# Patient Record
Sex: MALE | Race: OTHER
[De-identification: names, ages, dates, MRNs, and addresses within clinical notes are randomized per-mention and may not be internally consistent; named-entity substitution may affect disease eponyms.]

---

## 2018-12-29 ENCOUNTER — HOSPITAL ENCOUNTER (INPATIENT)
Dept: HOSPITAL 74 - JER | Age: 33
LOS: 6 days | Discharge: HOME | DRG: 720 | End: 2019-01-04
Attending: INTERNAL MEDICINE | Admitting: INTERNAL MEDICINE
Payer: COMMERCIAL

## 2018-12-29 VITALS — BODY MASS INDEX: 23.4 KG/M2

## 2018-12-29 DIAGNOSIS — Q02: ICD-10-CM

## 2018-12-29 DIAGNOSIS — Z93.1: ICD-10-CM

## 2018-12-29 DIAGNOSIS — G40.909: ICD-10-CM

## 2018-12-29 DIAGNOSIS — R00.0: ICD-10-CM

## 2018-12-29 DIAGNOSIS — N39.0: ICD-10-CM

## 2018-12-29 DIAGNOSIS — J96.00: ICD-10-CM

## 2018-12-29 DIAGNOSIS — R53.83: ICD-10-CM

## 2018-12-29 DIAGNOSIS — Z96.0: ICD-10-CM

## 2018-12-29 DIAGNOSIS — F79: ICD-10-CM

## 2018-12-29 DIAGNOSIS — A41.9: Primary | ICD-10-CM

## 2018-12-29 DIAGNOSIS — J69.0: ICD-10-CM

## 2018-12-29 DIAGNOSIS — D72.829: ICD-10-CM

## 2018-12-29 LAB
ALBUMIN SERPL-MCNC: 3.4 G/DL (ref 3.4–5)
ALP SERPL-CCNC: 125 U/L (ref 45–117)
ALT SERPL-CCNC: 52 U/L (ref 13–61)
ANION GAP SERPL CALC-SCNC: 10 MMOL/L (ref 8–16)
APPEARANCE UR: (no result)
AST SERPL-CCNC: 26 U/L (ref 15–37)
BACTERIA #/AREA URNS HPF: (no result) /HPF
BASOPHILS # BLD: 0.1 % (ref 0–2)
BILIRUB SERPL-MCNC: 0.4 MG/DL (ref 0.2–1)
BILIRUB UR STRIP.AUTO-MCNC: NEGATIVE MG/DL
BUN SERPL-MCNC: 19 MG/DL (ref 7–18)
CALCIUM SERPL-MCNC: 9.2 MG/DL (ref 8.5–10.1)
CHLORIDE SERPL-SCNC: 102 MMOL/L (ref 98–107)
CO2 SERPL-SCNC: 26 MMOL/L (ref 21–32)
COLOR UR: YELLOW
CREAT SERPL-MCNC: 0.7 MG/DL (ref 0.55–1.3)
DEPRECATED RDW RBC AUTO: 13.3 % (ref 11.9–15.9)
EOSINOPHIL # BLD: 0.1 % (ref 0–4.5)
GLUCOSE SERPL-MCNC: 95 MG/DL (ref 74–106)
HCT VFR BLD CALC: 39.3 % (ref 35.4–49)
HGB BLD-MCNC: 13 GM/DL (ref 11.7–16.9)
INR BLD: 1.16 (ref 0.83–1.09)
KETONES UR QL STRIP: NEGATIVE
LEUKOCYTE ESTERASE UR QL STRIP.AUTO: (no result)
LYMPHOCYTES # BLD: 11.4 % (ref 8–40)
MAGNESIUM SERPL-MCNC: 2.2 MG/DL (ref 1.8–2.4)
MCH RBC QN AUTO: 30.7 PG (ref 25.7–33.7)
MCHC RBC AUTO-ENTMCNC: 33 G/DL (ref 32–35.9)
MCV RBC: 93.2 FL (ref 80–96)
MONOCYTES # BLD AUTO: 2.3 % (ref 3.8–10.2)
MUCOUS THREADS URNS QL MICRO: (no result)
NEUTROPHILS # BLD: 86.1 % (ref 42.8–82.8)
NITRITE UR QL STRIP: POSITIVE
PH UR: 7 [PH] (ref 5–8)
PLATELET # BLD AUTO: 245 K/MM3 (ref 134–434)
PLATELET BLD QL SMEAR: ADEQUATE
PMV BLD: 7.4 FL (ref 7.5–11.1)
POTASSIUM SERPLBLD-SCNC: 4 MMOL/L (ref 3.5–5.1)
PROT SERPL-MCNC: 8.2 G/DL (ref 6.4–8.2)
PROT UR QL STRIP: NEGATIVE
PROT UR QL STRIP: NEGATIVE
PT PNL PPP: 13.7 SEC (ref 9.7–13)
RBC # BLD AUTO: 4.22 M/MM3 (ref 4–5.6)
SODIUM SERPL-SCNC: 138 MMOL/L (ref 136–145)
SP GR UR: 1.01 (ref 1.01–1.03)
UROBILINOGEN UR STRIP-MCNC: NEGATIVE MG/DL (ref 0.2–1)
WBC # BLD AUTO: 20.7 K/MM3 (ref 4–10)

## 2018-12-29 PROCEDURE — 3E0G76Z INTRODUCTION OF NUTRITIONAL SUBSTANCE INTO UPPER GI, VIA NATURAL OR ARTIFICIAL OPENING: ICD-10-PCS | Performed by: INTERNAL MEDICINE

## 2018-12-29 NOTE — PDOC
History of Present Illness





- General


Chief Complaint: Seizure


Stated Complaint: SEIZURE


Time Seen by Provider: 12/29/18 16:34


History Source: Care Provider, Nursing Home Records


Exam Limitations: Clinical Condition, Physical Impairment





- History of Present Illness


Initial Comments: 





12/29/18 17:27


Pt. is a 33 y.o. M from [place] w/ PMHx. 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 

synostosis, MR, G-tube, and permanent suprapubic catheter 2/2 urethral 

strictures presents to the ED for desaturations to 78%, upper airway congestion

, scattered ronchi, and seizures x2 this morning each lasting about 30 seconds. 

Pt. sezied enroute to the hospital however was not actively seizing on arrival. 

Pt. seized again for ~20seconds during physical exam. Pt. did not receive any 

of his evening medications. Pt. unable to give ROS and caretaker present now 

was not present in the AM when the events transpired.   





Valproic Acid level, Keppra level, Head CT, CBC, CMP, Mag, UA, EKG and CXR were 

ordered. 





12/29/18 21:01


CXR, UA and Valproic acid level noted. Pt. give 1L NS bolus, Keppra and 

Valproic Acid. BCx. obtained. 





Timing/Duration: 4-6 hours


Severity: mild


Aspirin Received prior to arrival: Yes: no aspirin today


Beta Blocker Contraindications(Core Measure): Yes: Not Prescribed





Past History





- Travel


Traveled outside of the country in the last 30 days: No


Close contact w/someone who was outside of country & ill: No





- Past Medical History


Allergies/Adverse Reactions: 


 Allergies











Allergy/AdvReac Type Severity Reaction Status Date / Time


 


No Known Allergies Allergy   Verified 02/09/15 20:22











Home Medications: 


Ambulatory Orders





Calcium Carbonate/Vitamin D3 [Oyster Shell 500 mg + Vit D Tb] 1 each GT BID 02/

09/15 


Ciprofloxacin [Cipro -] 500 mg PO BID #14 tablet 02/09/15 


Lacosamide Liquid [Vimpat] 150 mg GT BID 02/09/15 


Lactulose [Enulose] 20 gm GT BID 02/09/15 


Levetiracetam in NaCl (Iso-Os) [Levetiraceta-NaCl 1,000 mg/100] 2 gm GT BID 02/

09/15 


Levocarnitine 660 mg GT TID 02/09/15 


Multivit Infusion,Pedi 2,Vit K [M.v.i. Pediatric] 1 each GT DAILY 02/09/15 


Psyllium Husk [Fiber] 11 gm GT DAILY 02/09/15 


Pyridoxine HCl [Vitamin B-6] 100 mg GT DAILY 02/09/15 


Sodium Chloride 3.5 gm GT BID 02/09/15 


Valproic Acid [Depakene] 200 mg GT BID 02/09/15 


Zonisamide [Zonegran] 300 mg GT BID 02/09/15 








Asthma: No


CVA: No


COPD: No


CHF: No


DVT: No


Diabetes: No


GI Disorders: Yes (gerd)


HTN: No


Hypercholesterolemia: No


Seizures: Yes (microcephaly)





- Surgical History


Abdominal Surgery: Yes (G-tube)


GI Surgery: Yes (suprapubic tube)


Orthopedic Surgery: Yes (Spinal fusion)





- Immunization History


Immunization Up to Date: Yes





- Suicide/Smoking/Psychosocial Hx


Smoking History: Never smoked


Have you smoked in the past 12 months: No


Information on smoking cessation initiated: No


Hx Alcohol Use: No


Drug/Substance Use Hx: No


Substance Use Type: None





**Review of Systems





- Review of Systems


Able to Perform ROS?: No (Pt. is non-verbal )


HEENTM: Yes: Symptoms Reported


Respiratory: Yes: Symptoms reported


Cardiac (ROS): Yes: Symptoms Reported





*Physical Exam





- Vital Signs


 Last Vital Signs











Temp Pulse Resp BP Pulse Ox


 


    107 H  16   139/92   100 


 


    12/29/18 16:20  12/29/18 16:20  12/29/18 16:20  12/29/18 16:20














- Physical Exam


General Appearance: Yes: Nourished, Appropriately Dressed, Apparent Distress


HEENT: positive: CARLO, Symmetrical, Hearing Grossly Normal, Excessive drooling.

  negative: Photophobia, Sinus Tenderness


Neck: positive: Supple.  negative: Tender


Respiratory/Chest: positive: Respiratory Distress, Decreased Breath Sounds, 

Crackles, Rhonchi, Wheezing.  negative: Chest Tender, Lungs Clear, Normal 

Breath Sounds, Accessory Muscle Use


Cardiovascular: positive: Other (could not appreciate heart sounds over 

treatment and coarse BS)


Vascular Pulses: Dorsalis-Pedis (R): 1+, Doralis-Pedis (L): 1+


Gastrointestinal/Abdominal: positive: Normal Bowel Sounds, Soft.  negative: 

Protuberent, Distended, Guarding, Rebound


Musculoskeletal: positive: Normal Inspection.  negative: CVA Tenderness


Extremity: positive: Normal Inspection.  negative: Pedal Edema, Swelling, Calf 

Tenderness, Erythema, Inflammation


Neurologic: positive: Alert, Respond to painful stimul, Responsive





Moderate Sedation





- Procedure Monitoring


Vital Signs: 


Procedure Monitoring Vital Signs











Temperature      


 


Pulse Rate  107 H  12/29/18 16:20


 


Respiratory Rate  16   12/29/18 16:20


 


Blood Pressure  139/92   12/29/18 16:20


 


O2 Sat by Pulse Oximetry (%)  100   12/29/18 16:20











ED Treatment Course





- LABORATORY


CBC & Chemistry Diagram: 


 12/29/18 19:32





 12/29/18 19:32





*DC/Admit/Observation/Transfer


Diagnosis at time of Disposition: 


 UTI (lower urinary tract infection)








- Discharge Dispostion


Decision to Admit order: Yes





- Referrals





- Patient Instructions





- Post Discharge Activity

## 2018-12-29 NOTE — HP
CHIEF COMPLAINT: seizures and respiratory distress





PCP: Joe





HISTORY OF PRESENT ILLNESS:


pt is nonverbal and unable to provide history. History obtained from health 

aide at bedside and ED documentation. 


33 y.o. man from [place] with Microencephaly due to cranial synostosis, MR, G-

tube, and permanent suprapubic catheter 2/2 urethral strictures BIBEMS to the 

ED for desaturations to 78%, upper airway congestion, scattered ronchi, and 

seizures x2 this morning each lasting about 30 seconds. Pt had witnessed 

seizures enroute and in the ED. 


As per aide, multiple residents of [place] have similar respiratory 

desaturations and respiratory congestion during this last week.


No reports of fevers, vomiting, diarrhea or changes in behavior as noted by the 

aide at bedside.





ER course was notable for:


(1) multiple seizures


(2)


(3)





Recent Travel: none





PAST MEDICAL HISTORY:


microencephaly, MR





PAST SURGICAL HISTORY:


G-tube, and permanent suprapubic catheter 2/2 urethral strictures





Social History:


Smoking: never


Alcohol:never


Drugs: never





Family History: NC


Allergies





No Known Allergies Allergy (Verified 02/09/15 20:22)


 








HOME MEDICATIONS:


 Home Medications











 Medication  Instructions  Recorded


 


Calcium Carbonate/Vitamin D3 1 each GT BID 02/09/15





[Oyster Shell 500 mg + Vit D Tb]  


 


Ciprofloxacin [Cipro -] 500 mg PO BID #14 tablet 02/09/15


 


Lacosamide Liquid [Vimpat] 150 mg GT BID 02/09/15


 


Lactulose [Enulose] 20 gm GT BID 02/09/15


 


Levetiracetam in NaCl (Iso-Os) 2 gm GT BID 02/09/15





[Levetiraceta-NaCl 1,000 mg/100]  


 


Levocarnitine 660 mg GT TID 02/09/15


 


Multivit Infusion,Pedi 2,Vit K 1 each GT DAILY 02/09/15





[M.v.i. Pediatric]  


 


Psyllium Husk [Fiber] 11 gm GT DAILY 02/09/15


 


Pyridoxine HCl [Vitamin B-6] 100 mg GT DAILY 02/09/15


 


Sodium Chloride 3.5 gm GT BID 02/09/15


 


Valproic Acid [Depakene] 200 mg GT BID 02/09/15


 


Zonisamide [Zonegran] 300 mg GT BID 02/09/15








REVIEW OF SYSTEMS


pt unable to participate in ROS.








PHYSICAL EXAMINATION


 Vital Signs - 24 hr











  12/29/18 12/29/18





  16:20 19:09


 


Temperature 100.1 F H 


 


Pulse Rate 107 H 


 


Respiratory 16 





Rate  


 


Blood Pressure 139/92 


 


O2 Sat by Pulse 100 99





Oximetry (%)  











GENERAL:  in no acute distress, contracted to his right side in fetal position 

in bed(is preferred position)


HEAD: microcephalic, atraumatic


EYES: Pupils equal, round and reactive to light, sclera anicteric, conjunctiva 

clear. No lid lag.


EARS, NOSE, THROAT: nares patent, pt does not follow commands for full 

assessment: visable portions of oropharynx clear without exudates. Moist mucous 

membranes.


NECK: pt contracted looking down. without lymphadenopathy, JVD, or masses.


LUNGS: coarse breath sounds throughout


HEART: Regular rate and rhythm, normal S1 and S2 without murmur, rub or gallop.


ABDOMEN: Soft, nontender, not distended, normoactive bowel sounds, no guarding, 

no rebound, Gtube in place, clamped. suprapubic fraser in place with yellow urine


MUSCULOSKELETAL: contracted upper extremities especially at wrists. no 

tenderness. 


UPPER EXTREMITIES: 2+ radial pulses, warm, well-perfused. No cyanosis. No 

clubbing. No peripheral edema.


LOWER EXTREMITIES: warm, well-perfused. No peripheral edema. 


NEUROLOGICAL: nonverbal, MR


SKIN: Warm, dry, normal turgor, no rashes or lesions noted, normal capillary 

refill. 


 Laboratory Results - last 24 hr











  12/29/18 12/29/18 12/29/18





  19:32 19:32 19:32


 


WBC  20.7 H  


 


RBC  4.22  


 


Hgb  13.0  


 


Hct  39.3  


 


MCV  93.2  


 


MCH  30.7  


 


MCHC  33.0  


 


RDW  13.3  


 


Plt Count  245  


 


MPV  7.4 L D  


 


Absolute Neuts (auto)  17.8 H  


 


Neutrophils %  86.1 H  


 


Neutrophils % (Manual)  47.0  


 


Band Neutrophils %  34.0  


 


Lymphocytes %  11.4  


 


Lymphocytes % (Manual)  17.0  


 


Monocytes %  2.3 L  


 


Monocytes % (Manual)  2 L  


 


Eosinophils %  0.1  


 


Eosinophils % (Manual)  0.0  


 


Basophils %  0.1  


 


Basophils % (Manual)  0.0  


 


Nucleated RBC %  0  


 


Platelet Estimate  Adequate  


 


PT with INR   13.70 H 


 


INR   1.16 H 


 


Sodium   


 


Potassium   


 


Chloride   


 


Carbon Dioxide   


 


Anion Gap   


 


BUN   


 


Creatinine   


 


Creat Clearance w eGFR   


 


Random Glucose   


 


Lactic Acid   


 


Calcium   


 


Magnesium   


 


Total Bilirubin   


 


AST   


 


ALT   


 


Alkaline Phosphatase   


 


Total Protein   


 


Albumin   


 


Urine Color    Yellow


 


Urine Appearance    Cloudy


 


Urine pH    7.0


 


Ur Specific Gravity    1.015


 


Urine Protein    Negative


 


Urine Glucose (UA)    Negative


 


Urine Ketones    Negative


 


Urine Blood    Negative


 


Urine Nitrite    Positive


 


Urine Bilirubin    Negative


 


Urine Urobilinogen    Negative


 


Ur Leukocyte Esterase    2+ H


 


Urine WBC (Auto)    27


 


Urine RBC (Auto)    1


 


Urine Bacteria    Few


 


Urine Mucus    Rare


 


Valproic Acid   














  12/29/18 12/29/18 12/29/18





  19:32 19:32 20:57


 


WBC   


 


RBC   


 


Hgb   


 


Hct   


 


MCV   


 


MCH   


 


MCHC   


 


RDW   


 


Plt Count   


 


MPV   


 


Absolute Neuts (auto)   


 


Neutrophils %   


 


Neutrophils % (Manual)   


 


Band Neutrophils %   


 


Lymphocytes %   


 


Lymphocytes % (Manual)   


 


Monocytes %   


 


Monocytes % (Manual)   


 


Eosinophils %   


 


Eosinophils % (Manual)   


 


Basophils %   


 


Basophils % (Manual)   


 


Nucleated RBC %   


 


Platelet Estimate   


 


PT with INR   


 


INR   


 


Sodium  138  


 


Potassium  4.0  


 


Chloride  102  


 


Carbon Dioxide  26  


 


Anion Gap  10  


 


BUN  19 H  


 


Creatinine  0.7  


 


Creat Clearance w eGFR  > 60  


 


Random Glucose  95  


 


Lactic Acid    0.8


 


Calcium  9.2  


 


Magnesium  2.2  


 


Total Bilirubin  0.4  


 


AST  26  


 


ALT  52  


 


Alkaline Phosphatase  125 H  


 


Total Protein  8.2  


 


Albumin  3.4  


 


Urine Color   


 


Urine Appearance   


 


Urine pH   


 


Ur Specific Gravity   


 


Urine Protein   


 


Urine Glucose (UA)   


 


Urine Ketones   


 


Urine Blood   


 


Urine Nitrite   


 


Urine Bilirubin   


 


Urine Urobilinogen   


 


Ur Leukocyte Esterase   


 


Urine WBC (Auto)   


 


Urine RBC (Auto)   


 


Urine Bacteria   


 


Urine Mucus   


 


Valproic Acid   6.2 L 











ASSESSMENT/PLAN:


33 yr old man with microcephy from [place] presents with seizures and reported 

desaturations this evening, found to have leucocytosis, low grade fever and 

tachycardia. 








#Sepsis due to pneumonia(Left upper lobe infiltrate on cxy) and possibly UTI (

pos nit, leuk and wbc in urine)


- no historical cx at our facility


 - broad spec abx: zosyn and azithromycin to cover asp pna and for atypical 

organisms and for UTI organisms


- ed has given dose of rocephin and azithro


- Dr. bateman consulted for ID input on abx use


- pending urine strep ag, respiratory viral panel, bld cx 


- influenza negative


- abg ordered to monitor oxygenation and need for intubation





#seizure - likely induced by sepsis


- keppra level low, ed loaded with 1g ivpb


- continue home medications; valproate, keppra 2gm BID, vimpat





#Diet: Gtube - will need to obtain feed schedule from [place]


#DVT: lovenox


#Activity: bedrest continuous


# continue home supplements: vit B6 and ca+


Full code














Visit type





- Emergency Visit


Emergency Visit: Yes


ED Registration Date: 12/30/18


Care time: The patient presented to the Emergency Department on the above date 

and was hospitalized for further evaluation of their emergent condition.





- New Patient


This patient is new to me today: Yes


Date on this admission: 12/30/18





- Critical Care


Critical Care patient: No

## 2018-12-29 NOTE — PDOC
Attending Attestation





- HPI


HPI: 





12/29/18 21:51


The patient is a 33 year old male with a significant PMH of microcephaly, 

SEIZURES, mr, gastrostomy tube, craniosynostosis, and suprapubic catheter  who 

presents to the emergency department via EMS from [place] with seizures since 

earlier today. As per EMS, the patient had been noted to be desaturating 

between 72- 80 %. On arrival to ED, patient has experienced about 6 seizures 

lasting about 20 sec each. The patient reports that he has not taken his 

medications today. He denies any other symptoms or complaints. 











- Physicial Exam


PE: 





12/29/18 21:51


GENERAL: Awake, alert, and fully oriented, in no acute distress. Good pulses 

and color. 


HEAD: (+)large cranial surgical scar. No signs of trauma


EYES: (+)right eye injection. PERRLA, EOMI, sclera anicteric, conjunctiva clear


ENT: Auricles normal inspection, hearing grossly normal, nares patent, 

oropharynx clear without exudates. Moist mucosa


NECK: Normal ROM, supple, no lymphadenopathy, JVD, or masses


LUNGS:(+)coarse breath sounds bilaterally, crackles and wheezing.  


HEART: Regular rate and rhythm, normal S1 and S2, no murmurs, rubs or gallops


ABDOMEN: (+)g tube in place, suprapubic cath clean and in place. Soft, nontender

, normoactive bowel sounds.  No guarding, no rebound.  No masses


EXTREMITIES: Normal range of motion, no edema.  No clubbing or cyanosis. No 

cords, erythema, or tenderness


NEUROLOGICAL: Cranial nerves II through XII grossly intact.  Normal speech, 

normal gait


SKIN: Warm, Dry, normal turgor, no rashes or lesions noted.








Documentation prepared by Cathy Macias, acting as medical scribe for Jelly Evans MD.





<Cathy Macias - Last Filed: 12/29/18 21:51>





- Resident


Resident Name: Juan Puente





- ED Attending Attestation


I have performed the following: I have examined & evaluated the patient, The 

case was reviewed & discussed with the resident, I agree w/resident's findings 

& plan





- Medical Decision Making





12/29/18 21:20


Pt comes with temp and focal seizures/shaking of his bilateral arms.  He has 

pneumonia seen on CXR left side haziness; pt also has some leukocytes in his 

urine. 


Tegretol level is low/subtherapeutic.


Pt will be loaded with keppra and tegretol.  He will be given a L of NSS.


He will be admitted for pneumonia/seizures/uti.





12/29/18 21:26


WBC 20; lactic acid sent


12/29/18 23:18


Patient Name: SAMANTHA CAM


THIS IS A PRELIMINARY REPORT FROM IMAGING ON CALL


DATE OF SERVICE: 2018-12-29 22:17:17


IMAGES: 134


Exam: CT head without IV contrast.


Clinical indication:Rule out mass. No further information is provided.


Comparison:None available.


Technique: Axial unenhanced CT images from the skull base through the brain 

were obtained


followed by coronal and sagital reformats.


Findings:


The visualized bony structures are unremarkable.


The visualized paranasal sinuses and mastoid air cells are clear.


There is no evidence of intra-or extra-axial hemorrhage.


The ventricles and basilar cisterns are unremarkable.


There is some asymmetric atrophic changes involving the right parietal lobe 

which could be


posttraumatic or postischemic. There is no evidence of intracranial mass, acute 

infarct, or midline


shift.


Impression:


1. No definite evidence of intracranial mass on this unenhanced CT. If there is 

high clinical


concern for intracranial mass then an MRI with IV contrast could be performed.


2. Asymmetric atrophic changes involving the right parietal lobe which could be 

posttraumatic or


postischemic.


12/29/18 23:18


Lactic acid is normal





<Jelly Evans - Last Filed: 12/29/18 23:19>

## 2018-12-30 LAB
ANISOCYTOSIS BLD QL: 0
ARTERIAL BLOOD GAS PCO2: 34.4 MMHG (ref 35–45)
ARTERIAL PATENCY WRIST A: POSITIVE
BASE EXCESS BLDA CALC-SCNC: -2.2 MEQ/L (ref -2–2)
BASOPHILS # BLD: 0.4 % (ref 0–2)
DEPRECATED RDW RBC AUTO: 13.7 % (ref 11.9–15.9)
EOSINOPHIL # BLD: 0.1 % (ref 0–4.5)
HCT VFR BLD CALC: 42.8 % (ref 35.4–49)
HGB BLD-MCNC: 13.9 GM/DL (ref 11.7–16.9)
LYMPHOCYTES # BLD: 14.3 % (ref 8–40)
MACROCYTES BLD QL: 0
MCH RBC QN AUTO: 30.5 PG (ref 25.7–33.7)
MCHC RBC AUTO-ENTMCNC: 32.5 G/DL (ref 32–35.9)
MCV RBC: 94 FL (ref 80–96)
MONOCYTES # BLD AUTO: 4 % (ref 3.8–10.2)
NEUTROPHILS # BLD: 81.2 % (ref 42.8–82.8)
PLATELET # BLD AUTO: 192 K/MM3 (ref 134–434)
PLATELET BLD QL SMEAR: NORMAL
PMV BLD: 6.9 FL (ref 7.5–11.1)
PO2 BLDA: 219 MMHG (ref 80–100)
RBC # BLD AUTO: 4.55 M/MM3 (ref 4–5.6)
SAO2 % BLDA: 99.1 % (ref 90–98.9)
WBC # BLD AUTO: 22.9 K/MM3 (ref 4–10)

## 2018-12-30 RX ADMIN — Medication SCH TAB: at 22:02

## 2018-12-30 RX ADMIN — SODIUM CHLORIDE SCH MLS/HR: 9 INJECTION, SOLUTION INTRAVENOUS at 15:17

## 2018-12-30 RX ADMIN — LEVETIRACETAM SCH MG: 100 SOLUTION ORAL at 15:16

## 2018-12-30 RX ADMIN — ZONISAMIDE SCH MG: 100 CAPSULE ORAL at 22:03

## 2018-12-30 RX ADMIN — LEVETIRACETAM SCH MG: 100 SOLUTION ORAL at 22:54

## 2018-12-30 RX ADMIN — Medication SCH MG: at 15:16

## 2018-12-30 RX ADMIN — ENOXAPARIN SODIUM SCH MG: 40 INJECTION SUBCUTANEOUS at 15:16

## 2018-12-30 RX ADMIN — LACOSAMIDE SCH: 10 SOLUTION ORAL at 18:25

## 2018-12-30 RX ADMIN — LACOSAMIDE SCH MG: 10 SOLUTION ORAL at 22:53

## 2018-12-30 RX ADMIN — LACOSAMIDE SCH MG: 10 SOLUTION ORAL at 22:04

## 2018-12-30 RX ADMIN — PIPERACILLIN SODIUM,TAZOBACTAM SODIUM SCH MLS/HR: 3; .375 INJECTION, POWDER, FOR SOLUTION INTRAVENOUS at 15:17

## 2018-12-30 RX ADMIN — PIPERACILLIN SODIUM,TAZOBACTAM SODIUM SCH: 3; .375 INJECTION, POWDER, FOR SOLUTION INTRAVENOUS at 17:44

## 2018-12-30 RX ADMIN — ZONISAMIDE SCH: 100 CAPSULE ORAL at 17:44

## 2018-12-30 RX ADMIN — Medication SCH: at 17:44

## 2018-12-30 RX ADMIN — PIPERACILLIN SODIUM,TAZOBACTAM SODIUM SCH MLS/HR: 3; .375 INJECTION, POWDER, FOR SOLUTION INTRAVENOUS at 06:15

## 2018-12-30 NOTE — PN
Progress Note (short form)





- Note


Progress Note: 





Subjective:


unable to obtain hx. per aid he usually gets break through seizures at  

[facility] 








Objective:








Vital Signs:


 Last Vital Signs











Temp Pulse Resp BP Pulse Ox


 


 100.1 F H  107 H  16   115/50 L  99 


 


 12/29/18 16:20  12/30/18 07:06  12/29/18 16:20  12/30/18 07:06  12/30/18 07:06








 Laboratory Results - last 24 hr











  12/29/18 12/29/18 12/29/18





  19:32 19:32 19:32


 


WBC  20.7 H  


 


RBC  4.22  


 


Hgb  13.0  


 


Hct  39.3  


 


MCV  93.2  


 


MCH  30.7  


 


MCHC  33.0  


 


RDW  13.3  


 


Plt Count  245  


 


MPV  7.4 L D  


 


Absolute Neuts (auto)  17.8 H  


 


Neutrophils %  86.1 H  


 


Neutrophils % (Manual)  47.0  


 


Band Neutrophils %  34.0  


 


Lymphocytes %  11.4  


 


Lymphocytes % (Manual)  17.0  


 


Monocytes %  2.3 L  


 


Monocytes % (Manual)  2 L  


 


Eosinophils %  0.1  


 


Eosinophils % (Manual)  0.0  


 


Basophils %  0.1  


 


Basophils % (Manual)  0.0  


 


Nucleated RBC %  0  


 


Platelet Estimate  Adequate  


 


PT with INR   13.70 H 


 


INR   1.16 H 


 


Anticoagulation Therapy   


 


Puncture Site   


 


ABG pH   


 


ABG pCO2 at Pt Temp   


 


ABG pO2 at Pt Temp   


 


ABG HCO3   


 


ABG O2 Sat (Measured)   


 


ABG O2 Content   


 


ABG Base Excess   


 


Nabeel Test   


 


O2 Delivery Device   


 


Oxygen Flow Rate   


 


Vent Mode   


 


Vent Rate   


 


Mechanical Rate   


 


Pressure Support Vent   


 


Sodium   


 


Potassium   


 


Chloride   


 


Carbon Dioxide   


 


Anion Gap   


 


BUN   


 


Creatinine   


 


Creat Clearance w eGFR   


 


Random Glucose   


 


Lactic Acid   


 


Calcium   


 


Magnesium   


 


Total Bilirubin   


 


AST   


 


ALT   


 


Alkaline Phosphatase   


 


Total Protein   


 


Albumin   


 


Urine Color    Yellow


 


Urine Appearance    Cloudy


 


Urine pH    7.0


 


Ur Specific Gravity    1.015


 


Urine Protein    Negative


 


Urine Glucose (UA)    Negative


 


Urine Ketones    Negative


 


Urine Blood    Negative


 


Urine Nitrite    Positive


 


Urine Bilirubin    Negative


 


Urine Urobilinogen    Negative


 


Ur Leukocyte Esterase    2+ H


 


Urine WBC (Auto)    27


 


Urine RBC (Auto)    1


 


Urine Bacteria    Few


 


Urine Mucus    Rare


 


Valproic Acid   


 


Influenza A (Rapid)   


 


Influenza B (Rapid)   














  12/29/18 12/29/18 12/29/18





  19:32 19:32 20:57


 


WBC   


 


RBC   


 


Hgb   


 


Hct   


 


MCV   


 


MCH   


 


MCHC   


 


RDW   


 


Plt Count   


 


MPV   


 


Absolute Neuts (auto)   


 


Neutrophils %   


 


Neutrophils % (Manual)   


 


Band Neutrophils %   


 


Lymphocytes %   


 


Lymphocytes % (Manual)   


 


Monocytes %   


 


Monocytes % (Manual)   


 


Eosinophils %   


 


Eosinophils % (Manual)   


 


Basophils %   


 


Basophils % (Manual)   


 


Nucleated RBC %   


 


Platelet Estimate   


 


PT with INR   


 


INR   


 


Anticoagulation Therapy   


 


Puncture Site   


 


ABG pH   


 


ABG pCO2 at Pt Temp   


 


ABG pO2 at Pt Temp   


 


ABG HCO3   


 


ABG O2 Sat (Measured)   


 


ABG O2 Content   


 


ABG Base Excess   


 


Nabeel Test   


 


O2 Delivery Device   


 


Oxygen Flow Rate   


 


Vent Mode   


 


Vent Rate   


 


Mechanical Rate   


 


Pressure Support Vent   


 


Sodium  138  


 


Potassium  4.0  


 


Chloride  102  


 


Carbon Dioxide  26  


 


Anion Gap  10  


 


BUN  19 H  


 


Creatinine  0.7  


 


Creat Clearance w eGFR  > 60  


 


Random Glucose  95  


 


Lactic Acid    0.8


 


Calcium  9.2  


 


Magnesium  2.2  


 


Total Bilirubin  0.4  


 


AST  26  


 


ALT  52  


 


Alkaline Phosphatase  125 H  


 


Total Protein  8.2  


 


Albumin  3.4  


 


Urine Color   


 


Urine Appearance   


 


Urine pH   


 


Ur Specific Gravity   


 


Urine Protein   


 


Urine Glucose (UA)   


 


Urine Ketones   


 


Urine Blood   


 


Urine Nitrite   


 


Urine Bilirubin   


 


Urine Urobilinogen   


 


Ur Leukocyte Esterase   


 


Urine WBC (Auto)   


 


Urine RBC (Auto)   


 


Urine Bacteria   


 


Urine Mucus   


 


Valproic Acid   6.2 L 


 


Influenza A (Rapid)   


 


Influenza B (Rapid)   














  12/29/18 12/30/18





  23:40 00:30


 


WBC  


 


RBC  


 


Hgb  


 


Hct  


 


MCV  


 


MCH  


 


MCHC  


 


RDW  


 


Plt Count  


 


MPV  


 


Absolute Neuts (auto)  


 


Neutrophils %  


 


Neutrophils % (Manual)  


 


Band Neutrophils %  


 


Lymphocytes %  


 


Lymphocytes % (Manual)  


 


Monocytes %  


 


Monocytes % (Manual)  


 


Eosinophils %  


 


Eosinophils % (Manual)  


 


Basophils %  


 


Basophils % (Manual)  


 


Nucleated RBC %  


 


Platelet Estimate  


 


PT with INR  


 


INR  


 


Anticoagulation Therapy   No Result Required.


 


Puncture Site   Right radial


 


ABG pH   7.41


 


ABG pCO2 at Pt Temp   34.4 L


 


ABG pO2 at Pt Temp   219.0 H*


 


ABG HCO3   21.4 L


 


ABG O2 Sat (Measured)   99.1 H


 


ABG O2 Content   16.4


 


ABG Base Excess   -2.2 L


 


Nabeel Test   Positive


 


O2 Delivery Device   Non rebreather


 


Oxygen Flow Rate   100


 


Vent Mode   No Result Required.


 


Vent Rate   No Result Required.


 


Mechanical Rate   No Result Required.


 


Pressure Support Vent   No Result Required.


 


Sodium  


 


Potassium  


 


Chloride  


 


Carbon Dioxide  


 


Anion Gap  


 


BUN  


 


Creatinine  


 


Creat Clearance w eGFR  


 


Random Glucose  


 


Lactic Acid  


 


Calcium  


 


Magnesium  


 


Total Bilirubin  


 


AST  


 


ALT  


 


Alkaline Phosphatase  


 


Total Protein  


 


Albumin  


 


Urine Color  


 


Urine Appearance  


 


Urine pH  


 


Ur Specific Gravity  


 


Urine Protein  


 


Urine Glucose (UA)  


 


Urine Ketones  


 


Urine Blood  


 


Urine Nitrite  


 


Urine Bilirubin  


 


Urine Urobilinogen  


 


Ur Leukocyte Esterase  


 


Urine WBC (Auto)  


 


Urine RBC (Auto)  


 


Urine Bacteria  


 


Urine Mucus  


 


Valproic Acid  


 


Influenza A (Rapid)  Negative 


 


Influenza B (Rapid)  Negative 











Physical Exam:


sleeping,  mask on .  


CV: RRR


Lungs: course breath sounds b/l 


Abd: sfot, NT, ND , N LB S. PEg in . supeapubic cath in 


ext : no edema or erythema 











Imaging:


cxray reviewed 








Assessment/Plan:





32 y/o man with h/o seizures , microcephaly, urethral stricture s/p suprapubic 

cath placement, MR and other medical problems who presented with   tachycardia, 

and was found to have sepsis 





1- Sepsis L due to L sided PNA. ? aspiration . UA shows pyuira but has cath. 


- cont zosyn 


- follow blood cx 


- order urine cx 


- check  urine legionella 


- sputum cx if possible 


- ID consult 





2- Seizure disorder with 3 seizures yesterday.  likely due to sepsis. has break 

through seizure at [facility] normally


- cont  his home seizure meds 


- meds were ordered incorrectly. he is not on valproic , but he is on Onfi in 

addition to other meds. meds were reconciled and update din EMR . correct 

dosing ordered 





3- Nutrition : tube feeds order Promote 9.5 hours  with water flushes 





HLOC 





Visit type





- Emergency Visit


Emergency Visit: Yes


ED Registration Date: 12/30/18


Care time: The patient presented to the Emergency Department on the above date 

and was hospitalized for further evaluation of their emergent condition.





- New Patient


This patient is new to me today: Yes


Date on this admission: 12/30/18





- Critical Care


Critical Care patient: No

## 2018-12-30 NOTE — CON.ID
Consult


Consult Specialty:: infectious diseases


Reason for Consultation:: pneumonia





- History of Present Illness


Chief Complaint: lethargy


History of Present Illness: 





33 y.o. 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

tube, and permanent suprapubic catheter 2/2 urethral strictures BIBEMS to the 

ED for desaturations to 78%, upper airway congestion, scattered ronchi, and 

seizures x2 this morning each lasting about 30 seconds. Pt had witnessed 

seizures enroute and in the ED. 


As per aide, multiple residents of [place] have similar respiratory 

desaturations and respiratory congestion during this last week.


No reports of fevers, vomiting, diarrhea or changes in behavior as noted by the 

aide at bedside.


patient because of his condition unable to give any history








- History Source


History Provided By: Medical Record


Limitations to Obtaining History: Clinical Condition





- Alcohol/Substance Use


Hx Alcohol Use: No





- Smoking History


Smoking history: Never smoked


Have you smoked in the past 12 months: No





Home Medications





- Allergies


Allergies/Adverse Reactions: 


 Allergies











Allergy/AdvReac Type Severity Reaction Status Date / Time


 


No Known Allergies Allergy   Verified 02/09/15 20:22














- Home Medications


Home Medications: 


Ambulatory Orders





Calcium Carbonate/Vitamin D3 [Oyster Shell 500 mg + Vit D Tb] 1 each GT BID 02/

09/15 


Lacosamide Liquid [Vimpat] 200 mg GT BID 02/09/15 


Levetiracetam in NaCl (Iso-Os) [Levetiraceta-NaCl 1,000 mg/100] 2 gm GT BID 02/

09/15 


Multivit Infusion,Pedi 2,Vit K [M.v.i. Pediatric] 1 each GT DAILY 02/09/15 


Pyridoxine HCl [Vitamin B-6] 100 mg GT DAILY 02/09/15 


RX: Levocarnitine 660 mg GT TID 02/09/15 


Zonisamide [Zonegran] 300 mg GT BID 02/09/15 


Albuterol Sulfate 0.5% [Ventolin 0.5% -] 1 amp NEB TID 12/30/18 


Clobazam [Onfi -] 10 mg GT HS 12/30/18 


Diazepam [Diastat Acudial] 5 mg RC PRN 12/31/18 


Fructooligosaccharides/Polydex [Fiber-Stat 15 gm/30 ml Liquid] 30 ml GT DAILY 12 /31/18 


RX: Sodium Chloride Tablet - 3.5 tab GT BID 12/31/18 


Sennosides [Senna] 2 tab GT HS 12/31/18 











Review of Systems


Unable to obtain ROS, reason: unable to obtain





Physical Exam


Vital Signs: 


 Vital Signs











Temperature  98.3 F   12/30/18 15:54


 


Pulse Rate  101 H  12/30/18 15:54


 


Respiratory Rate  18   12/30/18 15:54


 


Blood Pressure  112/70   12/30/18 15:54


 


O2 Sat by Pulse Oximetry (%)  99   12/30/18 15:54











Constitutional: Yes: No Distress, Calm


Eyes: Yes: Conjunctiva Clear


Neck: Yes: Supple, Trachea Midline


Cardiovascular: Yes: Regular Rate and Rhythm


Respiratory: Yes: Poor Air Entry (bases), Rhonchi, Other (on face mask)


Gastrointestinal: Yes: Normal Bowel Sounds, Soft, Other (feeding tube in place)


Musculoskeletal: Yes: WNL


Extremities: Yes: Other


Neurological: Yes: Lethargy, Other


Psychiatric: Yes: Other


Labs: 


 CBC, BMP





 12/30/18 13:17 





 12/29/18 19:32 











Imaging





- Results


Chest X-ray: Report Reviewed, Image Reviewed





Assessment/Plan





sepsis


pneumonia


lethargy


leukocytosis





patients wbc trending down








plan


ct scan


will start patient on abx


asp precautions


monitor wbc


rest as per the team


resp support

## 2018-12-30 NOTE — PN
Teaching Attending Note


Name of Resident: Vincenzo Baltazar





ATTENDING PHYSICIAN STATEMENT





I saw and evaluated the patient.


I reviewed the resident's note and discussed the case with the resident.


I agree with the resident's findings and plan as documented.








SUBJECTIVE:


Seen and examined; please refer to resident note for additional historical 

details.  Briefly, this is a 32 y/o male resident of [facility] who 

presents with sepsis; he is found to be tachycardic with a positive UA and 

findings suggestive of a pneumonia.  He also had a witnessed seizure x2 for ~30 

seconds at the NH and was found in our ER to have a low valproate level; he is 

documented as seizing a third time in the ER during exam.  He was hypoxic per 

ER records which did improve with O2.  He has no prior records here in this 

chart in terms of abx sensitivity.  Unfortunately, due to his clinical status, 

this patient cannot provide any history.  He will be admitted to the medicine 

service with ID consult.





10 sys ROS couldn't be obtained due to underlying clinical condition


PMH and PSH reviewed; as per chart


FH couldnt obtain


Socially he is a nondrinker/nonsmoker; dependent on [facility] care for all ADLs.


Medication list reviewed with resident; reconciliation pending








OBJECTIVE:


VS, labs, imaging reviewed





Labs reviewed; leukocytosis to 20 with neutrophilia observed; ABG with normal 

pH and adequate O2.  BMP unremarkable.  Alk Phos 125 which is only 8 over the 

upper limit of normal.  Flu negative.  Valproic Acid level low at 6.2.


CXR shows L-sided infiltrates; CT chest pending


Pancultures pending


EKG reviewed








ASSESSMENT AND PLAN:


Pt presents from [facility] with sepsis from ?UTI and Pneumonia, 

breakthrough seizure





1) Sepsis 2/2 ?UTI/PNA


2) Breakthrough Seizure


3) H/O MR/CP





In summation he will be treated with broad spectrum abx.  Further studies will 

be done to ascertain the etiology of his pulmonary findings; chronic fraser with 

pyuria but it should be noted he would be covered for both respiratory and 

urinary source by current abx.  Loaded with seizure meds in ER; continue and 

consider neuro consult.  Seizure precautions and neuro checks.

## 2018-12-31 LAB
ANION GAP SERPL CALC-SCNC: 6 MMOL/L (ref 8–16)
BASOPHILS # BLD: 0.4 % (ref 0–2)
BUN SERPL-MCNC: 12 MG/DL (ref 7–18)
CALCIUM SERPL-MCNC: 8.6 MG/DL (ref 8.5–10.1)
CHLORIDE SERPL-SCNC: 106 MMOL/L (ref 98–107)
CO2 SERPL-SCNC: 25 MMOL/L (ref 21–32)
CREAT SERPL-MCNC: 0.5 MG/DL (ref 0.55–1.3)
DEPRECATED RDW RBC AUTO: 13.5 % (ref 11.9–15.9)
EOSINOPHIL # BLD: 0.5 % (ref 0–4.5)
GLUCOSE SERPL-MCNC: 141 MG/DL (ref 74–106)
HCT VFR BLD CALC: 34.8 % (ref 35.4–49)
HGB BLD-MCNC: 11.4 GM/DL (ref 11.7–16.9)
LYMPHOCYTES # BLD: 13.7 % (ref 8–40)
MCH RBC QN AUTO: 30.6 PG (ref 25.7–33.7)
MCHC RBC AUTO-ENTMCNC: 32.8 G/DL (ref 32–35.9)
MCV RBC: 93.2 FL (ref 80–96)
MONOCYTES # BLD AUTO: 3.6 % (ref 3.8–10.2)
NEUTROPHILS # BLD: 81.8 % (ref 42.8–82.8)
PLATELET # BLD AUTO: 216 K/MM3 (ref 134–434)
PMV BLD: 7.2 FL (ref 7.5–11.1)
POTASSIUM SERPLBLD-SCNC: 3.8 MMOL/L (ref 3.5–5.1)
RBC # BLD AUTO: 3.74 M/MM3 (ref 4–5.6)
SODIUM SERPL-SCNC: 137 MMOL/L (ref 136–145)
WBC # BLD AUTO: 15.8 K/MM3 (ref 4–10)

## 2018-12-31 RX ADMIN — PIPERACILLIN SODIUM,TAZOBACTAM SODIUM SCH MLS/HR: 3; .375 INJECTION, POWDER, FOR SOLUTION INTRAVENOUS at 01:52

## 2018-12-31 RX ADMIN — LEVETIRACETAM SCH MG: 100 SOLUTION ORAL at 10:33

## 2018-12-31 RX ADMIN — Medication SCH TAB: at 10:32

## 2018-12-31 RX ADMIN — LEVETIRACETAM SCH MG: 100 SOLUTION ORAL at 21:56

## 2018-12-31 RX ADMIN — ENOXAPARIN SODIUM SCH MG: 40 INJECTION SUBCUTANEOUS at 10:31

## 2018-12-31 RX ADMIN — Medication SCH MG: at 10:33

## 2018-12-31 RX ADMIN — ZONISAMIDE SCH MG: 100 CAPSULE ORAL at 21:57

## 2018-12-31 RX ADMIN — SODIUM CHLORIDE SCH MLS/HR: 9 INJECTION, SOLUTION INTRAVENOUS at 05:48

## 2018-12-31 RX ADMIN — PIPERACILLIN SODIUM,TAZOBACTAM SODIUM SCH MLS/HR: 3; .375 INJECTION, POWDER, FOR SOLUTION INTRAVENOUS at 10:40

## 2018-12-31 RX ADMIN — PIPERACILLIN SODIUM,TAZOBACTAM SODIUM SCH MLS/HR: 3; .375 INJECTION, POWDER, FOR SOLUTION INTRAVENOUS at 17:38

## 2018-12-31 RX ADMIN — ZONISAMIDE SCH MG: 100 CAPSULE ORAL at 10:31

## 2018-12-31 RX ADMIN — LACOSAMIDE SCH MG: 10 SOLUTION ORAL at 10:30

## 2018-12-31 RX ADMIN — Medication SCH TAB: at 21:55

## 2018-12-31 RX ADMIN — LACOSAMIDE SCH MG: 10 SOLUTION ORAL at 22:19

## 2018-12-31 NOTE — PN
Physical Exam: 


SUBJECTIVE: Patient seen and examined at bedside. Per nurse, no acute events 

overnight. Unable to obtain ROS from patient.








OBJECTIVE:





 Vital Signs











Temperature  97.9 F   12/31/18 06:03


 


Pulse Rate  90   12/31/18 06:03


 


Respiratory Rate  18   12/31/18 06:03


 


Blood Pressure  123/66   12/31/18 06:03


 


O2 Sat by Pulse Oximetry (%)  97   12/30/18 21:00

















GENERAL: Nonverbal. Comfortable in bed. NAD. Moaning.


HEENT: Microcephaly. Opens eyes spontaneously. Moist mucus membranes.


NECK: Trachea midline, full range of motion, supple. 


LUNGS: CTA B/L. Symmetric chest rise. 


HEART: RRR. Normal S1, S2. No murmurs noted.


ABDOMEN: Soft, NT/ND. +Bs in all 4Qs. 


EXTREMITIES: 2+ pulses, warm, well-perfused, no edema. 


NEUROLOGICAL: Unable to assess.


PSYCH: Normal mood, normal affect.











 CBCD











WBC  22.9 K/mm3 (4.0-10.0)  H  12/30/18  13:17    


 


RBC  4.55 M/mm3 (4.00-5.60)   12/30/18  13:17    


 


Hgb  13.9 GM/dL (11.7-16.9)   12/30/18  13:17    


 


Hct  42.8 % (35.4-49)   12/30/18  13:17    


 


MCV  94.0 fl (80-96)   12/30/18  13:17    


 


MCHC  32.5 g/dl (32.0-35.9)   12/30/18  13:17    


 


RDW  13.7 % (11.9-15.9)   12/30/18  13:17    


 


Plt Count  192 K/MM3 (134-434)  D 12/30/18  13:17    


 


MPV  6.9 fl (7.5-11.1)  L  12/30/18  13:17    








 CMP











Sodium  138 mmol/L (136-145)   12/29/18  19:32    


 


Potassium  4.0 mmol/L (3.5-5.1)   12/29/18  19:32    


 


Chloride  102 mmol/L ()   12/29/18  19:32    


 


Carbon Dioxide  26 mmol/L (21-32)   12/29/18  19:32    


 


Anion Gap  10 MMOL/L (8-16)   12/29/18  19:32    


 


BUN  19 mg/dL (7-18)  H  12/29/18  19:32    


 


Creatinine  0.7 mg/dL (0.55-1.3)   12/29/18  19:32    


 


Creat Clearance w eGFR  > 60  (>60)   12/29/18  19:32    


 


Calcium  9.2 mg/dL (8.5-10.1)   12/29/18  19:32    


 


Total Bilirubin  0.4 mg/dL (0.2-1)   12/29/18  19:32    


 


AST  26 U/L (15-37)   12/29/18  19:32    


 


ALT  52 U/L (13-61)   12/29/18  19:32    


 


Alkaline Phosphatase  125 U/L ()  H  12/29/18  19:32    


 


Total Protein  8.2 g/dl (6.4-8.2)   12/29/18  19:32    


 


Albumin  3.4 g/dl (3.4-5.0)   12/29/18  19:32    























Active Medications





Calcium Carbonate/Cholecalciferol (Os-Dale 500+D -)  1 tab GT BID [facility]


   Last Admin: 12/30/18 22:02 Dose:  1 tab


Clobazam (Onfi -)  10 mg GT HS [facility]


   Last Admin: 12/30/18 22:02 Dose:  10 mg


Enoxaparin Sodium (Lovenox -)  40 mg SQ DAILY [facility]


   Last Admin: 12/30/18 15:16 Dose:  40 mg


Piperacillin Sod/Tazobactam (Sod 3.375 gm/ Dextrose)  50 mls @ 100 mls/hr IVPB 

Q8H-IV [facility]; Protocol


   Last Admin: 12/31/18 01:52 Dose:  100 mls/hr


Sodium Chloride (Normal Saline -)  1,000 mls @ 75 mls/hr IV [facility]


   Last Admin: 12/31/18 05:48 Dose:  75 mls/hr


Lacosamide (Vimpat Liquid -)  200 mg GT BID [facility]


   Last Admin: 12/30/18 22:53 Dose:  200 mg


Levetiracetam (Keppra Oral Solution -)  2,000 mg GT BID [facility]


   Last Admin: 12/30/18 22:54 Dose:  2,000 mg


Pyridoxine HCl (Vitamin B6 -)  100 mg GT DAILY [facility]


   Last Admin: 12/30/18 15:16 Dose:  100 mg


Zonisamide (Zonegran -)  300 mg GT BID [facility]


   Last Admin: 12/30/18 22:03 Dose:  300 mg








CONSULT:


ID- Dr. Garcia





IMAGING:


* Chest CT: Patchy consolidation within both upper and lower lobes. Most 

extensive infiltrates are noted within the L upper and R lower lobes. Diffuse 

pna involving both lungs. 


* Head CT: No acute IC pathology. Asymmetric atrophic changes are seen. 

Mucoperiosteal thickening L maxillary sinus.


* CXR (12/31/18): There are now b/l patchy infiltrates.








ASSESSMENT/PLAN:


34 y/o man with h/o seizures , microcephaly, urethral stricture s/p suprapubic 

cath placement, MR and other medical problems who presented with   tachycardia, 

and was found to have sepsis.





#Sepsis L due to L sided PNA; ? aspiration. UA shows pyuria but has cath. 


-Cont Zosyn 3.375 gm (started 12/30)


-BCx neg x24h, UCx/Sputum Cx ordered


-Legionella/S pneumo urine Ag neg


-ID consult 





#Seizure disorder; 3 seizures yesterday. likely due to sepsis. has break 

through seizure at [place] normally


Cont home meds:


-Clozabam 1 mg GT HS, Keppra 2000 GT BID, Vit B6 100 GT QD, Zonaside 300 mg GT 

BID, Vimpat 200 mg GT BID





#FEN


-IVf d/c'd


-recheck lytes in AM


-Promote 9.5 hours with water flushes 





dispo


-cont to monitor in med-surg





Visit type





- Emergency Visit


Emergency Visit: Yes


ED Registration Date: 12/30/18


Care time: The patient presented to the Emergency Department on the above date 

and was hospitalized for further evaluation of their emergent condition.





- New Patient


This patient is new to me today: Yes


Date on this admission: 12/31/18





- Critical Care


Critical Care patient: No

## 2018-12-31 NOTE — PN
Teaching Attending Note


Name of Resident: Maria C Robin





ATTENDING PHYSICIAN STATEMENT





I saw and evaluated the patient.


I reviewed the resident's note and discussed the case with the resident.


I agree with the resident's findings and plan as documented.








SUBJECTIVE:


no events overnight 





OBJECTIVE:


NAD


CV: RRR


Lungs: R sided crackles 


Abd: soft, NT, ND , NL BS . PEG in . supeapubic cath in 


ext : no edema or erythema 








Assessment/Plan:





34 y/o man with h/o seizures , microcephaly, urethral stricture s/p suprapubic 

cath placement, MR and other medical problems who presented with   tachycardia, 

and was found to have sepsis 





1- Sepsis L due to L sided PNA. ? aspiration . 


- cont zosyn 


- follow blood cx 


- urine cx  pending 


- urine legionella neg 





2- Seizure disorder : no recurrence . cont home meds 





3- Nutrition : tube feeds  Promote 9.5 hours  with water flushes 


dc IVF 





HLOC

## 2018-12-31 NOTE — PN
Progress Note, Physician


History of Present Illness: 





patient looks better than yesterday


salivation noted


afebrile


mental retardation





- Current Medication List


Current Medications: 


Active Medications





Calcium Carbonate/Cholecalciferol (Os-Dale 500+D -)  1 tab GT BID [facility]


   Last Admin: 12/31/18 10:32 Dose:  1 tab


Clobazam (Onfi -)  10 mg GT HS [facility]


   Last Admin: 12/30/18 22:02 Dose:  10 mg


Enoxaparin Sodium (Lovenox -)  40 mg SQ DAILY [facility]


   Last Admin: 12/31/18 10:31 Dose:  40 mg


Piperacillin Sod/Tazobactam (Sod 3.375 gm/ Dextrose)  50 mls @ 100 mls/hr IVPB 

Q8H-IV [facility]; Protocol


   Last Admin: 12/31/18 17:38 Dose:  100 mls/hr


Lacosamide (Vimpat Liquid -)  200 mg GT BID [facility]


   Last Admin: 12/31/18 10:30 Dose:  200 mg


Levetiracetam (Keppra Oral Solution -)  2,000 mg GT BID [facility]


   Last Admin: 12/31/18 10:33 Dose:  2,000 mg


Pyridoxine HCl (Vitamin B6 -)  100 mg GT DAILY [facility]


   Last Admin: 12/31/18 10:33 Dose:  100 mg


Zonisamide (Zonegran -)  300 mg GT BID [facility]


   Last Admin: 12/31/18 10:31 Dose:  300 mg











- Objective


Vital Signs: 


 Vital Signs











Temperature  99.0 F   12/31/18 14:59


 


Pulse Rate  90   12/31/18 14:59


 


Respiratory Rate  22 H  12/31/18 09:00


 


Blood Pressure  115/77   12/31/18 09:00


 


O2 Sat by Pulse Oximetry (%)  95   12/31/18 10:00











Constitutional: Yes: No Distress, Calm


Cardiovascular: Yes: S1, S2


Respiratory: Yes: Poor Air Entry, Other


Gastrointestinal: Yes: Normal Bowel Sounds, Soft, Other (peg in place)


Musculoskeletal: Yes: WNL


Extremities: Yes: WNL


Neurological: Yes: Other


Labs: 


 CBC, BMP





 12/31/18 06:45 





 12/31/18 06:45 





 INR, PTT











INR  1.16  (0.83-1.09)  H  12/29/18  19:32    














Assessment/Plan





sepsis


pneumonia


lethargy


leukocytosis





patients wbc trending down








plan


1 asp precautions


montor wbc


if wbc increases keep him npo


continue abx


resp support


monitor for fevers


rest as per the team

## 2019-01-01 LAB
ANION GAP SERPL CALC-SCNC: 9 MMOL/L (ref 8–16)
BASOPHILS # BLD: 0.3 % (ref 0–2)
BUN SERPL-MCNC: 12 MG/DL (ref 7–18)
CALCIUM SERPL-MCNC: 8.3 MG/DL (ref 8.5–10.1)
CHLORIDE SERPL-SCNC: 103 MMOL/L (ref 98–107)
CO2 SERPL-SCNC: 23 MMOL/L (ref 21–32)
CREAT SERPL-MCNC: 0.5 MG/DL (ref 0.55–1.3)
DEPRECATED RDW RBC AUTO: 13.5 % (ref 11.9–15.9)
EOSINOPHIL # BLD: 0.9 % (ref 0–4.5)
GLUCOSE SERPL-MCNC: 219 MG/DL (ref 74–106)
HCT VFR BLD CALC: 33.9 % (ref 35.4–49)
HGB BLD-MCNC: 11.1 GM/DL (ref 11.7–16.9)
LYMPHOCYTES # BLD: 17.3 % (ref 8–40)
MCH RBC QN AUTO: 30.4 PG (ref 25.7–33.7)
MCHC RBC AUTO-ENTMCNC: 32.8 G/DL (ref 32–35.9)
MCV RBC: 92.6 FL (ref 80–96)
MONOCYTES # BLD AUTO: 5.2 % (ref 3.8–10.2)
NEUTROPHILS # BLD: 76.3 % (ref 42.8–82.8)
PLATELET # BLD AUTO: 207 K/MM3 (ref 134–434)
PMV BLD: 7.4 FL (ref 7.5–11.1)
POTASSIUM SERPLBLD-SCNC: 4 MMOL/L (ref 3.5–5.1)
RBC # BLD AUTO: 3.66 M/MM3 (ref 4–5.6)
SODIUM SERPL-SCNC: 135 MMOL/L (ref 136–145)
WBC # BLD AUTO: 12.1 K/MM3 (ref 4–10)

## 2019-01-01 RX ADMIN — LACOSAMIDE SCH MG: 10 SOLUTION ORAL at 09:54

## 2019-01-01 RX ADMIN — LEVETIRACETAM SCH MG: 100 SOLUTION ORAL at 22:34

## 2019-01-01 RX ADMIN — PIPERACILLIN SODIUM,TAZOBACTAM SODIUM SCH MLS/HR: 3; .375 INJECTION, POWDER, FOR SOLUTION INTRAVENOUS at 17:13

## 2019-01-01 RX ADMIN — ZONISAMIDE SCH MG: 100 CAPSULE ORAL at 09:55

## 2019-01-01 RX ADMIN — Medication SCH TAB: at 09:53

## 2019-01-01 RX ADMIN — LACOSAMIDE SCH MG: 10 SOLUTION ORAL at 22:33

## 2019-01-01 RX ADMIN — PIPERACILLIN SODIUM,TAZOBACTAM SODIUM SCH MLS/HR: 3; .375 INJECTION, POWDER, FOR SOLUTION INTRAVENOUS at 09:54

## 2019-01-01 RX ADMIN — Medication SCH MG: at 09:56

## 2019-01-01 RX ADMIN — ZONISAMIDE SCH MG: 100 CAPSULE ORAL at 22:34

## 2019-01-01 RX ADMIN — PIPERACILLIN SODIUM,TAZOBACTAM SODIUM SCH MLS/HR: 3; .375 INJECTION, POWDER, FOR SOLUTION INTRAVENOUS at 03:01

## 2019-01-01 RX ADMIN — Medication SCH TAB: at 22:34

## 2019-01-01 RX ADMIN — ENOXAPARIN SODIUM SCH MG: 40 INJECTION SUBCUTANEOUS at 09:54

## 2019-01-01 RX ADMIN — LEVETIRACETAM SCH MG: 100 SOLUTION ORAL at 09:55

## 2019-01-01 NOTE — PN
Progress Note (short form)





- Note


Progress Note: 





Subjective:





No events over night . 


Objective:








Vital Signs:


NAD


CV: RRR


Lungs: R sided crackles . 


Abd: soft, NT, ND , NL BS . PEG in . supeapubic cath in 


ext : no edema or erythema 








Assessment/Plan:





32 y/o man with h/o seizures , microcephaly, urethral stricture s/p suprapubic 

cath placement, MR and other medical problems who presented with   tachycardia, 

and was found to have sepsis 





1- Sepsis L due to Aspiration PNA 


- cont zosyn 


-  blood cx neg to date 


- urine cx  pending 


- urine legionella neg , and RVP pending 





2- Seizure disorder : no recurrence . cont home meds 





3- Nutrition: tube feeds  Promote 9.5 hours  over night with water flushes . 

feed were being given continuously due to error in order . stop TF and will fix 

order. 








HLOC 























Visit type





- Emergency Visit


Emergency Visit: Yes


ED Registration Date: 12/30/18


Care time: The patient presented to the Emergency Department on the above date 

and was hospitalized for further evaluation of their emergent condition.





- New Patient


This patient is new to me today: No





- Critical Care


Critical Care patient: No

## 2019-01-02 LAB
ANION GAP SERPL CALC-SCNC: 7 MMOL/L (ref 8–16)
BASOPHILS # BLD: 0.3 % (ref 0–2)
BUN SERPL-MCNC: 15 MG/DL (ref 7–18)
CALCIUM SERPL-MCNC: 8.5 MG/DL (ref 8.5–10.1)
CHLORIDE SERPL-SCNC: 105 MMOL/L (ref 98–107)
CO2 SERPL-SCNC: 25 MMOL/L (ref 21–32)
CREAT SERPL-MCNC: 0.5 MG/DL (ref 0.55–1.3)
DEPRECATED RDW RBC AUTO: 13.4 % (ref 11.9–15.9)
EOSINOPHIL # BLD: 1.9 % (ref 0–4.5)
GLUCOSE SERPL-MCNC: 198 MG/DL (ref 74–106)
HCT VFR BLD CALC: 30.8 % (ref 35.4–49)
HGB BLD-MCNC: 10.2 GM/DL (ref 11.7–16.9)
LYMPHOCYTES # BLD: 21.2 % (ref 8–40)
MCH RBC QN AUTO: 31.1 PG (ref 25.7–33.7)
MCHC RBC AUTO-ENTMCNC: 33.2 G/DL (ref 32–35.9)
MCV RBC: 93.5 FL (ref 80–96)
MONOCYTES # BLD AUTO: 6.7 % (ref 3.8–10.2)
NEUTROPHILS # BLD: 69.9 % (ref 42.8–82.8)
PLATELET # BLD AUTO: 226 K/MM3 (ref 134–434)
PMV BLD: 7.6 FL (ref 7.5–11.1)
POTASSIUM SERPLBLD-SCNC: 4.1 MMOL/L (ref 3.5–5.1)
RBC # BLD AUTO: 3.3 M/MM3 (ref 4–5.6)
SODIUM SERPL-SCNC: 137 MMOL/L (ref 136–145)
WBC # BLD AUTO: 10.2 K/MM3 (ref 4–10)

## 2019-01-02 RX ADMIN — PIPERACILLIN SODIUM,TAZOBACTAM SODIUM SCH MLS/HR: 3; .375 INJECTION, POWDER, FOR SOLUTION INTRAVENOUS at 01:11

## 2019-01-02 RX ADMIN — LEVETIRACETAM SCH MG: 100 SOLUTION ORAL at 21:58

## 2019-01-02 RX ADMIN — LACOSAMIDE SCH MG: 10 SOLUTION ORAL at 10:55

## 2019-01-02 RX ADMIN — ZONISAMIDE SCH MG: 100 CAPSULE ORAL at 21:59

## 2019-01-02 RX ADMIN — LEVETIRACETAM SCH MG: 100 SOLUTION ORAL at 11:12

## 2019-01-02 RX ADMIN — ZONISAMIDE SCH MG: 100 CAPSULE ORAL at 10:12

## 2019-01-02 RX ADMIN — ENOXAPARIN SODIUM SCH MG: 40 INJECTION SUBCUTANEOUS at 10:04

## 2019-01-02 RX ADMIN — LACOSAMIDE SCH MG: 10 SOLUTION ORAL at 21:58

## 2019-01-02 RX ADMIN — PIPERACILLIN SODIUM,TAZOBACTAM SODIUM SCH MLS/HR: 3; .375 INJECTION, POWDER, FOR SOLUTION INTRAVENOUS at 18:13

## 2019-01-02 RX ADMIN — Medication SCH MG: at 10:12

## 2019-01-02 RX ADMIN — Medication SCH TAB: at 11:11

## 2019-01-02 RX ADMIN — PIPERACILLIN SODIUM,TAZOBACTAM SODIUM SCH MLS/HR: 3; .375 INJECTION, POWDER, FOR SOLUTION INTRAVENOUS at 10:06

## 2019-01-02 RX ADMIN — Medication SCH TAB: at 21:58

## 2019-01-02 NOTE — PN
Progress Note, Physician


History of Present Illness: 





stable


no new issues


looks comfortable





- Current Medication List


Current Medications: 


Active Medications





Calcium Carbonate/Cholecalciferol (Os-Dale 500+D -)  1 tab GT BID [facility]


   Last Admin: 01/02/19 11:11 Dose:  1 tab


Clobazam (Onfi -)  10 mg GT HS [facility]


   Last Admin: 01/01/19 22:34 Dose:  10 mg


Enoxaparin Sodium (Lovenox -)  40 mg SQ DAILY [facility]


   Last Admin: 01/02/19 10:04 Dose:  40 mg


Piperacillin Sod/Tazobactam (Sod 3.375 gm/ Dextrose)  50 mls @ 100 mls/hr IVPB 

Q8H-IV [facility]; Protocol


   Last Admin: 01/02/19 10:06 Dose:  100 mls/hr


Lacosamide (Vimpat Liquid -)  200 mg GT BID [facility]


   Last Admin: 01/02/19 10:55 Dose:  200 mg


Levetiracetam (Keppra Oral Solution -)  2,000 mg GT BID [facility]


   Last Admin: 01/02/19 11:12 Dose:  2,000 mg


Pyridoxine HCl (Vitamin B6 -)  100 mg GT DAILY [facility]


   Last Admin: 01/02/19 10:12 Dose:  100 mg


Zonisamide (Zonegran -)  300 mg GT BID [facility]


   Last Admin: 01/02/19 10:12 Dose:  300 mg











- Objective


Vital Signs: 


 Vital Signs











Temperature  98.3 F   01/02/19 10:00


 


Pulse Rate  103 H  01/02/19 10:00


 


Respiratory Rate  20   01/02/19 10:00


 


Blood Pressure  150/89   01/02/19 10:00


 


O2 Sat by Pulse Oximetry (%)  98   01/02/19 09:00











Constitutional: Yes: No Distress


Cardiovascular: Yes: Regular Rate and Rhythm


Respiratory: Yes: Regular, Other


Gastrointestinal: Yes: Normal Bowel Sounds, Soft, Other (peg in place)


Musculoskeletal: Yes: WNL


Extremities: Yes: Other


Neurological: Yes: Alert, Other


Psychiatric: Yes: Other


Labs: 


 CBC, BMP





 01/02/19 07:30 





 01/02/19 07:30 





 INR, PTT











INR  1.16  (0.83-1.09)  H  12/29/18  19:32    














Assessment/Plan





sepsis


pneumonia


lethargy


leukocytosis








plan


continue current abx


resp support


nutrition


monitor for secretions


rest as per the team








wbc trending down

## 2019-01-02 NOTE — PN
Physical Exam: 


SUBJECTIVE: Patient seen and examined at bedside. No acute events overnight. Pt 

seen comfortable.








OBJECTIVE:





 Vital Signs











 Period  Temp  Pulse  Resp  BP Sys/Curran  Pulse Ox


 


 Last 24 Hr  98.6 F-98.8 F  80-88  20-20  105-128/64-70  94











GENERAL: Nonverbal. Comfortable in bed. NAD. Moaning.


HEENT: Microcephaly. Opens eyes spontaneously. Moist mucus membranes.


NECK: Trachea midline, full range of motion, supple. 


LUNGS: CTA B/L. Symmetric chest rise. 


HEART: RRR. Normal S1, S2. No murmurs noted.


ABDOMEN: Soft, NT/ND. +Bs in all 4Qs. G tube in place. Suprapubic catheter in 

place.


EXTREMITIES: 2+ pulses, warm, well-perfused, no edema. 


NEUROLOGICAL: Unable to assess.


PSYCH: Normal mood, normal affect.














 CBCD











WBC  10.2 K/mm3 (4.0-10.0)  H  01/02/19  07:30    


 


RBC  3.30 M/mm3 (4.00-5.60)  L  01/02/19  07:30    


 


Hgb  10.2 GM/dL (11.7-16.9)  L  01/02/19  07:30    


 


Hct  30.8 % (35.4-49)  L  01/02/19  07:30    


 


MCV  93.5 fl (80-96)   01/02/19  07:30    


 


MCHC  33.2 g/dl (32.0-35.9)   01/02/19  07:30    


 


RDW  13.4 % (11.9-15.9)   01/02/19  07:30    


 


Plt Count  226 K/MM3 (134-434)   01/02/19  07:30    


 


MPV  7.6 fl (7.5-11.1)   01/02/19  07:30    








 CMP











Sodium  137 mmol/L (136-145)   01/02/19  07:30    


 


Potassium  4.1 mmol/L (3.5-5.1)   01/02/19  07:30    


 


Chloride  105 mmol/L ()   01/02/19  07:30    


 


Carbon Dioxide  25 mmol/L (21-32)   01/02/19  07:30    


 


Anion Gap  7 MMOL/L (8-16)  L  01/02/19  07:30    


 


BUN  15 mg/dL (7-18)   01/02/19  07:30    


 


Creatinine  0.5 mg/dL (0.55-1.3)  L  01/02/19  07:30    


 


Creat Clearance w eGFR  > 60  (>60)   01/02/19  07:30    


 


Calcium  8.5 mg/dL (8.5-10.1)   01/02/19  07:30    


 


Total Bilirubin  0.4 mg/dL (0.2-1)   12/29/18  19:32    


 


AST  26 U/L (15-37)   12/29/18  19:32    


 


ALT  52 U/L (13-61)   12/29/18  19:32    


 


Alkaline Phosphatase  125 U/L ()  H  12/29/18  19:32    


 


Total Protein  8.2 g/dl (6.4-8.2)   12/29/18  19:32    


 


Albumin  3.4 g/dl (3.4-5.0)   12/29/18  19:32    














Active Medications





Calcium Carbonate/Cholecalciferol (Os-Dale 500+D -)  1 tab GT BID [facility]


   Last Admin: 01/01/19 22:34 Dose:  1 tab


Clobazam (Onfi -)  10 mg GT HS [facility]


   Last Admin: 01/01/19 22:34 Dose:  10 mg


Enoxaparin Sodium (Lovenox -)  40 mg SQ DAILY [facility]


   Last Admin: 01/01/19 09:54 Dose:  40 mg


Piperacillin Sod/Tazobactam (Sod 3.375 gm/ Dextrose)  50 mls @ 100 mls/hr IVPB 

Q8H-IV [facility]; Protocol


   Last Admin: 01/02/19 01:11 Dose:  100 mls/hr


Lacosamide (Vimpat Liquid -)  200 mg GT BID [facility]


   Last Admin: 01/01/19 22:33 Dose:  200 mg


Levetiracetam (Keppra Oral Solution -)  2,000 mg GT BID [facility]


   Last Admin: 01/01/19 22:34 Dose:  2,000 mg


Pyridoxine HCl (Vitamin B6 -)  100 mg GT DAILY [facility]


   Last Admin: 01/01/19 09:56 Dose:  100 mg


Zonisamide (Zonegran -)  300 mg GT BID [facility]


   Last Admin: 01/01/19 22:34 Dose:  300 mg








CONSULT:


ID- Dr. Garcia





IMAGING:


* Chest CT: Patchy consolidation within both upper and lower lobes. Most 

extensive infiltrates are noted within the L upper and R lower lobes. Diffuse 

pna involving both lungs. 


* Head CT: No acute IC pathology. Asymmetric atrophic changes are seen. 

Mucoperiosteal thickening L maxillary sinus.


* CXR (12/31/18): There are now b/l patchy infiltrates.








ASSESSMENT/PLAN:


34 y/o man with h/o seizures , microcephaly, urethral stricture s/p suprapubic 

cath placement, MR and other medical problems who presented with   tachycardia, 

and was found to have sepsis.





#Sepsis L due to L sided PNA; ? aspiration. UA shows pyuria but has cath. WBC 

trending down.


-Cont Zosyn 3.375 gm (started 12/30)


-BCx neg x72h, UCx +pending organism; RSV pending


-Legionella/S pneumo urine Ag neg


-ID consult 





#Seizure disorder; No seizures.


Cont home meds:


-Clozabam 1 mg GT HS, Keppra 2000 GT BID, Vit B6 100 GT QD, Zonaside 300 mg GT 

BID, Vimpat 200 mg GT BID





#FEN


-IVf d/c'd


-recheck lytes in AM


-Promote 9.5 hours with water flushes 





dispo


-cont to monitor in med-surg











Visit type





- Emergency Visit


Emergency Visit: Yes


ED Registration Date: 12/30/18


Care time: The patient presented to the Emergency Department on the above date 

and was hospitalized for further evaluation of their emergent condition.





- New Patient


This patient is new to me today: No





- Critical Care


Critical Care patient: No

## 2019-01-02 NOTE — PN
Teaching Attending Note


Name of Resident: Maddie Cooper





ATTENDING PHYSICIAN STATEMENT





I saw and evaluated the patient.


I reviewed the resident's note and discussed the case with the resident.


I agree with the resident's findings and plan as documented.








SUBJECTIVE:


Patient is feeling better, no acute distress. 


OBJECTIVE:


Vital Signs











Temperature  98.6 F   01/02/19 14:18


 


Pulse Rate  106 H  01/02/19 14:18


 


Respiratory Rate  20   01/02/19 10:00


 


Blood Pressure  150/89   01/02/19 10:00


 


O2 Sat by Pulse Oximetry (%)  98   01/02/19 09:00








GENERAL: Nonverbal. Comfortable in bed. NAD. Moaning.


HEENT: Microcephaly. Opens eyes spontaneously. Moist mucus membranes.


NECK: Trachea midline, full range of motion, supple. 


LUNGS: CTA B/L. Symmetric chest rise. 


HEART: RRR. Normal S1, S2. No murmurs noted.


ABDOMEN: Soft, NT/ND.  G tube in place. Suprapubic catheter in place.


EXTREMITIES: 2+ pulses, warm, well-perfused, no edema. 


NEUROLOGICAL: Unable to assess.


PSYCH: Normal mood, normal affect.


CBCD











WBC  10.2 K/mm3 (4.0-10.0)  H  01/02/19  07:30    


 


RBC  3.30 M/mm3 (4.00-5.60)  L  01/02/19  07:30    


 


Hgb  10.2 GM/dL (11.7-16.9)  L  01/02/19  07:30    


 


Hct  30.8 % (35.4-49)  L  01/02/19  07:30    


 


MCV  93.5 fl (80-96)   01/02/19  07:30    


 


MCHC  33.2 g/dl (32.0-35.9)   01/02/19  07:30    


 


RDW  13.4 % (11.9-15.9)   01/02/19  07:30    


 


Plt Count  226 K/MM3 (134-434)   01/02/19  07:30    


 


MPV  7.6 fl (7.5-11.1)   01/02/19  07:30    








 CMP











Sodium  137 mmol/L (136-145)   01/02/19  07:30    


 


Potassium  4.1 mmol/L (3.5-5.1)   01/02/19  07:30    


 


Chloride  105 mmol/L ()   01/02/19  07:30    


 


Carbon Dioxide  25 mmol/L (21-32)   01/02/19  07:30    


 


Anion Gap  7 MMOL/L (8-16)  L  01/02/19  07:30    


 


BUN  15 mg/dL (7-18)   01/02/19  07:30    


 


Creatinine  0.5 mg/dL (0.55-1.3)  L  01/02/19  07:30    


 


Creat Clearance w eGFR  > 60  (>60)   01/02/19  07:30    


 


Random Glucose  198 mg/dL ()  H  01/02/19  07:30    


 


Calcium  8.5 mg/dL (8.5-10.1)   01/02/19  07:30    


 


Total Bilirubin  0.4 mg/dL (0.2-1)   12/29/18  19:32    


 


AST  26 U/L (15-37)   12/29/18  19:32    


 


ALT  52 U/L (13-61)   12/29/18  19:32    


 


Alkaline Phosphatase  125 U/L ()  H  12/29/18  19:32    


 


Total Protein  8.2 g/dl (6.4-8.2)   12/29/18  19:32    


 


Albumin  3.4 g/dl (3.4-5.0)   12/29/18  19:32    








 Current Medications











Generic Name Dose Route Start Last Admin





  Trade Name Sagarq  PRN Reason Stop Dose Admin


 


Calcium Carbonate/Cholecalciferol  1 tab  12/29/18 22:00  01/02/19 11:11





  Os-Dale 500+D -  GT   1 tab





  BID RAKAN   Administration





     





     





     





     


 


Clobazam  10 mg  12/30/18 22:00  01/01/19 22:34





  Onfi -  GT   10 mg





  HS RAKAN   Administration





     





     





     





     


 


Enoxaparin Sodium  40 mg  12/30/18 10:00  01/02/19 10:04





  Lovenox -  SQ   40 mg





  DAILY RAKAN   Administration





     





     





     





     


 


Piperacillin Sod/Tazobactam  50 mls @ 100 mls/hr  12/30/18 02:00  01/02/19 18:13





  Sod 3.375 gm/ Dextrose  IVPB   100 mls/hr





  Q8H-IV RAKAN   Administration





     





     





  Protocol   





     


 


Lacosamide  200 mg  12/30/18 15:30  01/02/19 10:55





  Vimpat Liquid -  GT   200 mg





  BID RAKAN   Administration





     





     





     





     


 


Levetiracetam  2,000 mg  12/30/18 10:00  01/02/19 11:12





  Keppra Oral Solution -  GT   2,000 mg





  BID RAKAN   Administration





     





     





     





     


 


Pyridoxine HCl  100 mg  12/30/18 10:00  01/02/19 10:12





  Vitamin B6 -  GT   100 mg





  DAILY RAKAN   Administration





     





     





     





     


 


Zonisamide  300 mg  12/29/18 22:00  01/02/19 10:12





  Zonegran -  GT   300 mg





  BID RAKAN   Administration





     





     





     





     








 Home Medications











 Medication  Instructions  Recorded


 


Calcium Carbonate/Vitamin D3 1 each GT BID 02/09/15





[Oyster Shell 500 mg + Vit D Tb]  


 


Lacosamide Liquid [Vimpat] 200 mg GT BID 02/09/15


 


Levetiracetam in NaCl (Iso-Os) 2 gm GT BID 02/09/15





[Levetiraceta-NaCl 1,000 mg/100]  


 


Levocarnitine 660 mg GT TID 02/09/15


 


Multivit Infusion,Pedi 2,Vit K 1 each GT DAILY 02/09/15





[M.v.i. Pediatric]  


 


Pyridoxine HCl [Vitamin B-6] 100 mg GT DAILY 02/09/15


 


Zonisamide [Zonegran] 300 mg GT BID 02/09/15


 


Albuterol Sulfate 0.5% [Ventolin 1 amp NEB TID 12/30/18





0.5% -]  


 


Clobazam [Onfi -] 10 mg GT HS 12/30/18


 


Diazepam [Diastat Acudial] 5 mg RC PRN 12/31/18


 


Fructooligosaccharides/Polydex 30 ml GT DAILY 12/31/18





[Fiber-Stat 15 gm/30 ml Liquid]  


 


Sennosides [Senna] 2 tab GT HS 12/31/18


 


Sodium Chloride Tablet - 3.5 tab GT BID 12/31/18











Chest CT: Patchy consolidation within both upper and lower lobes. Most 

extensive infiltrates are noted within the L upper and R lower lobes. Diffuse 

pna involving both lungs. 


Head CT: No acute IC pathology. Asymmetric atrophic changes are seen. 

Mucoperiosteal thickening L maxillary sinus.


CXR (12/31/18): There are now b/l patchy infiltrates.





ASSESSMENT/PLAN:


Patient is a 34 y/o man with no Hx of seizures , microcephaly, urethral 

stricture s/p suprapubic cath placement, MR , who presented with tachycardia, 

and was found to have sepsis.





#acute sepsis due to L sided PNA improved on Zosyn 3.375gm (started 12/30)  as 

per ID to switch to 5 more days of Augmentin and dc him in am.  





#Seizure disorder: stable cont. home meds; Clozabam 1 mg GT HS, Keppra 2000 GT 

BID, Vit B6 100 GT QD, Zonaside 300 mg GT BID, Vimpat 200 mg GT BID





Promote 9.5 hours with water flushes 





cont to monitor in med-surg 





DVT px :lovenox

## 2019-01-03 LAB
ANION GAP SERPL CALC-SCNC: 8 MMOL/L (ref 8–16)
BUN SERPL-MCNC: 15 MG/DL (ref 7–18)
CALCIUM SERPL-MCNC: 8.3 MG/DL (ref 8.5–10.1)
CHLORIDE SERPL-SCNC: 102 MMOL/L (ref 98–107)
CO2 SERPL-SCNC: 25 MMOL/L (ref 21–32)
CREAT SERPL-MCNC: 0.5 MG/DL (ref 0.55–1.3)
DEPRECATED RDW RBC AUTO: 13.1 % (ref 11.9–15.9)
GLUCOSE SERPL-MCNC: 210 MG/DL (ref 74–106)
HCT VFR BLD CALC: 32.3 % (ref 35.4–49)
HGB BLD-MCNC: 10.6 GM/DL (ref 11.7–16.9)
MCH RBC QN AUTO: 30.7 PG (ref 25.7–33.7)
MCHC RBC AUTO-ENTMCNC: 32.9 G/DL (ref 32–35.9)
MCV RBC: 93.3 FL (ref 80–96)
PLATELET # BLD AUTO: 249 K/MM3 (ref 134–434)
PMV BLD: 7.5 FL (ref 7.5–11.1)
POTASSIUM SERPLBLD-SCNC: 4.1 MMOL/L (ref 3.5–5.1)
RBC # BLD AUTO: 3.46 M/MM3 (ref 4–5.6)
SODIUM SERPL-SCNC: 135 MMOL/L (ref 136–145)
WBC # BLD AUTO: 9.9 K/MM3 (ref 4–10)

## 2019-01-03 RX ADMIN — PIPERACILLIN SODIUM,TAZOBACTAM SODIUM SCH MLS/HR: 3; .375 INJECTION, POWDER, FOR SOLUTION INTRAVENOUS at 01:47

## 2019-01-03 RX ADMIN — ENOXAPARIN SODIUM SCH MG: 40 INJECTION SUBCUTANEOUS at 11:23

## 2019-01-03 RX ADMIN — PIPERACILLIN SODIUM,TAZOBACTAM SODIUM SCH MLS/HR: 3; .375 INJECTION, POWDER, FOR SOLUTION INTRAVENOUS at 11:18

## 2019-01-03 RX ADMIN — LEVETIRACETAM SCH MG: 100 SOLUTION ORAL at 21:47

## 2019-01-03 RX ADMIN — Medication SCH TAB: at 11:24

## 2019-01-03 RX ADMIN — Medication SCH MG: at 11:25

## 2019-01-03 RX ADMIN — ZONISAMIDE SCH MG: 100 CAPSULE ORAL at 11:25

## 2019-01-03 RX ADMIN — PIPERACILLIN SODIUM,TAZOBACTAM SODIUM SCH MLS/HR: 3; .375 INJECTION, POWDER, FOR SOLUTION INTRAVENOUS at 18:00

## 2019-01-03 RX ADMIN — LACOSAMIDE SCH MG: 10 SOLUTION ORAL at 21:48

## 2019-01-03 RX ADMIN — ZONISAMIDE SCH MG: 100 CAPSULE ORAL at 21:48

## 2019-01-03 RX ADMIN — LACOSAMIDE SCH MG: 10 SOLUTION ORAL at 10:50

## 2019-01-03 RX ADMIN — Medication SCH TAB: at 21:47

## 2019-01-03 RX ADMIN — LEVETIRACETAM SCH MG: 100 SOLUTION ORAL at 11:29

## 2019-01-03 NOTE — PN
Progress Note, Physician


History of Present Illness: 





patient doing well


breathing better


comfortable





- Current Medication List


Current Medications: 


Active Medications





Calcium Carbonate/Cholecalciferol (Os-Dale 500+D -)  1 tab GT BID [facility]


   Last Admin: 01/03/19 11:24 Dose:  1 tab


Clobazam (Onfi -)  10 mg GT HS [facility]


   Last Admin: 01/02/19 21:58 Dose:  10 mg


Enoxaparin Sodium (Lovenox -)  40 mg SQ DAILY [facility]


   Last Admin: 01/03/19 11:23 Dose:  40 mg


Piperacillin Sod/Tazobactam (Sod 3.375 gm/ Dextrose)  50 mls @ 100 mls/hr IVPB 

Q8H-IV RAKAN; Protocol


   Last Admin: 01/03/19 11:18 Dose:  100 mls/hr


Lacosamide (Vimpat Liquid -)  200 mg GT BID [facility]


   Last Admin: 01/03/19 10:50 Dose:  200 mg


Levetiracetam (Keppra Oral Solution -)  2,000 mg GT BID [facility]


   Last Admin: 01/03/19 11:29 Dose:  2,000 mg


Pyridoxine HCl (Vitamin B6 -)  100 mg GT DAILY [facility]


   Last Admin: 01/03/19 11:25 Dose:  100 mg


Zonisamide (Zonegran -)  300 mg GT BID [facility]


   Last Admin: 01/03/19 11:25 Dose:  300 mg











- Objective


Vital Signs: 


 Vital Signs











Temperature  98.5 F   01/03/19 14:11


 


Pulse Rate  99 H  01/03/19 09:38


 


Respiratory Rate  20   01/03/19 09:38


 


Blood Pressure  165/94   01/03/19 09:38


 


O2 Sat by Pulse Oximetry (%)  98   01/03/19 09:00











Constitutional: Yes: No Distress, Calm


Cardiovascular: Yes: Regular Rate and Rhythm


Respiratory: Yes: Regular, On Nasal O2, Poor Air Entry


Gastrointestinal: Yes: Normal Bowel Sounds, Soft


Musculoskeletal: Yes: WNL


Extremities: Yes: WNL


Neurological: Yes: Alert, Other


Psychiatric: Yes: Other


Labs: 


 CBC, BMP





 01/03/19 06:00 





 01/03/19 06:00 





 INR, PTT











INR  1.16  (0.83-1.09)  H  12/29/18  19:32    














Assessment/Plan





sepsis


pneumonia


lethargy


leukocytosis








wbc has normalized





plan


can change abx to oral augmentin for another 5 days'


nutrition


rest as per the team

## 2019-01-03 NOTE — PN
Physical Exam: 


SUBJECTIVE: Patient seen and examined at bedside. No acute events overnight.    








OBJECTIVE:





 Vital Signs











Temperature  98.5 F   01/03/19 14:11


 


Pulse Rate  99 H  01/03/19 09:38


 


Respiratory Rate  20   01/03/19 09:38


 


Blood Pressure  165/94   01/03/19 09:38


 


O2 Sat by Pulse Oximetry (%)  98   01/03/19 09:00




















GENERAL: Nonverbal. Comfortable in bed. NAD. Moaning.


HEENT: Microcephaly. Opens eyes spontaneously. Moist mucus membranes.


NECK: Trachea midline, full range of motion, supple. 


LUNGS: CTA B/L. Symmetric chest rise. 


HEART: RRR. Normal S1, S2. No murmurs noted.


ABDOMEN: Soft, NT/ND. +Bs in all 4Qs. G tube in place. Suprapubic catheter in 

place.


EXTREMITIES: 2+ pulses, warm, well-perfused, no edema. 


NEUROLOGICAL: Unable to assess.


PSYCH: Normal mood, normal affect.














 CBCD











WBC  9.9 K/mm3 (4.0-10.0)   01/03/19  06:00    


 


RBC  3.46 M/mm3 (4.00-5.60)  L  01/03/19  06:00    


 


Hgb  10.6 GM/dL (11.7-16.9)  L  01/03/19  06:00    


 


Hct  32.3 % (35.4-49)  L  01/03/19  06:00    


 


MCV  93.3 fl (80-96)   01/03/19  06:00    


 


MCHC  32.9 g/dl (32.0-35.9)   01/03/19  06:00    


 


RDW  13.1 % (11.9-15.9)   01/03/19  06:00    


 


Plt Count  249 K/MM3 (134-434)   01/03/19  06:00    


 


MPV  7.5 fl (7.5-11.1)   01/03/19  06:00    








 CMP











Sodium  135 mmol/L (136-145)  L  01/03/19  06:00    


 


Potassium  4.1 mmol/L (3.5-5.1)   01/03/19  06:00    


 


Chloride  102 mmol/L ()   01/03/19  06:00    


 


Carbon Dioxide  25 mmol/L (21-32)   01/03/19  06:00    


 


Anion Gap  8 MMOL/L (8-16)   01/03/19  06:00    


 


BUN  15 mg/dL (7-18)   01/03/19  06:00    


 


Creatinine  0.5 mg/dL (0.55-1.3)  L  01/03/19  06:00    


 


Creat Clearance w eGFR  > 60  (>60)   01/03/19  06:00    


 


Calcium  8.3 mg/dL (8.5-10.1)  L  01/03/19  06:00    


 


Total Bilirubin  0.4 mg/dL (0.2-1)   12/29/18  19:32    


 


AST  26 U/L (15-37)   12/29/18  19:32    


 


ALT  52 U/L (13-61)   12/29/18  19:32    


 


Alkaline Phosphatase  125 U/L ()  H  12/29/18  19:32    


 


Total Protein  8.2 g/dl (6.4-8.2)   12/29/18  19:32    


 


Albumin  3.4 g/dl (3.4-5.0)   12/29/18  19:32    














Active Medications





Calcium Carbonate/Cholecalciferol (Os-Dale 500+D -)  1 tab GT BID [facility]


   Last Admin: 01/03/19 11:24 Dose:  1 tab


Clobazam (Onfi -)  10 mg GT HS [facility]


   Last Admin: 01/02/19 21:58 Dose:  10 mg


Enoxaparin Sodium (Lovenox -)  40 mg SQ DAILY [facility]


   Last Admin: 01/03/19 11:23 Dose:  40 mg


Piperacillin Sod/Tazobactam (Sod 3.375 gm/ Dextrose)  50 mls @ 100 mls/hr IVPB 

Q8H-IV RAKAN; Protocol


   Last Admin: 01/03/19 11:18 Dose:  100 mls/hr


Lacosamide (Vimpat Liquid -)  200 mg GT BID [facility]


   Last Admin: 01/03/19 10:50 Dose:  200 mg


Levetiracetam (Keppra Oral Solution -)  2,000 mg GT BID [facility]


   Last Admin: 01/03/19 11:29 Dose:  2,000 mg


Pyridoxine HCl (Vitamin B6 -)  100 mg GT DAILY [facility]


   Last Admin: 01/03/19 11:25 Dose:  100 mg


Zonisamide (Zonegran -)  300 mg GT BID [facility]


   Last Admin: 01/03/19 11:25 Dose:  300 mg











CONSULT:


ID- Dr. Garcia





IMAGING:


* Chest CT: Patchy consolidation within both upper and lower lobes. Most 

extensive infiltrates are noted within the L upper and R lower lobes. Diffuse 

pna involving both lungs. 


* Head CT: No acute IC pathology. Asymmetric atrophic changes are seen. 

Mucoperiosteal thickening L maxillary sinus.


* CXR (12/31/18): There are now b/l patchy infiltrates.








ASSESSMENT/PLAN:


34 y/o man with h/o seizures , microcephaly, urethral stricture s/p suprapubic 

cath placement, MR and other medical problems who presented with   tachycardia, 

and was found to have sepsis.





#Sepsis L due to L sided PNA; ? aspiration. UA shows pyuria but has cath. WBC 

trending down. Afebrile.


-Cont Zosyn 3.375 gm (started 12/30); per ID, can change to PO Augmentin x 5 

days.


-BCx neg x72h, UCx +pending organism; RSV pending


-Legionella/S pneumo urine Ag neg





#Seizure disorder; No seizures.


Cont home meds:


-Clozabam 1 mg GT HS, Keppra 2000 GT BID, Vit B6 100 GT QD, Zonaside 300 mg GT 

BID, Vimpat 200 mg GT BID





#FEN


-IVf d/c'd


-recheck lytes in AM


-Promote 9.5 hours with water flushes 





dispo


-cont to monitor in med-surg


-contact [facility] for DC planning in AM








Visit type





- Emergency Visit


Emergency Visit: Yes


ED Registration Date: 12/30/18


Care time: The patient presented to the Emergency Department on the above date 

and was hospitalized for further evaluation of their emergent condition.





- New Patient


This patient is new to me today: No





- Critical Care


Critical Care patient: No

## 2019-01-03 NOTE — PN
Teaching Attending Note


Name of Resident: Maddie Cooper





ATTENDING PHYSICIAN STATEMENT





I saw and evaluated the patient.


I reviewed the resident's note and discussed the case with the resident.


I agree with the resident's findings and plan as documented.








SUBJECTIVE:





Patient is feeling better, as per ID to switch to 5 more days of Augmentin and 

discharge patient in am. patient is feeling better and smiling.


OBJECTIVE:


 Vital Signs











Temperature  98.3 F   01/03/19 09:38


 


Pulse Rate  99 H  01/03/19 09:38


 


Respiratory Rate  20   01/03/19 09:38


 


Blood Pressure  165/94   01/03/19 09:38


 


O2 Sat by Pulse Oximetry (%)  98   01/02/19 20:30





GENERAL: Nonverbal. Comfortable in bed. NAD. 


HEENT: Microcephaly. Opens eyes spontaneously. Moist mucus membranes.


NECK: Trachea midline, full range of motion, supple. 


LUNGS: CTA B/L. Symmetric chest rise. 


HEART: RRR. Normal S1, S2. No murmurs noted.


ABDOMEN: Soft, NT/ND.  G tube in place. Suprapubic catheter in place., 


EXTREMITIES: 2+ pulses, warm, well-perfused, no edema. contracted. 


NEUROLOGICAL: Unable to assess.


PSYCH: Normal mood, normal affect.                                       CBCD











WBC  9.9 K/mm3 (4.0-10.0)   01/03/19  06:00    


 


RBC  3.46 M/mm3 (4.00-5.60)  L  01/03/19  06:00    


 


Hgb  10.6 GM/dL (11.7-16.9)  L  01/03/19  06:00    


 


Hct  32.3 % (35.4-49)  L  01/03/19  06:00    


 


MCV  93.3 fl (80-96)   01/03/19  06:00    


 


MCHC  32.9 g/dl (32.0-35.9)   01/03/19  06:00    


 


RDW  13.1 % (11.9-15.9)   01/03/19  06:00    


 


Plt Count  249 K/MM3 (134-434)   01/03/19  06:00    


 


MPV  7.5 fl (7.5-11.1)   01/03/19  06:00    








 CMP











Sodium  135 mmol/L (136-145)  L  01/03/19  06:00    


 


Potassium  4.1 mmol/L (3.5-5.1)   01/03/19  06:00    


 


Chloride  102 mmol/L ()   01/03/19  06:00    


 


Carbon Dioxide  25 mmol/L (21-32)   01/03/19  06:00    


 


Anion Gap  8 MMOL/L (8-16)   01/03/19  06:00    


 


BUN  15 mg/dL (7-18)   01/03/19  06:00    


 


Creatinine  0.5 mg/dL (0.55-1.3)  L  01/03/19  06:00    


 


Creat Clearance w eGFR  > 60  (>60)   01/03/19  06:00    


 


Random Glucose  210 mg/dL ()  H  01/03/19  06:00    


 


Calcium  8.3 mg/dL (8.5-10.1)  L  01/03/19  06:00    


 


Total Bilirubin  0.4 mg/dL (0.2-1)   12/29/18  19:32    


 


AST  26 U/L (15-37)   12/29/18  19:32    


 


ALT  52 U/L (13-61)   12/29/18  19:32    


 


Alkaline Phosphatase  125 U/L ()  H  12/29/18  19:32    


 


Total Protein  8.2 g/dl (6.4-8.2)   12/29/18  19:32    


 


Albumin  3.4 g/dl (3.4-5.0)   12/29/18  19:32    








 Current Medications











Generic Name Dose Route Start Last Admin





  Trade Name Freq  PRN Reason Stop Dose Admin


 


Calcium Carbonate/Cholecalciferol  1 tab  12/29/18 22:00  01/02/19 21:58





  Os-Dale 500+D -  GT   1 tab





  BID RAKAN   Administration





     





     





     





     


 


Clobazam  10 mg  12/30/18 22:00  01/02/19 21:58





  Onfi -  GT   10 mg





  HS RAKAN   Administration





     





     





     





     


 


Enoxaparin Sodium  40 mg  12/30/18 10:00  01/02/19 10:04





  Lovenox -  SQ   40 mg





  DAILY RAKAN   Administration





     





     





     





     


 


Piperacillin Sod/Tazobactam  50 mls @ 100 mls/hr  12/30/18 02:00  01/03/19 01:47





  Sod 3.375 gm/ Dextrose  IVPB   100 mls/hr





  Q8H-IV RAKAN   Administration





     





     





  Protocol   





     


 


Lacosamide  200 mg  12/30/18 15:30  01/02/19 21:58





  Vimpat Liquid -  GT   200 mg





  BID RAKAN   Administration





     





     





     





     


 


Levetiracetam  2,000 mg  12/30/18 10:00  01/02/19 21:58





  Keppra Oral Solution -  GT   2,000 mg





  BID RAKAN   Administration





     





     





     





     


 


Pyridoxine HCl  100 mg  12/30/18 10:00  01/02/19 10:12





  Vitamin B6 -  GT   100 mg





  DAILY RAKAN   Administration





     





     





     





     


 


Zonisamide  300 mg  12/29/18 22:00  01/02/19 21:59





  Zonegran -  GT   300 mg





  BID RAKAN   Administration





     





     





     





     








 Home Medications











 Medication  Instructions  Recorded


 


Calcium Carbonate/Vitamin D3 1 each GT BID 02/09/15





[Oyster Shell 500 mg + Vit D Tb]  


 


Lacosamide Liquid [Vimpat] 200 mg GT BID 02/09/15


 


Levetiracetam in NaCl (Iso-Os) 2 gm GT BID 02/09/15





[Levetiraceta-NaCl 1,000 mg/100]  


 


Levocarnitine 660 mg GT TID 02/09/15


 


Multivit Infusion,Pedi 2,Vit K 1 each GT DAILY 02/09/15





[M.v.i. Pediatric]  


 


Pyridoxine HCl [Vitamin B-6] 100 mg GT DAILY 02/09/15


 


Zonisamide [Zonegran] 300 mg GT BID 02/09/15


 


Albuterol Sulfate 0.5% [Ventolin 1 amp NEB TID 12/30/18





0.5% -]  


 


Clobazam [Onfi -] 10 mg GT HS 12/30/18


 


Diazepam [Diastat Acudial] 5 mg RC PRN 12/31/18


 


Fructooligosaccharides/Polydex 30 ml GT DAILY 12/31/18





[Fiber-Stat 15 gm/30 ml Liquid]  


 


Sennosides [Senna] 2 tab GT HS 12/31/18


 


Sodium Chloride Tablet - 3.5 tab GT BID 12/31/18

















Chest CT: Patchy consolidation within both upper and lower lobes. Most 

extensive infiltrates are noted within the L upper and R lower lobes. Diffuse 

pna involving both lungs. 


Head CT: No acute IC pathology. Asymmetric atrophic changes are seen. 

Mucoperiosteal thickening L maxillary sinus.


CXR (12/31/18): There are now b/l patchy infiltrates.





ASSESSMENT/PLAN:


Patient is a 32 y/o man with no Hx of seizures , microcephaly, urethral 

stricture s/p suprapubic cath placement, MR , who presented with tachycardia, 

and was found to have sepsis.





#acute sepsis due to L sided PNA on zosyn as per Id to switch the patient to 

Augmentin for 5 more days. will discharge the patient in am.  





#Seizure disorder: stable cont home meds; Clozabam 1 mg GT HS, Keppra 2000 GT 

BID, Vit B6 100 GT QD, Zonaside 300 mg GT BID, Vimpat 200 mg GT BID





Promote 9.5 hours with water flushes 





discharge the patient in am.

## 2019-01-04 VITALS — DIASTOLIC BLOOD PRESSURE: 72 MMHG | SYSTOLIC BLOOD PRESSURE: 113 MMHG | HEART RATE: 96 BPM

## 2019-01-04 VITALS — TEMPERATURE: 98.5 F

## 2019-01-04 RX ADMIN — LACOSAMIDE SCH MG: 10 SOLUTION ORAL at 11:02

## 2019-01-04 RX ADMIN — LEVETIRACETAM SCH MG: 100 SOLUTION ORAL at 11:03

## 2019-01-04 RX ADMIN — Medication SCH TAB: at 11:02

## 2019-01-04 RX ADMIN — Medication SCH MG: at 11:02

## 2019-01-04 RX ADMIN — ZONISAMIDE SCH MG: 100 CAPSULE ORAL at 11:03

## 2019-01-04 RX ADMIN — PIPERACILLIN SODIUM,TAZOBACTAM SODIUM SCH MLS/HR: 3; .375 INJECTION, POWDER, FOR SOLUTION INTRAVENOUS at 02:36

## 2019-01-04 RX ADMIN — ENOXAPARIN SODIUM SCH MG: 40 INJECTION SUBCUTANEOUS at 11:02

## 2019-01-04 NOTE — PN
Teaching Attending Note


Name of Resident: Maddie Cooper





ATTENDING PHYSICIAN STATEMENT





I saw and evaluated the patient.


I reviewed the resident's note and discussed the case with the resident.


I agree with the resident's findings and plan as documented.








SUBJECTIVE:


Patient is comfortable with no acute distress, no fever or chills.





OBJECTIVE:


 Vital Signs











Temperature  98.5 F   01/04/19 05:00


 


Pulse Rate  96 H  01/04/19 10:00


 


Respiratory Rate  18   01/04/19 10:00


 


Blood Pressure  113/72   01/04/19 10:00


 


O2 Sat by Pulse Oximetry (%)  96   01/04/19 09:00








GENERAL: Nonverbal. Comfortable in bed. NAD. 


HEENT: Microcephaly. Opens eyes spontaneously. Moist mucus membranes.


NECK: Trachea midline, full range of motion, supple. 


LUNGS: CTA B/L. Symmetric chest rise. 


HEART: RRR. Normal S1, S2. No murmurs noted.


ABDOMEN: Soft, NT/ND. G-tube in place. Suprapubic catheter in place.  


EXTREMITIES: 2+ pulses, warm, well-perfused, no edema. contracted. 


NEUROLOGICAL: Unable to assess.


PSYCH: Normal mood, normal affect. 


 CBCD











WBC  9.9 K/mm3 (4.0-10.0)   01/03/19  06:00    


 


RBC  3.46 M/mm3 (4.00-5.60)  L  01/03/19  06:00    


 


Hgb  10.6 GM/dL (11.7-16.9)  L  01/03/19  06:00    


 


Hct  32.3 % (35.4-49)  L  01/03/19  06:00    


 


MCV  93.3 fl (80-96)   01/03/19  06:00    


 


MCHC  32.9 g/dl (32.0-35.9)   01/03/19  06:00    


 


RDW  13.1 % (11.9-15.9)   01/03/19  06:00    


 


Plt Count  249 K/MM3 (134-434)   01/03/19  06:00    


 


MPV  7.5 fl (7.5-11.1)   01/03/19  06:00    








 CMP











Sodium  135 mmol/L (136-145)  L  01/03/19  06:00    


 


Potassium  4.1 mmol/L (3.5-5.1)   01/03/19  06:00    


 


Chloride  102 mmol/L ()   01/03/19  06:00    


 


Carbon Dioxide  25 mmol/L (21-32)   01/03/19  06:00    


 


Anion Gap  8 MMOL/L (8-16)   01/03/19  06:00    


 


BUN  15 mg/dL (7-18)   01/03/19  06:00    


 


Creatinine  0.5 mg/dL (0.55-1.3)  L  01/03/19  06:00    


 


Creat Clearance w eGFR  > 60  (>60)   01/03/19  06:00    


 


Random Glucose  210 mg/dL ()  H  01/03/19  06:00    


 


Calcium  8.3 mg/dL (8.5-10.1)  L  01/03/19  06:00    


 


Total Bilirubin  0.4 mg/dL (0.2-1)   12/29/18  19:32    


 


AST  26 U/L (15-37)   12/29/18  19:32    


 


ALT  52 U/L (13-61)   12/29/18  19:32    


 


Alkaline Phosphatase  125 U/L ()  H  12/29/18  19:32    


 


Total Protein  8.2 g/dl (6.4-8.2)   12/29/18  19:32    


 


Albumin  3.4 g/dl (3.4-5.0)   12/29/18  19:32    








 Home Medications











 Medication  Instructions  Recorded


 


Calcium Carbonate/Vitamin D3 1 each GT BID 02/09/15





[Oyster Shell 500 mg + Vit D Tb]  


 


Lacosamide Liquid [Vimpat] 200 mg GT BID 02/09/15


 


Levetiracetam in NaCl (Iso-Os) 2 gm GT BID 02/09/15





[Levetiraceta-NaCl 1,000 mg/100]  


 


Levocarnitine 660 mg GT TID 02/09/15


 


Multivit Infusion,Pedi 2,Vit K 1 each GT DAILY 02/09/15





[M.v.i. Pediatric]  


 


Pyridoxine HCl [Vitamin B-6] 100 mg GT DAILY 02/09/15


 


Zonisamide [Zonegran] 300 mg GT BID 02/09/15


 


Albuterol Sulfate 0.5% [Ventolin 1 amp NEB TID 12/30/18





0.5% -]  


 


Clobazam [Onfi -] 10 mg GT HS 12/30/18


 


Diazepam [Diastat Acudial] 5 mg RC PRN 12/31/18


 


Fructooligosaccharides/Polydex 30 ml GT DAILY 12/31/18





[Fiber-Stat 15 gm/30 ml Liquid]  


 


Sennosides [Senna] 2 tab GT HS 12/31/18


 


Sodium Chloride Tablet - 3.5 tab GT BID 12/31/18


 


Amox-Tr/K Cl [Augmentin 400 mg/5 5 ml PO TID #105 ml 01/04/19





ml Oral Suspension -]  








 Microbiology





12/31/18 13:30   Urine - Urine Suprapubic   Urine Culture - Final


                            Pseudomonas Aeruginosa


12/29/18 21:15   Blood - Peripheral Venous   Blood Culture - Final


                            NO GROWTH AFTER 5 DAYS INCUBATION


12/29/18 21:15   Blood - Peripheral Venous   Blood Culture - Final


                            NO GROWTH AFTER 5 DAYS INCUBATION


12/30/18 22:15   Nasopharyngeal Swab   Respiratory Virus (PCR) - Final


12/29/18 23:40   Urine For Antigen Detection   Legionella Antigen - Final


12/29/18 23:40   Urine For Antigen Detection   Streptococcus pneumoniae Antigen 

(M - Final








Chest CT: Patchy consolidation within both upper and lower lobes. Most 

extensive infiltrates are noted within the L upper and R lower lobes. Diffuse 

pna involving both lungs. 


Head CT: No acute IC pathology. Asymmetric atrophic changes are seen. 

Mucoperiosteal thickening L maxillary sinus.


CXR (12/31/18): There are now b/l patchy infiltrates.





ASSESSMENT/PLAN:


Patient is a 32yo man with no Hx of seizures , microcephaly, urethral stricture 

s/p suprapubic cath placement, MR , who presented with tachycardia, and was 

found to have sepsis.





#Acute sepsis due to Left sided PNA s/p Zosyn as per ID, will continue with 

Augmentin 500mg po tid  5 more days. will discharge the patient back to [facility] home.   





#Seizure disorder: stable cont home meds; Clozabam 1 mg GT HS, Keppra 2000 GT 

BID, Vit B6 100 GT QD, Zonaside 300 mg GT BID, Vimpat 200 mg GT BID





Promote 9.5 hours with water flushes 





discharge the patient back to rehab.

## 2019-01-04 NOTE — PN
Progress Note, Physician





- Current Medication List


Current Medications: 


Active Medications





Calcium Carbonate/Cholecalciferol (Os-Dale 500+D -)  1 tab GT BID [facility]


   Last Admin: 01/04/19 11:02 Dose:  1 tab


Clobazam (Onfi -)  10 mg GT HS [facility]


   Last Admin: 01/03/19 21:47 Dose:  10 mg


Enoxaparin Sodium (Lovenox -)  40 mg SQ DAILY [facility]


   Last Admin: 01/04/19 11:02 Dose:  40 mg


Lacosamide (Vimpat Liquid -)  200 mg GT BID [facility]


   Last Admin: 01/04/19 11:02 Dose:  200 mg


Levetiracetam (Keppra Oral Solution -)  2,000 mg GT BID [facility]


   Last Admin: 01/04/19 11:03 Dose:  2,000 mg


Pyridoxine HCl (Vitamin B6 -)  100 mg GT DAILY [facility]


   Last Admin: 01/04/19 11:02 Dose:  100 mg


Zonisamide (Zonegran -)  300 mg GT BID [facility]


   Last Admin: 01/04/19 11:03 Dose:  300 mg











- Objective


Vital Signs: 


 Vital Signs











Temperature  98.5 F   01/04/19 05:00


 


Pulse Rate  96 H  01/04/19 10:00


 


Respiratory Rate  18   01/04/19 10:00


 


Blood Pressure  113/72   01/04/19 10:00


 


O2 Sat by Pulse Oximetry (%)  96   01/04/19 09:00











Labs: 


 CBC, BMP





 01/03/19 06:00 





 01/03/19 06:00 





 INR, PTT











INR  1.16  (0.83-1.09)  H  12/29/18  19:32

## 2019-01-04 NOTE — DS
Physical Exam: 


SUBJECTIVE: Patient seen and examined at bedside. Seen comfortable. Per nurse, 

no acute events overnight.








OBJECTIVE:





 Vital Signs











 Period  Temp  Pulse  Resp  BP Sys/Curran  Pulse Ox


 


 Last 24 Hr  98.5 F-98.9 F    18-20  112-131/68-73  96-98








PHYSICAL EXAM





GENERAL: Nonverbal. Comfortable in bed. NAD. Moaning.


HEENT: Microcephaly. Opens eyes spontaneously. Moist mucus membranes.


NECK: Trachea midline, full range of motion, supple. 


LUNGS: CTA B/L. Symmetric chest rise. 


HEART: RRR. Normal S1, S2. No murmurs noted.


ABDOMEN: Soft, NT/ND. +Bs in all 4Qs. G tube in place. Suprapubic catheter in 

place.


EXTREMITIES: 2+ pulses, warm, well-perfused, no edema. 


NEUROLOGICAL: Unable to assess.


PSYCH: Normal mood, normal affect.








LABS





HOSPITAL COURSE:





Date of Admission:12/30/18





IMAGING:


* Chest CT: Patchy consolidation within both upper and lower lobes. Most 

extensive infiltrates are noted within the L upper and R lower lobes. Diffuse 

pna involving both lungs. 


* Head CT: No acute IC pathology. Asymmetric atrophic changes are seen. 

Mucoperiosteal thickening L maxillary sinus.


* CXR (12/31/18): There are now b/l patchy infiltrates.





33M with h/o seizures, microcephaly, urethral stricture s/p suprapubic cath 

placement, MR and presented with tachycardia, O2 desaturation in the 70's 

admitted for sepsis 2/2 aspiration pna. Head CT was done that did not show 

acute IC pathology. Chest CT was done that showed patchy consolidation within 

upper and lower lobes with extensive infiltrates. Pt was subsequently treated 

empirically for pneumonia with Ceftriaxone and Azithromycin. Blood cultures 

were neg. RSV/Legionella/Strep pneumo Ag were also neg. ID was consulted and 

upon eval, antibiotic therapy was switched to Zosyn. Pt's home seizure meds 

were continued upon admission. During hospital admission, pt's symptoms 

improved with O2 saturation in 96-97% on RA. Pt was discharged to [facility] 

with directions to continue taking Augmentin and to follow up with his primary 

care physician.








Date of Discharge: 01/04/19











Minutes to complete discharge: 40





Discharge Summary


Reason For Visit: LOWER URINARY TRACT INFECTIOUS DISEASE


Current Active Problems





Seizure (Chronic)








Condition: Improved





- Instructions


Diet, Activity, Other Instructions: 


You were admitted to the hospital after episodes of oxygen desaturation and 

respiratory congestion.





In the hospital, imaging was done that showed evidence of pneumonia. You were 

evaluated by an infectious disease doctor and given IV antibiotics. Throughout 

your hospital stay, your breathing and oxygen saturation improved. Additionally

, on labs, your white count improved. You are being discharged home with oral 

antibiotics.








MEDICAL RECOMMENDATIONS


Please take Augmentin  500mg orally 3x per day for 4 more days. Please start 

taking your second dose today at 10pm.





Please continue the rest of your home medications as directed.





CONSULT RECOMMENDATIONS


Please follow up with your primary care physician within 1 week.








If you experience worsening shortness of breath, difficulty breathing, 

persistent seizures that are refractory to medication, chest pain, or other 

neurological symptoms, please proceed to your nearest emergency room 

immediately. 











Referrals: 


Olu Alvarez Jr [Non Staff, Medical] - 


Disposition: HOME





- Home Medications


Comprehensive Discharge Medication List: 


Ambulatory Orders





Calcium Carbonate/Vitamin D3 [Oyster Shell 500 mg + Vit D Tb] 1 each GT BID 02/

09/15 


Lacosamide Liquid [Vimpat] 200 mg GT BID 02/09/15 


Levetiracetam in NaCl (Iso-Os) [Levetiraceta-NaCl 1,000 mg/100] 2 gm GT BID 02/

09/15 


Levocarnitine 660 mg GT TID 02/09/15 


Multivit Infusion,Pedi 2,Vit K [M.v.i. Pediatric] 1 each GT DAILY 02/09/15 


Pyridoxine HCl [Vitamin B-6] 100 mg GT DAILY 02/09/15 


Zonisamide [Zonegran] 300 mg GT BID 02/09/15 


Albuterol Sulfate 0.5% [Ventolin 0.5% -] 1 amp NEB TID 12/30/18 


Clobazam [Onfi -] 10 mg GT HS 12/30/18 


Diazepam [Diastat Acudial] 5 mg RC PRN 12/31/18 


Fructooligosaccharides/Polydex [Fiber-Stat 15 gm/30 ml Liquid] 30 ml GT DAILY 12 /31/18 


Sennosides [Senna] 2 tab GT HS 12/31/18 


Sodium Chloride Tablet - 3.5 tab GT BID 12/31/18 


Amox-Tr/K Cl [Augmentin 400 mg/5 ml Oral Suspension -] 5 ml PO TID #105 ml 01/04 /19 








This patient is new to me today: Yes


Date on this admission: 01/04/19


Emergency Visit: Yes


ED Registration Date: 12/30/18


Care time: The patient presented to the Emergency Department on the above date 

and was hospitalized for further evaluation of their emergent condition.


Critical Care patient: No





- Discharge Referral


Referred to [facility] No

## 2019-01-12 ENCOUNTER — HOSPITAL ENCOUNTER (INPATIENT)
Dept: HOSPITAL 74 - JER | Age: 34
LOS: 5 days | Discharge: HOME | DRG: 463 | End: 2019-01-17
Attending: INTERNAL MEDICINE | Admitting: INTERNAL MEDICINE
Payer: COMMERCIAL

## 2019-01-12 VITALS — BODY MASS INDEX: 24.5 KG/M2

## 2019-01-12 DIAGNOSIS — B96.5: ICD-10-CM

## 2019-01-12 DIAGNOSIS — G80.0: ICD-10-CM

## 2019-01-12 DIAGNOSIS — R68.0: ICD-10-CM

## 2019-01-12 DIAGNOSIS — N35.919: ICD-10-CM

## 2019-01-12 DIAGNOSIS — R91.8: ICD-10-CM

## 2019-01-12 DIAGNOSIS — Z93.1: ICD-10-CM

## 2019-01-12 DIAGNOSIS — F73: ICD-10-CM

## 2019-01-12 DIAGNOSIS — Q02: ICD-10-CM

## 2019-01-12 DIAGNOSIS — G40.909: ICD-10-CM

## 2019-01-12 DIAGNOSIS — D72.829: ICD-10-CM

## 2019-01-12 DIAGNOSIS — K21.9: ICD-10-CM

## 2019-01-12 DIAGNOSIS — D47.3: ICD-10-CM

## 2019-01-12 DIAGNOSIS — N39.0: Primary | ICD-10-CM

## 2019-01-12 DIAGNOSIS — R78.81: ICD-10-CM

## 2019-01-12 LAB
ALBUMIN SERPL-MCNC: 3.2 G/DL (ref 3.4–5)
ALP SERPL-CCNC: 96 U/L (ref 45–117)
ALT SERPL-CCNC: 43 U/L (ref 13–61)
ANION GAP SERPL CALC-SCNC: 7 MMOL/L (ref 8–16)
APPEARANCE UR: (no result)
AST SERPL-CCNC: 22 U/L (ref 15–37)
BACTERIA #/AREA URNS HPF: (no result) /HPF
BASOPHILS # BLD: 0.3 % (ref 0–2)
BILIRUB SERPL-MCNC: 0.1 MG/DL (ref 0.2–1)
BILIRUB UR STRIP.AUTO-MCNC: NEGATIVE MG/DL
BUN SERPL-MCNC: 16 MG/DL (ref 7–18)
CALCIUM SERPL-MCNC: 9.7 MG/DL (ref 8.5–10.1)
CHLORIDE SERPL-SCNC: 110 MMOL/L (ref 98–107)
CO2 SERPL-SCNC: 25 MMOL/L (ref 21–32)
COLOR UR: (no result)
CREAT SERPL-MCNC: 0.6 MG/DL (ref 0.55–1.3)
DEPRECATED RDW RBC AUTO: 14.6 % (ref 11.9–15.9)
EOSINOPHIL # BLD: 1.3 % (ref 0–4.5)
GLUCOSE SERPL-MCNC: 123 MG/DL (ref 74–106)
HCT VFR BLD CALC: 36.4 % (ref 35.4–49)
HGB BLD-MCNC: 12.7 GM/DL (ref 11.7–16.9)
HYALINE CASTS URNS QL MICRO: 1 /LPF
KETONES UR QL STRIP: NEGATIVE
LEUKOCYTE ESTERASE UR QL STRIP.AUTO: (no result)
LYMPHOCYTES # BLD: 44.4 % (ref 8–40)
MCH RBC QN AUTO: 32.7 PG (ref 25.7–33.7)
MCHC RBC AUTO-ENTMCNC: 34.9 G/DL (ref 32–35.9)
MCV RBC: 93.6 FL (ref 80–96)
MONOCYTES # BLD AUTO: 2.5 % (ref 3.8–10.2)
NEUTROPHILS # BLD: 51.5 % (ref 42.8–82.8)
NITRITE UR QL STRIP: POSITIVE
PH UR: 7 [PH] (ref 5–8)
PLATELET # BLD AUTO: 501 K/MM3 (ref 134–434)
PMV BLD: 6.6 FL (ref 7.5–11.1)
POTASSIUM SERPLBLD-SCNC: 5.1 MMOL/L (ref 3.5–5.1)
PROT SERPL-MCNC: 8 G/DL (ref 6.4–8.2)
PROT UR QL STRIP: NEGATIVE
PROT UR QL STRIP: NEGATIVE
RBC # BLD AUTO: 3.89 M/MM3 (ref 4–5.6)
SODIUM SERPL-SCNC: 141 MMOL/L (ref 136–145)
SP GR UR: 1.01 (ref 1.01–1.03)
UROBILINOGEN UR STRIP-MCNC: NEGATIVE MG/DL (ref 0.2–1)
WBC # BLD AUTO: 7 K/MM3 (ref 4–10)

## 2019-01-12 NOTE — PDOC
History of Present Illness





- General


Stated Complaint: HYPOTHERMIA


Time Seen by Provider: 01/12/19 17:27


History Source: Patient, Nursing Home Records, Old Records, Other (Member of 

[facility] Staff at bedside)





- History of Present Illness


Initial Comments: 





HPI: 





32 y/o male BIBEMS to [facility] ER from [facility] for hypothermia. 

Interview technically limited as pt is severely MR and nonverbal at baseline. 

[facility] staff at bedside familiar with patient but is unable to 

provide HPI. Transfer paperwork states, found unresponsive and hypothermic. 

After blankets applied only 94.5 No further vitals obtained. EMS crew did not 

obtain BP or HR enroute.





Pt was discharged from this facility on 01/04/2019 for L upper and R lower lobe 

pneumonia. Recently completed outpatient Augmentin therapy.  





PCP: Dr. Elma De La Fuente





Medical Hx: 


- Profound MR


- Microcephalus


- Cerebral Palsy Spastic Quadriplegia


- Generalized Convulsive Epilepsy


- Urethral Stricture s/p suprapubic cath placement


- S/p G-tube placement











Past History





- Past Medical History


Allergies/Adverse Reactions: 


 Allergies











Allergy/AdvReac Type Severity Reaction Status Date / Time


 


No Known Allergies Allergy   Verified 01/12/19 17:41











Home Medications: 


Ambulatory Orders





Lacosamide Liquid [Vimpat] 200 mg GT BID 02/09/15 


Multivit Infusion,Pedi 2,Vit K [M.v.i. Pediatric] 1 each GT DAILY 02/09/15 


Fructooligosaccharides/Polydex [Fiber-Stat 15 gm/30 ml Liquid] 30 ml GT DAILY 12 /31/18 


Sodium Chloride Tablet - 3.5 tab GT BID 12/31/18 


Calcium Carbonate/Vitamin D3 [Oyster Shell 500-Vit D3 200 Tb] 1 each PO BID 01/ 12/19 


Levocarnitine (with Sugar) [Levocarnitine 100 mg/ml Soln] 7 ml PO TID 01/12/19 


levETIRAcetam [levETIRAcetam ORAL SUSPENSION] 20 mg PO BID 01/12/19 








Anemia: No


Asthma: No


Cancer: No


Cardiac Disorders: No


CVA: No


COPD: No


CHF: No


DVT: No


Dementia: No


Diabetes: No


GI Disorders: Yes (gerd)


 Disorders: Yes (SUPRAPUBIC CATHETER)


HTN: No


Hypercholesterolemia: No


Liver Disease: No


Seizures: Yes


Thyroid Disease: No





- Surgical History


Abdominal Surgery: Yes (G-tube)


Appendectomy: No


Cardiac Surgery: No


Cholecystectomy: No


GI Surgery: Yes (suprapubic tube)


Lung Surgery: No


Neurologic Surgery: No


Orthopedic Surgery: Yes (Spinal fusion)





- Immunization History


Immunization Up to Date: Yes





- Suicide/Smoking/Psychosocial Hx


Smoking History: Never smoked


Have you smoked in the past 12 months: No


Hx Alcohol Use: No


Drug/Substance Use Hx: No


Substance Use Type: None


Hx Substance Use Treatment: No





**Review of Systems





- Review of Systems


Able to Perform ROS?: No (Pt nonverbal at baseline)





*Physical Exam





- Physical Exam


Comments: 





Constitutional: Non-toxic, small adult male in no acute distress or obvious 

discomfort. Found semi-fowlers on hospital bed covered in two blankets. Alert 

and acting at baseline per member of staff. 





Head: Microcephaly. No obvious external signs of trauma. 





Eyes: Pupils 3mm and PERRL bilaterally. Tracking pen light. Sclerae white. 

Conjunctiva moist and not injected. 





NOSE: No nasal discharge.





THROAT: Pt would not open mouth wide enough to inspect posterior oropharynx. 

Visible portion was unremarkable. Membranes moist.


 


Neck: Supple, trachea is midline. 





Cardiovascular: Regular rate and regular rhythm.  No murmur, rubs, clicks, or 

gallops. Peripheral pulses:  Radial pulses full.   


 


Respiratory: Breathing unlabored. Equal chest rise and fall. Clear to 

auscultation bilaterally. No stridor, no wheezing, no rhonchi. 


 


Gastrointestinal: abdomen is soft and nondistended. No grimace with palpation. 

G tube in LUQ; site old and well appearing. Suprapubic catheter in place; site 

old and well appearing. Urine bag in place with yellow urine; no hematuria. No 

pulsatile masses. No overlying skin lesions or obvious signs of trauma. 


 


Neuro: Alert. Local withdraw to painful stimuli. Moving all four extremities 

spontaneously. 


  


Skin/MSK: Warm, dry, and intact. No bruising, rashes, or other lesions. Upper 

and lower extremities contracted.


 


MALE GENITALIA: Uncircumcised male penis with two descended testicles. No rash. 

Diaper in place. 











ED Treatment Course





- LABORATORY


CBC & Chemistry Diagram: 


 01/12/19 18:07





 01/12/19 18:07





Medical Decision Making





- Medical Decision Making





*Reviewed vital signs, nursing notes, and prior visit documentation (if 

available).





32 y/o MR and nonverbal male presenting with hypothermia and unresponsiveness 

but found alert and at baseline (per staff member). Borderline hypothermic to 

95.5 rectally. Vitals unremarkable for hypotension or tachycardia. Physical 

exam as described above. Unrevealing. Suspect symptoms are secondary to 

exposure based on staff members description of facility. Low suspicion for 

infectious process but will obtain CXR given recent pneumonia and UA given 

permanent catheterization. Applied multiple warm blankets and ordered warmed NS 

IVFB. 





CXR unremarkable for acute cardiopulmonary process. Improved over previous 

study from last admission. 





CBC unremarkable for leukocytosis or anemia. Mild thrombocytosis; possibly 

reaction to temperature. 





CMP unremarkable for electrolyte derangement. LFTs not elevated. BUN and Cr at 

baseline. eGFR >60.  





TSH within normal elements. Low suspicion for thyroid dysfunction. 





Pt signed out to resident Dr. Bryant after she was verbally appraised of the pts 

HPI, current ED course, and plan of management. Will follow up on pending UA 

and repeat rectal temp. Dispo dependent on results.  











*DC/Admit/Observation/Transfer


Diagnosis at time of Disposition: 


Hypothermia


Qualifiers:


 Encounter type: initial encounter Qualified Code(s): T68.XXXA - Hypothermia, 

initial encounter








- Referrals


Referrals: 


Elma De La Fuente [Non Staff, Medical] - 





- Patient Instructions





- Post Discharge Activity

## 2019-01-12 NOTE — PDOC
*Physical Exam





- Vital Signs


 Last Vital Signs











Temp Pulse Resp BP Pulse Ox


 


 95.5 F L  69   18   110/73   100 


 


 01/12/19 17:25  01/12/19 17:25  01/12/19 17:25  01/12/19 17:25  01/12/19 17:40














ED Treatment Course





- LABORATORY


CBC & Chemistry Diagram: 


 01/12/19 18:07





 01/12/19 18:07





- ADDITIONAL ORDERS


Additional order review: 


 Laboratory  Results











  01/12/19





  18:07


 


Sodium  141


 


Potassium  5.1


 


Chloride  110 H


 


Carbon Dioxide  25


 


Anion Gap  7 L


 


BUN  16


 


Creatinine  0.6


 


Creat Clearance w eGFR  > 60


 


Random Glucose  123 H


 


Calcium  9.7


 


Total Bilirubin  0.1 L


 


AST  22


 


ALT  43


 


Alkaline Phosphatase  96


 


Total Protein  8.0


 


Albumin  3.2 L


 


TSH  2.47








 











  01/12/19





  18:07


 


RBC  3.89 L


 


MCV  93.6


 


MCHC  34.9


 


RDW  14.6  D


 


MPV  6.6 L D


 


Neutrophils %  51.5  D


 


Lymphocytes %  44.4 H D


 


Monocytes %  2.5 L


 


Eosinophils %  1.3


 


Basophils %  0.3














- Medications


Given in the ED: 


ED Medications














Discontinued Medications














Generic Name Dose Route Start Last Admin





  Trade Name Freq  PRN Reason Stop Dose Admin


 


Sodium Chloride  500 ml  01/12/19 17:51  01/12/19 18:11





  Normal Saline -  IV  01/12/19 17:52  500 ml





  ONCE ONE   Administration





     





     





     





     














Medical Decision Making





- Medical Decision Making


Patient signed out from Dr. Aly


34yo M presenting from [facility] with hypothermia


-Temp 95.5 on triage, Received 500cc warm fluids


-Repeat temperature 94.6


-UA positive for infection with 2+LE, 12 WBCs, and moderate bacteria; covering 

with ceftazidime given previous urine cultures


-Plan to admit


01/12/19 20:45





Discussed case with inpatient team who accepted patient for admission under Dr. Alex


01/12/19 21:12


Updated Nurse Hernan who called from [facility]


01/12/19 22:17








*DC/Admit/Observation/Transfer


Diagnosis at time of Disposition: 


 UTI (urinary tract infection)





Hypothermia


Qualifiers:


 Encounter type: initial encounter Qualified Code(s): T68.XXXA - Hypothermia, 

initial encounter








- Discharge Dispostion


Condition at time of disposition: Guarded


Decision to Admit order: Yes





- Referrals





- Patient Instructions





- Post Discharge Activity

## 2019-01-12 NOTE — PDOC
Attending Attestation





- HPI


HPI: 





01/12/19 18:11


Patient is a 33 year old male with history of microencephaly, 2/2 

craniosynostosis, MR (non-verbal at baseline), s/p suprapubic catheter and G 

tube, brought in by ambulance from [facility] for hypothermia. As per 

facility, patients temperature was found to be 94 and has not improved despite 

warming with blankets. Patient was admitted on 12/29/18 for pneumonia where he 

was discharged on antibiotics and finished his course this week. Patients aide 

denies any shortness of breath, cough, nausea, vomiting, or diarrhea. Denies 

any change in baseline. 





- Physicial Exam


PE: 





01/12/19 18:11


GENERAL: Awake, nonverbal, contracted, in no acute distress


HEAD: No signs of trauma


EYES: PERRLA, EOMI, sclera anicteric, conjunctiva clear


LUNGS: Breath sounds equal, clear to auscultation bilaterally. 


HEART: Regular rate and rhythm


CHEST: Well healed midline surgical scar


ABDOMEN: Soft, nontender, left abdominal G tube, site is clean without signs of 

infection, no cellulitis. Suprapubic catheter is clean, dry, without signs of 

infection.  


EXTREMITIES: No edema.  No clubbing or cyanosis. No cords, erythema, or 

tenderness


NEUROLOGICAL: Cranial nerves II through XII grossly intact.


SKIN: Warm, Dry, normal turgor, no rashes or lesions noted.











- Medical Decision Making





01/12/19 18:13


Documentation prepared by Angelica Jin, acting as medical scribe for 

Isatu Spivey MD.





<Angelica Jin - Last Filed: 01/12/19 18:15>





- Resident


Resident Name: Alberto Aly





- ED Attending Attestation


I have performed the following: I have examined & evaluated the patient, The 

case was reviewed & discussed with the resident, I agree w/resident's findings 

& plan, Exceptions are as noted





- Medical Decision Making


EKG - NSR rate of 70 bpm, axis nml, intervals nml, no st elevation or 

depression 





01/12/19 21:17


 Laboratory Tests











  01/12/19 01/12/19 01/12/19





  18:07 18:07 19:34


 


WBC  7.0  


 


Hgb  12.7  


 


Hct  36.4  


 


Plt Count  501 H D  


 


Sodium   141 


 


Potassium   5.1 


 


Chloride   110 H 


 


Carbon Dioxide   25 


 


BUN   16 


 


Creatinine   0.6 


 


Random Glucose   123 H 


 


TSH   2.47 


 


Urine Color    Ltyellow


 


Urine Appearance    Cloudy


 


Urine Nitrite    Positive


 


Ur Leukocyte Esterase    2+ H


 


Urine WBC (Auto)    12


 


Urine RBC (Auto)    1


 


Urine Bacteria    Moderate











32 yo M presenting to the ER due to hypothermia


Normotensive


No leukocytosis


UA cloudy, (+) 


Micro demonstrates prior infection with pseudomonas


Will treat with Ceftazidime


Will admit to hospitalist





clinical impression: 


UTI, initial presentation


hypothermia, initial presentation





01/13/19 00:28


 





<Isatu Spivey - Last Filed: 01/16/19 07:30>

## 2019-01-12 NOTE — PN
Teaching Attending Note


Name of Resident: Maria C Robin





ATTENDING PHYSICIAN STATEMENT





I saw and evaluated the patient.


I reviewed the resident's note and discussed the case with the resident.


I agree with the resident's findings and plan as documented.








SUBJECTIVE:


Seen and examined; please see resident note for further discussion.  Overall 

this is a 32 y/o male with a PMH significant for MR/CP, seizures, resident of 

[facility], microcephaly, who was recently discharged from here after 

completing a course of abx for L-sided PNA (zosyn then augmentin); he is known 

to be conolonized in his urine with pansensative Pseudomonas.  He presents 

today being less interactive and hypothermic; concern in ER for UTI.  He cannot 

provide any history.  PRN warming applied, abx given in ER.  Bringing to 

medicine service with an ID consult.





Couldn't obtain ROS due to clinical status


PMH and PSH reviewed


FH asked and noncontributory


Socially he is resident of [place]; no etoh or tobacco


Medication list reviewed; reconciliation pending








OBJECTIVE:


VS, labs, imaging reviewed


Less interactive than baseline per resident who knows him from prior admit when 

he was well (I only saw him when he was sick and he looks same); NAD, resting 

in bed.  Not agitated.  Cannot provide history but will track, etc.


Microcephaly with PERRLA and EOMI


RRR s1/2 no mgr


Lungs with scattered crackles but hard to direct respiratory effort, w/ sym exp


NT ND +BS





Labs with slightly elevated platelets, WBC wnl, chemistry unremarkable.  UA 

same as last time with 2+ LE and +Nitrite


EKG pending


CXR pending








ASSESSMENT AND PLAN:


Presents from [facility] being less interactive





1) Less Interactive


-Likely due to global process; suspect potential UTI with underlying MR/CP/

Microcephaly and autonomic instability.


-No seizure activity noted; can do qShift neuro checks





2) Hypothermia


-2/2 UTI vs. autonomic instability


-PRN warming





3) Potential Acute Cystitis with chronic SPT (strictures, etc. hx)


-No WBC; hypothermic; Followup blood and urine cultures.  Cannot tell if 

symptomatic


-Sensativities reviewed with pseud from his last admission


-Giving a dose of levaquin and consulting Dr. Garcia; deferring further abx to 

his service





4) Thrombocytosis


-Could be reactive most likely; will followup CBC





5) Hx Seizures


-Continue home meds; no sz activity noted





6) Hx MR/CP/Microcephaly


-Will go to [place] on DC


-Hold feeds overnight; resume in AM.





7) Prior PNA


-Followup CXR for resolution of preexisting changes





FENA


-LR@60 overnight x1 L then stop


-PRN replete


-Hold overnight then resume what he gets at facility


-Baseline bedbound; continue with turning, etc.





Full Code

## 2019-01-12 NOTE — HP
CHIEF COMPLAINT: hypothermia 





PCP: Dr. Alvarez





HISTORY OF PRESENT ILLNESS:





32 y/o M from [facility] with h/o seizures, microcephaly, urethral stricture s/

p suprapubic catheter placement, MR, who presents to the ED with hypothermia. 

Pt is nonverbal at baseline. As per aid at bedside, pt was found to be 

hypothermic to 94.5F rectally and was thus sent to the [facility] ED for further 

evaluation. States that he was also shivering and was "not himself." At baseline

, while pt is nonverbal, he visually tracks. Currently, he is lethargic. Aid 

denies pt had any other sx.





Pt was recently d/c from [facility] 1/4/19 after tx of b/l PNA (ИРИНА, RLL). He was 

initially tx with cef and zithro, and later switched to zosyn. He was sent home 

on augmentin 875mg BID course through , which completed yesterday (1/11/19). 

On last admission, his UCx was (+) Pseudomonas that was pan-sensitive to azactam

, ceftazidime, gentamicin, levaquin, zosyn, meropenem and tobramycin. His UA 

looks similar from past visits, leuk esterase+ and nitrite+. 





ER course was notable for:


(1) ceftazidime 1gm IVPB x 1 


(2) 500 cc warm fluids


(3) varsha hugger





Recent Travel: none





PAST MEDICAL HISTORY: as above 





PAST SURGICAL HISTORY: unable to provide 





Social History: lives at [place]. see above 


Smoking: 


Alcohol:


Drugs: 





Family History: as above 





Allergies





No Known Allergies Allergy (Verified 01/12/19 17:41)


 








HOME MEDICATIONS:


 Home Medications





From [place] Paperwork:


Zonegran 300mg BID


Keppra 2000mg BID


Valproic acid 250mg BID


Vimpat 150mg BID


Diastat 5mg PRN if seizures


Vit B6 100mg Qam


MTV 5ml daily


Lactulose syrup 30cc BID


Fiberstat 30ml Qd


dulcolax supp. 10mg PRN


Fleet enema q3 days PRN


NaCl 3500 mg BID


Carnitor 660mg TID


Oscal 500mg BID


Acetaminophen 640mg q4h PRN pain/temp





REVIEW OF SYSTEMS


CONSTITUTIONAL: +lethargy, hypothermia 


Absent:  fever, chills, diaphoresis, generalized weakness, malaise, loss of 

appetite, weight change


HEENT: 


Absent:  rhinorrhea, nasal congestion, throat pain, throat swelling, difficulty 

swallowing, mouth swelling, ear pain, eye pain, visual changes


CARDIOVASCULAR: 


Absent: chest pain, syncope, palpitations, irregular heart rate, lightheadedness

, peripheral edema


RESPIRATORY: 


Absent: cough, shortness of breath, dyspnea with exertion, orthopnea, wheezing, 

stridor, hemoptysis


GASTROINTESTINAL:


Absent: abdominal pain, abdominal distension, nausea, vomiting, diarrhea, 

constipation, melena, hematochezia


GENITOURINARY: 


Absent: dysuria, frequency, urgency, hesitancy, hematuria, flank pain, genital 

pain


MUSCULOSKELETAL: 


Absent: myalgia, arthralgia, joint swelling, back pain, neck pain


SKIN: 


Absent: rash, itching, pallor


HEMATOLOGIC/IMMUNOLOGIC: 


Absent: easy bleeding, easy bruising, lymphadenopathy, frequent infections


ENDOCRINE:


Absent: unexplained weight gain, unexplained weight loss, heat intolerance, 

cold intolerance


NEUROLOGIC: 


Absent: headache, focal weakness or paresthesias, dizziness, unsteady gait, 

seizure, mental status changes, bladder or bowel incontinence


PSYCHIATRIC: 


Absent: anxiety, depression, suicidal or homicidal ideation, hallucinations.








PHYSICAL EXAMINATION


 Vital Signs - 24 hr











  01/12/19 01/12/19





  17:25 20:30


 


Temperature 95.5 F L 94.6 F L


 


Pulse Rate 69 


 


Pulse Rate [  69





Left Radial]  


 


Respiratory 18 16





Rate  


 


Blood Pressure 110/73 


 


Blood Pressure  124/92





[Left Arm]  


 


O2 Sat by Pulse 100 98





Oximetry (%)  











GENERAL: Non-verbal, lethargic. in NAD 


HEAD: +microcephaly


EYES: Pupils equal, round and reactive to light


EARS, NOSE, THROAT: Ears normal, nares patent, oropharynx clear without 

exudates. Moist mucous membranes.


NECK: favors R, supple 


LUNGS: Breath sounds equal, clear to auscultation bilaterally however unable to 

follow commands 


HEART: Regular rate and rhythm, normal S1 and S2 without murmur, rub or gallop.


ABDOMEN: Soft, nontender, not distended, normoactive bowel sounds, no guarding. 

+GT without surrounding erythema or edema 


: +suprapubic cath. clean, draining. 


LOWER EXTREMITIES: 2+ pt pulses, no peripheral edema. contracted LE 


NEUROLOGICAL:  unable to fully assess as pt nonverbal , does not follow 

commands.


PSYCHIATRIC: Cooperative. Good eye contact. Appropriate mood and affect.


SKIN: Dry





 Laboratory Results - last 24 hr











  01/12/19 01/12/19 01/12/19





  18:07 18:07 19:34


 


WBC  7.0  


 


RBC  3.89 L  


 


Hgb  12.7  


 


Hct  36.4  


 


MCV  93.6  


 


MCH  32.7  


 


MCHC  34.9  


 


RDW  14.6  D  


 


Plt Count  501 H D  


 


MPV  6.6 L D  


 


Absolute Neuts (auto)  3.6  


 


Neutrophils %  51.5  D  


 


Lymphocytes %  44.4 H D  


 


Monocytes %  2.5 L  


 


Eosinophils %  1.3  


 


Basophils %  0.3  


 


Nucleated RBC %  0  


 


Sodium   141 


 


Potassium   5.1 


 


Chloride   110 H 


 


Carbon Dioxide   25 


 


Anion Gap   7 L 


 


BUN   16 


 


Creatinine   0.6 


 


Creat Clearance w eGFR   > 60 


 


Random Glucose   123 H 


 


Calcium   9.7 


 


Total Bilirubin   0.1 L 


 


AST   22 


 


ALT   43 


 


Alkaline Phosphatase   96 


 


Total Protein   8.0 


 


Albumin   3.2 L 


 


TSH   2.47 


 


Urine Color    Ltyellow


 


Urine Appearance    Cloudy


 


Urine pH    7.0


 


Ur Specific Gravity    1.013


 


Urine Protein    Negative


 


Urine Glucose (UA)    Negative


 


Urine Ketones    Negative


 


Urine Blood    Negative


 


Urine Nitrite    Positive


 


Urine Bilirubin    Negative


 


Urine Urobilinogen    Negative


 


Ur Leukocyte Esterase    2+ H


 


Urine WBC (Auto)    12


 


Urine RBC (Auto)    1


 


Urine Bacteria    Moderate


 


Hyaline Casts    1








TESTING





Micro: UCx, Blood cx -pending


EKG: ordered, result pending


CXR: since prior, b/l infiltrates improved. still residual L perihilar 

infiltrate present. weak inspiratory effort





ASSESSMENT/PLAN:





32 y/o M from [facility] with h/o seizures, microcephaly, urethral stricture s/

p suprapubic catheter placement, MR, who presents to the ED with hypothermia. 





#Hypothermia possibly 2/2 infectious etiology, hypothalamic


-with new onset hypothermia. possible pt becoming septic, or possible 

hypothalamic involvement d/t microcephaly


-will c/w varsha santamaria PRN





#Possible UTI, hx pseudomonas 


-presents with similar UA from past admissions (+leuk esterase, +nitrates). has 

suprapubic catheter


-possible that it is colonized 


-s/p ceftazidime 1gm IVPB x 1 in ED. will start levaquin 500mg IVPB qd. EKG 

pending 


-pending blood cx, Ucx - can adjust abx accordingly


-ID consult; Dr. Garcia





#seizure hx


-c/w zongran 300mg BID


-keppra 2000mg BID


-valproic acid 250mg BID


-vimpat 150mg BID 


-carnitor 600mg TID


-sz precautions; pad side


-neurochecks q4h





#Thrombocytosis


-likely reactive, or 2/2 infection


-will continue to follow





#hx MR


-c/w turning q2h 





#F/E/N


IV LR 60 cc/hr x 1 bag


continue to follow lytes


will hold feeds for now; to start in AM





#PPX


DVT: SCD's





#Dispo


admit to med-surg








Visit type





- Emergency Visit


Emergency Visit: Yes


ED Registration Date: 01/12/19


Care time: The patient presented to the Emergency Department on the above date 

and was hospitalized for further evaluation of their emergent condition.





- New Patient


This patient is new to me today: Yes


Date on this admission: 01/13/19





- Critical Care


Critical Care patient: No

## 2019-01-13 LAB
ANION GAP SERPL CALC-SCNC: 7 MMOL/L (ref 8–16)
BASOPHILS # BLD: 0.5 % (ref 0–2)
BUN SERPL-MCNC: 13 MG/DL (ref 7–18)
CALCIUM SERPL-MCNC: 8.9 MG/DL (ref 8.5–10.1)
CHLORIDE SERPL-SCNC: 110 MMOL/L (ref 98–107)
CO2 SERPL-SCNC: 22 MMOL/L (ref 21–32)
CREAT SERPL-MCNC: 0.5 MG/DL (ref 0.55–1.3)
DEPRECATED RDW RBC AUTO: 14.7 % (ref 11.9–15.9)
EOSINOPHIL # BLD: 1 % (ref 0–4.5)
GLUCOSE SERPL-MCNC: 90 MG/DL (ref 74–106)
HCT VFR BLD CALC: 34.7 % (ref 35.4–49)
HGB BLD-MCNC: 11.4 GM/DL (ref 11.7–16.9)
LYMPHOCYTES # BLD: 41.7 % (ref 8–40)
MAGNESIUM SERPL-MCNC: 1.9 MG/DL (ref 1.8–2.4)
MCH RBC QN AUTO: 31 PG (ref 25.7–33.7)
MCHC RBC AUTO-ENTMCNC: 32.8 G/DL (ref 32–35.9)
MCV RBC: 94.4 FL (ref 80–96)
MONOCYTES # BLD AUTO: 2.5 % (ref 3.8–10.2)
NEUTROPHILS # BLD: 54.3 % (ref 42.8–82.8)
PHOSPHATE SERPL-MCNC: 3.6 MG/DL (ref 2.5–4.9)
PLATELET # BLD AUTO: 446 K/MM3 (ref 134–434)
PMV BLD: 6.8 FL (ref 7.5–11.1)
POTASSIUM SERPLBLD-SCNC: 4.7 MMOL/L (ref 3.5–5.1)
RBC # BLD AUTO: 3.68 M/MM3 (ref 4–5.6)
SODIUM SERPL-SCNC: 139 MMOL/L (ref 136–145)
WBC # BLD AUTO: 7.5 K/MM3 (ref 4–10)

## 2019-01-13 RX ADMIN — Medication SCH TAB: at 23:00

## 2019-01-13 RX ADMIN — LACOSAMIDE SCH MG: 10 SOLUTION ORAL at 23:50

## 2019-01-13 RX ADMIN — LEVETIRACETAM SCH MG: 100 SOLUTION ORAL at 11:28

## 2019-01-13 RX ADMIN — ZONISAMIDE SCH MG: 100 CAPSULE ORAL at 11:29

## 2019-01-13 RX ADMIN — LACOSAMIDE SCH MG: 10 SOLUTION ORAL at 00:05

## 2019-01-13 RX ADMIN — ZONISAMIDE SCH MG: 100 CAPSULE ORAL at 00:05

## 2019-01-13 RX ADMIN — PIPERACILLIN SODIUM,TAZOBACTAM SODIUM SCH MLS/HR: 3; .375 INJECTION, POWDER, FOR SOLUTION INTRAVENOUS at 14:51

## 2019-01-13 RX ADMIN — LACOSAMIDE SCH MG: 10 SOLUTION ORAL at 11:26

## 2019-01-13 RX ADMIN — PIPERACILLIN SODIUM,TAZOBACTAM SODIUM SCH MLS/HR: 3; .375 INJECTION, POWDER, FOR SOLUTION INTRAVENOUS at 17:50

## 2019-01-13 RX ADMIN — LEVETIRACETAM SCH MG: 100 SOLUTION ORAL at 00:05

## 2019-01-13 RX ADMIN — LEVOCARNITINE SCH MG: 1 SOLUTION ORAL at 14:52

## 2019-01-13 RX ADMIN — LEVETIRACETAM SCH MG: 100 SOLUTION ORAL at 23:00

## 2019-01-13 RX ADMIN — MULTIVITAMIN SCH ML: LIQUID ORAL at 11:28

## 2019-01-13 RX ADMIN — SODIUM CHLORIDE TAB 1 GM SCH GM: 1 TAB at 23:01

## 2019-01-13 RX ADMIN — LEVOCARNITINE SCH MG: 1 SOLUTION ORAL at 23:51

## 2019-01-13 RX ADMIN — SODIUM CHLORIDE TAB 1 GM SCH GM: 1 TAB at 00:25

## 2019-01-13 RX ADMIN — HEPARIN SODIUM SCH UNIT: 5000 INJECTION, SOLUTION INTRAVENOUS; SUBCUTANEOUS at 23:00

## 2019-01-13 RX ADMIN — SODIUM CHLORIDE TAB 1 GM SCH GM: 1 TAB at 11:29

## 2019-01-13 RX ADMIN — LEVOCARNITINE SCH MG: 1 SOLUTION ORAL at 05:20

## 2019-01-13 RX ADMIN — ZONISAMIDE SCH MG: 100 CAPSULE ORAL at 23:00

## 2019-01-13 RX ADMIN — Medication SCH TAB: at 11:28

## 2019-01-13 NOTE — PN
Progress Note (short form)





- Note


Progress Note: 





Subjective:


No events .  per aid, he was awake at his base line this am . now sleeping 





Objective:








Vital Signs:


 Last Vital Signs











Temp Pulse Resp BP Pulse Ox


 


 97.4 F L  99 H  18   104/70   98 


 


 01/13/19 15:29  01/13/19 15:29  01/13/19 15:29  01/13/19 15:29  01/13/19 01:35








 Laboratory Results - last 24 hr











  01/12/19 01/12/19 01/12/19





  18:07 18:07 19:34


 


WBC  7.0  


 


RBC  3.89 L  


 


Hgb  12.7  


 


Hct  36.4  


 


MCV  93.6  


 


MCH  32.7  


 


MCHC  34.9  


 


RDW  14.6  D  


 


Plt Count  501 H D  


 


MPV  6.6 L D  


 


Absolute Neuts (auto)  3.6  


 


Neutrophils %  51.5  D  


 


Lymphocytes %  44.4 H D  


 


Monocytes %  2.5 L  


 


Eosinophils %  1.3  


 


Basophils %  0.3  


 


Nucleated RBC %  0  


 


Sodium   141 


 


Potassium   5.1 


 


Chloride   110 H 


 


Carbon Dioxide   25 


 


Anion Gap   7 L 


 


BUN   16 


 


Creatinine   0.6 


 


Creat Clearance w eGFR   > 60 


 


Random Glucose   123 H 


 


Calcium   9.7 


 


Phosphorus   


 


Magnesium   


 


Total Bilirubin   0.1 L 


 


AST   22 


 


ALT   43 


 


Alkaline Phosphatase   96 


 


Total Protein   8.0 


 


Albumin   3.2 L 


 


TSH   2.47 


 


Urine Color    Ltyellow


 


Urine Appearance    Cloudy


 


Urine pH    7.0


 


Ur Specific Gravity    1.013


 


Urine Protein    Negative


 


Urine Glucose (UA)    Negative


 


Urine Ketones    Negative


 


Urine Blood    Negative


 


Urine Nitrite    Positive


 


Urine Bilirubin    Negative


 


Urine Urobilinogen    Negative


 


Ur Leukocyte Esterase    2+ H


 


Urine WBC (Auto)    12


 


Urine RBC (Auto)    1


 


Urine Bacteria    Moderate


 


Hyaline Casts    1














  01/13/19 01/13/19





  07:30 07:30


 


WBC  7.5 


 


RBC  3.68 L 


 


Hgb  11.4 L 


 


Hct  34.7 L 


 


MCV  94.4 


 


MCH  31.0 


 


MCHC  32.8 


 


RDW  14.7 


 


Plt Count  446 H 


 


MPV  6.8 L 


 


Absolute Neuts (auto)  4.1 


 


Neutrophils %  54.3 


 


Lymphocytes %  41.7 H 


 


Monocytes %  2.5 L 


 


Eosinophils %  1.0 


 


Basophils %  0.5 


 


Nucleated RBC %  0 


 


Sodium   139


 


Potassium   4.7


 


Chloride   110 H


 


Carbon Dioxide   22


 


Anion Gap   7 L


 


BUN   13


 


Creatinine   0.5 L


 


Creat Clearance w eGFR   > 60


 


Random Glucose   90


 


Calcium   8.9


 


Phosphorus   3.6


 


Magnesium   1.9


 


Total Bilirubin  


 


AST  


 


ALT  


 


Alkaline Phosphatase  


 


Total Protein  


 


Albumin  


 


TSH  


 


Urine Color  


 


Urine Appearance  


 


Urine pH  


 


Ur Specific Gravity  


 


Urine Protein  


 


Urine Glucose (UA)  


 


Urine Ketones  


 


Urine Blood  


 


Urine Nitrite  


 


Urine Bilirubin  


 


Urine Urobilinogen  


 


Ur Leukocyte Esterase  


 


Urine WBC (Auto)  


 


Urine RBC (Auto)  


 


Urine Bacteria  


 


Hyaline Casts  














Physical Exam:


NAD, sleeping and comfortable 


CV: RRR


Lungs: no crackles 


Abd: soft, NT, ND , NL BS . PEG in . supeapubic cath in 


ext : no edema or erythema 








Assessment/Plan:





32 y/o man with h/o seizures , microcephaly, urethral stricture s/p suprapubic 

cath placement, MR , recent hospitalization for PNA , and other medical 

problems who was sent from [place] for decreased responsiveness 








1- Possible UTI . 


- zosyn per ID . 


- follow urine cx 


- no leukocystosis or lung infiltrate.  or skin lesions 


- temperature improved. of varsha santamaria 








2- Seizure disorder :meds were reconsiled and correct doses wer ordered ( Vimpat

, Onfi, Kepra , and Zonisamide) . not on depakote, will dc 





3- Nutrition: Resume tube feeds  Promote 9.5 hours  over night with water 

flushes ( 75 cc/hr and 240 cc BID )  





HLOC 

















Visit type





- Emergency Visit


Emergency Visit: Yes


ED Registration Date: 01/12/19


Care time: The patient presented to the Emergency Department on the above date 

and was hospitalized for further evaluation of their emergent condition.





- New Patient


This patient is new to me today: Yes


Date on this admission: 01/13/19





- Critical Care


Critical Care patient: No

## 2019-01-13 NOTE — CON.ID
Consult


Consult Specialty:: infectous diseases


Referred by:: hospitalist


Reason for Consultation:: sepsis,hypothermia





- History of Present Illness


History of Present Illness: 





 32 y/o male with a PMH significant for MR/CP, seizures, resident of [facility], microcephaly, who was recently discharged from here after completing a 

course of abx for L-sided PNA who is well known to me from previous admission 

was brought to the hospital because he was hypothermic and lethargic and the 

thought process was that he was having sepsis from urinary tract infection


patient currently looks stable and his breathing well





- History Source


History Provided By: Medical Record


Limitations to Obtaining History: Clinical Condition





- Alcohol/Substance Use


Hx Alcohol Use: No





- Smoking History


Smoking history: Never smoked


Have you smoked in the past 12 months: No





Home Medications





- Allergies


Allergies/Adverse Reactions: 


 Allergies











Allergy/AdvReac Type Severity Reaction Status Date / Time


 


No Known Allergies Allergy   Verified 01/12/19 17:41














- Home Medications


Home Medications: 


Ambulatory Orders





Lacosamide Liquid [Vimpat] 200 mg GT BID 02/09/15 


Multivit Infusion,Pedi 2,Vit K [M.v.i. Pediatric] 1 each GT DAILY 02/09/15 


Fructooligosaccharides/Polydex [Fiber-Stat 15 gm/30 ml Liquid] 30 ml GT DAILY 12 /31/18 


Sodium Chloride Tablet - 3.5 tab GT BID 12/31/18 


Calcium Carbonate/Vitamin D3 [Oyster Shell 500-Vit D3 200 Tb] 1 each PO BID 01/ 12/19 


Levocarnitine (with Sugar) [Levocarnitine 100 mg/ml Soln] 7 ml PO TID 01/12/19 


levETIRAcetam [levETIRAcetam ORAL SUSPENSION] 2 g PO BID 01/12/19 


Albuterol 0.083% Nebulizer Sol [Ventolin 0.083%] 1 neb NEB Q4H PRN 01/13/19 


Clobazam [Onfi -] 10 mg GT HS 01/13/19 


Diazepam [Diastat] 5 mg RC TID PRN 01/13/19 


Pyridoxine HCl [Vitamin B-6] 100 mg GT DAILY 01/13/19 


Sennosides [Senna] 8.6 mg GT HS 01/13/19 


Zonisamide 300 mg GT BID 01/13/19 











Review of Systems


Unable to obtain ROS, reason: unable to obtain





Physical Exam


Vital Signs: 


 Vital Signs











Temperature  97.2 F L  01/13/19 10:00


 


Pulse Rate  83   01/13/19 10:00


 


Respiratory Rate  18   01/13/19 10:00


 


Blood Pressure  150/83   01/13/19 10:00


 


O2 Sat by Pulse Oximetry (%)  98   01/13/19 01:35











Constitutional: Yes: No Distress, Calm


Cardiovascular: Yes: Regular Rate and Rhythm


Respiratory: Yes: Regular, Rhonchi


Gastrointestinal: Yes: Normal Bowel Sounds, Soft, Other


Renal/: Yes: Other (supra pubic in place)


Neurological: Yes: Alert, Other


Psychiatric: Yes: Other


Labs: 


 CBC, BMP





 01/13/19 07:30 





 01/13/19 07:30 











Imaging





- Results


Chest X-ray: Report Reviewed, Image Reviewed


X-ray: Report Reviewed, Image Reviewed





Assessment/Plan





32 y/o M from [facility] with h/o seizures, microcephaly, urethral stricture s/

p suprapubic catheter placement, MR, who presents to the ED with hypothermia. 





Hypothermia possibly 2/2 infectious etiology, hypothalamic


Possible UTI


seizure hx


Thrombocytosis


hx MR





plan


in view of patients back ground will start him on iv abx


will monitor 


await for cx reports


will make a final decision after that


nutrition


rest as per the team

## 2019-01-14 LAB
BASOPHILS # BLD: 0.3 % (ref 0–2)
DEPRECATED RDW RBC AUTO: 14.8 % (ref 11.9–15.9)
EOSINOPHIL # BLD: 0.1 % (ref 0–4.5)
HCT VFR BLD CALC: 36.5 % (ref 35.4–49)
HGB BLD-MCNC: 11.8 GM/DL (ref 11.7–16.9)
LYMPHOCYTES # BLD: 10.1 % (ref 8–40)
MCH RBC QN AUTO: 30.6 PG (ref 25.7–33.7)
MCHC RBC AUTO-ENTMCNC: 32.2 G/DL (ref 32–35.9)
MCV RBC: 94.9 FL (ref 80–96)
MONOCYTES # BLD AUTO: 3.9 % (ref 3.8–10.2)
NEUTROPHILS # BLD: 85.6 % (ref 42.8–82.8)
PLATELET # BLD AUTO: 414 K/MM3 (ref 134–434)
PMV BLD: 7 FL (ref 7.5–11.1)
RBC # BLD AUTO: 3.85 M/MM3 (ref 4–5.6)
WBC # BLD AUTO: 13.8 K/MM3 (ref 4–10)

## 2019-01-14 RX ADMIN — LACOSAMIDE SCH MG: 10 SOLUTION ORAL at 21:56

## 2019-01-14 RX ADMIN — SODIUM CHLORIDE TAB 1 GM SCH GM: 1 TAB at 10:33

## 2019-01-14 RX ADMIN — Medication SCH TAB: at 21:57

## 2019-01-14 RX ADMIN — MULTIVITAMIN SCH ML: LIQUID ORAL at 10:32

## 2019-01-14 RX ADMIN — PIPERACILLIN SODIUM,TAZOBACTAM SODIUM SCH MLS/HR: 3; .375 INJECTION, POWDER, FOR SOLUTION INTRAVENOUS at 02:24

## 2019-01-14 RX ADMIN — LEVETIRACETAM SCH MG: 100 SOLUTION ORAL at 10:32

## 2019-01-14 RX ADMIN — PIPERACILLIN SODIUM,TAZOBACTAM SODIUM SCH MLS/HR: 3; .375 INJECTION, POWDER, FOR SOLUTION INTRAVENOUS at 18:15

## 2019-01-14 RX ADMIN — LEVOCARNITINE SCH MG: 1 SOLUTION ORAL at 15:02

## 2019-01-14 RX ADMIN — LEVOCARNITINE SCH MG: 1 SOLUTION ORAL at 21:57

## 2019-01-14 RX ADMIN — Medication SCH TAB: at 10:33

## 2019-01-14 RX ADMIN — LEVETIRACETAM SCH MG: 100 SOLUTION ORAL at 21:59

## 2019-01-14 RX ADMIN — LEVOCARNITINE SCH MG: 1 SOLUTION ORAL at 05:55

## 2019-01-14 RX ADMIN — SODIUM CHLORIDE TAB 1 GM SCH GM: 1 TAB at 21:58

## 2019-01-14 RX ADMIN — LACOSAMIDE SCH MG: 10 SOLUTION ORAL at 10:34

## 2019-01-14 RX ADMIN — ZONISAMIDE SCH MG: 100 CAPSULE ORAL at 21:58

## 2019-01-14 RX ADMIN — HEPARIN SODIUM SCH UNIT: 5000 INJECTION, SOLUTION INTRAVENOUS; SUBCUTANEOUS at 15:02

## 2019-01-14 RX ADMIN — HEPARIN SODIUM SCH UNIT: 5000 INJECTION, SOLUTION INTRAVENOUS; SUBCUTANEOUS at 05:55

## 2019-01-14 RX ADMIN — HEPARIN SODIUM SCH UNIT: 5000 INJECTION, SOLUTION INTRAVENOUS; SUBCUTANEOUS at 21:59

## 2019-01-14 RX ADMIN — PIPERACILLIN SODIUM,TAZOBACTAM SODIUM SCH MLS/HR: 3; .375 INJECTION, POWDER, FOR SOLUTION INTRAVENOUS at 10:34

## 2019-01-14 RX ADMIN — ZONISAMIDE SCH MG: 100 CAPSULE ORAL at 10:34

## 2019-01-14 NOTE — PN
Progress Note, Physician


History of Present Illness: 





patient stable


no new issues








- Current Medication List


Current Medications: 


Active Medications





Albuterol Sulfate (Ventolin 0.083% Nebulizer Soln -)  1 amp NEB Q4H PRN


   PRN Reason: SHORT OF BREATH/WHEEZING


Calcium Carbonate/Cholecalciferol (Os-Dale 500+D -)  1 tab GT BID [facility]


   Last Admin: 01/14/19 10:33 Dose:  1 tab


Clobazam (Onfi -)  10 mg PO HS [facility]


   Last Admin: 01/13/19 23:01 Dose:  10 mg


Heparin Sodium (Porcine) (Heparin -)  5,000 unit SQ TID [facility]


   Last Admin: 01/14/19 05:55 Dose:  5,000 unit


Piperacillin Sod/Tazobactam (Sod 3.375 gm/ Dextrose)  50 mls @ 100 mls/hr IVPB 

Q8H-IV [facility]; Protocol


   Last Admin: 01/14/19 10:34 Dose:  100 mls/hr


Lacosamide (Vimpat Liquid -)  200 mg GT BID [facility]


   Last Admin: 01/14/19 10:34 Dose:  200 mg


Levetiracetam (Keppra Oral Solution -)  2,000 mg GT BID [facility]


   Last Admin: 01/14/19 10:32 Dose:  2,000 mg


Levocarnitine (Carnitor Oral Solution -)  660 mg GT TID [facility]


   Last Admin: 01/14/19 05:55 Dose:  660 mg


Sodium Chloride (Sodium Chloride Tablet -)  3.5 gm GT BID [facility]


   Last Admin: 01/14/19 10:33 Dose:  3.5 gm


Zonisamide (Zonisamide)  300 mg GT BID [facility]


   Last Admin: 01/14/19 10:34 Dose:  300 mg











- Objective


Vital Signs: 


 Vital Signs











Temperature  98.8 F   01/14/19 09:00


 


Pulse Rate  107 H  01/14/19 09:00


 


Respiratory Rate  18   01/14/19 09:00


 


Blood Pressure  99/73   01/14/19 09:00


 


O2 Sat by Pulse Oximetry (%)  96   01/13/19 21:00











Constitutional: Yes: No Distress, Calm


Cardiovascular: Yes: Regular Rate and Rhythm


Respiratory: Yes: Regular, CTA Bilaterally


Gastrointestinal: Yes: Normal Bowel Sounds, Soft, Other (peg tube feeding)


Musculoskeletal: Yes: WNL


Extremities: Yes: WNL


Neurological: Yes: Alert


Psychiatric: Yes: Other


Labs: 


 CBC, BMP





 01/14/19 06:30 





 01/13/19 07:30 











Assessment/Plan





32 y/o M from [facility] with h/o seizures, microcephaly, urethral stricture s/

p suprapubic catheter placement, MR, who presents to the ED with hypothermia. 





Hypothermia possibly 2/2 infectious etiology, hypothalamic


Possible UTI


seizure hx


Thrombocytosis


hx MR


gm positive bactermia





plan


continue current abx


incentive patricio


rest as per the team


will repeat blood cx tomorrow

## 2019-01-14 NOTE — PN
Teaching Attending Note


Name of Resident: Maddie Cooper





ATTENDING PHYSICIAN STATEMENT





I saw and evaluated the patient.


I reviewed the resident's note and discussed the case with the resident.


I agree with the resident's findings and plan as documented.








SUBJECTIVE:


no events over night 





OBJECTIVE:


NAD, non-verbal


CV: RRR


Lungs: no crackles 


Abd: soft, NT, ND , NL BS. PEG in . supeapubic cath with yellow urine 


ext : no edema or erythema 








Assessment/Plan:





32 y/o man with h/o seizures, microcephaly, urethral stricture s/p suprapubic 

cath placement, MR , recent hospitalization for PNA , and other medical 

problems who was sent from [place] for decreased responsiveness 








1- UTI . 


- zosyn 


- follow final urine cx ( prelim, pseudomonas ) 


- G + bacili in one set of blood cx is likely contaminant form skin


- repeat cx. 


- no more hypothermia. will ask [place] staff if  patient is hypothermic at 

his base line 








2- Seizure disorder :Cont  Vimpat, Onfi, Kepra , and Zonisamide





3- Nutrition: cont  tube feeds  Promote 9.5 hours  over night ( 8 pm to 5:30 am 

)  with water flushes ( 75 cc/hr and 240 cc BID )  





HLOC

## 2019-01-15 LAB
ANION GAP SERPL CALC-SCNC: 9 MMOL/L (ref 8–16)
BUN SERPL-MCNC: 17 MG/DL (ref 7–18)
CALCIUM SERPL-MCNC: 9 MG/DL (ref 8.5–10.1)
CHLORIDE SERPL-SCNC: 107 MMOL/L (ref 98–107)
CO2 SERPL-SCNC: 21 MMOL/L (ref 21–32)
CREAT SERPL-MCNC: 0.7 MG/DL (ref 0.55–1.3)
DEPRECATED RDW RBC AUTO: 15.6 % (ref 11.9–15.9)
GLUCOSE SERPL-MCNC: 204 MG/DL (ref 74–106)
HCT VFR BLD CALC: 31.5 % (ref 35.4–49)
HGB BLD-MCNC: 11 GM/DL (ref 11.7–16.9)
MCH RBC QN AUTO: 32.9 PG (ref 25.7–33.7)
MCHC RBC AUTO-ENTMCNC: 35 G/DL (ref 32–35.9)
MCV RBC: 94.2 FL (ref 80–96)
PLATELET # BLD AUTO: 432 K/MM3 (ref 134–434)
PMV BLD: 7.1 FL (ref 7.5–11.1)
POTASSIUM SERPLBLD-SCNC: 4.2 MMOL/L (ref 3.5–5.1)
RBC # BLD AUTO: 3.35 M/MM3 (ref 4–5.6)
SODIUM SERPL-SCNC: 137 MMOL/L (ref 136–145)
WBC # BLD AUTO: 14.1 K/MM3 (ref 4–10)

## 2019-01-15 RX ADMIN — HEPARIN SODIUM SCH UNIT: 5000 INJECTION, SOLUTION INTRAVENOUS; SUBCUTANEOUS at 22:27

## 2019-01-15 RX ADMIN — ZONISAMIDE SCH MG: 100 CAPSULE ORAL at 10:13

## 2019-01-15 RX ADMIN — HEPARIN SODIUM SCH UNIT: 5000 INJECTION, SOLUTION INTRAVENOUS; SUBCUTANEOUS at 05:53

## 2019-01-15 RX ADMIN — MULTIVITAMIN SCH ML: LIQUID ORAL at 10:10

## 2019-01-15 RX ADMIN — PIPERACILLIN SODIUM,TAZOBACTAM SODIUM SCH MLS/HR: 3; .375 INJECTION, POWDER, FOR SOLUTION INTRAVENOUS at 10:10

## 2019-01-15 RX ADMIN — LEVOCARNITINE SCH MG: 1 SOLUTION ORAL at 05:53

## 2019-01-15 RX ADMIN — LACOSAMIDE SCH MG: 10 SOLUTION ORAL at 10:09

## 2019-01-15 RX ADMIN — LEVOCARNITINE SCH MG: 1 SOLUTION ORAL at 13:50

## 2019-01-15 RX ADMIN — HEPARIN SODIUM SCH UNIT: 5000 INJECTION, SOLUTION INTRAVENOUS; SUBCUTANEOUS at 13:49

## 2019-01-15 RX ADMIN — Medication SCH TAB: at 10:09

## 2019-01-15 RX ADMIN — SODIUM CHLORIDE TAB 1 GM SCH GM: 1 TAB at 22:29

## 2019-01-15 RX ADMIN — PIPERACILLIN SODIUM,TAZOBACTAM SODIUM SCH MLS/HR: 3; .375 INJECTION, POWDER, FOR SOLUTION INTRAVENOUS at 02:41

## 2019-01-15 RX ADMIN — PIPERACILLIN SODIUM,TAZOBACTAM SODIUM SCH MLS/HR: 3; .375 INJECTION, POWDER, FOR SOLUTION INTRAVENOUS at 18:06

## 2019-01-15 RX ADMIN — SODIUM CHLORIDE TAB 1 GM SCH GM: 1 TAB at 10:10

## 2019-01-15 RX ADMIN — LACOSAMIDE SCH MG: 10 SOLUTION ORAL at 22:27

## 2019-01-15 RX ADMIN — ZONISAMIDE SCH MG: 100 CAPSULE ORAL at 22:28

## 2019-01-15 RX ADMIN — LEVOCARNITINE SCH MG: 1 SOLUTION ORAL at 22:30

## 2019-01-15 RX ADMIN — LEVETIRACETAM SCH MG: 100 SOLUTION ORAL at 22:29

## 2019-01-15 RX ADMIN — Medication SCH TAB: at 22:27

## 2019-01-15 RX ADMIN — LEVETIRACETAM SCH MG: 100 SOLUTION ORAL at 10:09

## 2019-01-15 NOTE — PN
Physical Exam: 


SUBJECTIVE: Patient seen and examined








OBJECTIVE:





 Vital Signs











Temperature  98.0 F   01/15/19 15:14


 


Pulse Rate  99 H  01/15/19 15:14


 


Respiratory Rate  20   01/15/19 15:14


 


Blood Pressure  110/71   01/15/19 15:14


 


O2 Sat by Pulse Oximetry (%)  94 L  01/15/19 09:00




















GENERAL: Awake. NAD. Comfortable.


HEENT: Microcephalic. Tracks eyes. Moist mucus membranes.


NECK: Trachea midline, supple. 


LUNGS: CTA B/L. No wheezes noted.


HEART: RRR. Normal S1, S2. No murmurs noted.


ABDOMEN: Soft, NT/ND. Gtube in place, clear and dry. Suprapubic catheter in 

place, clear and dry, draining yellow urine.


EXTREMITIES: 2+ pulses, warm, well-perfused, no edema. Contracted b/l LE 

extremities. No peripheral edema noted.


NEUROLOGICAL: Unable to assess.


SKIN: Warm, dry, normal turgor, no rashes or lesions noted














 CBCD











WBC  14.1 K/mm3 (4.0-10.0)  H  01/15/19  06:00    


 


RBC  3.35 M/mm3 (4.00-5.60)  L  01/15/19  06:00    


 


Hgb  11.0 GM/dL (11.7-16.9)  L  01/15/19  06:00    


 


Hct  31.5 % (35.4-49)  L  01/15/19  06:00    


 


MCV  94.2 fl (80-96)   01/15/19  06:00    


 


MCHC  35.0 g/dl (32.0-35.9)   01/15/19  06:00    


 


RDW  15.6 % (11.9-15.9)   01/15/19  06:00    


 


Plt Count  432 K/MM3 (134-434)   01/15/19  06:00    


 


MPV  7.1 fl (7.5-11.1)  L  01/15/19  06:00    








 CMP











Sodium  137 mmol/L (136-145)   01/15/19  06:00    


 


Potassium  4.2 mmol/L (3.5-5.1)   01/15/19  06:00    


 


Chloride  107 mmol/L ()   01/15/19  06:00    


 


Carbon Dioxide  21 mmol/L (21-32)   01/15/19  06:00    


 


Anion Gap  9 MMOL/L (8-16)   01/15/19  06:00    


 


BUN  17 mg/dL (7-18)   01/15/19  06:00    


 


Creatinine  0.7 mg/dL (0.55-1.3)   01/15/19  06:00    


 


Creat Clearance w eGFR  > 60  (>60)   01/15/19  06:00    


 


Calcium  9.0 mg/dL (8.5-10.1)   01/15/19  06:00    


 


Total Bilirubin  0.1 mg/dL (0.2-1)  L  01/12/19  18:07    


 


AST  22 U/L (15-37)   01/12/19  18:07    


 


ALT  43 U/L (13-61)   01/12/19  18:07    


 


Alkaline Phosphatase  96 U/L ()   01/12/19  18:07    


 


Total Protein  8.0 g/dl (6.4-8.2)   01/12/19  18:07    


 


Albumin  3.2 g/dl (3.4-5.0)  L  01/12/19  18:07    

















Active Medications





Albuterol Sulfate (Ventolin 0.083% Nebulizer Soln -)  1 amp NEB Q4H PRN


   PRN Reason: SHORT OF BREATH/WHEEZING


Calcium Carbonate/Cholecalciferol (Os-Dale 500+D -)  1 tab GT BID [facility]


   Last Admin: 01/15/19 10:09 Dose:  1 tab


Clobazam (Onfi -)  10 mg PO HS [facility]


   Last Admin: 01/14/19 21:57 Dose:  10 mg


Heparin Sodium (Porcine) (Heparin -)  5,000 unit SQ TID [facility]


   Last Admin: 01/15/19 13:49 Dose:  5,000 unit


Piperacillin Sod/Tazobactam (Sod 3.375 gm/ Dextrose)  50 mls @ 100 mls/hr IVPB 

Q8H-IV [facility]; Protocol


   Last Admin: 01/15/19 10:10 Dose:  100 mls/hr


Lacosamide (Vimpat Liquid -)  200 mg GT BID [facility]


   Last Admin: 01/15/19 10:09 Dose:  200 mg


Levetiracetam (Keppra Oral Solution -)  2,000 mg GT BID [facility]


   Last Admin: 01/15/19 10:09 Dose:  2,000 mg


Levocarnitine (Carnitor Oral Solution -)  660 mg GT TID [facility]


   Last Admin: 01/15/19 13:50 Dose:  660 mg


Sodium Chloride (Sodium Chloride Tablet -)  3.5 gm GT BID [facility]


   Last Admin: 01/15/19 10:10 Dose:  3.5 gm


Zonisamide (Zonisamide)  300 mg GT BID [facility]


   Last Admin: 01/15/19 10:13 Dose:  300 mg














CONSULT:


ID- Dr. Garcia





IMAGING:


* CXR: Residual L perihilar infiltrate





ASSESSMENT/PLAN:


34 y/o man with h/o seizures , microcephaly, urethral stricture s/p suprapubic 

cath placement, MR , recent hospitalization for PNA , and other medical 

problems who was sent from [place] for decreased responsiveness and 

hypothermia.





#? UTI


-Per ID, Zosyn 3.375 gm (started 1/13/19)


-UCx growing presumptive Pseudomonas; BCx neg x24h (1/13), await final results


-WBC 13.8, now 14.1; temp improved, today 98.0. Per Dr. Alvarez, pt temp 

usually normal, not hypothermic; ? hypothalamic etiology as pt has seizure d/o


-Flo santamaria


-F/u ID recs





#Seizure disorder


-sz precautions; pad side


-neurochecks q4h


Cont home meds:


-Onfi 10 mg PO HS


-Keppra 2000 mg BID 


-Vimpat 200 mg BID 


-Carnitor 600mg TID 





#Prophylaxis


-Heparin 5000U SQ TID





#FEN


-No IVfs


-recheck lytes in AM


-Resume tube feeds, Promote 9.5 hours over night with water flushes (50 cc/hr 

and 240 cc BID)  





dispo


-full code











Visit type





- Emergency Visit


Emergency Visit: Yes


ED Registration Date: 01/12/19


Care time: The patient presented to the Emergency Department on the above date 

and was hospitalized for further evaluation of their emergent condition.





- New Patient


This patient is new to me today: No





- Critical Care


Critical Care patient: No

## 2019-01-15 NOTE — PN
Teaching Attending Note


Name of Resident: Maddie Cooper





ATTENDING PHYSICIAN STATEMENT





I saw and evaluated the patient.


I reviewed the resident's note and discussed the case with the resident.


I agree with the resident's findings and plan as documented.








SUBJECTIVE:


No events over night. 


learned form [facility] staff, that patient is not hypothermic at his base line 


OBJECTIVE:


NAD, non-verbal


CV: RRR


Lungs: no crackles 


Abd: soft, NT, ND , NL BS. PEG in . supeapubic cath with yellow urine 


Ext : no edema or erythema 








Assessment/Plan:





34 y/o man with h/o seizures, microcephaly, urethral stricture s/p suprapubic 

cath placement, MR , recent hospitalization for PNA , and other medical 

problems who was sent from [place] for decreased responsiveness and hypothermia








1- UTI . 


- Cont zosyn 


- Final urine cx with pseudomonas 


- G + bacili in blood , could be contaminant, repeat cx pending . follow final 

ID and sensitivity   





2- Seizure disorder :Cont  Vimpat, Onfi, Kepra , and Zonisamide





3- Nutrition: cont  tube feeds  Promote 9.5 hours  over night ( 8 pm to 5:30 am 

) . decrease rate of feed and free water as per nutritionist recs  





HLOC

## 2019-01-15 NOTE — PN
Progress Note, Physician


History of Present Illness: 





patient stable


no new issues





- Current Medication List


Current Medications: 


Active Medications





Albuterol Sulfate (Ventolin 0.083% Nebulizer Soln -)  1 amp NEB Q4H PRN


   PRN Reason: SHORT OF BREATH/WHEEZING


Calcium Carbonate/Cholecalciferol (Os-Dale 500+D -)  1 tab GT BID [facility]


   Last Admin: 01/15/19 10:09 Dose:  1 tab


Clobazam (Onfi -)  10 mg PO HS [facility]


   Last Admin: 01/14/19 21:57 Dose:  10 mg


Heparin Sodium (Porcine) (Heparin -)  5,000 unit SQ TID [facility]


   Last Admin: 01/15/19 13:49 Dose:  5,000 unit


Piperacillin Sod/Tazobactam (Sod 3.375 gm/ Dextrose)  50 mls @ 100 mls/hr IVPB 

Q8H-IV [facility]; Protocol


   Last Admin: 01/15/19 10:10 Dose:  100 mls/hr


Lacosamide (Vimpat Liquid -)  200 mg GT BID [facility]


   Last Admin: 01/15/19 10:09 Dose:  200 mg


Levetiracetam (Keppra Oral Solution -)  2,000 mg GT BID [facility]


   Last Admin: 01/15/19 10:09 Dose:  2,000 mg


Levocarnitine (Carnitor Oral Solution -)  660 mg GT TID [facility]


   Last Admin: 01/15/19 13:50 Dose:  660 mg


Sodium Chloride (Sodium Chloride Tablet -)  3.5 gm GT BID [facility]


   Last Admin: 01/15/19 10:10 Dose:  3.5 gm


Zonisamide (Zonisamide)  300 mg GT BID [facility]


   Last Admin: 01/15/19 10:13 Dose:  300 mg











- Objective


Vital Signs: 


 Vital Signs











Temperature  98.6 F   01/15/19 09:00


 


Pulse Rate  103 H  01/15/19 09:00


 


Respiratory Rate  20   01/15/19 09:00


 


Blood Pressure  145/88   01/15/19 09:00


 


O2 Sat by Pulse Oximetry (%)  94 L  01/15/19 09:00











Constitutional: Yes: No Distress, Calm


Cardiovascular: Yes: Regular Rate and Rhythm


Respiratory: Yes: Regular, Poor Air Entry


Gastrointestinal: Yes: Normal Bowel Sounds, Soft


Musculoskeletal: Yes: WNL


Extremities: Yes: Other (contracted)


Neurological: Yes: Alert, Other


Labs: 


 CBC, BMP





 01/15/19 06:00 





 01/15/19 06:00 











Assessment/Plan





34 y/o M from [facility] with h/o seizures, microcephaly, urethral stricture s/

p suprapubic catheter placement, MR, who presents to the ED with hypothermia. 





Hypothermia possibly 2/2 infectious etiology, hypothalamic


Possible UTI


seizure hx


Thrombocytosis


hx MR


gm positive bactermia





plan


continue current abx


incentive patricio


rest as per the team


await for repeat blood cx

## 2019-01-16 LAB
ANION GAP SERPL CALC-SCNC: 10 MMOL/L (ref 8–16)
BUN SERPL-MCNC: 14 MG/DL (ref 7–18)
CALCIUM SERPL-MCNC: 8.5 MG/DL (ref 8.5–10.1)
CHLORIDE SERPL-SCNC: 108 MMOL/L (ref 98–107)
CO2 SERPL-SCNC: 21 MMOL/L (ref 21–32)
CREAT SERPL-MCNC: 0.7 MG/DL (ref 0.55–1.3)
DEPRECATED RDW RBC AUTO: 15.1 % (ref 11.9–15.9)
GLUCOSE SERPL-MCNC: 268 MG/DL (ref 74–106)
HCT VFR BLD CALC: 32 % (ref 35.4–49)
HGB BLD-MCNC: 11 GM/DL (ref 11.7–16.9)
MCH RBC QN AUTO: 32.8 PG (ref 25.7–33.7)
MCHC RBC AUTO-ENTMCNC: 34.5 G/DL (ref 32–35.9)
MCV RBC: 95.2 FL (ref 80–96)
PLATELET # BLD AUTO: 352 K/MM3 (ref 134–434)
PMV BLD: 7.1 FL (ref 7.5–11.1)
POTASSIUM SERPLBLD-SCNC: 3.9 MMOL/L (ref 3.5–5.1)
RBC # BLD AUTO: 3.36 M/MM3 (ref 4–5.6)
SODIUM SERPL-SCNC: 139 MMOL/L (ref 136–145)
WBC # BLD AUTO: 9.7 K/MM3 (ref 4–10)

## 2019-01-16 RX ADMIN — MULTIVITAMIN SCH ML: LIQUID ORAL at 11:01

## 2019-01-16 RX ADMIN — PIPERACILLIN SODIUM,TAZOBACTAM SODIUM SCH MLS/HR: 3; .375 INJECTION, POWDER, FOR SOLUTION INTRAVENOUS at 01:30

## 2019-01-16 RX ADMIN — HEPARIN SODIUM SCH UNIT: 5000 INJECTION, SOLUTION INTRAVENOUS; SUBCUTANEOUS at 14:41

## 2019-01-16 RX ADMIN — Medication SCH TAB: at 23:12

## 2019-01-16 RX ADMIN — Medication SCH TAB: at 11:01

## 2019-01-16 RX ADMIN — LEVOCARNITINE SCH MG: 1 SOLUTION ORAL at 23:10

## 2019-01-16 RX ADMIN — SODIUM CHLORIDE TAB 1 GM SCH GM: 1 TAB at 23:12

## 2019-01-16 RX ADMIN — ZONISAMIDE SCH MG: 100 CAPSULE ORAL at 23:17

## 2019-01-16 RX ADMIN — PIPERACILLIN SODIUM,TAZOBACTAM SODIUM SCH MLS/HR: 3; .375 INJECTION, POWDER, FOR SOLUTION INTRAVENOUS at 20:28

## 2019-01-16 RX ADMIN — LEVETIRACETAM SCH MG: 100 SOLUTION ORAL at 23:11

## 2019-01-16 RX ADMIN — LEVOCARNITINE SCH MG: 1 SOLUTION ORAL at 05:52

## 2019-01-16 RX ADMIN — ZONISAMIDE SCH MG: 100 CAPSULE ORAL at 11:24

## 2019-01-16 RX ADMIN — PIPERACILLIN SODIUM,TAZOBACTAM SODIUM SCH MLS/HR: 3; .375 INJECTION, POWDER, FOR SOLUTION INTRAVENOUS at 10:59

## 2019-01-16 RX ADMIN — LACOSAMIDE SCH MG: 10 SOLUTION ORAL at 23:13

## 2019-01-16 RX ADMIN — LEVETIRACETAM SCH MG: 100 SOLUTION ORAL at 11:00

## 2019-01-16 RX ADMIN — SODIUM CHLORIDE TAB 1 GM SCH GM: 1 TAB at 11:00

## 2019-01-16 RX ADMIN — HEPARIN SODIUM SCH UNIT: 5000 INJECTION, SOLUTION INTRAVENOUS; SUBCUTANEOUS at 23:11

## 2019-01-16 RX ADMIN — LEVOCARNITINE SCH MG: 1 SOLUTION ORAL at 14:40

## 2019-01-16 RX ADMIN — LACOSAMIDE SCH MG: 10 SOLUTION ORAL at 12:59

## 2019-01-16 RX ADMIN — HEPARIN SODIUM SCH UNIT: 5000 INJECTION, SOLUTION INTRAVENOUS; SUBCUTANEOUS at 05:51

## 2019-01-16 NOTE — PN
Teaching Attending Note


Name of Resident: Maddie Cooper





ATTENDING PHYSICIAN STATEMENT





I saw and evaluated the patient.


I reviewed the resident's note and discussed the case with the resident.


I agree with the resident's findings and plan as documented.








SUBJECTIVE:


Patient is comfortable with no acute distress, doing better. 





OBJECTIVE:


 Vital Signs











Temperature  99.4 F   01/16/19 06:00


 


Pulse Rate  97 H  01/16/19 06:00


 


Respiratory Rate  20   01/16/19 06:00


 


Blood Pressure  101/63   01/16/19 06:00


 


O2 Sat by Pulse Oximetry (%)  94 L  01/15/19 21:00








GENERAL: Awake. NAD. Comfortable.


HEENT: Microcephalic. Tracks eyes. Moist mucus membranes.


NECK: Trachea midline, supple. 


LUNGS: CTA B/L. No wheezes noted.


HEART: RRR. Normal S1, S2. No murmurs noted.


ABDOMEN: Soft, NT/ND. G-tube in place, clear and dry. Suprapubic catheter in 

place, clear and dry, draining yellow urine.


EXTREMITIES: 2+ pulses, warm, well-perfused, no edema. Contracted b/l LE 

extremities. No peripheral edema noted.


NEUROLOGICAL: Unable to assess.


SKIN: Warm, dry, normal turgor, no rashes or lesions noted


 CBCD











WBC  9.7 K/mm3 (4.0-10.0)   01/16/19  06:15    


 


RBC  3.36 M/mm3 (4.00-5.60)  L  01/16/19  06:15    


 


Hgb  11.0 GM/dL (11.7-16.9)  L  01/16/19  06:15    


 


Hct  32.0 % (35.4-49)  L  01/16/19  06:15    


 


MCV  95.2 fl (80-96)   01/16/19  06:15    


 


MCHC  34.5 g/dl (32.0-35.9)   01/16/19  06:15    


 


RDW  15.1 % (11.9-15.9)   01/16/19  06:15    


 


Plt Count  352 K/MM3 (134-434)   01/16/19  06:15    


 


MPV  7.1 fl (7.5-11.1)  L  01/16/19  06:15    








 CMP











Sodium  139 mmol/L (136-145)   01/16/19  06:15    


 


Potassium  3.9 mmol/L (3.5-5.1)   01/16/19  06:15    


 


Chloride  108 mmol/L ()  H  01/16/19  06:15    


 


Carbon Dioxide  21 mmol/L (21-32)   01/16/19  06:15    


 


Anion Gap  10 MMOL/L (8-16)   01/16/19  06:15    


 


BUN  14 mg/dL (7-18)   01/16/19  06:15    


 


Creatinine  0.7 mg/dL (0.55-1.3)   01/16/19  06:15    


 


Creat Clearance w eGFR  > 60  (>60)   01/16/19  06:15    


 


Random Glucose  268 mg/dL ()  H  01/16/19  06:15    


 


Calcium  8.5 mg/dL (8.5-10.1)   01/16/19  06:15    


 


Total Bilirubin  0.1 mg/dL (0.2-1)  L  01/12/19  18:07    


 


AST  22 U/L (15-37)   01/12/19  18:07    


 


ALT  43 U/L (13-61)   01/12/19  18:07    


 


Alkaline Phosphatase  96 U/L ()   01/12/19  18:07    


 


Total Protein  8.0 g/dl (6.4-8.2)   01/12/19  18:07    


 


Albumin  3.2 g/dl (3.4-5.0)  L  01/12/19  18:07    





 


  Current Medications











Generic Name Dose Route Start Last Admin





  Trade Name Freq  PRN Reason Stop Dose Admin


 


Albuterol Sulfate  1 amp  01/13/19 15:51  





  Ventolin 0.083% Nebulizer Soln -  NEB   





  Q4H PRN   





  SHORT OF BREATH/WHEEZING   





     





     





     


 


Calcium Carbonate/Cholecalciferol  1 tab  01/12/19 22:19  01/16/19 11:01





  Os-Dale 500+D -  GT   1 tab





  BID RAKAN   Administration





     





     





     





     


 


Clobazam  10 mg  01/13/19 22:00  01/15/19 22:28





  Onfi -  PO   10 mg





  HS RAKAN   Administration





     





     





     





     


 


Heparin Sodium (Porcine)  5,000 unit  01/13/19 22:00  01/16/19 14:41





  Heparin -  SQ   5,000 unit





  TID RAKAN   Administration





     





     





     





     


 


Piperacillin Sod/Tazobactam  50 mls @ 100 mls/hr  01/13/19 13:15  01/16/19 10:59





  Sod 3.375 gm/ Dextrose  IVPB   100 mls/hr





  Q8H-IV RAKAN   Administration





     





     





  Protocol   





     


 


Lacosamide  200 mg  01/13/19 15:42  01/16/19 12:59





  Vimpat Liquid -  GT   200 mg





  BID RAKAN   Administration





     





     





     





     


 


Levetiracetam  2,000 mg  01/12/19 22:00  01/16/19 11:00





  Keppra Oral Solution -  GT   2,000 mg





  BID RAKAN   Administration





     





     





     





     


 


Levocarnitine  660 mg  01/12/19 22:19  01/16/19 14:40





  Carnitor Oral Solution -  GT   660 mg





  TID RAKAN   Administration





     





     





     





     


 


Sodium Chloride  3.5 gm  01/12/19 22:00  01/16/19 11:00





  Sodium Chloride Tablet -  GT   3.5 gm





  BID RAKAN   Administration





     





     





     





     


 


Zonisamide  300 mg  01/12/19 22:30  01/16/19 11:24





  Zonisamide  GT   300 mg





  BID RAKAN   Administration





     





     





     





     








 Home Medications











 Medication  Instructions  Recorded


 


Lacosamide Liquid [Vimpat] 200 mg GT BID 02/09/15


 


Multivit Infusion,Pedi 2,Vit K 1 each GT DAILY 02/09/15





[M.v.i. Pediatric]  


 


Fructooligosaccharides/Polydex 30 ml GT DAILY 12/31/18





[Fiber-Stat 15 gm/30 ml Liquid]  


 


Sodium Chloride Tablet - 3.5 tab GT BID 12/31/18


 


Calcium Carbonate/Vitamin D3 1 each PO BID 01/12/19





[Oyster Shell 500-Vit D3 200 Tb]  


 


Levocarnitine (with Sugar) 7 ml PO TID 01/12/19





[Levocarnitine 100 mg/ml Soln]  


 


levETIRAcetam [levETIRAcetam ORAL 2 g PO BID 01/12/19





SUSPENSION]  


 


Albuterol 0.083% Nebulizer Sol 1 neb NEB Q4H PRN 01/13/19





[Ventolin 0.083%]  


 


Clobazam [Onfi -] 10 mg GT HS 01/13/19


 


Diazepam [Diastat] 5 mg RC TID PRN 01/13/19


 


Pyridoxine HCl [Vitamin B-6] 100 mg GT DAILY 01/13/19


 


Sennosides [Senna] 8.6 mg GT HS 01/13/19


 


Zonisamide 300 mg GT BID 01/13/19








Microbiology





01/13/19 01:04   Blood - Peripheral Venous   Blood Culture - Preliminary


                            Diphtheroid/Corynebacterium


                            Pending Organism#2


01/15/19 07:00   Blood - Peripheral Venous   Blood Culture - Preliminary


                            NO GROWTH OBTAINED AFTER 24 HOURS, INCUBATION TO 

CONTINUE


                            FOR 4 DAYS.


01/15/19 06:00   Blood - Peripheral Venous   Blood Culture - Preliminary


                            NO GROWTH OBTAINED AFTER 24 HOURS, INCUBATION TO 

CONTINUE


                            FOR 4 DAYS.


01/13/19 01:04   Blood - Peripheral Venous   Blood Culture - Preliminary


                            NO GROWTH OBTAINED AFTER 72 HOURS, INCUBATION TO 

CONTINUE


                            FOR 2 DAYS.


01/14/19 19:30   Blood - Peripheral Venous   Blood Culture - Preliminary


                            NO GROWTH OBTAINED AFTER 24 HOURS, INCUBATION TO 

CONTINUE


                            FOR 4 DAYS.


01/14/19 17:30   Blood - Peripheral Venous   Blood Culture - Preliminary


                            NO GROWTH OBTAINED AFTER 24 HOURS, INCUBATION TO 

CONTINUE


                            FOR 4 DAYS.


01/12/19 19:04   Urine - Urine - Catheterized   Urine Culture - Final


                            Pseudomonas Aeruginosa





ASSESSMENT AND PLAN:


patient is a 34 y/o man with Pmhx of seizures, microcephaly, urethral stricture 

s/p suprapubic cath placement, MR , recent hospitalization for PNA , who was 

sent from [place] for decreased responsiveness and hypothermia.





# Acute UTI growing pseudomonas on Zosyn last dose in am  





# Seizure disorder :Cont  Vimpat, Onfi, Kepra , and Zonisamide





# Nutrition: continue  tube feeds  Promote 9.5 hours  over night. ( 8 pm to 5:

30 am ) . decrease rate of feed and free water as per nutritionist recs .





Patient can be dc in am after last dose of zosyn.

## 2019-01-16 NOTE — PN
Physical Exam: 


SUBJECTIVE: Patient seen and examined at bedside. No acute events overnight. Pt 

is nonverbal at baseline.








OBJECTIVE:





 Vital Signs











Temperature  99.4 F   01/16/19 06:00


 


Pulse Rate  87   01/16/19 10:58


 


Respiratory Rate  20   01/16/19 10:58


 


Blood Pressure  125/75   01/16/19 10:58


 


O2 Sat by Pulse Oximetry (%)  94 L  01/15/19 21:00




















GENERAL: Awake. NAD. Comfortable.


HEENT: Microcephalic. Tracks eyes. Moist mucus membranes.


NECK: Trachea midline, supple. 


LUNGS: CTA B/L. No wheezes noted.


HEART: RRR. Normal S1, S2. No murmurs noted.


ABDOMEN: Soft, NT/ND. Gtube in place, clear and dry. Suprapubic catheter in 

place, clear and dry, draining yellow urine.


EXTREMITIES: 2+ pulses, warm, well-perfused, no edema. Contracted b/l LE 

extremities. No peripheral edema noted.


NEUROLOGICAL: Unable to assess.


SKIN: Warm, dry, normal turgor. R heel pressure injury noted.











 CBC, BMP





 01/16/19 06:15 





 01/16/19 06:15 











Active Medications





Albuterol Sulfate (Ventolin 0.083% Nebulizer Soln -)  1 amp NEB Q4H PRN


   PRN Reason: SHORT OF BREATH/WHEEZING


Calcium Carbonate/Cholecalciferol (Os-Dale 500+D -)  1 tab GT BID [facility]


   Last Admin: 01/16/19 11:01 Dose:  1 tab


Clobazam (Onfi -)  10 mg PO HS [facility]


   Last Admin: 01/15/19 22:28 Dose:  10 mg


Heparin Sodium (Porcine) (Heparin -)  5,000 unit SQ TID [facility]


   Last Admin: 01/16/19 05:51 Dose:  5,000 unit


Piperacillin Sod/Tazobactam (Sod 3.375 gm/ Dextrose)  50 mls @ 100 mls/hr IVPB 

Q8H-IV [facility]AKAN; Protocol


   Last Admin: 01/16/19 10:59 Dose:  100 mls/hr


Lacosamide (Vimpat Liquid -)  200 mg GT BID [facility]


   Last Admin: 01/16/19 12:59 Dose:  200 mg


Levetiracetam (Keppra Oral Solution -)  2,000 mg GT BID [facility]


   Last Admin: 01/16/19 11:00 Dose:  2,000 mg


Levocarnitine (Carnitor Oral Solution -)  660 mg GT TID [facility]


   Last Admin: 01/16/19 05:52 Dose:  660 mg


Sodium Chloride (Sodium Chloride Tablet -)  3.5 gm GT BID [facility]


   Last Admin: 01/16/19 11:00 Dose:  3.5 gm


Zonisamide (Zonisamide)  300 mg GT BID [facility]


   Last Admin: 01/16/19 11:24 Dose:  300 mg











CONSULT:


ID- Dr. Garcia





IMAGING:


* CXR: Residual L perihilar infiltrate





ASSESSMENT/PLAN:


32 y/o man with h/o seizures , microcephaly, urethral stricture s/p suprapubic 

cath placement, MR , recent hospitalization for PNA , and other medical 

problems who was sent from [place] for decreased responsiveness and 

hypothermia.





#UTI


-Per ID, cont w/ Zosyn 3.375gm (started 1/13); recommend to observe pt for at 

least 24 hours while on IV abx 


-UCx growing Pseudomonas; BCx neg x24h (1/14), await final results. 


-WBC 13.8, now 9.7; temp improved, today 98.0. Per Dr. Alvarez, pt temp usually 

normal, not hypothermic; ? hypothalamic etiology as pt has seizure d/o


-Flo santamaria


-F/u ID recs





#Seizure disorder


-sz precautions; pad side


-neurochecks q4h


Cont home meds:


-Onfi 10 mg PO HS


-Keppra 2000 mg BID 


-Vimpat 200 mg BID 


-Carnitor 600mg TID 





#Prophylaxis


-Heparin 5000U SQ TID





#FEN


-No IVfs


-recheck lytes in AM


-Promote 9.5 hours over night with water flushes (50 cc/hr and 240 cc BID)  





dispo


-full code











Visit type





- Emergency Visit


Emergency Visit: Yes


ED Registration Date: 01/12/19


Care time: The patient presented to the Emergency Department on the above date 

and was hospitalized for further evaluation of their emergent condition.





- New Patient


This patient is new to me today: No





- Critical Care


Critical Care patient: No

## 2019-01-16 NOTE — PN
Progress Note, Physician


History of Present Illness: 





stable


no new issues





- Current Medication List


Current Medications: 


Active Medications





Albuterol Sulfate (Ventolin 0.083% Nebulizer Soln -)  1 amp NEB Q4H PRN


   PRN Reason: SHORT OF BREATH/WHEEZING


Calcium Carbonate/Cholecalciferol (Os-Dale 500+D -)  1 tab GT BID [facility]


   Last Admin: 01/15/19 22:27 Dose:  1 tab


Clobazam (Onfi -)  10 mg PO HS [facility]


   Last Admin: 01/15/19 22:28 Dose:  10 mg


Heparin Sodium (Porcine) (Heparin -)  5,000 unit SQ TID [facility]


   Last Admin: 01/16/19 05:51 Dose:  5,000 unit


Piperacillin Sod/Tazobactam (Sod 3.375 gm/ Dextrose)  50 mls @ 100 mls/hr IVPB 

Q8H-IV [facility]AKAN; Protocol


   Last Admin: 01/16/19 01:30 Dose:  100 mls/hr


Lacosamide (Vimpat Liquid -)  200 mg GT BID [facility]


   Last Admin: 01/15/19 22:27 Dose:  200 mg


Levetiracetam (Keppra Oral Solution -)  2,000 mg GT BID [facility]


   Last Admin: 01/15/19 22:29 Dose:  2,000 mg


Levocarnitine (Carnitor Oral Solution -)  660 mg GT TID [facility]


   Last Admin: 01/16/19 05:52 Dose:  660 mg


Sodium Chloride (Sodium Chloride Tablet -)  3.5 gm GT BID [facility]


   Last Admin: 01/15/19 22:29 Dose:  3.5 gm


Zonisamide (Zonisamide)  300 mg GT BID [facility]


   Last Admin: 01/15/19 22:28 Dose:  300 mg











- Objective


Vital Signs: 


 Vital Signs











Temperature  99.4 F   01/16/19 06:00


 


Pulse Rate  97 H  01/16/19 06:00


 


Respiratory Rate  20   01/16/19 06:00


 


Blood Pressure  101/63   01/16/19 06:00


 


O2 Sat by Pulse Oximetry (%)  94 L  01/15/19 21:00











Constitutional: Yes: No Distress, Calm


Cardiovascular: Yes: Regular Rate and Rhythm


Respiratory: Yes: Regular, On Nasal O2, Poor Air Entry


Gastrointestinal: Yes: Normal Bowel Sounds, Soft, Other


Neurological: Yes: Alert, Other


Psychiatric: Yes: Other


Labs: 


 CBC, BMP





 01/16/19 06:15 





 01/16/19 06:15 











Assessment/Plan





34 y/o M from [facility] with h/o seizures, microcephaly, urethral stricture s/

p suprapubic catheter placement, MR, who presents to the ED with hypothermia. 





Hypothermia possibly 2/2 infectious etiology, hypothalamic


Possible UTI


seizure hx


Thrombocytosis


hx MR


gm positive bactermia





plan


continue current abx


repeat blood cx results noted


continue current mgmt


nutrition


rest as per the team

## 2019-01-17 VITALS — SYSTOLIC BLOOD PRESSURE: 144 MMHG | DIASTOLIC BLOOD PRESSURE: 77 MMHG | HEART RATE: 95 BPM | TEMPERATURE: 97.5 F

## 2019-01-17 LAB
ANION GAP SERPL CALC-SCNC: 11 MMOL/L (ref 8–16)
BUN SERPL-MCNC: 17 MG/DL (ref 7–18)
CALCIUM SERPL-MCNC: 8.6 MG/DL (ref 8.5–10.1)
CHLORIDE SERPL-SCNC: 111 MMOL/L (ref 98–107)
CO2 SERPL-SCNC: 20 MMOL/L (ref 21–32)
CREAT SERPL-MCNC: 0.6 MG/DL (ref 0.55–1.3)
DEPRECATED RDW RBC AUTO: 15.5 % (ref 11.9–15.9)
GLUCOSE SERPL-MCNC: 214 MG/DL (ref 74–106)
HCT VFR BLD CALC: 30.5 % (ref 35.4–49)
HGB BLD-MCNC: 10.5 GM/DL (ref 11.7–16.9)
MCH RBC QN AUTO: 33.2 PG (ref 25.7–33.7)
MCHC RBC AUTO-ENTMCNC: 34.5 G/DL (ref 32–35.9)
MCV RBC: 96.5 FL (ref 80–96)
PLATELET # BLD AUTO: 312 K/MM3 (ref 134–434)
PMV BLD: 7.2 FL (ref 7.5–11.1)
POTASSIUM SERPLBLD-SCNC: 4 MMOL/L (ref 3.5–5.1)
RBC # BLD AUTO: 3.17 M/MM3 (ref 4–5.6)
SODIUM SERPL-SCNC: 142 MMOL/L (ref 136–145)
WBC # BLD AUTO: 6.3 K/MM3 (ref 4–10)

## 2019-01-17 RX ADMIN — HEPARIN SODIUM SCH UNIT: 5000 INJECTION, SOLUTION INTRAVENOUS; SUBCUTANEOUS at 06:35

## 2019-01-17 RX ADMIN — MULTIVITAMIN SCH ML: LIQUID ORAL at 10:42

## 2019-01-17 RX ADMIN — ZONISAMIDE SCH MG: 100 CAPSULE ORAL at 11:15

## 2019-01-17 RX ADMIN — LEVOCARNITINE SCH MG: 1 SOLUTION ORAL at 14:57

## 2019-01-17 RX ADMIN — LACOSAMIDE SCH MG: 10 SOLUTION ORAL at 10:40

## 2019-01-17 RX ADMIN — PIPERACILLIN SODIUM,TAZOBACTAM SODIUM SCH MLS/HR: 3; .375 INJECTION, POWDER, FOR SOLUTION INTRAVENOUS at 10:41

## 2019-01-17 RX ADMIN — PIPERACILLIN SODIUM,TAZOBACTAM SODIUM SCH MLS/HR: 3; .375 INJECTION, POWDER, FOR SOLUTION INTRAVENOUS at 02:38

## 2019-01-17 RX ADMIN — SODIUM CHLORIDE TAB 1 GM SCH GM: 1 TAB at 10:42

## 2019-01-17 RX ADMIN — HEPARIN SODIUM SCH UNIT: 5000 INJECTION, SOLUTION INTRAVENOUS; SUBCUTANEOUS at 14:57

## 2019-01-17 RX ADMIN — LEVOCARNITINE SCH MG: 1 SOLUTION ORAL at 06:36

## 2019-01-17 RX ADMIN — LEVETIRACETAM SCH MG: 100 SOLUTION ORAL at 10:42

## 2019-01-17 RX ADMIN — Medication SCH TAB: at 10:43

## 2019-01-17 NOTE — PN
Progress Note, Physician


History of Present Illness: 





Pt seen and examined. Events noted. He is afebrile, without acute distress.





- Current Medication List


Current Medications: 


Active Medications





Albuterol Sulfate (Ventolin 0.083% Nebulizer Soln -)  1 amp NEB Q4H PRN


   PRN Reason: SHORT OF BREATH/WHEEZING


Calcium Carbonate/Cholecalciferol (Os-Dale 500+D -)  1 tab GT BID [facility]


   Last Admin: 01/17/19 10:43 Dose:  1 tab


Clobazam (Onfi -)  10 mg PO HS [facility]


   Last Admin: 01/16/19 23:11 Dose:  10 mg


Heparin Sodium (Porcine) (Heparin -)  5,000 unit SQ TID [facility]


   Last Admin: 01/17/19 06:35 Dose:  5,000 unit


Piperacillin Sod/Tazobactam (Sod 3.375 gm/ Dextrose)  50 mls @ 100 mls/hr IVPB 

Q8H-IV RAKAN; Protocol


   Last Admin: 01/17/19 10:41 Dose:  100 mls/hr


Lacosamide (Vimpat Liquid -)  200 mg GT BID [facility]


   Last Admin: 01/17/19 10:40 Dose:  200 mg


Levetiracetam (Keppra Oral Solution -)  2,000 mg GT BID [facility]


   Last Admin: 01/17/19 10:42 Dose:  2,000 mg


Levocarnitine (Carnitor Oral Solution -)  660 mg GT TID [facility]


   Last Admin: 01/17/19 06:36 Dose:  660 mg


Sodium Chloride (Sodium Chloride Tablet -)  3.5 gm GT BID [facility]


   Last Admin: 01/17/19 10:42 Dose:  3.5 gm


Zonisamide (Zonisamide)  300 mg GT BID [facility]


   Last Admin: 01/17/19 11:15 Dose:  300 mg











- Objective


Vital Signs: 


 Vital Signs











Temperature  97.3 F L  01/17/19 10:28


 


Pulse Rate  84   01/17/19 10:28


 


Respiratory Rate  20   01/17/19 10:28


 


Blood Pressure  116/76   01/17/19 10:28


 


O2 Sat by Pulse Oximetry (%)  98   01/16/19 11:00











Constitutional: Yes: No Distress, Calm


Cardiovascular: Yes: Regular Rate and Rhythm


Respiratory: Yes: Regular


Gastrointestinal: Yes: Normal Bowel Sounds, Soft


Genitourinary: Yes: Other (spc)


Integumentary: Yes: WNL


Neurological: Yes: Other (nonverbal, MR)


Labs: 


 CBC, BMP





 01/17/19 07:00 





 01/17/19 07:00 





 Microbiology





01/13/19 01:04   Blood - Peripheral Venous   Blood Culture - Preliminary


                            Dermabacter Hominis


                            Pending Organism#2


01/15/19 07:00   Blood - Peripheral Venous   Blood Culture - Preliminary


                            NO GROWTH OBTAINED AFTER 48 HOURS, INCUBATION TO 

CONTINUE


                            FOR 3 DAYS.


01/15/19 06:00   Blood - Peripheral Venous   Blood Culture - Preliminary


                            NO GROWTH OBTAINED AFTER 48 HOURS, INCUBATION TO 

CONTINUE


                            FOR 3 DAYS.


01/13/19 01:04   Blood - Peripheral Venous   Blood Culture - Preliminary


                            NO GROWTH OBTAINED AFTER 96 HOURS, INCUBATION TO 

CONTINUE


                            FOR 1 DAYS.


01/14/19 19:30   Blood - Peripheral Venous   Blood Culture - Preliminary


                            NO GROWTH OBTAINED AFTER 48 HOURS, INCUBATION TO 

CONTINUE


                            FOR 3 DAYS.


01/14/19 17:30   Blood - Peripheral Venous   Blood Culture - Preliminary


                            NO GROWTH OBTAINED AFTER 48 HOURS, INCUBATION TO 

CONTINUE


                            FOR 3 DAYS.


01/12/19 19:04   Urine - Urine - Catheterized   Urine Culture - Final


                            Pseudomonas Aeruginosa











Problem List





- Problems


(1) UTI (urinary tract infection)


Code(s): N39.0 - URINARY TRACT INFECTION, SITE NOT SPECIFIED   





(2) Hypothermia


Code(s): T68.XXXA - HYPOTHERMIA, INITIAL ENCOUNTER   


Qualifiers: 


   Encounter type: initial encounter   Qualified Code(s): T68.XXXA - Hypothermia

, initial encounter   





(3) Seizure


Code(s): R56.9 - UNSPECIFIED CONVULSIONS   





Assessment/Plan





UTI


Urethral stricture s/p SPC


Mental retardation


Hypothermia


Seizure d.o.








--d/c IV antibiotics, switch to levaquin via gt x 3 days


pt currently afebrile, without distress, leukocytosis resolved

## 2019-01-17 NOTE — DS
Physical Exam: 


SUBJECTIVE: Patient seen and examined








OBJECTIVE:





 Vital Signs











 Period  Temp  Pulse  Resp  BP Sys/Curran  Pulse Ox


 


 Last 24 Hr  97.3 F-98.2 F  84-97  20-20  /50-80  








PHYSICAL EXAM





GENERAL: The patient is awake, alert, and fully oriented, in no acute distress.


HEAD: Normal with no signs of trauma.


EYES: PERRL, extraocular movements intact, sclera anicteric, conjunctiva clear. 


ENT: Ears normal, nares patent, oropharynx clear without exudates, moist mucous 

membranes.


NECK: Trachea midline, full range of motion, supple. 


LUNGS: Breath sounds equal, clear to auscultation bilaterally, no wheezes, no 

crackles, no accessory muscle use. 


HEART: Regular rate and rhythm, S1, S2 without murmur, rub or gallop.


ABDOMEN: Soft, nontender, nondistended, normoactive bowel sounds, no guarding, 

no rebound, no hepatosplenomegaly, no masses.


EXTREMITIES: 2+ pulses, warm, well-perfused, no edema. 


NEUROLOGICAL: Cranial nerves II through XII grossly intact. Normal speech, gait 

not observed.


PSYCH: Normal mood, normal affect.


SKIN: Warm, dry, normal turgor, no rashes or lesions noted.





LABS


 Laboratory Results - last 24 hr











  01/17/19 01/17/19





  07:00 07:00


 


WBC  6.3 


 


RBC  3.17 L 


 


Hgb  10.5 L 


 


Hct  30.5 L 


 


MCV  96.5 H 


 


MCH  33.2 


 


MCHC  34.5 


 


RDW  15.5 


 


Plt Count  312 


 


MPV  7.2 L 


 


Sodium   142


 


Potassium   4.0


 


Chloride   111 H


 


Carbon Dioxide   20 L


 


Anion Gap   11


 


BUN   17


 


Creatinine   0.6


 


Creat Clearance w eGFR   > 60


 


Random Glucose   214 H


 


Calcium   8.6











HOSPITAL COURSE:





Date of Admission:01/12/19





Date of Discharge: 01/17/19














Discharge Summary


Reason For Visit: URINARY TRACT INFECTION, HYPOTHERMIA


Current Active Problems





UTI (urinary tract infection) (Acute)








Condition: Improved





- Instructions


Diet, Activity, Other Instructions: 


You were sent to the hospital from your nursing home for hypothermia and 

decreased responsiveness.





In the hospital, you were found to have bacteremia. You were seen and evaluated 

by the infectious disease doctor and given IV antibiotics. Throughout your 

hospital stay, your symptoms improved. You are being discharged back to your 

nursing home.





MEDICAL RECOMMENDATIONS


Please take Levaquin 500 mg through G tube for 3 days. You may start this 

medication tomorrow, 1/18/19.





Please continue taking your home medications as directed





REFERRALS


Please follow up with your primary care physician, Dr. De La Fuente, within 1 week.








If you experience persistent low/high body temperature, fever/chills, 

difficulty breathing, shortness of breath, mental status changes, please 

proceed to your nearest emergency room immediately. 


Referrals: 


Elma De La Fuente [Primary Care Provider] - 1 Week


Disposition: SKILLED NURSING FACILITY





- Home Medications


Comprehensive Discharge Medication List: 


Ambulatory Orders





Lacosamide Liquid [Vimpat] 200 mg GT BID 02/09/15 


Multivit Infusion,Pedi 2,Vit K [M.v.i. Pediatric] 1 each GT DAILY 02/09/15 


Fructooligosaccharides/Polydex [Fiber-Stat 15 gm/30 ml Liquid] 30 ml GT DAILY 12 /31/18 


Sodium Chloride Tablet - 3.5 tab GT BID 12/31/18 


Calcium Carbonate/Vitamin D3 [Oyster Shell 500-Vit D3 200 Tb] 1 each PO BID 01/ 12/19 


Levocarnitine (with Sugar) [Levocarnitine 100 mg/ml Soln] 7 ml PO TID 01/12/19 


levETIRAcetam [levETIRAcetam ORAL SUSPENSION] 2 g PO BID 01/12/19 


Albuterol 0.083% Nebulizer Sol [Ventolin 0.083%] 1 neb NEB Q4H PRN 01/13/19 


Clobazam [Onfi -] 10 mg GT HS 01/13/19 


Diazepam [Diastat] 5 mg RC TID PRN 01/13/19 


Pyridoxine HCl [Vitamin B-6] 100 mg GT DAILY 01/13/19 


Sennosides [Senna] 8.6 mg GT HS 01/13/19 


Zonisamide 300 mg GT BID 01/13/19 


Levofloxacin [Levaquin] 500 mg GT DAILY #3 tablet 01/17/19

## 2019-01-17 NOTE — PN
Teaching Attending Note


Name of Resident: Maddie Cooper





ATTENDING PHYSICIAN STATEMENT





I saw and evaluated the patient.


I reviewed the resident's note and discussed the case with the resident.


I agree with the resident's findings and plan as documented.








SUBJECTIVE:





Patient is comfortable with no acute distress, no shortness of breath, no 

nausea or vomiting. no fever or chills. 


OBJECTIVE:


 Vital Signs











Temperature  97.3 F L  01/17/19 10:28


 


Pulse Rate  84   01/17/19 10:28


 


Respiratory Rate  20   01/17/19 10:28


 


Blood Pressure  116/76   01/17/19 10:28


 


O2 Sat by Pulse Oximetry (%)  98   01/16/19 11:00








GENERAL: Awake. NAD. Comfortable.


HEENT: Microcephalic. Tracks eyes. Moist mucus membranes.


NECK: Trachea midline, supple. 


LUNGS: CTA B/L. No wheezes noted.


HEART: RRR. Normal S1, S2. No murmurs noted.


ABDOMEN: Soft, NT/ND. G-tube in place, clear and dry. Suprapubic catheter in 

place, clear and dry, draining yellow urine.


EXTREMITIES: 2+ pulses, warm, well-perfused, no edema. Contracted b/l LE 

extremities. No peripheral edema noted.


NEUROLOGICAL: Unable to assess.


SKIN: Warm, dry, normal turgor, no rashes or lesions noted


CBCD











WBC  6.3 K/mm3 (4.0-10.0)   01/17/19  07:00    


 


RBC  3.17 M/mm3 (4.00-5.60)  L  01/17/19  07:00    


 


Hgb  10.5 GM/dL (11.7-16.9)  L  01/17/19  07:00    


 


Hct  30.5 % (35.4-49)  L  01/17/19  07:00    


 


MCV  96.5 fl (80-96)  H  01/17/19  07:00    


 


MCHC  34.5 g/dl (32.0-35.9)   01/17/19  07:00    


 


RDW  15.5 % (11.9-15.9)   01/17/19  07:00    


 


Plt Count  312 K/MM3 (134-434)   01/17/19  07:00    


 


MPV  7.2 fl (7.5-11.1)  L  01/17/19  07:00    








 CMP











Sodium  142 mmol/L (136-145)   01/17/19  07:00    


 


Potassium  4.0 mmol/L (3.5-5.1)   01/17/19  07:00    


 


Chloride  111 mmol/L ()  H  01/17/19  07:00    


 


Carbon Dioxide  20 mmol/L (21-32)  L  01/17/19  07:00    


 


Anion Gap  11 MMOL/L (8-16)   01/17/19  07:00    


 


BUN  17 mg/dL (7-18)   01/17/19  07:00    


 


Creatinine  0.6 mg/dL (0.55-1.3)   01/17/19  07:00    


 


Creat Clearance w eGFR  > 60  (>60)   01/17/19  07:00    


 


Random Glucose  214 mg/dL ()  H  01/17/19  07:00    


 


Calcium  8.6 mg/dL (8.5-10.1)   01/17/19  07:00    


 


Total Bilirubin  0.1 mg/dL (0.2-1)  L  01/12/19  18:07    


 


AST  22 U/L (15-37)   01/12/19  18:07    


 


ALT  43 U/L (13-61)   01/12/19  18:07    


 


Alkaline Phosphatase  96 U/L ()   01/12/19  18:07    


 


Total Protein  8.0 g/dl (6.4-8.2)   01/12/19  18:07    


 


Albumin  3.2 g/dl (3.4-5.0)  L  01/12/19  18:07    








 Current Medications











Generic Name Dose Route Start Last Admin





  Trade Name Freq  PRN Reason Stop Dose Admin


 


Albuterol Sulfate  1 amp  01/13/19 15:51  





  Ventolin 0.083% Nebulizer Soln -  NEB   





  Q4H PRN   





  SHORT OF BREATH/WHEEZING   





     





     





     


 


Calcium Carbonate/Cholecalciferol  1 tab  01/12/19 22:19  01/17/19 10:43





  Os-Dale 500+D -  GT   1 tab





  BID RAKAN   Administration





     





     





     





     


 


Clobazam  10 mg  01/13/19 22:00  01/16/19 23:11





  Onfi -  PO   10 mg





  HS RAKAN   Administration





     





     





     





     


 


Heparin Sodium (Porcine)  5,000 unit  01/13/19 22:00  01/17/19 06:35





  Heparin -  SQ   5,000 unit





  TID RAKAN   Administration





     





     





     





     


 


Piperacillin Sod/Tazobactam  50 mls @ 100 mls/hr  01/13/19 13:15  01/17/19 10:41





  Sod 3.375 gm/ Dextrose  IVPB   100 mls/hr





  Q8H-IV RAKAN   Administration





     





     





  Protocol   





     


 


Lacosamide  200 mg  01/13/19 15:42  01/17/19 10:40





  Vimpat Liquid -  GT   200 mg





  BID RAKAN   Administration





     





     





     





     


 


Levetiracetam  2,000 mg  01/12/19 22:00  01/17/19 10:42





  Keppra Oral Solution -  GT   2,000 mg





  BID RAKAN   Administration





     





     





     





     


 


Levocarnitine  660 mg  01/12/19 22:19  01/17/19 06:36





  Carnitor Oral Solution -  GT   660 mg





  TID RAKAN   Administration





     





     





     





     


 


Sodium Chloride  3.5 gm  01/12/19 22:00  01/17/19 10:42





  Sodium Chloride Tablet -  GT   3.5 gm





  BID RAKAN   Administration





     





     





     





     


 


Zonisamide  300 mg  01/12/19 22:30  01/17/19 11:15





  Zonisamide  GT   300 mg





  BID RAKAN   Administration





     





     





     





     








 Home Medications











 Medication  Instructions  Recorded


 


Lacosamide Liquid [Vimpat] 200 mg GT BID 02/09/15


 


Multivit Infusion,Pedi 2,Vit K 1 each GT DAILY 02/09/15





[M.v.i. Pediatric]  


 


Fructooligosaccharides/Polydex 30 ml GT DAILY 12/31/18





[Fiber-Stat 15 gm/30 ml Liquid]  


 


Sodium Chloride Tablet - 3.5 tab GT BID 12/31/18


 


Calcium Carbonate/Vitamin D3 1 each PO BID 01/12/19





[Oyster Shell 500-Vit D3 200 Tb]  


 


Levocarnitine (with Sugar) 7 ml PO TID 01/12/19





[Levocarnitine 100 mg/ml Soln]  


 


levETIRAcetam [levETIRAcetam ORAL 2 g PO BID 01/12/19





SUSPENSION]  


 


Albuterol 0.083% Nebulizer Sol 1 neb NEB Q4H PRN 01/13/19





[Ventolin 0.083%]  


 


Clobazam [Onfi -] 10 mg GT HS 01/13/19


 


Diazepam [Diastat] 5 mg RC TID PRN 01/13/19


 


Pyridoxine HCl [Vitamin B-6] 100 mg GT DAILY 01/13/19


 


Sennosides [Senna] 8.6 mg GT HS 01/13/19


 


Zonisamide 300 mg GT BID 01/13/19


 


Levofloxacin [Levaquin] 500 mg GT DAILY #3 tablet 01/17/19








 Microbiology





01/13/19 01:04   Blood - Peripheral Venous   Blood Culture - Preliminary


                            Dermabacter Hominis


                            Pending Organism#2


01/15/19 07:00   Blood - Peripheral Venous   Blood Culture - Preliminary


                            NO GROWTH OBTAINED AFTER 48 HOURS, INCUBATION TO 

CONTINUE


                            FOR 3 DAYS.


01/15/19 06:00   Blood - Peripheral Venous   Blood Culture - Preliminary


                            NO GROWTH OBTAINED AFTER 48 HOURS, INCUBATION TO 

CONTINUE


                            FOR 3 DAYS.


01/13/19 01:04   Blood - Peripheral Venous   Blood Culture - Preliminary


                            NO GROWTH OBTAINED AFTER 96 HOURS, INCUBATION TO 

CONTINUE


                            FOR 1 DAYS.


01/14/19 19:30   Blood - Peripheral Venous   Blood Culture - Preliminary


                            NO GROWTH OBTAINED AFTER 48 HOURS, INCUBATION TO 

CONTINUE


                            FOR 3 DAYS.


01/14/19 17:30   Blood - Peripheral Venous   Blood Culture - Preliminary


                            NO GROWTH OBTAINED AFTER 48 HOURS, INCUBATION TO 

CONTINUE


                            FOR 3 DAYS.


01/12/19 19:04   Urine - Urine - Catheterized   Urine Culture - Final


                            Pseudomonas Aeruginosa











ASSESSMENT AND PLAN:


patient is a 32 y/o man with Pmhx of seizures, microcephaly, urethral stricture 

s/p suprapubic cath placement, MR , recent hospitalization for PNA , who was 

sent from [place] for decreased responsiveness and hypothermia.





# Acute UTI growing pseudomonas last dose of Zosyn last dose today. will 

discharge the patient on 3 more days of Levaquin as per ID.   





# Seizure disorder :Cont  Vimpat, Onfi, Kepra , and Zonisamide





# Nutrition: continue  tube feeds  Promote 9.5 hours  over night. ( 8 pm to 5:

30 am ) . decrease rate of feed and free water as per nutritionist recs .





Patient can be dc back to [facility], accepted by Dr. Hernandez

## 2019-09-13 ENCOUNTER — HOSPITAL ENCOUNTER (INPATIENT)
Dept: HOSPITAL 74 - JER | Age: 34
LOS: 5 days | Discharge: NURSING, REHAB OR LONG-TERM CARE | DRG: 463 | End: 2019-09-18
Attending: INTERNAL MEDICINE | Admitting: INTERNAL MEDICINE
Payer: COMMERCIAL

## 2019-09-13 VITALS — BODY MASS INDEX: 22.8 KG/M2

## 2019-09-13 DIAGNOSIS — N39.0: Primary | ICD-10-CM

## 2019-09-13 DIAGNOSIS — R53.2: ICD-10-CM

## 2019-09-13 DIAGNOSIS — H00.036: ICD-10-CM

## 2019-09-13 DIAGNOSIS — F73: ICD-10-CM

## 2019-09-13 DIAGNOSIS — R00.1: ICD-10-CM

## 2019-09-13 DIAGNOSIS — H01.004: ICD-10-CM

## 2019-09-13 DIAGNOSIS — D64.9: ICD-10-CM

## 2019-09-13 DIAGNOSIS — K21.9: ICD-10-CM

## 2019-09-13 DIAGNOSIS — G80.9: ICD-10-CM

## 2019-09-13 DIAGNOSIS — M41.9: ICD-10-CM

## 2019-09-13 DIAGNOSIS — Z93.1: ICD-10-CM

## 2019-09-13 DIAGNOSIS — Q02: ICD-10-CM

## 2019-09-13 DIAGNOSIS — G40.909: ICD-10-CM

## 2019-09-13 DIAGNOSIS — R68.0: ICD-10-CM

## 2019-09-13 LAB
ALBUMIN SERPL-MCNC: 3.5 G/DL (ref 3.4–5)
ALP SERPL-CCNC: 129 U/L (ref 45–117)
ALT SERPL-CCNC: 52 U/L (ref 13–61)
ANION GAP SERPL CALC-SCNC: 5 MMOL/L (ref 8–16)
APPEARANCE UR: (no result)
AST SERPL-CCNC: 28 U/L (ref 15–37)
BACTERIA # UR AUTO: 1832.5 /HPF
BASOPHILS # BLD: 1.1 % (ref 0–2)
BILIRUB SERPL-MCNC: 0.2 MG/DL (ref 0.2–1)
BILIRUB UR STRIP.AUTO-MCNC: NEGATIVE MG/DL
BUN SERPL-MCNC: 17.9 MG/DL (ref 7–18)
CALCIUM SERPL-MCNC: 9.5 MG/DL (ref 8.5–10.1)
CASTS URNS QL MICRO: 104 /LPF (ref 0–8)
CHLORIDE SERPL-SCNC: 106 MMOL/L (ref 98–107)
CO2 SERPL-SCNC: 27 MMOL/L (ref 21–32)
COLOR UR: YELLOW
CREAT SERPL-MCNC: 0.6 MG/DL (ref 0.55–1.3)
CRYSTALS URNS QL MICRO: (no result) /HPF
DEPRECATED RDW RBC AUTO: 13.5 % (ref 11.9–15.9)
EOSINOPHIL # BLD: 2.2 % (ref 0–4.5)
EPITH CASTS URNS QL MICRO: 0.5 /HPF
GLUCOSE SERPL-MCNC: 100 MG/DL (ref 74–106)
HCT VFR BLD CALC: 34 % (ref 35.4–49)
HGB BLD-MCNC: 11.4 GM/DL (ref 11.7–16.9)
KETONES UR QL STRIP: NEGATIVE
LEUKOCYTE ESTERASE UR QL STRIP.AUTO: (no result)
LYMPHOCYTES # BLD: 35.7 % (ref 8–40)
MCH RBC QN AUTO: 32 PG (ref 25.7–33.7)
MCHC RBC AUTO-ENTMCNC: 33.6 G/DL (ref 32–35.9)
MCV RBC: 95.2 FL (ref 80–96)
MONOCYTES # BLD AUTO: 7.5 % (ref 3.8–10.2)
NEUTROPHILS # BLD: 53.5 % (ref 42.8–82.8)
NITRITE UR QL STRIP: POSITIVE
PH UR: 8 [PH] (ref 5–8)
PLATELET # BLD AUTO: 170 K/MM3 (ref 134–434)
PMV BLD: 8 FL (ref 7.5–11.1)
POTASSIUM SERPLBLD-SCNC: 4.3 MMOL/L (ref 3.5–5.1)
PROT SERPL-MCNC: 7.9 G/DL (ref 6.4–8.2)
PROT UR QL STRIP: NEGATIVE
PROT UR QL STRIP: NEGATIVE
RBC # BLD AUTO: 3 /HPF (ref 0–4)
RBC # BLD AUTO: 3.58 M/MM3 (ref 4–5.6)
SODIUM SERPL-SCNC: 137 MMOL/L (ref 136–145)
SP GR UR: 1.02 (ref 1.01–1.03)
UROBILINOGEN UR STRIP-MCNC: 0.2 MG/DL (ref 0.2–1)
WBC # BLD AUTO: 6.1 K/MM3 (ref 4–10)
WBC # UR AUTO: 57 /HPF (ref 0–5)

## 2019-09-13 NOTE — HP
CHIEF COMPLAINT: Hypothermia and L eyelid redness





PCP:





HISTORY OF PRESENT ILLNESS:


33 y.o M from [facility] with a PMH of Cerebral Palsy, Profound mental 

retardation, seizures, microcephaly, GERD presenting with hypothermia of 94F 

and L eyelid redness.  Pt was baseline non verbal and aid had very minimal to 

add to the story.








ER course was notable for:


(1)Sepsis workup, with Zosyn given 


(2)CXR with no acute pathology


(3) UA cloudy, pH8, +nitrites, + leukocyte esterase, 57 WBC, 3 RBC, 104 Casts, 

1832.5 bacteria





Recent Travel: no





PAST MEDICAL HISTORY:  as noted in HPI





PAST SURGICAL HISTORY: suprapubic catheter placement, spinal fusion surgery





Social History:


Smoking:no


Alcohol:no


Drugs: no





Family History: n/a


Allergies





No Known Allergies Allergy (Verified 09/13/19 13:27)


 








HOME MEDICATIONS:


 Home Medications











 Medication  Instructions  Recorded


 


Albuterol 0.083% Nebulizer Sol 1 neb NEB Q4H PRN 09/13/19





[Ventolin 0.083%]  


 


Calcium Carbonate/Vitamin D3 1 each GT BID 09/13/19





[Oyster Shell 500-Vit D3 200 Tb]  


 


Clobazam [Onfi -] 10 mg GT BID 09/13/19


 


Fructooligosaccharides/Polydex 30 ml GT DAILY 09/13/19





[Fiber-Stat 15 gm/30 ml Liquid]  


 


Lacosamide [Vimpat] 200 mg GT BID 09/13/19


 


Levocarnitine 7 ml GT TID 09/13/19


 


Multivitamin [Multiple Vitamins] 30 ml GT DAILY 09/13/19


 


Pyridoxine HCl [Vitamin B-6] 100 mg GT ASDIR 09/13/19


 


Sennosides/Docusate Sodium 2 tab GT HS 09/13/19





[Senna-S Tablet]  


 


Sodium Chloride Tablet - 3.5 gm GT BID 09/13/19


 


Zonisamide 300 mg GT BID 09/13/19


 


levETIRAcetam [levETIRAcetam ORAL 2 gm GT BID 09/13/19





SUSPENSION]  








REVIEW OF SYSTEMS


CONSTITUTIONAL: 


Absent:  fever, chills, diaphoresis, generalized weakness, malaise, loss of 

appetite, weight change


HEENT: eye discharge


Absent:  rhinorrhea, nasal congestion, throat pain, throat swelling, difficulty 

swallowing, mouth swelling, ear pain, visual changes


CARDIOVASCULAR: 


Absent: chest pain, syncope, palpitations, irregular heart rate, lightheadedness

, peripheral edema


RESPIRATORY: 


Absent: cough, shortness of breath, dyspnea with exertion, orthopnea, wheezing, 

stridor, hemoptysis


GASTROINTESTINAL:


Absent: abdominal pain, abdominal distension, nausea, vomiting, diarrhea, 

constipation, melena, hematochezia


GENITOURINARY: 


Absent: dysuria, frequency, urgency, hesitancy, hematuria, flank pain, genital 

pain


MUSCULOSKELETAL: 


Absent: myalgia, arthralgia, joint swelling, back pain, neck pain


SKIN: 


Absent: rash, itching, pallor


HEMATOLOGIC/IMMUNOLOGIC: 


Absent: easy bleeding, easy bruising, lymphadenopathy, frequent infections


ENDOCRINE:


Absent: unexplained weight gain, unexplained weight loss, heat intolerance, 

cold intolerance


NEUROLOGIC: 


Absent: headache, focal weakness or paresthesias, dizziness, unsteady gait, 

seizure, mental status changes, bladder or bowel incontinence


PSYCHIATRIC: 


Absent: anxiety, depression, suicidal or homicidal ideation, hallucinations.








PHYSICAL EXAMINATION


 Vital Signs - 24 hr











  09/13/19 09/13/19 09/13/19





  13:20 15:44 17:29


 


Temperature 93.2 F L 92.9 F L 


 


Pulse Rate 58 L  


 


Pulse Rate [   48 L





Left]   


 


Respiratory 16  16





Rate   


 


Blood Pressure 96/64  


 


Blood Pressure   94/71





[Right Arm]   


 


O2 Sat by Pulse 97  99





Oximetry (%)   














  09/13/19 09/13/19 09/13/19





  18:04 18:31 19:06


 


Temperature   92 F L


 


Pulse Rate   50 L


 


Pulse Rate [ 42 L  





Left]   


 


Respiratory 14  14





Rate   


 


Blood Pressure   107/76


 


Blood Pressure 95/63  





[Right Arm]   


 


O2 Sat by Pulse 99 99 100





Oximetry (%)   











GENERAL: in asleep, in no apparent distress.


HEAD:microcephaly w/o signs of trauma


LUNGS: Breath sounds equal, clear to auscultation bilaterally. No wheezes, and 

no crackles. No accessory muscle use.


HEART: Regular rate and rhythm, normal S1 and S2 without murmur, rub or gallop.


ABDOMEN: Soft, nontender, not distended, normoactive bowel sounds, no guarding, 

no rebound, no masses.  No hepatomegaly or  splenomegaly. 


MUSCULOSKELETAL: contracted . No CVA tenderness.


UPPER EXTREMITIES: 2+ pulses, warm, well-perfused. No cyanosis. No clubbing. No 

peripheral edema.


LOWER EXTREMITIES: 2+ pulses, warm, well-perfused. No calf tenderness. No 

peripheral edema. 


SKIN: Warm, dry, normal turgor, no rashes or lesions noted, normal capillary 

refill. 


 Laboratory Results - last 24 hr











  09/13/19 09/13/19 09/13/19





  16:00 16:15 16:15


 


WBC   6.1 


 


RBC   3.58 L 


 


Hgb   11.4 L 


 


Hct   34.0 L 


 


MCV   95.2 


 


MCH   32.0 


 


MCHC   33.6 


 


RDW   13.5  D 


 


Plt Count   170  D 


 


MPV   8.0  D 


 


Absolute Neuts (auto)   3.3 


 


Neutrophils %   53.5  D 


 


Lymphocytes %   35.7  D 


 


Monocytes %   7.5  D 


 


Eosinophils %   2.2  D 


 


Basophils %   1.1  D 


 


Nucleated RBC %   0 


 


Sodium   


 


Potassium   


 


Chloride   


 


Carbon Dioxide   


 


Anion Gap   


 


BUN   


 


Creatinine   


 


Est GFR (CKD-EPI)AfAm   


 


Est GFR (CKD-EPI)NonAf   


 


Random Glucose   


 


Lactic Acid    0.9


 


Calcium   


 


Total Bilirubin   


 


AST   


 


ALT   


 


Alkaline Phosphatase   


 


Total Protein   


 


Albumin   


 


Urine Color  Yellow  


 


Urine Appearance  Cloudy  


 


Urine pH  8.0  


 


Ur Specific Gravity  1.017  


 


Urine Protein  Negative  


 


Urine Glucose (UA)  Negative  


 


Urine Ketones  Negative  


 


Urine Blood  Negative  


 


Urine Nitrite  Positive H  


 


Urine Bilirubin  Negative  


 


Urine Urobilinogen  0.2  


 


Ur Leukocyte Esterase  3+ H  


 


Urine WBC (Auto)  57  


 


Urine RBC (Auto)  3  


 


Urine Casts (Auto)  104  


 


U Epithel Cells (Auto)  0.5  


 


Urine Bacteria (Auto)  1832.5  














  09/13/19





  17:00


 


WBC 


 


RBC 


 


Hgb 


 


Hct 


 


MCV 


 


MCH 


 


MCHC 


 


RDW 


 


Plt Count 


 


MPV 


 


Absolute Neuts (auto) 


 


Neutrophils % 


 


Lymphocytes % 


 


Monocytes % 


 


Eosinophils % 


 


Basophils % 


 


Nucleated RBC % 


 


Sodium  137


 


Potassium  4.3


 


Chloride  106


 


Carbon Dioxide  27


 


Anion Gap  5 L


 


BUN  17.9


 


Creatinine  0.6


 


Est GFR (CKD-EPI)AfAm  152.04


 


Est GFR (CKD-EPI)NonAf  131.18


 


Random Glucose  100


 


Lactic Acid 


 


Calcium  9.5


 


Total Bilirubin  0.2


 


AST  28


 


ALT  52


 


Alkaline Phosphatase  129 H


 


Total Protein  7.9


 


Albumin  3.5


 


Urine Color 


 


Urine Appearance 


 


Urine pH 


 


Ur Specific Gravity 


 


Urine Protein 


 


Urine Glucose (UA) 


 


Urine Ketones 


 


Urine Blood 


 


Urine Nitrite 


 


Urine Bilirubin 


 


Urine Urobilinogen 


 


Ur Leukocyte Esterase 


 


Urine WBC (Auto) 


 


Urine RBC (Auto) 


 


Urine Casts (Auto) 


 


U Epithel Cells (Auto) 


 


Urine Bacteria (Auto) 











ASSESSMENT/PLAN:


33 y.o M from [facility] with a PMH of Cerebral Palsy, Profound mental 

retardation, seizures, microcephaly, GERD presenting with hypothermia of 94F 

and L eyelid redness. found to have a UTI





Sepsis 2/2 UTI 


Zosyn given in the ED and warming blankets for hypothermia


Pt received vanco  for gram positive coverage and meropenem due to history of 

UTI with pseudomonas with sensitivity


UA positive, urine culture sent


Blood cultures pending 


consider ID consult 


CXR with no acute pathology


NS @75/hr


suprapubic catheter care. consult Urology for possible catheter change





L eye blepharitis


eryhtromycin ointment





Anemia


 Basic anemia work up to follow





FEN


continue tube feeding


NS @75/hr


ensure adequate nutrition





Seizures


continue seizure meds





DVT


Hep subQ





Visit type





- Emergency Visit


Emergency Visit: Yes


ED Registration Date: 09/13/19


Care time: The patient presented to the Emergency Department on the above date 

and was hospitalized for further evaluation of their emergent condition.





- New Patient


This patient is new to me today: Yes


Date on this admission: 09/14/19





- Critical Care


Critical Care patient: No





ATTENDING PHYSICIAN STATEMENT





I saw and evaluated the patient.


I reviewed the resident's note and discussed the case with the resident.


I agree with the resident's findings and plan as documented.








SUBJECTIVE:








OBJECTIVE:








ASSESSMENT AND PLAN:

## 2019-09-13 NOTE — PDOC
*Physical Exam





- Vital Signs


 Last Vital Signs











Temp Pulse Resp BP Pulse Ox


 


 92.9 F L  48 L  16   94/71   99 


 


 09/13/19 15:44  09/13/19 17:29  09/13/19 17:29  09/13/19 17:29  09/13/19 17:29














ED Treatment Course





- LABORATORY


CBC & Chemistry Diagram: 


 09/13/19 16:15





 09/13/19 17:00





- ADDITIONAL ORDERS


Additional order review: 


 Laboratory  Results











  09/13/19 09/13/19 09/13/19





  17:00 16:15 16:00


 


Sodium  137  


 


Potassium  4.3  


 


Chloride  106  


 


Carbon Dioxide  27  


 


Anion Gap  5 L  


 


BUN  17.9  


 


Creatinine  0.6  


 


Est GFR (CKD-EPI)AfAm  152.04  


 


Est GFR (CKD-EPI)NonAf  131.18  


 


Random Glucose  100  


 


Lactic Acid   0.9 


 


Calcium  9.5  


 


Total Bilirubin  0.2  


 


AST  28  


 


ALT  52  


 


Alkaline Phosphatase  129 H  


 


Total Protein  7.9  


 


Albumin  3.5  


 


Urine Color    Yellow


 


Urine Appearance    Cloudy


 


Urine pH    8.0


 


Ur Specific Gravity    1.017


 


Urine Protein    Negative


 


Urine Glucose (UA)    Negative


 


Urine Ketones    Negative


 


Urine Blood    Negative


 


Urine Nitrite    Positive H


 


Urine Bilirubin    Negative


 


Urine Urobilinogen    0.2


 


Ur Leukocyte Esterase    3+ H


 


Urine WBC (Auto)    57


 


Urine RBC (Auto)    3


 


Urine Casts (Auto)    104


 


U Epithel Cells (Auto)    0.5


 


Urine Bacteria (Auto)    1832.5








 











  09/13/19





  16:15


 


RBC  3.58 L


 


MCV  95.2


 


MCHC  33.6


 


RDW  13.5  D


 


MPV  8.0  D


 


Neutrophils %  53.5  D


 


Lymphocytes %  35.7  D


 


Monocytes %  7.5  D


 


Eosinophils %  2.2  D


 


Basophils %  1.1  D














- Medications


Given in the ED: 


ED Medications














Discontinued Medications














Generic Name Dose Route Start Last Admin





  Trade Name Freq  PRN Reason Stop Dose Admin


 


Ceftriaxone Sodium 1 gm/  100 mls @ 200 mls/hr  09/13/19 16:32  09/13/19 17:39





  Dextrose  IVPB  09/13/19 17:01  Not Given





  ONCE ONE   





     





     





     





     


 


Piperacillin Sod/Tazobactam  50 mls @ 100 mls/hr  09/13/19 17:06  09/13/19 17:19





  Sod 3.375 gm/ Dextrose  IVPB  09/13/19 17:35  100 mls/hr





  ONCE ONE   Administration





     





     





  Protocol   





     














Medical Decision Making





- Medical Decision Making








Patient signed out to me by ELLYN Sandra


Labs reviewed; lactic acid normal


UA positive (patient has had positive UA in the past), ?chronic UTI; patient 

has indwelling fraser


Prior urine culture reviewed - based on sensitivities, will give Zosyn


[facility] awaiting to hear back





09/13/19 18:03








*DC/Admit/Observation/Transfer


Diagnosis at time of Disposition: 


Blepharitis of left eye


Qualifiers:


 Blepharitis type: unspecified type Eyelid: upper Qualified Code(s): H01.004 - 

Unspecified blepharitis left upper eyelid





Hypothermia


Qualifiers:


 Encounter type: initial encounter Qualified Code(s): T68.XXXA - Hypothermia, 

initial encounter








- Discharge Dispostion


Condition at time of disposition: Stable


Decision to Admit order: Yes





- Referrals





- Patient Instructions





- Post Discharge Activity

## 2019-09-13 NOTE — PN
Teaching Attending Note


Name of Resident: Rufino Adames





ATTENDING PHYSICIAN STATEMENT





I saw and evaluated the patient.


I reviewed the resident's note and discussed the case with the resident.


I agree with the resident's findings and plan as documented.








SUBJECTIVE:


Patient is a 33 year old man resident of [facility] with PMH of Cerebral 

palsy, Profound mental retardation, Seizures, Pseudomonal UTI, Suprapubic 

catherter, G-tube feeding and Microcephaly sent for evaluation of mild lethargy

, L eyelid redness and hypothermia (Temp 94). No other information provided. 

Patient is unable to provide any information because of underlying clinical 

condition. 





OBJECTIVE:


Nonverbal





 Vital Signs











 Period  Temp  Pulse  Resp  BP Sys/Curran  Pulse Ox


 


 Last 24 Hr  92 F-93.2 F  42-58  14-16  /63-76  








HEENT: No Jaundice, left eye lid redness, PERRLA, EOMI. Normocephalic, 

atraumatic. External ears are normal. No nasal discharge.


Neck: Supple, nontender. No palpable adenopathy or thyromegaly. No JVD


Chest: Good effort. Clear to auscultation and percussion.


Heart: Regular. No S3, rub or murmur


Abdomen: Not distended, soft, nontender; PEG in place and no HSM. No rebound or 

guarding. Normal bowel sounds. Suprapubic catheter.


Ext: Peripheral pulses intact. No leg edema.


Skin: Warm and dry. No petechiae, rash or ecchymosis.


Neuro: Alert. Nonverbal.


Psych: Unable to assess.


 Current Medications











Generic Name Dose Route Start Last Admin





  Trade Name Freq  PRN Reason Stop Dose Admin


 


Sodium Chloride  1,000 mls @ 1,000 mls/hr  09/13/19 18:45  09/13/19 18:48





  Normal Saline -  IV  09/13/19 19:44  1,000 mls/hr





  ASDIR STA   Administration





     





     





     





     








 Home Medications











 Medication  Instructions  Recorded


 


Albuterol 0.083% Nebulizer Sol 1 neb NEB Q4H PRN 09/13/19





[Ventolin 0.083%]  


 


Calcium Carbonate/Vitamin D3 1 each GT BID 09/13/19





[Oyster Shell 500-Vit D3 200 Tb]  


 


Clobazam [Onfi -] 10 mg GT BID 09/13/19


 


Fructooligosaccharides/Polydex 30 ml GT DAILY 09/13/19





[Fiber-Stat 15 gm/30 ml Liquid]  


 


Lacosamide [Vimpat] 200 mg GT BID 09/13/19


 


Levocarnitine 7 ml GT TID 09/13/19


 


Multivitamin [Multiple Vitamins] 30 ml GT DAILY 09/13/19


 


Pyridoxine HCl [Vitamin B-6] 100 mg GT ASDIR 09/13/19


 


Sennosides/Docusate Sodium 2 tab GT HS 09/13/19





[Senna-S Tablet]  


 


Sodium Chloride Tablet - 3.5 gm GT BID 09/13/19


 


Zonisamide 300 mg GT BID 09/13/19


 


levETIRAcetam [levETIRAcetam ORAL 2 gm GT BID 09/13/19





SUSPENSION]  








 Abnormal Lab Results











  09/13/19 09/13/19 09/13/19





  16:00 16:15 17:00


 


RBC   3.58 L 


 


Hgb   11.4 L 


 


Hct   34.0 L 


 


Anion Gap    5 L


 


Alkaline Phosphatase    129 H


 


Urine Nitrite  Positive H  


 


Ur Leukocyte Esterase  3+ H  








ASSESSMENT AND PLAN:


1. Sepsis due to UTI/Left eye blepharitis - Sepsis workup done in the ER. 

Patient started on IV Zosyn and warming blanket. Based on historical urine 

culture and sensitivity od pseudomonas UTI, will treat with meropenem and add 

vancomycin for gram +ve coverage. Continu IV NS, treat blepharitis with eye lid 

wash and erythromycin eye drop. No acute chest process noted on CXR. EKG 

pending. Will continue comprehensive care of all his comorbid conditions. Will 

consult urology to change fraser catheter, continue anti-seizure drugs, ensure 

adequate nutrition and do frequent repositioning to prevent decubitus ulcer. 





2. Anemia - Likely multifactorial. Will do basic anemia work up including 

serial stool guaiacs, reticulocyte count and iron studies.  





3. DVT prophylaxis - Lovenox 40 mg SQ q 24 hours.     





4. Advance directives - Full code

## 2019-09-13 NOTE — PDOC
Rapid Medical Evaluation


Time Seen by Provider: 09/13/19 13:24


Medical Evaluation: 


 Allergies











Allergy/AdvReac Type Severity Reaction Status Date / Time


 


No Known Allergies Allergy   Verified 01/12/19 17:41











09/13/19 13:26


Pt c/o: left upper eyelid redness, mild lethargy, hx MRCP, microcephalus


Pt on brief exam: noted erythema/edema to upper left eyelid with dry crusting 

to eyelashes, 


Pt ordered for: none


Pt to proceed to the ED








**Discharge Disposition





- Diagnosis


 Eyelid cellulitis








- Referrals





- Patient Instructions





- Post Discharge Activity

## 2019-09-13 NOTE — PDOC
History of Present Illness





- General


Chief Complaint: Eye Problem


Stated Complaint: CELLULITIS LT EYELID


Time Seen by Provider: 09/13/19 13:24


History Source: Patient


Exam Limitations: No Limitations





- History of Present Illness


Initial Comments: 





09/13/19 15:33


32yo M from [facility] w/ a h/o CP, profound MR, seizures, microcephaly sent 

for evaluation of mild lethargy, L eyelid redness and hypothermia, temp 94. No 

other information provided.


09/13/19 15:35








Past History





- Past Medical History


Allergies/Adverse Reactions: 


 Allergies











Allergy/AdvReac Type Severity Reaction Status Date / Time


 


No Known Allergies Allergy   Verified 09/13/19 13:27











Home Medications: 


Ambulatory Orders





Lacosamide Liquid [Vimpat] 200 mg GT BID 02/09/15 


Multivit Infusion,Pedi 2,Vit K [M.v.i. Pediatric] 1 each GT DAILY 02/09/15 


Fructooligosaccharides/Polydex [Fiber-Stat 15 gm/30 ml Liquid] 30 ml GT DAILY 12 /31/18 


Sodium Chloride Tablet - 3.5 tab GT BID 12/31/18 


Calcium Carbonate/Vitamin D3 [Oyster Shell 500-Vit D3 200 Tb] 1 each PO BID 01/ 12/19 


Levocarnitine (with Sugar) [Levocarnitine 100 mg/ml Soln] 7 ml PO TID 01/12/19 


levETIRAcetam [levETIRAcetam ORAL SUSPENSION] 2 g PO BID 01/12/19 


Albuterol 0.083% Nebulizer Sol [Ventolin 0.083%] 1 neb NEB Q4H PRN 01/13/19 


Clobazam [Onfi -] 10 mg GT HS 01/13/19 


Diazepam [Diastat] 5 mg RC TID PRN 01/13/19 


Pyridoxine HCl [Vitamin B-6] 100 mg GT DAILY 01/13/19 


Sennosides [Senna] 8.6 mg GT HS 01/13/19 


Zonisamide 300 mg GT BID 01/13/19 


Levofloxacin [Levaquin] 500 mg GT DAILY #3 tablet 01/17/19 








Anemia: No


Asthma: No


Cancer: No


Cardiac Disorders: No


CVA: No


COPD: No


CHF: No


DVT: No


Dementia: No


Diabetes: No


GI Disorders: Yes (gerd)


 Disorders: Yes (SUPRAPUBIC CATHETER)


HTN: No


Hypercholesterolemia: No


Liver Disease: No


Seizures: Yes


Thyroid Disease: No





- Surgical History


Abdominal Surgery: Yes (G-tube)


Appendectomy: No


Cardiac Surgery: No


Cholecystectomy: No


GI Surgery: Yes (suprapubic tube)


Lung Surgery: No


Neurologic Surgery: No


Orthopedic Surgery: Yes (Spinal fusion)





- Immunization History


Immunization Up to Date: Yes





- Suicide/Smoking/Psychosocial Hx


Smoking History: Never smoked


Have you smoked in the past 12 months: No


Hx Alcohol Use: No


Drug/Substance Use Hx: No


Substance Use Type: None


Hx Substance Use Treatment: No





**Review of Systems





- Review of Systems


Able to Perform ROS?: No (due to patient condition)





*Physical Exam





- Vital Signs


 Last Vital Signs











Temp Pulse Resp BP Pulse Ox


 


 93.2 F L  58 L  16   96/64   97 


 


 09/13/19 13:20  09/13/19 13:20  09/13/19 13:20  09/13/19 13:20  09/13/19 13:20














- Physical Exam


General Appearance: Yes: Nourished.  No: Apparent Distress


HEENT: positive: CARLO, Other (L upper eyelid with mid swelling and erythema 

with crusting on eyelid, no periorbital tenderness/erythema/swelling, pt moving 

eyeball in NAD).  negative: Pale Conjunctivae, Scleral Icterus (R), Scleral 

Icterus (L)


Neck: positive: Supple.  negative: Decreased range of motion, Tender midline


Respiratory/Chest: positive: Lungs Clear, Normal Breath Sounds.  negative: 

Respiratory Distress, Accessory Muscle Use


Cardiovascular: positive: Regular Rhythm, Regular Rate


Gastrointestinal/Abdominal: positive: Normal Bowel Sounds, Soft.  negative: 

Tender


Musculoskeletal: positive: Normal Inspection


Integumentary: positive: Normal Color, Dry


Neurologic: positive: Other (Pt alert, non verbal)





ED Treatment Course





- RADIOLOGY


Radiology Studies Ordered: 














 Category Date Time Status


 


 CHEST X-RAY PORTABLE* [RAD] Stat Radiology  09/13/19 14:25 Ordered














Medical Decision Making





- Medical Decision Making





09/13/19 16:20


32 yo M w/ MR, CP, seizures, p/w blepharitis, hypothermia and mild lethargy. 

WIll check rectal temp, labs, CXR, Urine, blood culture.


Change of shift, care of patient signed over ELLYN Zimmer who will follow up labs

, XR, urine and reassess





*DC/Admit/Observation/Transfer


Diagnosis at time of Disposition: 


 Eyelid cellulitis








- Referrals





- Patient Instructions





- Post Discharge Activity

## 2019-09-13 NOTE — PDOC
*Physical Exam





- Vital Signs


 Last Vital Signs











Temp Pulse Resp BP Pulse Ox


 


 92.9 F L  58 L  16   96/64   97 


 


 09/13/19 15:44  09/13/19 13:20  09/13/19 13:20  09/13/19 13:20  09/13/19 13:20














ED Treatment Course





- LABORATORY


CBC & Chemistry Diagram: 


 09/13/19 16:15





 09/13/19 17:00





- ADDITIONAL ORDERS


Additional order review: 


 Laboratory  Results











  09/13/19 09/13/19





  16:15 16:00


 


Lactic Acid  0.9 


 


Urine Color   Yellow


 


Urine Appearance   Cloudy


 


Urine pH   8.0


 


Ur Specific Gravity   1.017


 


Urine Protein   Negative


 


Urine Glucose (UA)   Negative


 


Urine Ketones   Negative


 


Urine Blood   Negative


 


Urine Nitrite   Positive H


 


Urine Bilirubin   Negative


 


Urine Urobilinogen   0.2


 


Ur Leukocyte Esterase   3+ H


 


Urine WBC (Auto)   57


 


Urine RBC (Auto)   3


 


Urine Casts (Auto)   104


 


U Epithel Cells (Auto)   0.5


 


Urine Bacteria (Auto)   1832.5








 











  09/13/19





  16:15


 


RBC  3.58 L


 


MCV  95.2


 


MCHC  33.6


 


RDW  13.5  D


 


MPV  8.0  D


 


Neutrophils %  53.5  D


 


Lymphocytes %  35.7  D


 


Monocytes %  7.5  D


 


Eosinophils %  2.2  D


 


Basophils %  1.1  D














Medical Decision Making





- Medical Decision Making





09/13/19 17:24


Mr. Mendieta is a 34 yo M MR-CP sent to the ER due to left eye erythema


Pt noted to be hypothermic








 Laboratory Tests











  09/13/19 09/13/19 09/13/19





  16:00 16:15 16:15


 


WBC   6.1 


 


Hgb   11.4 L 


 


Hct   34.0 L 


 


Plt Count   170  D 


 


Lactic Acid    0.9


 


Urine Nitrite  Positive H  


 


Ur Leukocyte Esterase  3+ H  


 


Urine WBC (Auto)  57  


 


Urine RBC (Auto)  3  


 


Urine Bacteria (Auto)  1832.5  








Pt noted to be hypothermic


Pt noted to have UTI


Has had resistant organisms in the past


Will give Zosyn


Will hydrate


Pt has varshakane santamaria on





Pt seen by Midlevel Provider under my direct supervision


Pt interviewed and examined


Ancillary studies reviewed





I agree with plan as outlined by Midlevel Provider


09/13/19 19:01


EKG - SR rate of 56bpm, axis nml, intervals abn - pr:200ms, QRS:108ms, QTc:449ms





*DC/Admit/Observation/Transfer


Diagnosis at time of Disposition: 


 Blepharitis of left eye, Hypothermia








- Discharge Dispostion


Condition at time of disposition: Stable





- Referrals





- Patient Instructions





- Post Discharge Activity

## 2019-09-14 LAB
ALBUMIN SERPL-MCNC: 2.8 G/DL (ref 3.4–5)
ALP SERPL-CCNC: 104 U/L (ref 45–117)
ALT SERPL-CCNC: 42 U/L (ref 13–61)
ANION GAP SERPL CALC-SCNC: 3 MMOL/L (ref 8–16)
AST SERPL-CCNC: 25 U/L (ref 15–37)
BASOPHILS # BLD: 0.4 % (ref 0–2)
BILIRUB SERPL-MCNC: 0.2 MG/DL (ref 0.2–1)
BUN SERPL-MCNC: 14.6 MG/DL (ref 7–18)
CALCIUM SERPL-MCNC: 8.5 MG/DL (ref 8.5–10.1)
CHLORIDE SERPL-SCNC: 110 MMOL/L (ref 98–107)
CO2 SERPL-SCNC: 25 MMOL/L (ref 21–32)
CREAT SERPL-MCNC: 0.5 MG/DL (ref 0.55–1.3)
DEPRECATED RDW RBC AUTO: 13.5 % (ref 11.9–15.9)
EOSINOPHIL # BLD: 1.5 % (ref 0–4.5)
GLUCOSE SERPL-MCNC: 79 MG/DL (ref 74–106)
HCT VFR BLD CALC: 31.3 % (ref 35.4–49)
HGB BLD-MCNC: 10.6 GM/DL (ref 11.7–16.9)
LYMPHOCYTES # BLD: 37.9 % (ref 8–40)
MAGNESIUM SERPL-MCNC: 2.1 MG/DL (ref 1.8–2.4)
MCH RBC QN AUTO: 32.2 PG (ref 25.7–33.7)
MCHC RBC AUTO-ENTMCNC: 33.9 G/DL (ref 32–35.9)
MCV RBC: 95.1 FL (ref 80–96)
MONOCYTES # BLD AUTO: 4.8 % (ref 3.8–10.2)
NEUTROPHILS # BLD: 55.4 % (ref 42.8–82.8)
PHOSPHATE SERPL-MCNC: 2.9 MG/DL (ref 2.5–4.9)
PLATELET # BLD AUTO: 184 K/MM3 (ref 134–434)
PMV BLD: 7.5 FL (ref 7.5–11.1)
POTASSIUM SERPLBLD-SCNC: 4 MMOL/L (ref 3.5–5.1)
PROT SERPL-MCNC: 6.5 G/DL (ref 6.4–8.2)
RBC # BLD AUTO: 3.3 M/MM3 (ref 4–5.6)
SODIUM SERPL-SCNC: 138 MMOL/L (ref 136–145)
WBC # BLD AUTO: 7.7 K/MM3 (ref 4–10)

## 2019-09-14 RX ADMIN — SODIUM CHLORIDE SCH MLS/HR: 9 INJECTION, SOLUTION INTRAVENOUS at 00:23

## 2019-09-14 RX ADMIN — DOCUSATE SODIUM LIQUID SCH MG: 100 LIQUID ORAL at 21:53

## 2019-09-14 RX ADMIN — Medication SCH TAB: at 21:52

## 2019-09-14 RX ADMIN — LEVOCARNITINE SCH MG: 1 SOLUTION ORAL at 21:54

## 2019-09-14 RX ADMIN — HEPARIN SODIUM SCH UNIT: 5000 INJECTION, SOLUTION INTRAVENOUS; SUBCUTANEOUS at 00:33

## 2019-09-14 RX ADMIN — LEVOCARNITINE SCH MG: 1 SOLUTION ORAL at 14:40

## 2019-09-14 RX ADMIN — SODIUM CHLORIDE TAB 1 GM SCH GM: 1 TAB at 21:55

## 2019-09-14 RX ADMIN — LEVETIRACETAM SCH MG: 100 SOLUTION ORAL at 14:38

## 2019-09-14 RX ADMIN — ZONISAMIDE SCH MG: 100 CAPSULE ORAL at 21:59

## 2019-09-14 RX ADMIN — HEPARIN SODIUM SCH UNIT: 5000 INJECTION, SOLUTION INTRAVENOUS; SUBCUTANEOUS at 06:26

## 2019-09-14 RX ADMIN — SODIUM CHLORIDE TAB 1 GM SCH GM: 1 TAB at 14:39

## 2019-09-14 RX ADMIN — LACOSAMIDE SCH: 10 SOLUTION ORAL at 15:26

## 2019-09-14 RX ADMIN — HEPARIN SODIUM SCH UNIT: 5000 INJECTION, SOLUTION INTRAVENOUS; SUBCUTANEOUS at 21:49

## 2019-09-14 RX ADMIN — SENNOSIDES SCH MG: 8.8 SYRUP ORAL at 21:57

## 2019-09-14 RX ADMIN — ERYTHROMYCIN SCH APPLIC: 5 OINTMENT OPHTHALMIC at 10:00

## 2019-09-14 RX ADMIN — HEPARIN SODIUM SCH UNIT: 5000 INJECTION, SOLUTION INTRAVENOUS; SUBCUTANEOUS at 15:27

## 2019-09-14 RX ADMIN — SODIUM CHLORIDE SCH MLS/HR: 9 INJECTION, SOLUTION INTRAVENOUS at 22:01

## 2019-09-14 RX ADMIN — LEVETIRACETAM SCH MG: 100 SOLUTION ORAL at 21:53

## 2019-09-14 RX ADMIN — PIPERACILLIN SODIUM AND TAZOBACTAM SODIUM SCH MLS/HR: .25; 2 INJECTION, POWDER, LYOPHILIZED, FOR SOLUTION INTRAVENOUS at 19:36

## 2019-09-14 RX ADMIN — Medication SCH: at 15:26

## 2019-09-14 NOTE — PN
Progress Note (short form)





- Note


Progress Note: 





ID CONSULT DICTATED


HYPOTHERMIA


UTI R/O SEPSIS SECONDARY TO UTI


CP/MR


AWAIT C/S


EMPIRIC ZOSYN

## 2019-09-14 NOTE — CONS
DATE OF CONSULTATION:  09/14/2019

 

HISTORY OF PRESENT ILLNESS:  The patient is a 33-year-old male with a history of

microcephaly, cerebral palsy and profound mental retardation evaluated for possible

sepsis.  History was obtained from the chart as he cannot give a history.  He is a

resident at [facility].  He was admitted to the hospital after he

was noted to become increasingly lethargic and hypothermic.  He was also noted to

have erythema involving his left eyelid.  He was admitted to the hospital, where his

course was notable for hypothermia with a temperature of 92.9 in the emergency room. 

Cultures were obtained and he was empirically treated with Zosyn.  He is unable to

give any additional history.  According to the notes, he had been hospitalized in

January of this year.  At that time, he was found to have a pseudomonas urinary tract

infection sensitive to Zosyn.  There are no reports of shaking chills, labored

breathing, cough, sputum production, vomiting, diarrhea or infected decubitus ulcers.

 

 

PAST MEDICAL HISTORY:  Positive for microcephaly, cerebral palsy, mental retardation

and seizure disorder.   

 

PAST SURGICAL HISTORY:  Status post suprapubic catheter and feeding gastrostomy.

 

ALLERGIES:  No known allergies.

 

LABORATORY DATA:  White count 7.7, creatinine 0.5.  Urinalysis showed 57 white cells.

 A chest x-ray was negative for acute infiltrate.  

 

PHYSICAL EXAMINATION: 

General:  The patient is awake.  He is not verbally responsive.  

Vital Signs:  Temperature 97.8, pulse 63 and regular, blood pressure 90/50,

respiratory rate 20 per minute.  

HEENT:  Positive for microcephaly and slight erythema involving the left eyelid.  

Heart:  Heart sounds S1, S2.  

Lungs:  Grossly clear. 

Abdomen:  Soft and nontender.  Positive feeding tube and suprapubic tube.  

Extremities:  There is 1+ edema.

 

IMPRESSION: 

1.  Hypothermia/lethargy. 

2.  Urinary tract infection; rule out sepsis secondary to urinary tract infection. 

3.  Cerebral palsy and mental retardation.

 

PLAN:  

1.  Await cultures. 

2.  Empiric antibiotic coverage with Zosyn.  

3.  Topical ointment for left blepharitis.  

4.  Will follow.  

 

Thank you for the kind referral. 

 

GLORIA AMEZCUA M.D.

 

[id number]

DD: 09/14/2019 18:12

DT: 09/14/2019 18:50

Job #:  [id number]

## 2019-09-14 NOTE — PN
Progress Note (short form)





- Note


Progress Note: 





Hypothermic blanket , non verbal aid at bed side.


 Vital Signs











Temperature  98.7 F   09/14/19 19:00


 


Pulse Rate  74   09/14/19 19:00


 


Respiratory Rate  22 H  09/14/19 19:00


 


Blood Pressure  99/64   09/14/19 18:00


 


O2 Sat by Pulse Oximetry (%)  98   09/14/19 09:00








GENERAL: is asleep, in no apparent distress.


HEAD:microcephaly w/o signs of trauma


LUNGS:  Breath sounds equal, clear to auscultation bilaterally. No wheezes, and 

no crackles. No accessory muscle use.


HEART: Regular rate and rhythm, normal S1 and S2 without murmur, rub or gallop.


ABDOMEN: Soft, NT, ND, normoactive bowel sounds, no guarding, no rebound, no 

masses. positive for G-tube 


MUSCULOSKELETAL: contracted . No CVA tenderness.


EXTREMITIES: 2+ pulses, warm, well-perfused. No cyanosis. 


SKIN: Warm, dry, normal turgor, no rashes or lesions noted, normal capillary 

refill. 


; suprapubic catheter


 CBCD











WBC  7.7 K/mm3 (4.0-10.0)   09/14/19  10:10    


 


RBC  3.30 M/mm3 (4.00-5.60)  L  09/14/19  10:10    


 


Hgb  10.6 GM/dL (11.7-16.9)  L  09/14/19  10:10    


 


Hct  31.3 % (35.4-49)  L  09/14/19  10:10    


 


MCV  95.1 fl (80-96)   09/14/19  10:10    


 


MCHC  33.9 g/dl (32.0-35.9)   09/14/19  10:10    


 


RDW  13.5 % (11.9-15.9)   09/14/19  10:10    


 


Plt Count  184 K/MM3 (134-434)   09/14/19  10:10    


 


MPV  7.5 fl (7.5-11.1)   09/14/19  10:10    








 CMP











Sodium  138 mmol/L (136-145)   09/14/19  10:10    


 


Potassium  4.0 mmol/L (3.5-5.1)   09/14/19  10:10    


 


Chloride  110 mmol/L ()  H  09/14/19  10:10    


 


Carbon Dioxide  25 mmol/L (21-32)   09/14/19  10:10    


 


Anion Gap  3 MMOL/L (8-16)  L  09/14/19  10:10    


 


BUN  14.6 mg/dL (7-18)   09/14/19  10:10    


 


Creatinine  0.5 mg/dL (0.55-1.3)  L  09/14/19  10:10    


 


Random Glucose  79 mg/dL ()   09/14/19  10:10    


 


Calcium  8.5 mg/dL (8.5-10.1)   09/14/19  10:10    


 


Total Bilirubin  0.2 mg/dL (0.2-1)   09/14/19  10:10    


 


AST  25 U/L (15-37)   09/14/19  10:10    


 


ALT  42 U/L (13-61)   09/14/19  10:10    


 


Alkaline Phosphatase  104 U/L ()   09/14/19  10:10    


 


Total Protein  6.5 g/dl (6.4-8.2)   09/14/19  10:10    


 


Albumin  2.8 g/dl (3.4-5.0)  L  09/14/19  10:10    








 Current Medications











Generic Name Dose Route Start Last Admin





  Trade Name Freq  PRN Reason Stop Dose Admin


 


Albuterol Sulfate  1 amp  09/14/19 12:01  





  Ventolin 0.083% Nebulizer Soln -  NEB   





  Q4H PRN   





  SHORT OF BREATH/WHEEZING   





     





     





     


 


Calcium Carbonate/Cholecalciferol  1 tab  09/14/19 22:00  09/14/19 21:52





  Os-Dale 500+D -  GT   1 tab





  BID RAKAN   Administration





     





     





     





     


 


Clobazam  10 mg  09/14/19 12:30  09/14/19 21:51





  Onfi -  GT   10 mg





  BID RAKAN   Administration





     





     





     





     


 


Docusate Sodium  100 mg  09/14/19 22:00  09/14/19 21:53





  Colace Liquid -  GT   100 mg





  HS RAKAN   Administration





     





     





     





     


 


Erythromycin  1 applic  09/14/19 10:00  09/14/19 10:00





  Erythromycin 0.5% Eye Ointment  OS   1 applic





  DAILY RAKAN   Administration





     





     





     





     


 


Heparin Sodium (Porcine)  5,000 unit  09/13/19 21:15  09/14/19 21:49





  Heparin -  SQ   5,000 unit





  TID RAKAN   Administration





     





     





     





     


 


Sodium Chloride  1,000 mls @ 75 mls/hr  09/13/19 21:15  09/14/19 22:01





  Normal Saline -  IV   75 mls/hr





  ASDIR RAKAN   Administration





     





     





     





     


 


Piperacillin Sod/Tazobactam  50 mls @ 100 mls/hr  09/14/19 18:15  09/14/19 19:36





  Sod 2.25 gm/ Dextrose  IVPB   100 mls/hr





  Q8H-IV RAKAN   Administration





     





     





  Protocol   





     


 


Lacosamide  200 mg  09/14/19 13:00  09/14/19 15:26





  Vimpat Liquid -  GT   Not Given





  BID RAKAN   





     





     





     





     


 


Levetiracetam  2,000 mg  09/14/19 12:30  09/14/19 21:53





  Keppra Oral Solution -  GT   2,000 mg





  BID RAKAN   Administration





     





     





     





     


 


Levocarnitine  700 mg  09/14/19 14:00  09/14/19 21:54





  Carnitor Oral Solution -  GT   700 mg





  TID RAKAN   Administration





     





     





     





     


 


Multivitamins/Minerals  15 ml  09/15/19 10:00  





  Certavite-Antioxidant Liquid  GT   





  DAILY RAKAN   





     





     





     





     


 


Pyridoxine HCl  100 mg  09/14/19 14:15  09/14/19 15:26





  Vitamin B6 -  GT   Not Given





  DAILY RAKAN   





     





     





     





     


 


Senna  8.8 mg  09/14/19 22:00  09/14/19 21:57





  Senna Oral Solution -  GT   8.8 mg





  HS RAKAN   Administration





     





     





     





     


 


Sodium Chloride  3.5 gm  09/14/19 12:45  09/14/19 21:55





  Sodium Chloride Tablet -  GT   3.5 gm





  BID RAKAN   Administration





     





     





     





     


 


Zonisamide  300 mg  09/14/19 22:00  09/14/19 21:59





  Zonisamide  GT   300 mg





  BID RAKAN   Administration





     





     





     





     








 Home Medications











 Medication  Instructions  Recorded


 


Albuterol 0.083% Nebulizer Sol 1 neb NEB Q4H PRN 09/13/19





[Ventolin 0.083%]  


 


Calcium Carbonate/Vitamin D3 1 each GT BID 09/13/19





[Oyster Shell 500-Vit D3 200 Tb]  


 


Clobazam [Onfi -] 10 mg GT BID 09/13/19


 


Fructooligosaccharides/Polydex 30 ml GT DAILY 09/13/19





[Fiber-Stat 15 gm/30 ml Liquid]  


 


Lacosamide [Vimpat] 200 mg GT BID 09/13/19


 


Levocarnitine 7 ml GT TID 09/13/19


 


Multivitamin [Multiple Vitamins] 30 ml GT DAILY 09/13/19


 


Pyridoxine HCl [Vitamin B-6] 100 mg GT ASDIR 09/13/19


 


Sennosides/Docusate Sodium 2 tab GT HS 09/13/19





[Senna-S Tablet]  


 


Sodium Chloride Tablet - 3.5 gm GT BID 09/13/19


 


Zonisamide 300 mg GT BID 09/13/19


 


levETIRAcetam [levETIRAcetam ORAL 2 gm GT BID 09/13/19





SUSPENSION]  








 Microbiology





09/13/19 16:35   Blood - Peripheral Venous   Blood Culture - Preliminary


                            NO GROWTH OBTAINED AFTER 48 HOURS, INCUBATION TO 

CONTINUE


                            FOR 3 DAYS.


09/13/19 16:35   Blood - Peripheral Venous   Blood Culture - Preliminary


                            NO GROWTH OBTAINED AFTER 48 HOURS, INCUBATION TO 

CONTINUE


                            FOR 3 DAYS.


09/13/19 16:00   Urine - Urine Clean Catch   Urine Culture - Final


                            Contaminated: Please Repeat





 Urine Test Results











Urine Color  Yellow   09/13/19  16:00    


 


Urine Appearance  Cloudy   09/13/19  16:00    


 


Urine pH  8.0  (5.0-8.0)   09/13/19  16:00    


 


Ur Specific Gravity  1.017  (1.010-1.035)   09/13/19  16:00    


 


Urine Protein  Negative  (NEGATIVE)   09/13/19  16:00    


 


Urine Glucose (UA)  Negative  (NEGATIVE)   09/13/19  16:00    


 


Urine Ketones  Negative  (NEGATIVE)   09/13/19  16:00    


 


Urine Blood  Negative  (NEGATIVE)   09/13/19  16:00    


 


Urine Nitrite  Positive  (NEGATIVE)  H  09/13/19  16:00    


 


Urine Bilirubin  Negative  (NEGATIVE)   09/13/19  16:00    


 


Ur Leukocyte Esterase  3+  (NEGATIVE)  H  09/13/19  16:00    








CXR: negative


Assessment and plan:


Patient is a 32yo male from [facility] with a PMHx of Cerebral Palsy, 

Profound mental retardation, seizures, microcephaly, GERD presented with 

hypothermia of 94F and was found to have UTI/sepsis





#Sepsis due to  UTI on IV zosyn continue, possible needing suprapubic catheter 

change , ID on the case, will re-evaluate 


#Hypothermic due to sepsis continue Chantal santamaria 


#hx of Seizures continue home meds


# Nutrition: tube feeds are on hold due to hypothermic 





DVT px: Hep subQ

















Visit type





- Emergency Visit


Emergency Visit: Yes


ED Registration Date: 09/13/19


Care time: The patient presented to the Emergency Department on the above date 

and was hospitalized for further evaluation of their emergent condition.





- New Patient


This patient is new to me today: Yes


Date on this admission: 09/14/19





- Critical Care


Critical Care patient: No





- Discharge Referral


Referred to [facility] No

## 2019-09-14 NOTE — EKG
Test Reason : 

Blood Pressure : ***/*** mmHG

Vent. Rate : 056 BPM     Atrial Rate : 056 BPM

   P-R Int : 200 ms          QRS Dur : 108 ms

    QT Int : 466 ms       P-R-T Axes : 052 054 061 degrees

   QTc Int : 449 ms

 

SINUS BRADYCARDIA

INCREASED R/S RATIO IN V1, CONSIDER EARLY TRANSITION OR POSTERIOR

INFARCT

ST ELEVATION, CONSIDER EARLY REPOLARIZATION

ABNORMAL ECG

Confirmed by MD KASIE, KRISTA ([id number]) on 9/14/2019 3:04:51 PM

 

Referred By:             Confirmed By:KRISTA FERRO MD

## 2019-09-15 LAB
ALBUMIN SERPL-MCNC: 3 G/DL (ref 3.4–5)
ALP SERPL-CCNC: 121 U/L (ref 45–117)
ALT SERPL-CCNC: 42 U/L (ref 13–61)
ANION GAP SERPL CALC-SCNC: 7 MMOL/L (ref 8–16)
AST SERPL-CCNC: 26 U/L (ref 15–37)
BASOPHILS # BLD: 1.2 % (ref 0–2)
BILIRUB SERPL-MCNC: 0.4 MG/DL (ref 0.2–1)
BUN SERPL-MCNC: 12.4 MG/DL (ref 7–18)
CALCIUM SERPL-MCNC: 8.9 MG/DL (ref 8.5–10.1)
CHLORIDE SERPL-SCNC: 110 MMOL/L (ref 98–107)
CO2 SERPL-SCNC: 23 MMOL/L (ref 21–32)
CREAT SERPL-MCNC: 0.6 MG/DL (ref 0.55–1.3)
DEPRECATED RDW RBC AUTO: 13.4 % (ref 11.9–15.9)
EOSINOPHIL # BLD: 1 % (ref 0–4.5)
GLUCOSE SERPL-MCNC: 62 MG/DL (ref 74–106)
HCT VFR BLD CALC: 33.7 % (ref 35.4–49)
HGB BLD-MCNC: 11.4 GM/DL (ref 11.7–16.9)
LYMPHOCYTES # BLD: 42.7 % (ref 8–40)
MAGNESIUM SERPL-MCNC: 2.2 MG/DL (ref 1.8–2.4)
MCH RBC QN AUTO: 32.2 PG (ref 25.7–33.7)
MCHC RBC AUTO-ENTMCNC: 33.9 G/DL (ref 32–35.9)
MCV RBC: 94.9 FL (ref 80–96)
MONOCYTES # BLD AUTO: 4 % (ref 3.8–10.2)
NEUTROPHILS # BLD: 51.1 % (ref 42.8–82.8)
PHOSPHATE SERPL-MCNC: 3 MG/DL (ref 2.5–4.9)
PLATELET # BLD AUTO: 227 K/MM3 (ref 134–434)
PMV BLD: 7.8 FL (ref 7.5–11.1)
POTASSIUM SERPLBLD-SCNC: 3.8 MMOL/L (ref 3.5–5.1)
PROT SERPL-MCNC: 7.1 G/DL (ref 6.4–8.2)
RBC # BLD AUTO: 3.55 M/MM3 (ref 4–5.6)
SODIUM SERPL-SCNC: 140 MMOL/L (ref 136–145)
WBC # BLD AUTO: 9.2 K/MM3 (ref 4–10)

## 2019-09-15 RX ADMIN — HEPARIN SODIUM SCH UNIT: 5000 INJECTION, SOLUTION INTRAVENOUS; SUBCUTANEOUS at 06:34

## 2019-09-15 RX ADMIN — MULTIVITAMIN SCH ML: LIQUID ORAL at 10:28

## 2019-09-15 RX ADMIN — ZONISAMIDE SCH MG: 100 CAPSULE ORAL at 10:25

## 2019-09-15 RX ADMIN — PIPERACILLIN SODIUM AND TAZOBACTAM SODIUM SCH MLS/HR: .25; 2 INJECTION, POWDER, LYOPHILIZED, FOR SOLUTION INTRAVENOUS at 01:13

## 2019-09-15 RX ADMIN — LACOSAMIDE SCH MG: 10 SOLUTION ORAL at 00:54

## 2019-09-15 RX ADMIN — LEVETIRACETAM SCH MG: 100 SOLUTION ORAL at 22:40

## 2019-09-15 RX ADMIN — LEVOCARNITINE SCH ML: 1 SOLUTION ORAL at 22:38

## 2019-09-15 RX ADMIN — LEVETIRACETAM SCH MG: 100 SOLUTION ORAL at 10:27

## 2019-09-15 RX ADMIN — ERYTHROMYCIN SCH APPLIC: 5 OINTMENT OPHTHALMIC at 10:19

## 2019-09-15 RX ADMIN — ZONISAMIDE SCH MG: 100 CAPSULE ORAL at 22:40

## 2019-09-15 RX ADMIN — Medication SCH TAB: at 10:23

## 2019-09-15 RX ADMIN — DOCUSATE SODIUM LIQUID SCH MG: 100 LIQUID ORAL at 22:33

## 2019-09-15 RX ADMIN — SODIUM CHLORIDE TAB 1 GM SCH GM: 1 TAB at 10:23

## 2019-09-15 RX ADMIN — LEVOCARNITINE SCH MG: 1 SOLUTION ORAL at 06:33

## 2019-09-15 RX ADMIN — LACOSAMIDE SCH MG: 10 SOLUTION ORAL at 10:46

## 2019-09-15 RX ADMIN — LACOSAMIDE SCH MG: 10 SOLUTION ORAL at 22:31

## 2019-09-15 RX ADMIN — SENNOSIDES SCH MG: 8.8 SYRUP ORAL at 22:39

## 2019-09-15 RX ADMIN — LEVOCARNITINE SCH ML: 1 SOLUTION ORAL at 14:58

## 2019-09-15 RX ADMIN — Medication SCH MG: at 10:22

## 2019-09-15 RX ADMIN — SODIUM CHLORIDE TAB 1 GM SCH GM: 1 TAB at 22:34

## 2019-09-15 RX ADMIN — PIPERACILLIN SODIUM AND TAZOBACTAM SODIUM SCH MLS/HR: .25; 2 INJECTION, POWDER, LYOPHILIZED, FOR SOLUTION INTRAVENOUS at 10:18

## 2019-09-15 RX ADMIN — HEPARIN SODIUM SCH UNIT: 5000 INJECTION, SOLUTION INTRAVENOUS; SUBCUTANEOUS at 14:52

## 2019-09-15 RX ADMIN — SODIUM CHLORIDE SCH MLS/HR: 9 INJECTION, SOLUTION INTRAVENOUS at 22:33

## 2019-09-15 RX ADMIN — PIPERACILLIN SODIUM AND TAZOBACTAM SODIUM SCH MLS/HR: .25; 2 INJECTION, POWDER, LYOPHILIZED, FOR SOLUTION INTRAVENOUS at 17:44

## 2019-09-15 RX ADMIN — HEPARIN SODIUM SCH UNIT: 5000 INJECTION, SOLUTION INTRAVENOUS; SUBCUTANEOUS at 22:38

## 2019-09-15 RX ADMIN — Medication SCH TAB: at 22:32

## 2019-09-15 NOTE — PN
Physical Exam: 


SUBJECTIVE: Patient seen and examined. No events overnight. Hypothermic today @

96








OBJECTIVE:





 Vital Signs











 Period  Temp  Pulse  Resp  BP Sys/Curran  Pulse Ox


 


 Last 24 Hr  96.0 F-99.5 F  48-80  17-22  /64-89  97-97





























GENERAL: awake, in nad


HEAD:microcephaly w/o signs of trauma


LUNGS: Breath sounds equal, clear to auscultation bilaterally.


HEART: Regular rate and rhythm, normal S1 and S2 without murmur, rub or gallop.


ABDOMEN: Soft, nontender, not distended, normoactive bowel sounds, no guarding, 

no rebound, no masses.  No hepatomegaly or  splenomegaly. 


MUSCULOSKELETAL: contracted . 


LOWER EXTREMITIES: 2+ pulses, warm, well-perfused. No calf tenderness. No 

peripheral edema. 








 Laboratory Results - last 24 hr











  09/15/19 09/15/19





  05:18 05:18


 


WBC  9.2 


 


RBC  3.55 L 


 


Hgb  11.4 L 


 


Hct  33.7 L 


 


MCV  94.9 


 


MCH  32.2 


 


MCHC  33.9 


 


RDW  13.4 


 


Plt Count  227  D 


 


MPV  7.8 


 


Absolute Neuts (auto)  4.7 


 


Neutrophils %  51.1 


 


Lymphocytes %  42.7 H 


 


Monocytes %  4.0 


 


Eosinophils %  1.0 


 


Basophils %  1.2 


 


Nucleated RBC %  0 


 


Sodium   140


 


Potassium   3.8


 


Chloride   110 H


 


Carbon Dioxide   23


 


Anion Gap   7 L


 


BUN   12.4


 


Creatinine   0.6


 


Est GFR (CKD-EPI)AfAm   152.04


 


Est GFR (CKD-EPI)NonAf   131.18


 


Random Glucose   62 L


 


Calcium   8.9


 


Phosphorus   3.0


 


Magnesium   2.2


 


Total Bilirubin   0.4


 


AST   26


 


ALT   42


 


Alkaline Phosphatase   121 H


 


Total Protein   7.1


 


Albumin   3.0 L








Active Medications











Generic Name Dose Route Start Last Admin





  Trade Name Freq  PRN Reason Stop Dose Admin


 


Albuterol Sulfate  1 amp  09/14/19 12:01  





  Ventolin 0.083% Nebulizer Soln -  NEB   





  Q4H PRN   





  SHORT OF BREATH/WHEEZING   





     





     





     


 


Calcium Carbonate/Cholecalciferol  1 tab  09/14/19 22:00  09/15/19 10:23





  Os-Dale 500+D -  GT   1 tab





  BID RAKAN   Administration





     





     





     





     


 


Clobazam  10 mg  09/14/19 12:30  09/15/19 10:23





  Onfi -  GT   10 mg





  BID RAKAN   Administration





     





     





     





     


 


Docusate Sodium  100 mg  09/14/19 22:00  09/14/19 21:53





  Colace Liquid -  GT   100 mg





  HS RAKAN   Administration





     





     





     





     


 


Erythromycin  1 applic  09/14/19 10:00  09/15/19 10:19





  Erythromycin 0.5% Eye Ointment  OS   1 applic





  DAILY RAKAN   Administration





     





     





     





     


 


Heparin Sodium (Porcine)  5,000 unit  09/13/19 21:15  09/15/19 06:34





  Heparin -  SQ   5,000 unit





  TID RAKAN   Administration





     





     





     





     


 


Sodium Chloride  1,000 mls @ 75 mls/hr  09/13/19 21:15  09/14/19 22:01





  Normal Saline -  IV   75 mls/hr





  ASDIR RAKAN   Administration





     





     





     





     


 


Piperacillin Sod/Tazobactam  50 mls @ 100 mls/hr  09/14/19 18:15  09/15/19 10:18





  Sod 2.25 gm/ Dextrose  IVPB   100 mls/hr





  Q8H-IV RAKAN   Administration





     





     





  Protocol   





     


 


Lacosamide  200 mg  09/14/19 13:00  09/15/19 10:46





  Vimpat Liquid -  GT   200 mg





  BID RAKAN   Administration





     





     





     





     


 


Levetiracetam  2,000 mg  09/14/19 12:30  09/15/19 10:27





  Keppra Oral Solution -  GT   2,000 mg





  BID RAKAN   Administration





     





     





     





     


 


Levocarnitine  700 mg  09/14/19 14:00  09/15/19 06:33





  Carnitor Oral Solution -  GT   700 mg





  TID RAKAN   Administration





     





     





     





     


 


Multivitamins/Minerals  15 ml  09/15/19 10:00  09/15/19 10:28





  Certavite-Antioxidant Liquid  GT   15 ml





  DAILY RAKAN   Administration





     





     





     





     


 


Pyridoxine HCl  100 mg  09/14/19 14:15  09/15/19 10:22





  Vitamin B6 -  GT   100 mg





  DAILY RAKAN   Administration





     





     





     





     


 


Senna  8.8 mg  09/14/19 22:00  09/14/19 21:57





  Senna Oral Solution -  GT   8.8 mg





  HS RAKAN   Administration





     





     





     





     


 


Sodium Chloride  3.5 gm  09/14/19 12:45  09/15/19 10:23





  Sodium Chloride Tablet -  GT   3.5 gm





  BID RAKAN   Administration





     





     





     





     


 


Zonisamide  300 mg  09/14/19 22:00  09/15/19 10:25





  Zonisamide  GT   300 mg





  BID RAKAN   Administration





     





     





     





     











ASSESSMENT/PLAN:





33 y.o M from [facility] with a PMH of Cerebral Palsy, Profound mental 

retardation, seizures, microcephaly, GERD presenting with hypothermia of 94F 

and L eyelid redness. found to have a UTI





#Sepsis 2/2 UTI 


-hypothermic today at 96


-varsha hugger


-Cont. Iv Abx: Zosyn


-UA positive, pending ucx


-pending cultures


-ID consulted


-CXR with no acute pathology


-NS @75/hr


-suprapubic catheter care. consult Urology for possible catheter change








#hx of Seizures


-continue home meds





#FEN


continue tube feeding


NS @75/hr


-tube feeds held





DVT


Hep subQ



































Visit type





- Emergency Visit


Emergency Visit: Yes


ED Registration Date: 09/13/19


Care time: The patient presented to the Emergency Department on the above date 

and was hospitalized for further evaluation of their emergent condition.





- New Patient


This patient is new to me today: Yes


Date on this admission: 09/15/19





- Critical Care


Critical Care patient: No





ATTENDING PHYSICIAN STATEMENT





I saw and evaluated the patient.


I reviewed the resident's note and discussed the case with the resident.


I agree with the resident's findings and plan as documented.








SUBJECTIVE:








OBJECTIVE:








ASSESSMENT AND PLAN:

## 2019-09-15 NOTE — PN
Teaching Attending Note


Name of Resident: Rufino Adames





ATTENDING PHYSICIAN STATEMENT





I saw and evaluated the patient.


I reviewed the resident's note and discussed the case with the resident.


I agree with the resident's findings and plan as documented.








SUBJECTIVE:


continues to be hypothermic of 96





OBJECTIVE:


 Vital Signs











Temperature  95.7 F L  09/15/19 17:12


 


Pulse Rate  57 L  09/15/19 17:12


 


Respiratory Rate  18   09/15/19 15:00


 


Blood Pressure  112/58 L  09/15/19 15:00


 


O2 Sat by Pulse Oximetry (%)  97   09/15/19 09:00





GENERAL: is asleep, in no apparent distress.


HEAD:microcephaly w/o signs of trauma


LUNGS:  Breath sounds equal, clear to auscultation bilaterally. No wheezes, and 

no crackles. No accessory muscle use.


HEART: Regular rate and rhythm, normal S1 and S2 without murmur, rub or gallop.


ABDOMEN: Soft, NT, ND, normoactive bowel sounds, no guarding, no rebound, no 

masses. positive for G-tube 


MUSCULOSKELETAL: contracted . No CVA tenderness.


EXTREMITIES: 2+ pulses, warm, well-perfused. No cyanosis. 


SKIN: Warm, dry, normal turgor, no rashes or lesions noted, normal capillary 

refill. 


; suprapubic catheter








 CBCD











WBC  9.2 K/mm3 (4.0-10.0)   09/15/19  05:18    


 


RBC  3.55 M/mm3 (4.00-5.60)  L  09/15/19  05:18    


 


Hgb  11.4 GM/dL (11.7-16.9)  L  09/15/19  05:18    


 


Hct  33.7 % (35.4-49)  L  09/15/19  05:18    


 


MCV  94.9 fl (80-96)   09/15/19  05:18    


 


MCHC  33.9 g/dl (32.0-35.9)   09/15/19  05:18    


 


RDW  13.4 % (11.9-15.9)   09/15/19  05:18    


 


Plt Count  227 K/MM3 (134-434)  D 09/15/19  05:18    


 


MPV  7.8 fl (7.5-11.1)   09/15/19  05:18    








 CMP











Sodium  140 mmol/L (136-145)   09/15/19  05:18    


 


Potassium  3.8 mmol/L (3.5-5.1)   09/15/19  05:18    


 


Chloride  110 mmol/L ()  H  09/15/19  05:18    


 


Carbon Dioxide  23 mmol/L (21-32)   09/15/19  05:18    


 


Anion Gap  7 MMOL/L (8-16)  L  09/15/19  05:18    


 


BUN  12.4 mg/dL (7-18)   09/15/19  05:18    


 


Creatinine  0.6 mg/dL (0.55-1.3)   09/15/19  05:18    


 


Random Glucose  62 mg/dL ()  L  09/15/19  05:18    


 


Calcium  8.9 mg/dL (8.5-10.1)   09/15/19  05:18    


 


Total Bilirubin  0.4 mg/dL (0.2-1)   09/15/19  05:18    


 


AST  26 U/L (15-37)   09/15/19  05:18    


 


ALT  42 U/L (13-61)   09/15/19  05:18    


 


Alkaline Phosphatase  121 U/L ()  H  09/15/19  05:18    


 


Total Protein  7.1 g/dl (6.4-8.2)   09/15/19  05:18    


 


Albumin  3.0 g/dl (3.4-5.0)  L  09/15/19  05:18    








 Current Medications











Generic Name Dose Route Start Last Admin





  Trade Name Freq  PRN Reason Stop Dose Admin


 


Albuterol Sulfate  1 amp  09/14/19 12:01  





  Ventolin 0.083% Nebulizer Soln -  NEB   





  Q4H PRN   





  SHORT OF BREATH/WHEEZING   





     





     





     


 


Calcium Carbonate/Cholecalciferol  1 tab  09/14/19 22:00  09/15/19 10:23





  Os-Dale 500+D -  GT   1 tab





  BID RAKAN   Administration





     





     





     





     


 


Clobazam  10 mg  09/14/19 12:30  09/15/19 10:23





  Onfi -  GT   10 mg





  BID RAKAN   Administration





     





     





     





     


 


Docusate Sodium  100 mg  09/14/19 22:00  09/14/19 21:53





  Colace Liquid -  GT   100 mg





  HS RAKAN   Administration





     





     





     





     


 


Erythromycin  1 applic  09/14/19 10:00  09/15/19 10:19





  Erythromycin 0.5% Eye Ointment  OS   1 applic





  DAILY RAKAN   Administration





     





     





     





     


 


Heparin Sodium (Porcine)  5,000 unit  09/13/19 21:15  09/15/19 14:52





  Heparin -  SQ   5,000 unit





  TID RAKAN   Administration





     





     





     





     


 


Sodium Chloride  1,000 mls @ 75 mls/hr  09/13/19 21:15  09/14/19 22:01





  Normal Saline -  IV   75 mls/hr





  ASDIR RAKAN   Administration





     





     





     





     


 


Piperacillin Sod/Tazobactam  50 mls @ 100 mls/hr  09/14/19 18:15  09/15/19 17:44





  Sod 2.25 gm/ Dextrose  IVPB   100 mls/hr





  Q8H-IV RAKAN   Administration





     





     





  Protocol   





     


 


Lacosamide  200 mg  09/14/19 13:00  09/15/19 10:46





  Vimpat Liquid -  GT   200 mg





  BID RAKAN   Administration





     





     





     





     


 


Levetiracetam  2,000 mg  09/14/19 12:30  09/15/19 10:27





  Keppra Oral Solution -  GT   2,000 mg





  BID RAKAN   Administration





     





     





     





     


 


Levocarnitine  700 mg  09/14/19 14:00  09/15/19 14:58





  Carnitor Oral Solution -  GT   5 ml





  TID RAKAN   Administration





     





     





     





     


 


Multivitamins/Minerals  15 ml  09/15/19 10:00  09/15/19 10:28





  Certavite-Antioxidant Liquid  GT   15 ml





  DAILY RAKAN   Administration





     





     





     





     


 


Pyridoxine HCl  100 mg  09/14/19 14:15  09/15/19 10:22





  Vitamin B6 -  GT   100 mg





  DAILY RAKAN   Administration





     





     





     





     


 


Senna  8.8 mg  09/14/19 22:00  09/14/19 21:57





  Senna Oral Solution -  GT   8.8 mg





  HS RAKAN   Administration





     





     





     





     


 


Sodium Chloride  3.5 gm  09/14/19 12:45  09/15/19 10:23





  Sodium Chloride Tablet -  GT   3.5 gm





  BID RAKAN   Administration





     





     





     





     


 


Zonisamide  300 mg  09/14/19 22:00  09/15/19 10:25





  Zonisamide  GT   300 mg





  BID RAKAN   Administration





     





     





     





     








 Home Medications











 Medication  Instructions  Recorded


 


Albuterol 0.083% Nebulizer Sol 1 neb NEB Q4H PRN 09/13/19





[Ventolin 0.083%]  


 


Calcium Carbonate/Vitamin D3 1 each GT BID 09/13/19





[Oyster Shell 500-Vit D3 200 Tb]  


 


Clobazam [Onfi -] 10 mg GT BID 09/13/19


 


Fructooligosaccharides/Polydex 30 ml GT DAILY 09/13/19





[Fiber-Stat 15 gm/30 ml Liquid]  


 


Lacosamide [Vimpat] 200 mg GT BID 09/13/19


 


Levocarnitine 7 ml GT TID 09/13/19


 


Multivitamin [Multiple Vitamins] 30 ml GT DAILY 09/13/19


 


Pyridoxine HCl [Vitamin B-6] 100 mg GT ASDIR 09/13/19


 


Sennosides/Docusate Sodium 2 tab GT HS 09/13/19





[Senna-S Tablet]  


 


Sodium Chloride Tablet - 3.5 gm GT BID 09/13/19


 


Zonisamide 300 mg GT BID 09/13/19


 


levETIRAcetam [levETIRAcetam ORAL 2 gm GT BID 09/13/19





SUSPENSION]  

















CXR: negative





Assessment and plan:


Patient is a 32yo male from [facility] with a PMHx of Cerebral Palsy, 

Profound mental retardation, seizures, microcephaly, GERD presented with 

hypothermia of 94F and was found to have UTI/sepsis





#Sepsis due to  UTI on IV zosyn day#2  continue, possible needing suprapubic 

catheter change , ID on the case, will re-evaluate , will consult uro if needed


#Hypothermic due to sepsis continue Inessa santamaria 


#hx of Seizures continue home meds


# Nutrition: tube feeds are on hold due to hypothermic 





DVT px: Hep subQ

## 2019-09-16 RX ADMIN — ZONISAMIDE SCH ML: 100 CAPSULE ORAL at 22:19

## 2019-09-16 RX ADMIN — LEVOCARNITINE SCH ML: 1 SOLUTION ORAL at 22:15

## 2019-09-16 RX ADMIN — OFLOXACIN SCH DROP: 3 SOLUTION/ DROPS OPHTHALMIC at 22:20

## 2019-09-16 RX ADMIN — HEPARIN SODIUM SCH UNIT: 5000 INJECTION, SOLUTION INTRAVENOUS; SUBCUTANEOUS at 05:55

## 2019-09-16 RX ADMIN — OFLOXACIN SCH DROP: 3 SOLUTION/ DROPS OPHTHALMIC at 17:45

## 2019-09-16 RX ADMIN — MULTIVITAMIN SCH ML: LIQUID ORAL at 10:05

## 2019-09-16 RX ADMIN — ERYTHROMYCIN SCH APPLIC: 5 OINTMENT OPHTHALMIC at 10:08

## 2019-09-16 RX ADMIN — PIPERACILLIN SODIUM AND TAZOBACTAM SODIUM SCH MLS/HR: .25; 2 INJECTION, POWDER, LYOPHILIZED, FOR SOLUTION INTRAVENOUS at 01:30

## 2019-09-16 RX ADMIN — ZONISAMIDE SCH ML: 100 CAPSULE ORAL at 10:06

## 2019-09-16 RX ADMIN — PIPERACILLIN SODIUM AND TAZOBACTAM SODIUM SCH MLS/HR: .25; 2 INJECTION, POWDER, LYOPHILIZED, FOR SOLUTION INTRAVENOUS at 09:59

## 2019-09-16 RX ADMIN — HEPARIN SODIUM SCH UNIT: 5000 INJECTION, SOLUTION INTRAVENOUS; SUBCUTANEOUS at 13:24

## 2019-09-16 RX ADMIN — DOCUSATE SODIUM LIQUID SCH MG: 100 LIQUID ORAL at 22:15

## 2019-09-16 RX ADMIN — LEVOCARNITINE SCH ML: 1 SOLUTION ORAL at 05:56

## 2019-09-16 RX ADMIN — Medication SCH TAB: at 10:00

## 2019-09-16 RX ADMIN — SENNOSIDES SCH MG: 8.8 SYRUP ORAL at 22:19

## 2019-09-16 RX ADMIN — SODIUM CHLORIDE SCH MLS/HR: 9 INJECTION, SOLUTION INTRAVENOUS at 12:37

## 2019-09-16 RX ADMIN — LACOSAMIDE SCH MG: 10 SOLUTION ORAL at 10:00

## 2019-09-16 RX ADMIN — Medication SCH TAB: at 22:16

## 2019-09-16 RX ADMIN — LEVETIRACETAM SCH MG: 100 SOLUTION ORAL at 22:15

## 2019-09-16 RX ADMIN — OFLOXACIN SCH DROP: 3 SOLUTION/ DROPS OPHTHALMIC at 13:25

## 2019-09-16 RX ADMIN — LEVETIRACETAM SCH MG: 100 SOLUTION ORAL at 10:04

## 2019-09-16 RX ADMIN — LACOSAMIDE SCH MG: 10 SOLUTION ORAL at 22:15

## 2019-09-16 RX ADMIN — HEPARIN SODIUM SCH UNIT: 5000 INJECTION, SOLUTION INTRAVENOUS; SUBCUTANEOUS at 22:16

## 2019-09-16 RX ADMIN — SODIUM CHLORIDE SCH MLS/HR: 9 INJECTION, SOLUTION INTRAVENOUS at 22:14

## 2019-09-16 RX ADMIN — Medication SCH MG: at 10:02

## 2019-09-16 RX ADMIN — LEVOCARNITINE SCH ML: 1 SOLUTION ORAL at 13:25

## 2019-09-16 RX ADMIN — PIPERACILLIN SODIUM AND TAZOBACTAM SODIUM SCH MLS/HR: .25; 2 INJECTION, POWDER, LYOPHILIZED, FOR SOLUTION INTRAVENOUS at 17:44

## 2019-09-16 RX ADMIN — SODIUM CHLORIDE TAB 1 GM SCH GM: 1 TAB at 10:01

## 2019-09-16 RX ADMIN — SODIUM CHLORIDE TAB 1 GM SCH GM: 1 TAB at 22:16

## 2019-09-16 NOTE — PN
Physical Exam: 


SUBJECTIVE: Patient seen and examined. Pt asleep in no apparent distress








OBJECTIVE:





 Vital Signs











 Period  Temp  Pulse  Resp  BP Sys/Curran  Pulse Ox


 


 Last 24 Hr  95.7 F-99.6 F    16-18  110-148/65-90  99-99











GENERAL: in asleep, in no apparent distress.


HEAD:microcephaly w/o signs of trauma


EYES: left eyelids swollen and erythematous


LUNGS: Breath sounds equal, clear to auscultation bilaterally. No wheezes, and 

no crackles. No accessory muscle use.


HEART: Regular rate and rhythm, normal S1 and S2 without murmur, rub or gallop.


ABDOMEN: Soft, nontender, not distended, normoactive bowel sounds, no guarding, 

no rebound, no masses.  No hepatomegaly or  splenomegaly. 


MUSCULOSKELETAL: contracted . No CVA tenderness.


 EXTREMITIES: 2+ pulses, warm, well-perfused.  No peripheral edema.


SKIN: Warm, dry, normal turgor, no rashes or lesions noted, normal capillary 

refill. 











Active Medications











Generic Name Dose Route Start Last Admin





  Trade Name Freq  PRN Reason Stop Dose Admin


 


Albuterol Sulfate  1 amp  09/14/19 12:01  





  Ventolin 0.083% Nebulizer Soln -  NEB   





  Q4H PRN   





  SHORT OF BREATH/WHEEZING   





     





     





     


 


Calcium Carbonate/Cholecalciferol  1 tab  09/14/19 22:00  09/16/19 10:00





  Os-Dale 500+D -  GT   1 tab





  BID RAKAN   Administration





     





     





     





     


 


Clobazam  10 mg  09/14/19 12:30  09/16/19 10:00





  Onfi -  GT   10 mg





  BID RAKAN   Administration





     





     





     





     


 


Docusate Sodium  100 mg  09/14/19 22:00  09/15/19 22:33





  Colace Liquid -  GT   100 mg





  HS RAKAN   Administration





     





     





     





     


 


Heparin Sodium (Porcine)  5,000 unit  09/13/19 21:15  09/16/19 13:24





  Heparin -  SQ   5,000 unit





  TID RAKAN   Administration





     





     





     





     


 


Sodium Chloride  1,000 mls @ 75 mls/hr  09/13/19 21:15  09/16/19 12:37





  Normal Saline -  IV   75 mls/hr





  ASDIR RAKAN   Administration





     





     





     





     


 


Piperacillin Sod/Tazobactam  50 mls @ 100 mls/hr  09/14/19 18:15  09/16/19 09:59





  Sod 2.25 gm/ Dextrose  IVPB   100 mls/hr





  Q8H-IV RAKAN   Administration





     





     





  Protocol   





     


 


Lacosamide  200 mg  09/14/19 13:00  09/16/19 10:00





  Vimpat Liquid -  GT   200 mg





  BID RAKAN   Administration





     





     





     





     


 


Levetiracetam  2,000 mg  09/14/19 12:30  09/16/19 10:04





  Keppra Oral Solution -  GT   2,000 mg





  BID RAKAN   Administration





     





     





     





     


 


Levocarnitine  700 mg  09/14/19 14:00  09/16/19 13:25





  Carnitor Oral Solution -  GT   5 ml





  TID RAKAN   Administration





     





     





     





     


 


Multivitamins/Minerals  15 ml  09/15/19 10:00  09/16/19 10:05





  Certavite-Antioxidant Liquid  GT   15 ml





  DAILY RAKAN   Administration





     





     





     





     


 


Ofloxacin  2 drop  09/16/19 14:00  09/16/19 13:25





  Ocuflox 0.3% Eye Drops -  AD   2 drop





  Q4HWA RAKAN   Administration





     





     





     





     


 


Pyridoxine HCl  100 mg  09/14/19 14:15  09/16/19 10:02





  Vitamin B6 -  GT   100 mg





  DAILY RAKAN   Administration





     





     





     





     


 


Senna  8.8 mg  09/14/19 22:00  09/15/19 22:39





  Senna Oral Solution -  GT   8.8 mg





  HS RAKAN   Administration





     





     





     





     


 


Sodium Chloride  3.5 gm  09/14/19 12:45  09/16/19 10:01





  Sodium Chloride Tablet -  GT   3.5 gm





  BID RAKAN   Administration





     





     





     





     


 


Zonisamide  300 mg  09/14/19 22:00  09/16/19 10:06





  Zonisamide  GT   10 ml





  BID RAKAN   Administration





     





     





     





     











ASSESSMENT/PLAN:


33 y.o M from [facility] with a PMH of Cerebral Palsy, Profound mental 

retardation, seizures, microcephaly, GERD presenting with hypothermia of 94F 

and L eyelid redness. found to have a UTI





Sepsis 2/2 UTI 


Zosyn day 3 


pt current temp 97.9 Foff warming blanket for few hours. 94F on admission


UA positive, urine culture sent


Blood cultures showed no growth x48hrs


NS @75/hr


suprapubic catheter care. consult Urology for possible catheter change





L eye blepharitis


Ofloxacin 0.3% eye drops 2 drops AD





FEN


continue tube feeding


NS @75/hr





Seizures


continue seizure meds





DVT


Hep subQ





Visit type





- Emergency Visit


Emergency Visit: Yes


ED Registration Date: 09/13/19


Care time: The patient presented to the Emergency Department on the above date 

and was hospitalized for further evaluation of their emergent condition.





- New Patient


This patient is new to me today: No





- Critical Care


Critical Care patient: No





- Discharge Referral


Referred to [facility] No





ATTENDING PHYSICIAN STATEMENT





I saw and evaluated the patient.


I reviewed the resident's note and discussed the case with the resident.


I agree with the resident's findings and plan as documented.








SUBJECTIVE:








OBJECTIVE:








ASSESSMENT AND PLAN:

## 2019-09-16 NOTE — PN
Progress Note, Physician


History of Present Illness: 





NON VERBAL


NO ACUTE DISTRESS


TEMPS IMPROVED





- Current Medication List


Current Medications: 


Active Medications





Albuterol Sulfate (Ventolin 0.083% Nebulizer Soln -)  1 amp NEB Q4H PRN


   PRN Reason: SHORT OF BREATH/WHEEZING


Calcium Carbonate/Cholecalciferol (Os-Dale 500+D -)  1 tab GT BID RAKAN


   Last Admin: 09/16/19 10:00 Dose:  1 tab


Clobazam (Onfi -)  10 mg GT BID RAKAN


   Last Admin: 09/16/19 10:00 Dose:  10 mg


Docusate Sodium (Colace Liquid -)  100 mg GT HS RAKAN


   Last Admin: 09/15/19 22:33 Dose:  100 mg


Heparin Sodium (Porcine) (Heparin -)  5,000 unit SQ TID RAKAN


   Last Admin: 09/16/19 13:24 Dose:  5,000 unit


Sodium Chloride (Normal Saline -)  1,000 mls @ 75 mls/hr IV ASDIR RAKAN


   Last Admin: 09/16/19 12:37 Dose:  75 mls/hr


Piperacillin Sod/Tazobactam (Sod 2.25 gm/ Dextrose)  50 mls @ 100 mls/hr IVPB 

Q8H-IV RAKAN; Protocol


   Last Admin: 09/16/19 09:59 Dose:  100 mls/hr


Lacosamide (Vimpat Liquid -)  200 mg GT BID [facility]


   Last Admin: 09/16/19 10:00 Dose:  200 mg


Levetiracetam (Keppra Oral Solution -)  2,000 mg GT BID [facility]


   Last Admin: 09/16/19 10:04 Dose:  2,000 mg


Levocarnitine (Carnitor Oral Solution -)  700 mg GT TID [facility]


   Last Admin: 09/16/19 13:25 Dose:  5 ml


Multivitamins/Minerals (Certavite-Antioxidant Liquid)  15 ml GT DAILY [facility]


   Last Admin: 09/16/19 10:05 Dose:  15 ml


Ofloxacin (Ocuflox 0.3% Eye Drops -)  2 drop AD Q4HWA [facility]


   Last Admin: 09/16/19 13:25 Dose:  2 drop


Pyridoxine HCl (Vitamin B6 -)  100 mg GT DAILY [facility]


   Last Admin: 09/16/19 10:02 Dose:  100 mg


Senna (Senna Oral Solution -)  8.8 mg GT HS [facility]


   Last Admin: 09/15/19 22:39 Dose:  8.8 mg


Sodium Chloride (Sodium Chloride Tablet -)  3.5 gm GT BID [facility]


   Last Admin: 09/16/19 10:01 Dose:  3.5 gm


Zonisamide (Zonisamide)  300 mg GT BID [facility]


   Last Admin: 09/16/19 10:06 Dose:  10 ml











- Objective


Vital Signs: 


 Vital Signs











Temperature  98.5 F   09/16/19 08:16


 


Pulse Rate  78   09/16/19 08:16


 


Respiratory Rate  18   09/16/19 08:18


 


Blood Pressure  110/66   09/16/19 08:16


 


O2 Sat by Pulse Oximetry (%)  99   09/16/19 08:18











Constitutional: Yes: No Distress


Eyes: Yes: Conjunctiva Clear, Other (MILD SWELLING UPPER AND LOWER L EYELIDS  

NO OCULAR DISCHARGE NO CONJUCTIVITIS)


Cardiovascular: Yes: Regular Rate and Rhythm


Respiratory: Yes: Diminished


Gastrointestinal: Yes: Normal Bowel Sounds, Soft.  No: Tenderness


Edema: Yes


Edema: LLE: 1+, RLE: 1+


Labs: 


 CBC, BMP





 09/15/19 05:18 





 09/15/19 05:18 











Assessment/Plan





UTI/ R/O SEPSIS SECONDARY TO UTI


L BLEPHARITIS


MICROCEPHALY


CP/MR


CONTINUE ZOSYN


TOPICAL ERYTHROMYCIN

## 2019-09-16 NOTE — PN
Teaching Attending Note


Name of Resident: Latisha Cochran





ATTENDING PHYSICIAN STATEMENT





I saw and evaluated the patient.


I reviewed the resident's note and discussed the case with the resident.


I agree with the resident's findings and plan as documented.








SUBJECTIVE:


Patient has swelling of his left eyelid , continues to be hypothermic. 


OBJECTIVE:


 Vital Signs











Temperature  98.5 F   09/16/19 18:00


 


Pulse Rate  57 L  09/16/19 18:00


 


Respiratory Rate  18   09/16/19 18:00


 


Blood Pressure  101/62   09/16/19 18:00


 


O2 Sat by Pulse Oximetry (%)  99   09/16/19 08:18








GENERAL: is asleep, in no apparent distress.


HEENT: left eye  swelling 


HEAD:microcephaly w/o signs of trauma


LUNGS:  Breath sounds equal, clear to auscultation bilaterally. No wheezes, and 

no crackles. No accessory muscle use.


HEART: Regular rate and rhythm, normal S1 and S2 without murmur, rub or gallop.


ABDOMEN: Soft, NT, ND, normoactive bowel sounds, no guarding, no rebound, no 

masses. positive for G-tube 


MUSCULOSKELETAL: contracted . No CVA tenderness.


EXTREMITIES: 2+ pulses, warm, well-perfused. No cyanosis. 


SKIN: Warm, dry, normal turgor, no rashes or lesions noted, normal capillary 

refill. 


; suprapubic catheter                                                        

                                           CBCD











WBC  9.2 K/mm3 (4.0-10.0)   09/15/19  05:18    


 


RBC  3.55 M/mm3 (4.00-5.60)  L  09/15/19  05:18    


 


Hgb  11.4 GM/dL (11.7-16.9)  L  09/15/19  05:18    


 


Hct  33.7 % (35.4-49)  L  09/15/19  05:18    


 


MCV  94.9 fl (80-96)   09/15/19  05:18    


 


MCHC  33.9 g/dl (32.0-35.9)   09/15/19  05:18    


 


RDW  13.4 % (11.9-15.9)   09/15/19  05:18    


 


Plt Count  227 K/MM3 (134-434)  D 09/15/19  05:18    


 


MPV  7.8 fl (7.5-11.1)   09/15/19  05:18    








 CMP











Sodium  140 mmol/L (136-145)   09/15/19  05:18    


 


Potassium  3.8 mmol/L (3.5-5.1)   09/15/19  05:18    


 


Chloride  110 mmol/L ()  H  09/15/19  05:18    


 


Carbon Dioxide  23 mmol/L (21-32)   09/15/19  05:18    


 


Anion Gap  7 MMOL/L (8-16)  L  09/15/19  05:18    


 


BUN  12.4 mg/dL (7-18)   09/15/19  05:18    


 


Creatinine  0.6 mg/dL (0.55-1.3)   09/15/19  05:18    


 


Random Glucose  62 mg/dL ()  L  09/15/19  05:18    


 


Calcium  8.9 mg/dL (8.5-10.1)   09/15/19  05:18    


 


Total Bilirubin  0.4 mg/dL (0.2-1)   09/15/19  05:18    


 


AST  26 U/L (15-37)   09/15/19  05:18    


 


ALT  42 U/L (13-61)   09/15/19  05:18    


 


Alkaline Phosphatase  121 U/L ()  H  09/15/19  05:18    


 


Total Protein  7.1 g/dl (6.4-8.2)   09/15/19  05:18    


 


Albumin  3.0 g/dl (3.4-5.0)  L  09/15/19  05:18    








 Current Medications











Generic Name Dose Route Start Last Admin





  Trade Name Freq  PRN Reason Stop Dose Admin


 


Albuterol Sulfate  1 amp  09/14/19 12:01  





  Ventolin 0.083% Nebulizer Soln -  NEB   





  Q4H PRN   





  SHORT OF BREATH/WHEEZING   





     





     





     


 


Calcium Carbonate/Cholecalciferol  1 tab  09/14/19 22:00  09/16/19 10:00





  Os-Dale 500+D -  GT   1 tab





  BID RAKAN   Administration





     





     





     





     


 


Clobazam  10 mg  09/14/19 12:30  09/16/19 10:00





  Onfi -  GT   10 mg





  BID RAKAN   Administration





     





     





     





     


 


Docusate Sodium  100 mg  09/14/19 22:00  09/15/19 22:33





  Colace Liquid -  GT   100 mg





  HS RAKAN   Administration





     





     





     





     


 


Heparin Sodium (Porcine)  5,000 unit  09/13/19 21:15  09/16/19 13:24





  Heparin -  SQ   5,000 unit





  TID RAKAN   Administration





     





     





     





     


 


Sodium Chloride  1,000 mls @ 75 mls/hr  09/13/19 21:15  09/16/19 12:37





  Normal Saline -  IV   75 mls/hr





  ASDIR RAKAN   Administration





     





     





     





     


 


Piperacillin Sod/Tazobactam  50 mls @ 100 mls/hr  09/14/19 18:15  09/16/19 17:44





  Sod 2.25 gm/ Dextrose  IVPB   100 mls/hr





  Q8H-IV RAKAN   Administration





     





     





  Protocol   





     


 


Lacosamide  200 mg  09/14/19 13:00  09/16/19 10:00





  Vimpat Liquid -  GT   200 mg





  BID RAKAN   Administration





     





     





     





     


 


Levetiracetam  2,000 mg  09/14/19 12:30  09/16/19 10:04





  Keppra Oral Solution -  GT   2,000 mg





  BID RAKAN   Administration





     





     





     





     


 


Levocarnitine  700 mg  09/14/19 14:00  09/16/19 13:25





  Carnitor Oral Solution -  GT   5 ml





  TID RAKAN   Administration





     





     





     





     


 


Multivitamins/Minerals  15 ml  09/15/19 10:00  09/16/19 10:05





  Certavite-Antioxidant Liquid  GT   15 ml





  DAILY RAKAN   Administration





     





     





     





     


 


Ofloxacin  2 drop  09/16/19 14:00  09/16/19 17:45





  Ocuflox 0.3% Eye Drops -  AD   2 drop





  Q4HWA RAKAN   Administration





     





     





     





     


 


Pyridoxine HCl  100 mg  09/14/19 14:15  09/16/19 10:02





  Vitamin B6 -  GT   100 mg





  DAILY RAKAN   Administration





     





     





     





     


 


Senna  8.8 mg  09/14/19 22:00  09/15/19 22:39





  Senna Oral Solution -  GT   8.8 mg





  HS RAKAN   Administration





     





     





     





     


 


Sodium Chloride  3.5 gm  09/14/19 12:45  09/16/19 10:01





  Sodium Chloride Tablet -  GT   3.5 gm





  BID RAKAN   Administration





     





     





     





     


 


Zonisamide  300 mg  09/14/19 22:00  09/16/19 10:06





  Zonisamide  GT   10 ml





  BID RAKAN   Administration





     





     





     





     








 Home Medications











 Medication  Instructions  Recorded


 


Albuterol 0.083% Nebulizer Sol 1 neb NEB Q4H PRN 09/13/19





[Ventolin 0.083%]  


 


Calcium Carbonate/Vitamin D3 1 each GT BID 09/13/19





[Oyster Shell 500-Vit D3 200 Tb]  


 


Clobazam [Onfi -] 10 mg GT BID 09/13/19


 


Fructooligosaccharides/Polydex 30 ml GT DAILY 09/13/19





[Fiber-Stat 15 gm/30 ml Liquid]  


 


Lacosamide [Vimpat] 200 mg GT BID 09/13/19


 


Levocarnitine 7 ml GT TID 09/13/19


 


Multivitamin [Multiple Vitamins] 30 ml GT DAILY 09/13/19


 


Pyridoxine HCl [Vitamin B-6] 100 mg GT ASDIR 09/13/19


 


Sennosides/Docusate Sodium 2 tab GT HS 09/13/19





[Senna-S Tablet]  


 


Sodium Chloride Tablet - 3.5 gm GT BID 09/13/19


 


Zonisamide 300 mg GT BID 09/13/19


 


levETIRAcetam [levETIRAcetam ORAL 2 gm GT BID 09/13/19





SUSPENSION]  








 Urine Test Results











Urine Color  Yellow   09/13/19  16:00    


 


Urine Appearance  Cloudy   09/13/19  16:00    


 


Urine pH  8.0  (5.0-8.0)   09/13/19  16:00    


 


Ur Specific Gravity  1.017  (1.010-1.035)   09/13/19  16:00    


 


Urine Protein  Negative  (NEGATIVE)   09/13/19  16:00    


 


Urine Glucose (UA)  Negative  (NEGATIVE)   09/13/19  16:00    


 


Urine Ketones  Negative  (NEGATIVE)   09/13/19  16:00    


 


Urine Blood  Negative  (NEGATIVE)   09/13/19  16:00    


 


Urine Nitrite  Positive  (NEGATIVE)  H  09/13/19  16:00    


 


Urine Bilirubin  Negative  (NEGATIVE)   09/13/19  16:00    


 


Ur Leukocyte Esterase  3+  (NEGATIVE)  H  09/13/19  16:00    














 Microbiology





09/13/19 16:35   Blood - Peripheral Venous   Blood Culture - Preliminary


                            NO GROWTH OBTAINED AFTER 72 HOURS, INCUBATION TO 

CONTINUE


                            FOR 2 DAYS.


09/13/19 16:35   Blood - Peripheral Venous   Blood Culture - Preliminary


                            NO GROWTH OBTAINED AFTER 72 HOURS, INCUBATION TO 

CONTINUE


                            FOR 2 DAYS.


09/13/19 16:00   Urine - Urine Clean Catch   Urine Culture - Final


                            Contaminated: Please Repeat


CXR: negative





Assessment and plan:


Patient is a 34yo male from [facility] with a PMHx of Cerebral Palsy, 

Profound mental retardation, seizures, microcephaly, GERD presented with 

hypothermia of 94F and was found to have UTI/sepsis





#Sepsis due to  UTI on IV zosyn day#4 continue, possible needing suprapubic 

catheter change , ID on the case,  will consult uro if needed


#Hypothermic due to sepsis continue Inessa santamaria , will check TSH, FT4,Ft3 


#hx of Seizures continue home meds


# Nutrition: tube feeds are on hold due to hypothermic , will re-evaluate 





DVT px: Hep subQ

## 2019-09-17 LAB
ANION GAP SERPL CALC-SCNC: 11 MMOL/L (ref 8–16)
ANISOCYTOSIS BLD QL: (no result)
APPEARANCE UR: CLEAR
BASOPHILS # BLD: 1.3 % (ref 0–2)
BILIRUB UR STRIP.AUTO-MCNC: NEGATIVE MG/DL
BUN SERPL-MCNC: 9.7 MG/DL (ref 7–18)
CALCIUM SERPL-MCNC: 9.3 MG/DL (ref 8.5–10.1)
CHLORIDE SERPL-SCNC: 126 MMOL/L (ref 98–107)
CO2 SERPL-SCNC: 15 MMOL/L (ref 21–32)
COLOR UR: YELLOW
CREAT SERPL-MCNC: 0.7 MG/DL (ref 0.55–1.3)
DEPRECATED RDW RBC AUTO: 13.3 % (ref 11.9–15.9)
EOSINOPHIL # BLD: 1.7 % (ref 0–4.5)
GLUCOSE SERPL-MCNC: 72 MG/DL (ref 74–106)
HCT VFR BLD CALC: 33.1 % (ref 35.4–49)
HGB BLD-MCNC: 11.2 GM/DL (ref 11.7–16.9)
KETONES UR QL STRIP: NEGATIVE
LEUKOCYTE ESTERASE UR QL STRIP.AUTO: NEGATIVE
LYMPHOCYTES # BLD: 56.8 % (ref 8–40)
MACROCYTES BLD QL: (no result)
MCH RBC QN AUTO: 32.6 PG (ref 25.7–33.7)
MCHC RBC AUTO-ENTMCNC: 34 G/DL (ref 32–35.9)
MCV RBC: 96 FL (ref 80–96)
MONOCYTES # BLD AUTO: 4.6 % (ref 3.8–10.2)
NEUTROPHILS # BLD: 35.6 % (ref 42.8–82.8)
NITRITE UR QL STRIP: NEGATIVE
OVALOCYTES BLD QL SMEAR: (no result)
PH UR: 6.5 [PH] (ref 5–8)
PLATELET # BLD AUTO: 186 K/MM3 (ref 134–434)
PLATELET BLD QL SMEAR: NORMAL
PMV BLD: 7.6 FL (ref 7.5–11.1)
POTASSIUM SERPLBLD-SCNC: 4.2 MMOL/L (ref 3.5–5.1)
PROT UR QL STRIP: (no result)
PROT UR QL STRIP: NEGATIVE
RBC # BLD AUTO: 3.44 M/MM3 (ref 4–5.6)
SODIUM SERPL-SCNC: 152 MMOL/L (ref 136–145)
SP GR UR: 1.01 (ref 1.01–1.03)
UROBILINOGEN UR STRIP-MCNC: 0.2 MG/DL (ref 0.2–1)
WBC # BLD AUTO: 7.6 K/MM3 (ref 4–10)

## 2019-09-17 RX ADMIN — SENNOSIDES SCH MG: 8.8 SYRUP ORAL at 23:00

## 2019-09-17 RX ADMIN — LACOSAMIDE SCH MG: 10 SOLUTION ORAL at 10:15

## 2019-09-17 RX ADMIN — OFLOXACIN SCH DROP: 3 SOLUTION/ DROPS OPHTHALMIC at 07:01

## 2019-09-17 RX ADMIN — ZONISAMIDE SCH MG: 100 CAPSULE ORAL at 23:04

## 2019-09-17 RX ADMIN — OFLOXACIN SCH DROP: 3 SOLUTION/ DROPS OPHTHALMIC at 10:49

## 2019-09-17 RX ADMIN — LEVOCARNITINE SCH ML: 1 SOLUTION ORAL at 23:00

## 2019-09-17 RX ADMIN — LEVETIRACETAM SCH MG: 100 SOLUTION ORAL at 23:04

## 2019-09-17 RX ADMIN — SODIUM CHLORIDE TAB 1 GM SCH: 1 TAB at 10:26

## 2019-09-17 RX ADMIN — OFLOXACIN SCH DROP: 3 SOLUTION/ DROPS OPHTHALMIC at 17:13

## 2019-09-17 RX ADMIN — HEPARIN SODIUM SCH UNIT: 5000 INJECTION, SOLUTION INTRAVENOUS; SUBCUTANEOUS at 07:01

## 2019-09-17 RX ADMIN — LEVOCARNITINE SCH ML: 1 SOLUTION ORAL at 14:08

## 2019-09-17 RX ADMIN — Medication SCH MG: at 10:08

## 2019-09-17 RX ADMIN — PIPERACILLIN SODIUM AND TAZOBACTAM SODIUM SCH MLS/HR: .25; 2 INJECTION, POWDER, LYOPHILIZED, FOR SOLUTION INTRAVENOUS at 17:14

## 2019-09-17 RX ADMIN — ZONISAMIDE SCH ML: 100 CAPSULE ORAL at 10:09

## 2019-09-17 RX ADMIN — OFLOXACIN SCH DROP: 3 SOLUTION/ DROPS OPHTHALMIC at 23:02

## 2019-09-17 RX ADMIN — PIPERACILLIN SODIUM AND TAZOBACTAM SODIUM SCH MLS/HR: .25; 2 INJECTION, POWDER, LYOPHILIZED, FOR SOLUTION INTRAVENOUS at 10:03

## 2019-09-17 RX ADMIN — LEVOCARNITINE SCH ML: 1 SOLUTION ORAL at 07:01

## 2019-09-17 RX ADMIN — OFLOXACIN SCH DROP: 3 SOLUTION/ DROPS OPHTHALMIC at 14:09

## 2019-09-17 RX ADMIN — HEPARIN SODIUM SCH UNIT: 5000 INJECTION, SOLUTION INTRAVENOUS; SUBCUTANEOUS at 14:07

## 2019-09-17 RX ADMIN — MULTIVITAMIN SCH ML: LIQUID ORAL at 10:05

## 2019-09-17 RX ADMIN — DEXTROSE AND SODIUM CHLORIDE SCH MLS/HR: 5; 450 INJECTION, SOLUTION INTRAVENOUS at 10:49

## 2019-09-17 RX ADMIN — Medication SCH TAB: at 23:02

## 2019-09-17 RX ADMIN — PIPERACILLIN SODIUM AND TAZOBACTAM SODIUM SCH MLS/HR: .25; 2 INJECTION, POWDER, LYOPHILIZED, FOR SOLUTION INTRAVENOUS at 01:44

## 2019-09-17 RX ADMIN — DOCUSATE SODIUM LIQUID SCH MG: 100 LIQUID ORAL at 23:00

## 2019-09-17 RX ADMIN — HEPARIN SODIUM SCH UNIT: 5000 INJECTION, SOLUTION INTRAVENOUS; SUBCUTANEOUS at 23:01

## 2019-09-17 RX ADMIN — LEVETIRACETAM SCH MG: 100 SOLUTION ORAL at 10:07

## 2019-09-17 RX ADMIN — LACOSAMIDE SCH MG: 10 SOLUTION ORAL at 23:02

## 2019-09-17 RX ADMIN — Medication SCH TAB: at 10:04

## 2019-09-17 NOTE — PN
Physical Exam: 


SUBJECTIVE: Patient seen and examined. Overnight patient had an episode of 

bradycardia and temperature dropping to 96.5 -> varsha hugger restarted. patient 

returned to normal.





OBJECTIVE:





 Vital Signs











 Period  Temp  Pulse  Resp  BP Sys/Curran  Pulse Ox


 


 Last 24 Hr  97.7 F-98.8 F  44-69  17-18  101-141/62-80  100-100











GENERAL: asleep, in no apparent distress.


HEAD:microcephaly w/o signs of trauma


EYES: left eyelids swelling and erythema improved 


LUNGS: Breath sounds equal, clear to auscultation bilaterally. No wheezes, and 

no crackles. No accessory muscle use.


HEART: Regular rate and rhythm, normal S1 and S2 without murmur, rub or gallop.


ABDOMEN: Soft, nontender, not distended, normoactive bowel sounds, no guarding, 

no rebound, no masses.  No hepatomegaly or  splenomegaly. 


MUSCULOSKELETAL: contracted . No CVA tenderness.


 EXTREMITIES: 2+ pulses, warm, well-perfused.  No peripheral edema.


SKIN: Warm, dry, normal turgor, no rashes or lesions noted, normal capillary 

refill. 














 Laboratory Results - last 24 hr











  09/17/19 09/17/19





  05:10 05:10


 


WBC  7.6 


 


RBC  3.44 L 


 


Hgb  11.2 L 


 


Hct  33.1 L 


 


MCV  96.0 


 


MCH  32.6 


 


MCHC  34.0 


 


RDW  13.3 


 


Plt Count  186 


 


MPV  7.6 


 


Absolute Neuts (auto)  2.7 


 


Neutrophils %  35.6 L D 


 


Neutrophils % (Manual)  33.0 L D 


 


Band Neutrophils %  0.0 


 


Lymphocytes %  56.8 H D 


 


Lymphocytes % (Manual)  60.0 H D 


 


Monocytes %  4.6 


 


Monocytes % (Manual)  3 L 


 


Eosinophils %  1.7 


 


Eosinophils % (Manual)  4.0  D 


 


Basophils %  1.3 


 


Basophils % (Manual)  0.0 


 


Myelocytes % (Man)  0 


 


Promyelocytes % (Man)  0 


 


Blast Cells % (Manual)  0 


 


Nucleated RBC %  0 


 


Metamyelocytes  0  D 


 


Hypochromia  0 


 


Platelet Estimate  Normal 


 


Polychromasia  0 


 


Poikilocytosis  0 


 


Anisocytosis  1+ 


 


Macrocytosis  1+ 


 


Ovalocytes  1+ 


 


Sodium   152 H


 


Potassium   4.2


 


Chloride   126 H


 


Carbon Dioxide   15 L


 


Anion Gap   11


 


BUN   9.7


 


Creatinine   0.7


 


Est GFR (CKD-EPI)AfAm   142.70


 


Est GFR (CKD-EPI)NonAf   123.13


 


Random Glucose   72 L


 


Calcium   9.3


 


TSH   2.62  D


 


Free T4   1.03








Active Medications











Generic Name Dose Route Start Last Admin





  Trade Name Freshagufta  PRN Reason Stop Dose Admin


 


Albuterol Sulfate  1 amp  09/14/19 12:01  





  Ventolin 0.083% Nebulizer Soln -  NEB   





  Q4H PRN   





  SHORT OF BREATH/WHEEZING   





     





     





     


 


Calcium Carbonate/Cholecalciferol  1 tab  09/14/19 22:00  09/17/19 10:04





  Os-Dale 500+D -  GT   1 tab





  BID RAKAN   Administration





     





     





     





     


 


Clobazam  10 mg  09/14/19 12:30  09/17/19 10:04





  Onfi -  GT   10 mg





  BID RAKAN   Administration





     





     





     





     


 


Docusate Sodium  100 mg  09/14/19 22:00  09/16/19 22:15





  Colace Liquid -  GT   100 mg





  HS RAKAN   Administration





     





     





     





     


 


Heparin Sodium (Porcine)  5,000 unit  09/13/19 21:15  09/17/19 14:07





  Heparin -  SQ   5,000 unit





  TID RAKAN   Administration





     





     





     





     


 


Piperacillin Sod/Tazobactam  50 mls @ 100 mls/hr  09/14/19 18:15  09/17/19 10:03





  Sod 2.25 gm/ Dextrose  IVPB   100 mls/hr





  Q8H-IV RAKAN   Administration





     





     





  Protocol   





     


 


Dextrose/Sodium Chloride  1,000 mls @ 75 mls/hr  09/17/19 10:30  09/17/19 10:49





  D5-1/2ns -  IV   75 mls/hr





  ASDIR RAKAN   Administration





     





     





     





     


 


Lacosamide  200 mg  09/14/19 13:00  09/17/19 10:15





  Vimpat Liquid -  GT   200 mg





  BID RAKAN   Administration





     





     





     





     


 


Levetiracetam  2,000 mg  09/14/19 12:30  09/17/19 10:07





  Keppra Oral Solution -  GT   2,000 mg





  BID RAKAN   Administration





     





     





     





     


 


Levocarnitine  700 mg  09/14/19 14:00  09/17/19 14:08





  Carnitor Oral Solution -  GT   5 ml





  TID RAKAN   Administration





     





     





     





     


 


Multivitamins/Minerals  15 ml  09/15/19 10:00  09/17/19 10:05





  Certavite-Antioxidant Liquid  GT   15 ml





  DAILY RAKAN   Administration





     





     





     





     


 


Ofloxacin  2 drop  09/16/19 14:00  09/17/19 14:09





  Ocuflox 0.3% Eye Drops -  AD   2 drop





  Q4HWA RAKAN   Administration





     





     





     





     


 


Pyridoxine HCl  100 mg  09/14/19 14:15  09/17/19 10:08





  Vitamin B6 -  GT   100 mg





  DAILY RAKAN   Administration





     





     





     





     


 


Senna  8.8 mg  09/14/19 22:00  09/16/19 22:19





  Senna Oral Solution -  GT   8.8 mg





  HS RAKAN   Administration





     





     





     





     


 


Zonisamide  300 mg  09/14/19 22:00  09/17/19 10:09





  Zonisamide  GT   10 ml





  BID RAKAN   Administration





     





     





     





     











ASSESSMENT/PLAN:


33 y.o M from [facility] with a PMH of Cerebral Palsy, Profound mental 

retardation, seizures, microcephaly, GERD presenting with hypothermia of 94F 

and L eyelid redness. found to have a UTI





Sepsis 2/2 UTI 


Zosyn day 4


pt current temp 98.4 F after restarting varsha hugger at 43C


Blood cultures showed no growth x72hrs. Pt keeps having temperature drops, pan 

cultures resent today and pending.


NS @75/hr


suprapubic catheter care. consult Urology for possible catheter change after 

urine culture results


TSH 2.62 FT4 1.03 FT3 pending (hypothermia and bradycardia workup)








L eye blepharitis


Ofloxacin 0.3% eye drops 2 drops AD


 responsive. L eye infection improving





FEN


continue tube feeding


NS switched to D5 1/2NS @75/hr


Salt tab D/C due to hypernatremia





Seizures


continue seizure meds





DVT


Hep subQ








Visit type





- Emergency Visit


Emergency Visit: Yes


ED Registration Date: 09/13/19


Care time: The patient presented to the Emergency Department on the above date 

and was hospitalized for further evaluation of their emergent condition.





- New Patient


This patient is new to me today: No





- Critical Care


Critical Care patient: No





- Discharge Referral


Referred to [facility] No





ATTENDING PHYSICIAN STATEMENT





I saw and evaluated the patient.


I reviewed the resident's note and discussed the case with the resident.


I agree with the resident's findings and plan as documented.








SUBJECTIVE:








OBJECTIVE:








ASSESSMENT AND PLAN:

## 2019-09-17 NOTE — PN
Teaching Attending Note


Name of Resident: Latisha Cochran





ATTENDING PHYSICIAN STATEMENT





I saw and evaluated the patient.


I reviewed the resident's note and discussed the case with the resident.


I agree with the resident's findings and plan as documented.








SUBJECTIVE:


Patient's eye improved post cipro ophthalmic. no fever , on inessa hugger.


OBJECTIVE:


 Vital Signs











Temperature  98.8 F   09/17/19 08:15


 


Pulse Rate  69   09/17/19 08:15


 


Respiratory Rate  18   09/17/19 08:19


 


Blood Pressure  129/74   09/17/19 08:15


 


O2 Sat by Pulse Oximetry (%)  100   09/17/19 08:19





GENERAL: is asleep, in no apparent distress.


HEENT: right eye swollen 


HEAD:microcephaly w/o signs of trauma


LUNGS:  Breath sounds equal, clear to auscultation bilaterally. No wheezes, and 

no crackles. No accessory muscle use.


HEART: Regular rate and rhythm, normal S1 and S2 without murmur, rub or gallop.


ABDOMEN: Soft, NT, ND, normoactive bowel sounds, no guarding, no rebound, no 

masses. positive for G-tube 


MUSCULOSKELETAL: contracted . No CVA tenderness.


EXTREMITIES: 2+ pulses, warm, well-perfused. No cyanosis. 


SKIN: Warm, dry, normal turgor, no rashes or lesions noted, normal capillary 

refill. 


; suprapubic catheter   


 CBCD











WBC  7.6 K/mm3 (4.0-10.0)   09/17/19  05:10    


 


RBC  3.44 M/mm3 (4.00-5.60)  L  09/17/19  05:10    


 


Hgb  11.2 GM/dL (11.7-16.9)  L  09/17/19  05:10    


 


Hct  33.1 % (35.4-49)  L  09/17/19  05:10    


 


MCV  96.0 fl (80-96)   09/17/19  05:10    


 


MCHC  34.0 g/dl (32.0-35.9)   09/17/19  05:10    


 


RDW  13.3 % (11.9-15.9)   09/17/19  05:10    


 


Plt Count  186 K/MM3 (134-434)   09/17/19  05:10    


 


MPV  7.6 fl (7.5-11.1)   09/17/19  05:10    








 CMP











Sodium  140 mmol/L (136-145)   09/15/19  05:18    


 


Potassium  3.8 mmol/L (3.5-5.1)   09/15/19  05:18    


 


Chloride  110 mmol/L ()  H  09/15/19  05:18    


 


Carbon Dioxide  23 mmol/L (21-32)   09/15/19  05:18    


 


Anion Gap  7 MMOL/L (8-16)  L  09/15/19  05:18    


 


BUN  12.4 mg/dL (7-18)   09/15/19  05:18    


 


Creatinine  0.6 mg/dL (0.55-1.3)   09/15/19  05:18    


 


Random Glucose  62 mg/dL ()  L  09/15/19  05:18    


 


Calcium  8.9 mg/dL (8.5-10.1)   09/15/19  05:18    


 


Total Bilirubin  0.4 mg/dL (0.2-1)   09/15/19  05:18    


 


AST  26 U/L (15-37)   09/15/19  05:18    


 


ALT  42 U/L (13-61)   09/15/19  05:18    


 


Alkaline Phosphatase  121 U/L ()  H  09/15/19  05:18    


 


Total Protein  7.1 g/dl (6.4-8.2)   09/15/19  05:18    


 


Albumin  3.0 g/dl (3.4-5.0)  L  09/15/19  05:18    








 Current Medications











Generic Name Dose Route Start Last Admin





  Trade Name Freq  PRN Reason Stop Dose Admin


 


Albuterol Sulfate  1 amp  09/14/19 12:01  





  Ventolin 0.083% Nebulizer Soln -  NEB   





  Q4H PRN   





  SHORT OF BREATH/WHEEZING   





     





     





     


 


Calcium Carbonate/Cholecalciferol  1 tab  09/14/19 22:00  09/16/19 22:16





  Os-Dale 500+D -  GT   1 tab





  BID RAKAN   Administration





     





     





     





     


 


Clobazam  10 mg  09/14/19 12:30  09/16/19 22:16





  Onfi -  GT   10 mg





  BID RAKAN   Administration





     





     





     





     


 


Docusate Sodium  100 mg  09/14/19 22:00  09/16/19 22:15





  Colace Liquid -  GT   100 mg





  HS RAKAN   Administration





     





     





     





     


 


Heparin Sodium (Porcine)  5,000 unit  09/13/19 21:15  09/17/19 07:01





  Heparin -  SQ   5,000 unit





  TID RAKAN   Administration





     





     





     





     


 


Sodium Chloride  1,000 mls @ 75 mls/hr  09/13/19 21:15  09/16/19 22:14





  Normal Saline -  IV   75 mls/hr





  ASDIR RAKAN   Administration





     





     





     





     


 


Piperacillin Sod/Tazobactam  50 mls @ 100 mls/hr  09/14/19 18:15  09/17/19 01:44





  Sod 2.25 gm/ Dextrose  IVPB   100 mls/hr





  Q8H-IV RAKAN   Administration





     





     





  Protocol   





     


 


Lacosamide  200 mg  09/14/19 13:00  09/16/19 22:15





  Vimpat Liquid -  GT   200 mg





  BID RAKAN   Administration





     





     





     





     


 


Levetiracetam  2,000 mg  09/14/19 12:30  09/16/19 22:15





  Keppra Oral Solution -  GT   2,000 mg





  BID RAKAN   Administration





     





     





     





     


 


Levocarnitine  700 mg  09/14/19 14:00  09/17/19 07:01





  Carnitor Oral Solution -  GT   7 ml





  TID RAKAN   Administration





     





     





     





     


 


Multivitamins/Minerals  15 ml  09/15/19 10:00  09/16/19 10:05





  Certavite-Antioxidant Liquid  GT   15 ml





  DAILY RAKAN   Administration





     





     





     





     


 


Ofloxacin  2 drop  09/16/19 14:00  09/17/19 07:01





  Ocuflox 0.3% Eye Drops -  AD   2 drop





  Q4HWA RAKAN   Administration





     





     





     





     


 


Pyridoxine HCl  100 mg  09/14/19 14:15  09/16/19 10:02





  Vitamin B6 -  GT   100 mg





  DAILY RAKAN   Administration





     





     





     





     


 


Senna  8.8 mg  09/14/19 22:00  09/16/19 22:19





  Senna Oral Solution -  GT   8.8 mg





  HS RAKAN   Administration





     





     





     





     


 


Sodium Chloride  3.5 gm  09/14/19 12:45  09/16/19 22:16





  Sodium Chloride Tablet -  GT   3.5 gm





  BID RAKAN   Administration





     





     





     





     


 


Zonisamide  300 mg  09/14/19 22:00  09/16/19 22:19





  Zonisamide  GT   10 ml





  BID RAKAN   Administration





     





     





     





     








 Home Medications











 Medication  Instructions  Recorded


 


Albuterol 0.083% Nebulizer Sol 1 neb NEB Q4H PRN 09/13/19





[Ventolin 0.083%]  


 


Calcium Carbonate/Vitamin D3 1 each GT BID 09/13/19





[Oyster Shell 500-Vit D3 200 Tb]  


 


Clobazam [Onfi -] 10 mg GT BID 09/13/19


 


Fructooligosaccharides/Polydex 30 ml GT DAILY 09/13/19





[Fiber-Stat 15 gm/30 ml Liquid]  


 


Lacosamide [Vimpat] 200 mg GT BID 09/13/19


 


Levocarnitine 7 ml GT TID 09/13/19


 


Multivitamin [Multiple Vitamins] 30 ml GT DAILY 09/13/19


 


Pyridoxine HCl [Vitamin B-6] 100 mg GT ASDIR 09/13/19


 


Sennosides/Docusate Sodium 2 tab GT HS 09/13/19





[Senna-S Tablet]  


 


Sodium Chloride Tablet - 3.5 gm GT BID 09/13/19


 


Zonisamide 300 mg GT BID 09/13/19


 


levETIRAcetam [levETIRAcetam ORAL 2 gm GT BID 09/13/19





SUSPENSION]  








 Microbiology





09/13/19 16:35   Blood - Peripheral Venous   Blood Culture - Preliminary


                            NO GROWTH OBTAINED AFTER 72 HOURS, INCUBATION TO 

CONTINUE


                            FOR 2 DAYS.


09/13/19 16:35   Blood - Peripheral Venous   Blood Culture - Preliminary


                            NO GROWTH OBTAINED AFTER 72 HOURS, INCUBATION TO 

CONTINUE


                            FOR 2 DAYS.


09/13/19 16:00   Urine - Urine Clean Catch   Urine Culture - Final


                            Contaminated: Please Repeat





 Urine Test Results











Urine Color  Yellow   09/13/19  16:00    


 


Urine Appearance  Cloudy   09/13/19  16:00    


 


Urine pH  8.0  (5.0-8.0)   09/13/19  16:00    


 


Ur Specific Gravity  1.017  (1.010-1.035)   09/13/19  16:00    


 


Urine Protein  Negative  (NEGATIVE)   09/13/19  16:00    


 


Urine Glucose (UA)  Negative  (NEGATIVE)   09/13/19  16:00    


 


Urine Ketones  Negative  (NEGATIVE)   09/13/19  16:00    


 


Urine Blood  Negative  (NEGATIVE)   09/13/19  16:00    


 


Urine Nitrite  Positive  (NEGATIVE)  H  09/13/19  16:00    


 


Urine Bilirubin  Negative  (NEGATIVE)   09/13/19  16:00    


 


Ur Leukocyte Esterase  3+  (NEGATIVE)  H  09/13/19  16:00    











CXR: negative





Assessment and plan:


Patient is a 34yo male from [facility] with a PMHx of Cerebral Palsy, 

Profound mental retardation, seizures, microcephaly, GERD presented with 

hypothermia of 94F and was found to have UTI/sepsis





#Sepsis due to  UTI on IV zosyn day#5 continue, possible needing suprapubic 

catheter change , ID on the case, uro if needed


#Hypothermic due to sepsis continue Inessa santamaria day#4 continue to monitor, will 

check his TSH,ft4,3


#hx of Seizures continue home meds


# Left acute blepharitis improving om cipro opthl drops 


# Nutrition: ashley resume in am , its  on hold now , due to hypothermic 





DVT px: Hep subQ

## 2019-09-18 VITALS — SYSTOLIC BLOOD PRESSURE: 120 MMHG | DIASTOLIC BLOOD PRESSURE: 70 MMHG | HEART RATE: 62 BPM | TEMPERATURE: 97.1 F

## 2019-09-18 LAB
ANION GAP SERPL CALC-SCNC: 7 MMOL/L (ref 8–16)
BASOPHILS # BLD: 0.8 % (ref 0–2)
BUN SERPL-MCNC: 8.5 MG/DL (ref 7–18)
CALCIUM SERPL-MCNC: 8.5 MG/DL (ref 8.5–10.1)
CHLORIDE SERPL-SCNC: 108 MMOL/L (ref 98–107)
CO2 SERPL-SCNC: 23 MMOL/L (ref 21–32)
CREAT SERPL-MCNC: 0.6 MG/DL (ref 0.55–1.3)
DEPRECATED RDW RBC AUTO: 13 % (ref 11.9–15.9)
EOSINOPHIL # BLD: 2 % (ref 0–4.5)
GLUCOSE SERPL-MCNC: 154 MG/DL (ref 74–106)
HCT VFR BLD CALC: 30.5 % (ref 35.4–49)
HGB BLD-MCNC: 10.4 GM/DL (ref 11.7–16.9)
LYMPHOCYTES # BLD: 44.6 % (ref 8–40)
MCH RBC QN AUTO: 32.3 PG (ref 25.7–33.7)
MCHC RBC AUTO-ENTMCNC: 34.1 G/DL (ref 32–35.9)
MCV RBC: 94.7 FL (ref 80–96)
MONOCYTES # BLD AUTO: 5 % (ref 3.8–10.2)
NEUTROPHILS # BLD: 47.6 % (ref 42.8–82.8)
PLATELET # BLD AUTO: 204 K/MM3 (ref 134–434)
PMV BLD: 7.8 FL (ref 7.5–11.1)
POTASSIUM SERPLBLD-SCNC: 3.8 MMOL/L (ref 3.5–5.1)
RBC # BLD AUTO: 3.22 M/MM3 (ref 4–5.6)
SODIUM SERPL-SCNC: 138 MMOL/L (ref 136–145)
WBC # BLD AUTO: 6.3 K/MM3 (ref 4–10)

## 2019-09-18 RX ADMIN — PIPERACILLIN SODIUM AND TAZOBACTAM SODIUM SCH MLS/HR: .25; 2 INJECTION, POWDER, LYOPHILIZED, FOR SOLUTION INTRAVENOUS at 10:52

## 2019-09-18 RX ADMIN — ZONISAMIDE SCH MG: 100 CAPSULE ORAL at 10:53

## 2019-09-18 RX ADMIN — OFLOXACIN SCH DROP: 3 SOLUTION/ DROPS OPHTHALMIC at 06:59

## 2019-09-18 RX ADMIN — DEXTROSE AND SODIUM CHLORIDE SCH: 5; 450 INJECTION, SOLUTION INTRAVENOUS at 12:52

## 2019-09-18 RX ADMIN — OFLOXACIN SCH DROP: 3 SOLUTION/ DROPS OPHTHALMIC at 14:58

## 2019-09-18 RX ADMIN — LEVETIRACETAM SCH MG: 100 SOLUTION ORAL at 10:50

## 2019-09-18 RX ADMIN — Medication SCH MG: at 10:50

## 2019-09-18 RX ADMIN — MULTIVITAMIN SCH ML: LIQUID ORAL at 10:49

## 2019-09-18 RX ADMIN — LEVOCARNITINE SCH ML: 1 SOLUTION ORAL at 13:07

## 2019-09-18 RX ADMIN — LEVOCARNITINE SCH ML: 1 SOLUTION ORAL at 06:59

## 2019-09-18 RX ADMIN — LACOSAMIDE SCH MG: 10 SOLUTION ORAL at 10:49

## 2019-09-18 RX ADMIN — HEPARIN SODIUM SCH UNIT: 5000 INJECTION, SOLUTION INTRAVENOUS; SUBCUTANEOUS at 13:05

## 2019-09-18 RX ADMIN — PIPERACILLIN SODIUM AND TAZOBACTAM SODIUM SCH MLS/HR: .25; 2 INJECTION, POWDER, LYOPHILIZED, FOR SOLUTION INTRAVENOUS at 02:55

## 2019-09-18 RX ADMIN — Medication SCH TAB: at 10:48

## 2019-09-18 RX ADMIN — HEPARIN SODIUM SCH UNIT: 5000 INJECTION, SOLUTION INTRAVENOUS; SUBCUTANEOUS at 06:58

## 2019-09-18 RX ADMIN — OFLOXACIN SCH DROP: 3 SOLUTION/ DROPS OPHTHALMIC at 10:58

## 2019-09-18 NOTE — CON.CARD
Consult


Consult Specialty:: Cardiology





- History of Present Illness


History of Present Illness: 





Patient is a 32yo male from [facility] with a PMHx of Cerebral Palsy, 

Profound mental retardation, seizures, microcephaly, GERD presented with 

hypothermia of 94F and was found to have UTI/sepsis








- History Source


History Provided By: Medical Record





- Alcohol/Substance Use


Hx Alcohol Use: No





- Smoking History


Smoking history: Never smoked


Have you smoked in the past 12 months: No





Home Medications





- Allergies


Allergies/Adverse Reactions: 


 Allergies











Allergy/AdvReac Type Severity Reaction Status Date / Time


 


No Known Allergies Allergy   Verified 09/13/19 13:27














- Home Medications


Home Medications: 


Ambulatory Orders





Albuterol 0.083% Nebulizer Sol [Ventolin 0.083%] 1 neb NEB Q4H PRN 09/13/19 


Calcium Carbonate/Vitamin D3 [Oyster Shell 500-Vit D3 200 Tb] 1 each GT BID 09/ 13/19 


Clobazam [Onfi -] 10 mg GT BID 09/13/19 


Fructooligosaccharides/Polydex [Fiber-Stat 15 gm/30 ml Liquid] 30 ml GT DAILY 09 /13/19 


Lacosamide [Vimpat] 200 mg GT BID 09/13/19 


Levocarnitine 7 ml GT TID 09/13/19 


Multivitamin [Multiple Vitamins] 30 ml GT DAILY 09/13/19 


Pyridoxine HCl [Vitamin B-6] 100 mg GT ASDIR 09/13/19 


Sennosides/Docusate Sodium [Senna-S Tablet] 2 tab GT HS 09/13/19 


Sodium Chloride Tablet - 3.5 gm GT BID 09/13/19 


Zonisamide 300 mg GT BID 09/13/19 


levETIRAcetam [levETIRAcetam ORAL SUSPENSION] 2 gm GT BID 09/13/19 











Review of Systems


Unable to obtain ROS, reason: mental retardation


Vital Signs: 


 Vital Signs











Temperature  97.8 F   09/18/19 06:00


 


Pulse Rate  63   09/18/19 06:00


 


Respiratory Rate  18   09/18/19 06:00


 


Blood Pressure  126/79   09/18/19 06:00


 


O2 Sat by Pulse Oximetry (%)  99   09/17/19 21:00











Constitutional: Yes: Well Nourished, No Distress, Calm


Eyes: Yes: WNL, Conjunctiva Clear, EOM Intact


HENT: Yes: WNL, Atraumatic, Normocephalic


Neck: Yes: WNL, Supple, Trachea Midline


Respiratory: Yes: WNL, Regular, CTA Bilaterally


Gastrointestinal: Yes: WNL, Normal Bowel Sounds


Renal/: Yes: WNL


Cardiovascular: Yes: WNL, Regular Rate and Rhythm


Musculoskeletal: Yes: WNL


Extremities: Yes: WNL


Integumentary: Yes: WNL


...Motor Strength: WNL


Psychiatric: Yes: WNL, Alert, Oriented





- Other Data


Labs, Other Data: 


 CBC, BMP





 09/18/19 05:48 





 09/18/19 05:48 











Imaging





- Results


EKG: Image Reviewed (s christy early repol)





Assessment/Plan





Patient is a 32yo male from [facility] with a PMHx of Cerebral Palsy, 

Profound mental retardation, seizures, microcephaly, GERD presented with 

hypothermia of 94F and was found to have UTI/sepsis asked to evaluate 

bradycardia.


Asymptomatic bradycardia possibly due to Kepra


Obtain 24 Holter

## 2019-09-18 NOTE — DS
Physical Exam: 


SUBJECTIVE: Patient seen and examined. Pt lying in bed with varsha hugger in no 

apparent distress.








OBJECTIVE:





 Vital Signs











 Period  Temp  Pulse  Resp  BP Sys/Curran  Pulse Ox


 


 Last 24 Hr  96.2 F-97.8 F  48-65  18-18  107-126/60-79  99-99








PHYSICAL EXAM





GENERAL: asleep, in no apparent distress.


HEAD:microcephaly w/o signs of trauma


EYES: left eyelids swelling and erythema improved 


LUNGS: Breath sounds equal, clear to auscultation bilaterally. No wheezes, and 

no crackles. No accessory muscle use.


HEART: Regular rate and rhythm, normal S1 and S2 without murmur, rub or gallop.


ABDOMEN: Soft, nontender, not distended, normoactive bowel sounds, no guarding, 

no rebound, no masses.  No hepatomegaly or  splenomegaly. 


MUSCULOSKELETAL: contracted . No CVA tenderness.


 EXTREMITIES: 2+ pulses, warm, well-perfused.  No peripheral edema.


SKIN: Warm, dry, normal turgor, no rashes or lesions noted, normal capillary 

refill. 





LABS


 Laboratory Results - last 24 hr











  09/17/19 09/18/19 09/18/19





  11:55 05:48 05:48


 


WBC   6.3 


 


RBC   3.22 L 


 


Hgb   10.4 L 


 


Hct   30.5 L 


 


MCV   94.7 


 


MCH   32.3 


 


MCHC   34.1 


 


RDW   13.0 


 


Plt Count   204 


 


MPV   7.8 


 


Absolute Neuts (auto)   3.0 


 


Neutrophils %   47.6  D 


 


Lymphocytes %   44.6 H D 


 


Monocytes %   5.0 


 


Eosinophils %   2.0 


 


Basophils %   0.8 


 


Nucleated RBC %   0 


 


Sodium    138


 


Potassium    3.8


 


Chloride    108 H


 


Carbon Dioxide    23


 


Anion Gap    7 L


 


BUN    8.5


 


Creatinine    0.6


 


Est GFR (CKD-EPI)AfAm    152.04


 


Est GFR (CKD-EPI)NonAf    131.18


 


Random Glucose    154 H


 


Calcium    8.5


 


Free T3  1.9 L  








CXR 9/13/19 A single AP view of the chest reveals scoliosis with convexity to 

the right, prominent mediastinum and clear lung fields. An acute chest process 

is not seen. The soft tissues are intact. There are degenerative changes. There 

is a metallic foreign body projects over the upper thoracic spine which is seen 

in different positions and earlier studies. Correlation recommended. 





CXR 9/17/19 Single AP view of the chest reveals scoliosis with convexity to the 

right, prominent mediastinum and some central crowding of the weak inspiration. 

Correlation recommended. 





HOSPITAL COURSE:





Date of Admission:09/13/19


33 y.o M from [facility] home with a PMH of Cerebral Palsy, Profound mental 

retardation, seizures, microcephaly, GERD presenting with hypothermia of 94F 

and L eyelid redness We got a CXR which showed no acute pathology. UA positive 

for UTI treated with IV zosyn for 5 days with levaquin for 2 more days on 

discharge. Pt left eye redness and swelling worsened so pt was switched from 

erythromycin ointment to ofloxacin drops which significantly decreased the 

redness and swelling. Cardio saw him due recurrent episodes of bradycardia, as 

per cardio might have been due to keppra for his seizures. TFTS were ordered 

because of hypothermia and bradycardia  and were found to be of normal values. 

As per arthur pt is apparently always in the temperatures of 95-96 F 

normally. thus patient stable to be discharged home





Date of Discharge: 09/18/19











Minutes to complete discharge: 35





Discharge Summary


Reason For Visit: HYPOTHERMIA


Condition: Improved





- Instructions


Diet, Activity, Other Instructions: 


You came into the ED because of low body temperature and left eye redness. we 

took some pictures of your chest which showed no acute pathology. We gave you 

some antibiotics for the infection in your urine as well as antibiotics eye 

drops for your eye and both improved. You heart rate was on the slower side and 

you were seen by a cardiologist who recommended you be discharged back to 

[facility] with a 24 hour monitor to monitor your heart rate and rhythm that will 

need to be returned back to [facility] within 24 hours. You are now 

stable enough to be discharged home. 





Medications:


 Please resume all of your home medications. We have added some antibiotics to 

your regimen:


 Please take the antibiotic Levaquin for two days starting tomorrow


 Please continue taking your eyedrops for five more days 





Follow up:


Please follow up with you Nurse Practitioner Julius Alcantara within one week.


Please follow up with the cardiologist, Dr. Barahona within one week








If you begin to experience worsening hypothermia, eye redness, shortness of 

breath, chest pain, abdominal pain, bleeding please return to the Emergency 

room immediately.





 


Referrals: 


Freddie Barahona MD [Staff Physician] - 1 Week


Julius,Maricruz, NP [Non Staff, Medical] - 1 Week


Disposition: SKILLED NURSING FACILITY





- Home Medications


Comprehensive Discharge Medication List: 


Ambulatory Orders





Albuterol 0.083% Nebulizer Sol [Ventolin 0.083% Nebulizer Soln -] 1 neb NEB Q4H 

PRN 09/13/19 


Calcium Carbonate/Vitamin D3 [Oyster Shell 500-Vit D3 200 Tb] 1 each GT BID 09/ 13/19 


Clobazam [Onfi -] 10 mg GT BID 09/13/19 


Fructooligosaccharides/Polydex [Fiber-Stat 15 gm/30 ml Liquid] 30 ml GT DAILY 09 /13/19 


Lacosamide [Vimpat] 200 mg GT BID 09/13/19 


Levocarnitine 7 ml GT TID 09/13/19 


Multivitamin [Multiple Vitamins] 30 ml GT DAILY 09/13/19 


Pyridoxine HCl [Vitamin B-6] 100 mg GT ASDIR 09/13/19 


Sennosides/Docusate Sodium [Senna-S Tablet] 2 tab GT HS 09/13/19 


Sodium Chloride Tablet - 3.5 gm GT BID 09/13/19 


Zonisamide 300 mg GT BID 09/13/19 


levETIRAcetam [levETIRAcetam ORAL SUSPENSION] 2 gm GT BID 09/13/19 


Ofloxacin 0.3% Ophth Soln [Ocuflox -] 2 drop AD Q4HWA #10 drops 09/18/19 


levoFLOXacin [Levaquin] 750 mg GT DAILY #2 tab 09/18/19 











Problem List





- Problems


(1) Hypothermia


Code(s): T68.XXXA - HYPOTHERMIA, INITIAL ENCOUNTER   


Qualifiers: 


   Encounter type: initial encounter   Qualified Code(s): T68.XXXA - Hypothermia

, initial encounter   





(2) UTI (urinary tract infection)


Code(s): N39.0 - URINARY TRACT INFECTION, SITE NOT SPECIFIED   


This patient is new to me today: No


Emergency Visit: Yes


ED Registration Date: 09/13/19


Care time: The patient presented to the Emergency Department on the above date 

and was hospitalized for further evaluation of their emergent condition.


Critical Care patient: No





- Discharge Referral


Referred to [facility] No





ATTENDING PHYSICIAN STATEMENT





I saw and evaluated the patient.


I reviewed the resident's note and discussed the case with the resident.


I agree with the resident's findings and plan as documented.








SUBJECTIVE:








OBJECTIVE:








ASSESSMENT AND PLAN:

## 2019-09-18 NOTE — PN
Teaching Attending Note


Name of Resident: Jaqueline Locke





ATTENDING PHYSICIAN STATEMENT





I saw and evaluated the patient.


I reviewed the resident's note and discussed the case with the resident.


I agree with the resident's findings and plan as documented.








SUBJECTIVE:


no events over night. hypothermic to 96 this am 








OBJECTIVE:





NAD 


Cv : RRR


CV: minor rales anteriorly on both sides. 


Abd: soft, NT, ND , NL BS . PEG in with intact skin 


Ext : No edema or erythema on LE . no skin break down on heels





ASSESSMENT AND PLAN:











34 y/o man with h/o seizures, microcephaly, urethral stricture s/p suprapubic 

cath placement, MR ,  PNA , recent UTI and other medical problems who was sent 

from [facility] and being treated fro UTI 








1- UTI. change to Levaquin x 2 more days. since no growth on cx , cath was not 

changed 


2- hypothermia. [facility] was called and learned that he is hypothermic at his 

base line 


3- Seizure DO: cont home meds 


4- Nutrition : cont TF at dc as per pre-admission routine 


5- Blepharitis: cont drops for  3 more days 


6- Sinus bradycardia: no sx . Holter to be placed. can be sent back for read 





dispo: higinio Diaz provider,a s patient is clinically ready for dc

## 2019-09-20 NOTE — HOL
Hook-up date: 2019-09-18   14:44:00       Duration: 24:00:00

Test Indications: BRADYCARDIA

Medications: 

 

 

 

 

 

 

 

 06789 QRS complexes

     * Ventricular      ectopics which represent  **** % of total QRS comp.

     * Supraventricular ectopics which represent  **** % of total QRS comp.

     * Paced QRS complexs        which represent  **** % of total QRS comp.

    11 % of Time Classified as Noise

VENTRICULAR ECTOPY

     * Isolated

     * Bigeminal Cycles

     * Couplets

     * Runs

     * Beats in Runs

     * Beats LONGEST at   *  BPM at **:**:** ****-**-**

     * Beats FASTEST at   *  BPM at **:**:** ****-**-**

SUPRAVENTRICULAR ECTOPY

     * Isolated

     * Couplets

     * Runs

     * Beats in Runs

     * Beats LONGEST at   *  BPM at **:**:** ****-**-**

     * Beats FASTEST at   *  BPM at **:**:** ****-**-**

HEART RATES

    40 MIN at 20:22:18 2019-09-18

    65 AVG

   102 MAX at 05:32:11 2019-09-19

LONGEST RR

     1.536 secs at 20:22:18 2019-09-18   

1. Underlying rhythm NSR, average rate 65bpm.

2. Periods sinus bradycardia, mostly during evening hours.

3. No clinically significant pauses.

4. No VPCs, no sustained arrhythmias.

5. No diary entries.

 

Confirmed by SHEILA ARDON, GLORIA ([id number]) on 9/20/2019 1:17:37 PM

Referred By: RAY SULLIVAN DR           Overread By: GLORIA SOSA MD

## 2019-10-14 ENCOUNTER — HOSPITAL ENCOUNTER (INPATIENT)
Dept: HOSPITAL 74 - JER | Age: 34
LOS: 4 days | Discharge: HOME | DRG: 139 | End: 2019-10-18
Attending: INTERNAL MEDICINE | Admitting: INTERNAL MEDICINE
Payer: COMMERCIAL

## 2019-10-14 VITALS — BODY MASS INDEX: 26.2 KG/M2

## 2019-10-14 DIAGNOSIS — R56.9: ICD-10-CM

## 2019-10-14 DIAGNOSIS — Z93.1: ICD-10-CM

## 2019-10-14 DIAGNOSIS — F72: ICD-10-CM

## 2019-10-14 DIAGNOSIS — K21.9: ICD-10-CM

## 2019-10-14 DIAGNOSIS — Q02: ICD-10-CM

## 2019-10-14 DIAGNOSIS — J18.9: Primary | ICD-10-CM

## 2019-10-14 DIAGNOSIS — G80.9: ICD-10-CM

## 2019-10-14 LAB
ALBUMIN SERPL-MCNC: 3.5 G/DL (ref 3.4–5)
ALP SERPL-CCNC: 112 U/L (ref 45–117)
ALT SERPL-CCNC: 50 U/L (ref 13–61)
ANION GAP SERPL CALC-SCNC: 7 MMOL/L (ref 8–16)
ANISOCYTOSIS BLD QL: (no result)
APPEARANCE UR: CLEAR
AST SERPL-CCNC: 26 U/L (ref 15–37)
BACTERIA # UR AUTO: 670.1 /HPF
BASOPHILS # BLD: 0.3 % (ref 0–2)
BILIRUB SERPL-MCNC: 0.4 MG/DL (ref 0.2–1)
BILIRUB UR STRIP.AUTO-MCNC: NEGATIVE MG/DL
BUN SERPL-MCNC: 25.6 MG/DL (ref 7–18)
CALCIUM SERPL-MCNC: 9.6 MG/DL (ref 8.5–10.1)
CASTS URNS QL MICRO: 1 /LPF (ref 0–8)
CHLORIDE SERPL-SCNC: 105 MMOL/L (ref 98–107)
CO2 SERPL-SCNC: 23 MMOL/L (ref 21–32)
COLOR UR: YELLOW
CREAT SERPL-MCNC: 0.8 MG/DL (ref 0.55–1.3)
DEPRECATED RDW RBC AUTO: 13.4 % (ref 11.9–15.9)
EOSINOPHIL # BLD: 0.2 % (ref 0–4.5)
EPITH CASTS URNS QL MICRO: 2.3 /HPF
GLUCOSE SERPL-MCNC: 126 MG/DL (ref 74–106)
HCT VFR BLD CALC: 38.6 % (ref 35.4–49)
HGB BLD-MCNC: 12.8 GM/DL (ref 11.7–16.9)
KETONES UR QL STRIP: NEGATIVE
LEUKOCYTE ESTERASE UR QL STRIP.AUTO: (no result)
LYMPHOCYTES # BLD: 17.5 % (ref 8–40)
MACROCYTES BLD QL: (no result)
MCH RBC QN AUTO: 32.3 PG (ref 25.7–33.7)
MCHC RBC AUTO-ENTMCNC: 33.3 G/DL (ref 32–35.9)
MCV RBC: 96.8 FL (ref 80–96)
MONOCYTES # BLD AUTO: 4.8 % (ref 3.8–10.2)
NEUTROPHILS # BLD: 77.2 % (ref 42.8–82.8)
NITRITE UR QL STRIP: POSITIVE
PH BLDV: 7.29 [PH] (ref 7.31–7.41)
PH UR: 7 [PH] (ref 5–8)
PLATELET # BLD AUTO: 110 K/MM3 (ref 134–434)
PLATELET BLD QL SMEAR: (no result)
PMV BLD: 8.5 FL (ref 7.5–11.1)
POTASSIUM SERPLBLD-SCNC: 4 MMOL/L (ref 3.5–5.1)
PROT SERPL-MCNC: 7.9 G/DL (ref 6.4–8.2)
PROT UR QL STRIP: NEGATIVE
PROT UR QL STRIP: NEGATIVE
RBC # BLD AUTO: 2 /HPF (ref 0–4)
RBC # BLD AUTO: 3.98 M/MM3 (ref 4–5.6)
SODIUM SERPL-SCNC: 135 MMOL/L (ref 136–145)
SP GR UR: 1.01 (ref 1.01–1.03)
UROBILINOGEN UR STRIP-MCNC: 0.2 MG/DL (ref 0.2–1)
VENOUS PC02: 46.1 MMHG (ref 38–52)
VENOUS PO2: < 49 MMHG (ref 28–48)
WBC # BLD AUTO: 10.1 K/MM3 (ref 4–10)
WBC # UR AUTO: 7 /HPF (ref 0–5)

## 2019-10-14 PROCEDURE — 3E0G76Z INTRODUCTION OF NUTRITIONAL SUBSTANCE INTO UPPER GI, VIA NATURAL OR ARTIFICIAL OPENING: ICD-10-PCS | Performed by: INTERNAL MEDICINE

## 2019-10-14 RX ADMIN — HEPARIN SODIUM SCH UNIT: 5000 INJECTION, SOLUTION INTRAVENOUS; SUBCUTANEOUS at 11:46

## 2019-10-14 RX ADMIN — SODIUM CHLORIDE TAB 1 GM SCH GM: 1 TAB at 23:40

## 2019-10-14 RX ADMIN — LEVOCARNITINE SCH ML: 1 SOLUTION ORAL at 23:41

## 2019-10-14 RX ADMIN — LACOSAMIDE SCH MG: 10 SOLUTION ORAL at 23:25

## 2019-10-14 RX ADMIN — LEVETIRACETAM SCH MG: 100 SOLUTION ORAL at 23:26

## 2019-10-14 NOTE — EKG
Test Reason : 

Blood Pressure : ***/*** mmHG

Vent. Rate : 102 BPM     Atrial Rate : 102 BPM

   P-R Int : 154 ms          QRS Dur : 086 ms

    QT Int : 370 ms       P-R-T Axes : 058 065 065 degrees

   QTc Int : 482 ms

 

*** POOR DATA QUALITY, INTERPRETATION MAY BE ADVERSELY AFFECTED

SINUS TACHYCARDIA

OTHERWISE NORMAL ECG

WHEN COMPARED WITH ECG OF 13-SEP-2019 18:38,

VENT. RATE HAS INCREASED BY  46 BPM

QRS DURATION HAS DECREASED

ST NO LONGER ELEVATED IN ANTERIOR LEADS

Confirmed by SHAGGY DOMINGUEZ MD ([id number]) on 10/14/2019 12:41:54 PM

 

Referred By:             Confirmed By:SHAGGY DOMINGUEZ MD

## 2019-10-14 NOTE — HP
Admitting History and Physical





- Primary Care Physician


PCP: Rimma Gayle





- Admission


History of Present Illness: 





Lately like 1 yesterday without normal 33-year-old male history of severe MR 

seizures microcephaly GERD recently admitted for a UTI and hypothermia with 

sepsis.  Here today for shortness of breath patient was noted to be hypoxic 

with O2 sats of 92 no nausea no vomiting patient is nonverbal at baseline 

history is provided by staff at the bedside and transfer paperwork review of 

previous charts














- Smoking History


Smoking history: Never smoked


Have you smoked in the past 12 months: No





- Alcohol/Substance Use


Hx Alcohol Use: No





Home Medications





- Allergies


Allergies/Adverse Reactions: 


 Allergies











Allergy/AdvReac Type Severity Reaction Status Date / Time


 


No Known Allergies Allergy   Verified 10/14/19 10:19














- Home Medications


Home Medications: 


Ambulatory Orders





Albuterol 0.083% Nebulizer Sol [Ventolin 0.083% Nebulizer Soln -] 1 neb NEB Q4H 

PRN 09/13/19 


Calcium Carbonate/Vitamin D3 [Oyster Shell 500-Vit D3 200 Tb] 1 each GT BID 09/ 13/19 


Clobazam [Onfi -] 10 mg GT BID 09/13/19 


Fructooligosaccharides/Polydex [Fiber-Stat 15 gm/30 ml Liquid] 30 ml GT DAILY 09 /13/19 


Lacosamide [Vimpat] 200 mg GT BID 09/13/19 


Levocarnitine 7 ml GT TID 09/13/19 


Multivitamin [Multiple Vitamins] 30 ml GT DAILY 09/13/19 


Pyridoxine HCl [Vitamin B-6] 100 mg GT ASDIR 09/13/19 


Sennosides/Docusate Sodium [Senna-S Tablet] 2 tab GT HS 09/13/19 


Sodium Chloride Tablet - 3.5 gm GT BID 09/13/19 


Zonisamide 300 mg GT BID 09/13/19 


levETIRAcetam [levETIRAcetam ORAL SUSPENSION] 2 gm GT BID 09/13/19 


Ofloxacin 0.3% Ophth Soln [Ocuflox -] 2 drop AD Q4HWA #10 drops 09/18/19 


levoFLOXacin [Levaquin] 750 mg GT DAILY #2 tab 09/18/19 











Physical Examination


Vital Signs: 


 Vital Signs











Temperature  98 F   10/14/19 10:15


 


Pulse Rate  75   10/14/19 18:50


 


Respiratory Rate  22 H  10/14/19 18:50


 


Blood Pressure  125/90   10/14/19 18:50


 


O2 Sat by Pulse Oximetry (%)  99   10/14/19 18:50











Constitutional: Yes: No Distress


HENT: Yes: Atraumatic


Neck: Yes: Supple


Cardiovascular: Yes: Regular Rate and Rhythm


Respiratory: Yes: CTA Bilaterally


Gastrointestinal: Yes: Normal Bowel Sounds


Extremities: Yes: WNL


Labs: 


 CBC, BMP





 10/14/19 11:40 





 10/14/19 11:40 











Problem List





- Problems


(1) Pneumonia


Assessment/Plan: 


iv abx


cxs sent


id on board


Code(s): J18.9 - PNEUMONIA, UNSPECIFIED ORGANISM   





(2) Seizure


Assessment/Plan: 


on meds


stable


Code(s): R56.9 - UNSPECIFIED CONVULSIONS   





Assessment/Plan





 Laboratory Tests











  10/14/19 10/14/19 10/14/19





  11:15 11:40 11:40


 


WBC    10.1 H


 


RBC    3.98 L


 


Hgb    12.8


 


Hct    38.6  D


 


MCV    96.8 H


 


MCH    32.3


 


MCHC    33.3


 


RDW    13.4


 


Plt Count    110 L D


 


MPV    8.5


 


Absolute Neuts (auto)    7.8


 


Neutrophils %    77.2  D


 


Neutrophils % (Manual)    59.2  D


 


Band Neutrophils %    9.2


 


Lymphocytes %    17.5  D


 


Lymphocytes % (Manual)    24.5  D


 


Monocytes %    4.8


 


Monocytes % (Manual)    5


 


Eosinophils %    0.2  D


 


Eosinophils % (Manual)    1.0


 


Basophils %    0.3


 


Basophils % (Manual)    0.0


 


Myelocytes % (Man)    0


 


Promyelocytes % (Man)    0


 


Blast Cells % (Manual)    0


 


Nucleated RBC %    0


 


Metamyelocytes    0


 


Hypochromia    0


 


Platelet Estimate    Decreased


 


Polychromasia    0


 


Poikilocytosis    0


 


Anisocytosis    1+


 


Macrocytosis    1+


 


PTT (Actin FS)   37.4 H 


 


VBG pH   


 


POC VBG pCO2   


 


POC VBG pO2   


 


VBG HCO3   


 


VBG O2 Sat (Mando)   


 


VBG Base Excess   


 


Sodium   


 


Potassium   


 


Chloride   


 


Carbon Dioxide   


 


Anion Gap   


 


BUN   


 


Creatinine   


 


Est GFR (CKD-EPI)AfAm   


 


Est GFR (CKD-EPI)NonAf   


 


Random Glucose   


 


Lactic Acid   


 


Calcium   


 


Total Bilirubin   


 


AST   


 


ALT   


 


Alkaline Phosphatase   


 


Creatine Kinase   


 


Troponin I   


 


Total Protein   


 


Albumin   


 


Urine Color  Yellow  


 


Urine Appearance  Clear  


 


Urine pH  7.0  


 


Ur Specific Gravity  1.006 L  


 


Urine Protein  Negative  


 


Urine Glucose (UA)  Negative  


 


Urine Ketones  Negative  


 


Urine Blood  Negative  


 


Urine Nitrite  Positive H  


 


Urine Bilirubin  Negative  


 


Urine Urobilinogen  0.2  


 


Ur Leukocyte Esterase  1+ H  


 


Urine WBC (Auto)  7  


 


Urine RBC (Auto)  2  


 


Urine Casts (Auto)  1  


 


U Epithel Cells (Auto)  2.3  


 


Urine Bacteria (Auto)  670.1  














  10/14/19 10/14/19 10/14/19





  11:40 11:40 11:40


 


WBC   


 


RBC   


 


Hgb   


 


Hct   


 


MCV   


 


MCH   


 


MCHC   


 


RDW   


 


Plt Count   


 


MPV   


 


Absolute Neuts (auto)   


 


Neutrophils %   


 


Neutrophils % (Manual)   


 


Band Neutrophils %   


 


Lymphocytes %   


 


Lymphocytes % (Manual)   


 


Monocytes %   


 


Monocytes % (Manual)   


 


Eosinophils %   


 


Eosinophils % (Manual)   


 


Basophils %   


 


Basophils % (Manual)   


 


Myelocytes % (Man)   


 


Promyelocytes % (Man)   


 


Blast Cells % (Manual)   


 


Nucleated RBC %   


 


Metamyelocytes   


 


Hypochromia   


 


Platelet Estimate   


 


Polychromasia   


 


Poikilocytosis   


 


Anisocytosis   


 


Macrocytosis   


 


PTT (Actin FS)   


 


VBG pH    7.29 L


 


POC VBG pCO2    46.1


 


POC VBG pO2    < 49 H


 


VBG HCO3    21.9 L


 


VBG O2 Sat (Mando)    63.6 L


 


VBG Base Excess    -4.2 L


 


Sodium  135 L  


 


Potassium  4.0  


 


Chloride  105  


 


Carbon Dioxide  23  


 


Anion Gap  7 L  


 


BUN  25.6 H  


 


Creatinine  0.8  


 


Est GFR (CKD-EPI)AfAm  135.08  


 


Est GFR (CKD-EPI)NonAf  116.55  


 


Random Glucose  126 H  


 


Lactic Acid   2.7 H* 


 


Calcium  9.6  


 


Total Bilirubin  0.4  


 


AST  26  


 


ALT  50  


 


Alkaline Phosphatase  112  


 


Creatine Kinase  95  


 


Troponin I  < 0.02  


 


Total Protein  7.9  


 


Albumin  3.5  


 


Urine Color   


 


Urine Appearance   


 


Urine pH   


 


Ur Specific Gravity   


 


Urine Protein   


 


Urine Glucose (UA)   


 


Urine Ketones   


 


Urine Blood   


 


Urine Nitrite   


 


Urine Bilirubin   


 


Urine Urobilinogen   


 


Ur Leukocyte Esterase   


 


Urine WBC (Auto)   


 


Urine RBC (Auto)   


 


Urine Casts (Auto)   


 


U Epithel Cells (Auto)   


 


Urine Bacteria (Auto)   














  10/14/19





  18:20


 


WBC 


 


RBC 


 


Hgb 


 


Hct 


 


MCV 


 


MCH 


 


MCHC 


 


RDW 


 


Plt Count 


 


MPV 


 


Absolute Neuts (auto) 


 


Neutrophils % 


 


Neutrophils % (Manual) 


 


Band Neutrophils % 


 


Lymphocytes % 


 


Lymphocytes % (Manual) 


 


Monocytes % 


 


Monocytes % (Manual) 


 


Eosinophils % 


 


Eosinophils % (Manual) 


 


Basophils % 


 


Basophils % (Manual) 


 


Myelocytes % (Man) 


 


Promyelocytes % (Man) 


 


Blast Cells % (Manual) 


 


Nucleated RBC % 


 


Metamyelocytes 


 


Hypochromia 


 


Platelet Estimate 


 


Polychromasia 


 


Poikilocytosis 


 


Anisocytosis 


 


Macrocytosis 


 


PTT (Actin FS) 


 


VBG pH 


 


POC VBG pCO2 


 


POC VBG pO2 


 


VBG HCO3 


 


VBG O2 Sat (Mando) 


 


VBG Base Excess 


 


Sodium 


 


Potassium 


 


Chloride 


 


Carbon Dioxide 


 


Anion Gap 


 


BUN 


 


Creatinine 


 


Est GFR (CKD-EPI)AfAm 


 


Est GFR (CKD-EPI)NonAf 


 


Random Glucose 


 


Lactic Acid  1.7


 


Calcium 


 


Total Bilirubin 


 


AST 


 


ALT 


 


Alkaline Phosphatase 


 


Creatine Kinase 


 


Troponin I 


 


Total Protein 


 


Albumin 


 


Urine Color 


 


Urine Appearance 


 


Urine pH 


 


Ur Specific Gravity 


 


Urine Protein 


 


Urine Glucose (UA) 


 


Urine Ketones 


 


Urine Blood 


 


Urine Nitrite 


 


Urine Bilirubin 


 


Urine Urobilinogen 


 


Ur Leukocyte Esterase 


 


Urine WBC (Auto) 


 


Urine RBC (Auto) 


 


Urine Casts (Auto) 


 


U Epithel Cells (Auto) 


 


Urine Bacteria (Auto) 








Active Medications











Generic Name Dose Route Start Last Admin





  Trade Name Freq  PRN Reason Stop Dose Admin


 


Albuterol Sulfate  1 amp  10/14/19 19:22  





  Ventolin 0.083% Nebulizer Soln -  NEB   





  Q4H PRN   





  SHORT OF BREATH/WHEEZING   





     





     





     


 


Clobazam  10 mg  10/14/19 22:00  





  Onfi -  GT   





  BID RAKAN   





     





     





     





     


 


Heparin Sodium (Porcine)  5,000 unit  10/14/19 22:00  





  Heparin -  SQ   





  BID RAKAN   





     





     





     





     


 


Sodium Chloride  1,000 mls @ 75 mls/hr  10/14/19 19:30  





  Normal Saline -  IV   





  ASDIR Replaced by [facility]   





     





     





     





     


 


Multivitamins/Minerals/Vitamin C   tab  10/15/19 10:00  





  Tab-A-Vit -  PO   





  DAILY RAKAN   





     





     





     





     


 


Non-Formulary Medication  200 mg  10/14/19 22:00  





  Lacosamide [Vimpat]  GT   





  BID RAKAN   





     





     





     





     


 


Non-Formulary Medication  2 gm  10/14/19 22:00  





  Levetiracetam  GT   





  BID RAKAN   





     





     





     





     


 


Non-Formulary Medication  7 ml  10/14/19 22:00  





  Levocarnitine [Levocarnitine]  GT   





  TID RAKAN   





     





     





     





     


 


Pyridoxine HCl  100 mg  10/14/19 19:30  





  Vitamin B6 -  GT   





  ASDIR RAKAN   





     





     





     





     


 


Sodium Chloride  3.5 gm  10/14/19 22:00  





  Sodium Chloride Tablet -  GT   





  BID RAKAN   





     





     





     





     


 


Zonisamide  300 mg  10/14/19 22:00  





  Zonegran -  GT   





  BID RAKAN

## 2019-10-14 NOTE — PDOC
History of Present Illness





- General


Stated Complaint: Shortness of Breath


Time Seen by Provider: 10/14/19 09:55


History Source: Care Provider


Exam Limitations: Physical Impairment





- History of Present Illness


Initial Comments: 





10/14/19 09:56


Lately like 1 yesterday without normal 33-year-old male history of severe MR 

seizures microcephaly GERD recently admitted for a UTI and hypothermia with 

sepsis.  Here today for shortness of breath patient was noted to be hypoxic 

with O2 sats of 92 no nausea no vomiting patient is nonverbal at baseline 

history is provided by staff at the bedside and transfer paperwork review of 

previous charts





Past History





- Past Medical History


Allergies/Adverse Reactions: 


 Allergies











Allergy/AdvReac Type Severity Reaction Status Date / Time


 


No Known Allergies Allergy   Verified 10/14/19 10:19











Home Medications: 


Ambulatory Orders





Albuterol 0.083% Nebulizer Sol [Ventolin 0.083% Nebulizer Soln -] 1 neb NEB Q4H 

PRN 09/13/19 


Calcium Carbonate/Vitamin D3 [Oyster Shell 500-Vit D3 200 Tb] 1 each GT BID 09/ 13/19 


Clobazam [Onfi -] 10 mg GT BID 09/13/19 


Fructooligosaccharides/Polydex [Fiber-Stat 15 gm/30 ml Liquid] 30 ml GT DAILY 09 /13/19 


Lacosamide [Vimpat] 200 mg GT BID 09/13/19 


Levocarnitine 7 ml GT TID 09/13/19 


Multivitamin [Multiple Vitamins] 30 ml GT DAILY 09/13/19 


Pyridoxine HCl [Vitamin B-6] 100 mg GT ASDIR 09/13/19 


Sennosides/Docusate Sodium [Senna-S Tablet] 2 tab GT HS 09/13/19 


Sodium Chloride Tablet - 3.5 gm GT BID 09/13/19 


Zonisamide 300 mg GT BID 09/13/19 


levETIRAcetam [levETIRAcetam ORAL SUSPENSION] 2 gm GT BID 09/13/19 


Ofloxacin 0.3% Ophth Soln [Ocuflox -] 2 drop AD Q4HWA #10 drops 09/18/19 


levoFLOXacin [Levaquin] 750 mg GT DAILY #2 tab 09/18/19 








Anemia: No


Asthma: No


Cancer: No


Cardiac Disorders: No


CVA: No


COPD: No


CHF: No


DVT: No


Dementia: No


Diabetes: No


GI Disorders: Yes (gerd)


 Disorders: Yes (SUPRAPUBIC CATHETER)


HTN: No


Hypercholesterolemia: No


Liver Disease: No


Seizures: Yes


Thyroid Disease: No





- Surgical History


Abdominal Surgery: Yes (G-tube)


Appendectomy: No


Cardiac Surgery: No


Cholecystectomy: No


GI Surgery: Yes (suprapubic tube)


Lung Surgery: No


Neurologic Surgery: Yes


Orthopedic Surgery: Yes (Spinal fusion)





- Immunization History


Immunization Up to Date: Yes





- Psycho Social/Smoking Cessation Hx


Smoking History: Never smoked


Have you smoked in the past 12 months: No


Hx Alcohol Use: No


Drug/Substance Use Hx: No


Substance Use Type: None


Hx Substance Use Treatment: No





**Review of Systems





- Review of Systems


Able to Perform ROS?: No





*Physical Exam





- Physical Exam


Comments: 





10/14/19 09:57


Awake alert no acute distress O2 sat is 92% left sided lung fields heart reg no 

mrg abd soft nt nd ext wwp. 


10/14/19 09:58








**Heart Score/ECG Review


  ** #1


ECG reviewed & interpreted by me at: 11:53


General ECG Interpretation: Sinus Rhythm, Normal Intervals, No acute ischemic 

changes


Compared to previous ECG there are: Other (sinus tachycardia 102)





ED Treatment Course





- LABORATORY


CBC & Chemistry Diagram: 


 10/14/19 11:40





 10/14/19 11:40





Medical Decision Making





- Medical Decision Making





10/14/19 09:57


33-year-old male history of recent UTI sepsis severe MR seizures cerebral palsy 

and microcephaly here today with shortness of breath noted to be hypoxic 

differential includes pneumonia effusion UTI which is recurrent further sepsis 

plan CBC CMP chest x-ray cultures will likely require admission for hypoxia and 

presumed pneumonia


10/14/19 15:15


d/w dr reynoso for admission. will admit to medicine. givein vancomycin and 

zosyn for hcap and uti. hydrated wit normal saline. to repeat lactic acid. pt 

with fever at [place] 99, was given tylenol at 8 am. 





Discharge





- Discharge Information


Problems reviewed: Yes


Clinical Impression/Diagnosis: 


 UTI (urinary tract infection), Pneumonia








- Admission


Yes





- Follow up/Referral





- Patient Discharge Instructions





- Post Discharge Activity

## 2019-10-15 LAB
INR BLD: 0.93 (ref 0.83–1.09)
PT PNL PPP: 11 SEC (ref 9.7–13)

## 2019-10-15 RX ADMIN — SODIUM CHLORIDE TAB 1 GM SCH GM: 1 TAB at 21:01

## 2019-10-15 RX ADMIN — SODIUM CHLORIDE TAB 1 GM SCH GM: 1 TAB at 11:00

## 2019-10-15 RX ADMIN — LEVETIRACETAM SCH MG: 100 SOLUTION ORAL at 21:01

## 2019-10-15 RX ADMIN — LEVOCARNITINE SCH ML: 1 SOLUTION ORAL at 21:01

## 2019-10-15 RX ADMIN — MULTIVITAMIN TABLET SCH TAB: TABLET at 10:58

## 2019-10-15 RX ADMIN — HEPARIN SODIUM SCH UNIT: 5000 INJECTION, SOLUTION INTRAVENOUS; SUBCUTANEOUS at 11:01

## 2019-10-15 RX ADMIN — ZONISAMIDE SCH MG: 100 CAPSULE ORAL at 01:47

## 2019-10-15 RX ADMIN — PIPERACILLIN SODIUM,TAZOBACTAM SODIUM SCH MLS/HR: 3; .375 INJECTION, POWDER, FOR SOLUTION INTRAVENOUS at 17:25

## 2019-10-15 RX ADMIN — LEVETIRACETAM SCH MG: 100 SOLUTION ORAL at 11:01

## 2019-10-15 RX ADMIN — ZONISAMIDE SCH MG: 100 CAPSULE ORAL at 21:00

## 2019-10-15 RX ADMIN — ALBUTEROL SULFATE PRN AMP: 2.5 SOLUTION RESPIRATORY (INHALATION) at 20:00

## 2019-10-15 RX ADMIN — HEPARIN SODIUM SCH UNIT: 5000 INJECTION, SOLUTION INTRAVENOUS; SUBCUTANEOUS at 21:00

## 2019-10-15 RX ADMIN — PIPERACILLIN SODIUM,TAZOBACTAM SODIUM SCH MLS/HR: 3; .375 INJECTION, POWDER, FOR SOLUTION INTRAVENOUS at 12:22

## 2019-10-15 RX ADMIN — LEVOCARNITINE SCH ML: 1 SOLUTION ORAL at 15:04

## 2019-10-15 RX ADMIN — ZONISAMIDE SCH MG: 100 CAPSULE ORAL at 11:09

## 2019-10-15 RX ADMIN — LACOSAMIDE SCH MG: 10 SOLUTION ORAL at 10:58

## 2019-10-15 RX ADMIN — LACOSAMIDE SCH MG: 10 SOLUTION ORAL at 21:00

## 2019-10-15 RX ADMIN — LEVOCARNITINE SCH ML: 1 SOLUTION ORAL at 05:55

## 2019-10-15 RX ADMIN — Medication SCH MG: at 17:25

## 2019-10-15 NOTE — CON.ID
Consult


Consult Specialty:: infectious diseases


Referred by:: 





- History of Present Illness


Chief Complaint: sob


History of Present Illness: 





patient is non verbal,severely mentally retarded history obtained from the notes


33-year-old male history of severe MR seizures microcephaly GERD recently 

admitted for a UTI and hypothermia with sepsis .Patient  was noted to be 

hypoxic and was admitted to the hospital


Patient currently marcelino cabrera has  secretions


currently better





- History Source


History Provided By: Medical Record


Limitations to Obtaining History: Clinical Condition





- Alcohol/Substance Use


Hx Alcohol Use: No





- Smoking History


Smoking history: Never smoked


Have you smoked in the past 12 months: No





Home Medications





- Allergies


Allergies/Adverse Reactions: 


 Allergies











Allergy/AdvReac Type Severity Reaction Status Date / Time


 


No Known Allergies Allergy   Verified 10/14/19 10:19














- Home Medications


Home Medications: 


Ambulatory Orders





RX: Albuterol 0.083% Nebulizer Sol [Ventolin 0.083% Nebulizer Soln -] 1 neb NEB 

Q4H PRN 09/13/19 


RX: Calcium Carbonate/Vitamin D3 [Oyster Shell 500-Vit D3 200 Tb] 1 each GT BID 

09/13/19 


RX: Clobazam [Onfi -] 10 mg GT BID 09/13/19 


RX: Fructooligosaccharides/Polydex [Fiber-Stat 15 gm/30 ml Liquid] 30 ml GT 

DAILY 09/13/19 


RX: Lacosamide [Vimpat] 200 mg GT BID 09/13/19 


RX: Levocarnitine 7 ml GT TID 09/13/19 


RX: Multivitamin [Multiple Vitamins] 30 ml GT DAILY 09/13/19 


RX: Pyridoxine HCl [Vitamin B-6] 100 mg GT ASDIR 09/13/19 


RX: Sennosides/Docusate Sodium [Senna-S Tablet] 2 tab GT HS 09/13/19 


RX: Sodium Chloride Tablet - 3.5 gm GT BID 09/13/19 


RX: Zonisamide 300 mg GT BID 09/13/19 


RX: levETIRAcetam [levETIRAcetam ORAL SUSPENSION] 2 gm GT BID 09/13/19 


RX: Ofloxacin 0.3% Ophth Soln [Ocuflox -] 2 drop AD Q4HWA #10 drops 09/18/19 


RX: levoFLOXacin [Levaquin] 750 mg GT DAILY #2 tab 09/18/19 











Review of Systems


Unable to obtain ROS, reason: unable to obtain





Physical Exam


Vital Signs: 


 Vital Signs











Temperature  97.5 F L  10/15/19 06:00


 


Pulse Rate  77   10/15/19 06:00


 


Respiratory Rate  20   10/15/19 06:00


 


Blood Pressure  105/59 L  10/15/19 06:00


 


O2 Sat by Pulse Oximetry (%)  99   10/15/19 03:00











Constitutional: Yes: No Distress, Calm


Cardiovascular: Yes: Regular Rate and Rhythm


Respiratory: Yes: On Nasal O2, Poor Air Entry, Rhonchi


Gastrointestinal: Yes: Normal Bowel Sounds, Soft, Other (peg tube in place)


Musculoskeletal: Yes: WNL


Extremities: Yes: WNL


Neurological: Yes: Alert, Other


Psychiatric: Yes: Other


Labs: 


 CBC, BMP





 10/14/19 11:40 





 10/14/19 11:40 











Imaging





- Results


Chest X-ray: Report Reviewed, Image Reviewed





Assessment/Plan





 Problem List 





- Problems


(1) Pneumonia


Code(s): J18.9 - PNEUMONIA, UNSPECIFIED ORGANISM   





(2) Seizure


Code(s): R56.9 - UNSPECIFIED CONVULSIONS   





plan


will start patient on abx


asp precautions


rest as per the team

## 2019-10-15 NOTE — PN
Progress Note, Physician





- Current Medication List


Current Medications: 


Active Medications





Albuterol Sulfate (Ventolin 0.083% Nebulizer Soln -)  1 amp NEB Q4H PRN


   PRN Reason: SHORT OF BREATH/WHEEZING


Clobazam (Onfi -)  10 mg GT BID [facility]


   Last Admin: 10/15/19 10:58 Dose:  10 mg


Heparin Sodium (Porcine) (Heparin -)  5,000 unit SQ BID [facility]


   Last Admin: 10/15/19 11:01 Dose:  5,000 unit


Piperacillin Sod/Tazobactam (Sod 3.375 gm/ Dextrose)  50 mls @ 100 mls/hr IVPB 

Q8H-IV [facility]; Protocol


   Last Admin: 10/15/19 17:25 Dose:  100 mls/hr


Lacosamide (Vimpat Liquid -)  200 mg GT BID [facility]


   Last Admin: 10/15/19 10:58 Dose:  200 mg


Levetiracetam (Keppra Oral Solution -)  2,000 mg GT BID [facility]


   Last Admin: 10/15/19 11:01 Dose:  2,000 mg


Levocarnitine (Carnitor Oral Solution -)  700 mg GT TID [facility]


   Last Admin: 10/15/19 15:04 Dose:  5 ml


Multivitamins/Minerals/Vitamin C (Tab-A-Vit -)  1 tab PO DAILY [facility]


   Last Admin: 10/15/19 10:58 Dose:  1 tab


Pyridoxine HCl (Vitamin B6 -)  100 mg GT DAILY@1800 [facility]


   Last Admin: 10/15/19 17:25 Dose:  100 mg


Sodium Chloride (Sodium Chloride Tablet -)  3.5 gm GT BID [facility]


   Last Admin: 10/15/19 11:00 Dose:  3.5 gm


Zonisamide (Zonisamide)  300 mg GT BID [facility]


   Last Admin: 10/15/19 11:09 Dose:  300 mg











- Objective


Vital Signs: 


 Vital Signs











Temperature  97.6 F   10/15/19 15:11


 


Pulse Rate  74   10/15/19 15:11


 


Respiratory Rate  20   10/15/19 15:11


 


Blood Pressure  96/58 L  10/15/19 15:11


 


O2 Sat by Pulse Oximetry (%)  97   10/15/19 08:52











Constitutional: Yes: No Distress


HENT: Yes: Atraumatic


Neck: Yes: Supple


Cardiovascular: Yes: Regular Rate and Rhythm


Respiratory: Yes: CTA Bilaterally


Gastrointestinal: Yes: Normal Bowel Sounds


Extremities: Yes: WNL


Edema: No


Peripheral Pulses WNL: Yes


Neurological: Yes: Alert, Oriented


Labs: 


 CBC, BMP





 10/14/19 11:40 





 10/14/19 11:40 





 INR, PTT











INR  0.93  (0.83-1.09)   10/14/19  10:13    














Problem List





- Problems


(1) Pneumonia


Assessment/Plan: 


iv abx


cxs sent


id on board


Code(s): J18.9 - PNEUMONIA, UNSPECIFIED ORGANISM   





(2) Seizure


Assessment/Plan: 


iv abx


Code(s): R56.9 - UNSPECIFIED CONVULSIONS

## 2019-10-16 RX ADMIN — LEVOCARNITINE SCH ML: 1 SOLUTION ORAL at 13:40

## 2019-10-16 RX ADMIN — LEVETIRACETAM SCH MG: 100 SOLUTION ORAL at 22:02

## 2019-10-16 RX ADMIN — PIPERACILLIN SODIUM,TAZOBACTAM SODIUM SCH MLS/HR: 3; .375 INJECTION, POWDER, FOR SOLUTION INTRAVENOUS at 09:29

## 2019-10-16 RX ADMIN — SODIUM CHLORIDE TAB 1 GM SCH GM: 1 TAB at 22:01

## 2019-10-16 RX ADMIN — SODIUM CHLORIDE TAB 1 GM SCH GM: 1 TAB at 09:28

## 2019-10-16 RX ADMIN — Medication SCH MG: at 17:05

## 2019-10-16 RX ADMIN — ZONISAMIDE SCH MG: 100 CAPSULE ORAL at 09:25

## 2019-10-16 RX ADMIN — HEPARIN SODIUM SCH UNIT: 5000 INJECTION, SOLUTION INTRAVENOUS; SUBCUTANEOUS at 21:58

## 2019-10-16 RX ADMIN — LEVETIRACETAM SCH MG: 100 SOLUTION ORAL at 09:30

## 2019-10-16 RX ADMIN — LEVOCARNITINE SCH ML: 1 SOLUTION ORAL at 05:50

## 2019-10-16 RX ADMIN — PIPERACILLIN SODIUM,TAZOBACTAM SODIUM SCH MLS/HR: 3; .375 INJECTION, POWDER, FOR SOLUTION INTRAVENOUS at 17:05

## 2019-10-16 RX ADMIN — HEPARIN SODIUM SCH UNIT: 5000 INJECTION, SOLUTION INTRAVENOUS; SUBCUTANEOUS at 09:29

## 2019-10-16 RX ADMIN — LACOSAMIDE SCH MG: 10 SOLUTION ORAL at 21:59

## 2019-10-16 RX ADMIN — ZONISAMIDE SCH MG: 100 CAPSULE ORAL at 22:00

## 2019-10-16 RX ADMIN — LACOSAMIDE SCH MG: 10 SOLUTION ORAL at 09:29

## 2019-10-16 RX ADMIN — PIPERACILLIN SODIUM,TAZOBACTAM SODIUM SCH MLS/HR: 3; .375 INJECTION, POWDER, FOR SOLUTION INTRAVENOUS at 02:12

## 2019-10-16 RX ADMIN — MULTIVITAMIN TABLET SCH TAB: TABLET at 09:29

## 2019-10-16 RX ADMIN — LEVOCARNITINE SCH ML: 1 SOLUTION ORAL at 22:17

## 2019-10-16 NOTE — PN
Progress Note, Physician


History of Present Illness: 





stable


looking better








- Current Medication List


Current Medications: 


Active Medications





Albuterol Sulfate (Ventolin 0.083% Nebulizer Soln -)  1 amp NEB Q4H PRN


   PRN Reason: SHORT OF BREATH/WHEEZING


   Last Admin: 10/15/19 20:00 Dose:  1 amp


Clobazam (Onfi -)  10 mg GT BID [facility]


   Last Admin: 10/16/19 09:29 Dose:  10 mg


Heparin Sodium (Porcine) (Heparin -)  5,000 unit SQ BID [facility]


   Last Admin: 10/16/19 09:29 Dose:  5,000 unit


Piperacillin Sod/Tazobactam (Sod 3.375 gm/ Dextrose)  50 mls @ 100 mls/hr IVPB 

Q8H-IV [facility]; Protocol


   Last Admin: 10/16/19 09:29 Dose:  100 mls/hr


Lacosamide (Vimpat Liquid -)  200 mg GT BID [facility]


   Last Admin: 10/16/19 09:29 Dose:  200 mg


Levetiracetam (Keppra Oral Solution -)  2,000 mg GT BID [facility]


   Last Admin: 10/16/19 09:30 Dose:  2,000 mg


Levocarnitine (Carnitor Oral Solution -)  700 mg GT TID [facility]


   Last Admin: 10/16/19 05:50 Dose:  700 ml


Multivitamins/Minerals/Vitamin C (Tab-A-Vit -)  1 tab PO DAILY [facility]


   Last Admin: 10/16/19 09:29 Dose:  1 tab


Pyridoxine HCl (Vitamin B6 -)  100 mg GT DAILY@1800 [facility]


   Last Admin: 10/15/19 17:25 Dose:  100 mg


Sodium Chloride (Sodium Chloride Tablet -)  3.5 gm GT BID [facility]


   Last Admin: 10/16/19 09:28 Dose:  3.5 gm


Zonisamide (Zonisamide)  300 mg GT BID [facility]


   Last Admin: 10/16/19 09:25 Dose:  300 mg











- Objective


Vital Signs: 


 Vital Signs











Temperature  97.5 F L  10/16/19 09:00


 


Pulse Rate  71   10/16/19 09:00


 


Respiratory Rate  20   10/16/19 09:00


 


Blood Pressure  96/71   10/16/19 09:00


 


O2 Sat by Pulse Oximetry (%)  98   10/16/19 09:00











Constitutional: Yes: No Distress, Calm


Cardiovascular: Yes: S1, S2


Respiratory: Yes: Regular, CTA Bilaterally


Gastrointestinal: Yes: Normal Bowel Sounds, Soft, Other (peg)


Musculoskeletal: Yes: WNL


Extremities: Yes: Other (contracted)


Neurological: Yes: Alert, Other


Psychiatric: Yes: Other


Labs: 


 CBC, BMP





 10/14/19 11:40 





 10/14/19 11:40 





 INR, PTT











INR  0.93  (0.83-1.09)   10/14/19  10:13    














Assessment/Plan





 Problem List 





- Problems


(1) Pneumonia


Code(s): J18.9 - PNEUMONIA, UNSPECIFIED ORGANISM   





(2) Seizure


Code(s): R56.9 - UNSPECIFIED CONVULSIONS   





plan


continue abx


aspiration precaution


nutrition


rest as per the team

## 2019-10-16 NOTE — PN
Progress Note, Physician





- Current Medication List


Current Medications: 


Active Medications





Albuterol Sulfate (Ventolin 0.083% Nebulizer Soln -)  1 amp NEB Q4H PRN


   PRN Reason: SHORT OF BREATH/WHEEZING


   Last Admin: 10/15/19 20:00 Dose:  1 amp


Clobazam (Onfi -)  10 mg GT BID [facility]


   Last Admin: 10/16/19 09:29 Dose:  10 mg


Heparin Sodium (Porcine) (Heparin -)  5,000 unit SQ BID [facility]


   Last Admin: 10/16/19 09:29 Dose:  5,000 unit


Piperacillin Sod/Tazobactam (Sod 3.375 gm/ Dextrose)  50 mls @ 100 mls/hr IVPB 

Q8H-IV [facility]; Protocol


   Last Admin: 10/16/19 17:05 Dose:  100 mls/hr


Lacosamide (Vimpat Liquid -)  200 mg GT BID [facility]


   Last Admin: 10/16/19 09:29 Dose:  200 mg


Levetiracetam (Keppra Oral Solution -)  2,000 mg GT BID [facility]


   Last Admin: 10/16/19 09:30 Dose:  2,000 mg


Levocarnitine (Carnitor Oral Solution -)  700 mg GT TID [facility]


   Last Admin: 10/16/19 13:40 Dose:  5 ml


Multivitamins/Minerals/Vitamin C (Tab-A-Vit -)  1 tab PO DAILY [facility]


   Last Admin: 10/16/19 09:29 Dose:  1 tab


Pyridoxine HCl (Vitamin B6 -)  100 mg GT DAILY@1800 [facility]


   Last Admin: 10/16/19 17:05 Dose:  100 mg


Sodium Chloride (Sodium Chloride Tablet -)  3.5 gm GT BID [facility]


   Last Admin: 10/16/19 09:28 Dose:  3.5 gm


Zonisamide (Zonisamide)  300 mg GT BID [facility]


   Last Admin: 10/16/19 09:25 Dose:  300 mg











- Objective


Vital Signs: 


 Vital Signs











Temperature  97.4 F L  10/16/19 16:54


 


Pulse Rate  70   10/16/19 16:54


 


Respiratory Rate  18   10/16/19 16:54


 


Blood Pressure  92/56 L  10/16/19 16:54


 


O2 Sat by Pulse Oximetry (%)  98   10/16/19 09:00











Constitutional: Yes: No Distress


HENT: Yes: Atraumatic


Neck: Yes: Supple


Cardiovascular: Yes: Regular Rate and Rhythm


Respiratory: Yes: CTA Bilaterally


Gastrointestinal: Yes: Normal Bowel Sounds


Extremities: Yes: WNL


Edema: No


Peripheral Pulses WNL: Yes


Neurological: Yes: Alert, Oriented


Labs: 


 CBC, BMP





 10/14/19 11:40 





 10/14/19 11:40 





 INR, PTT











INR  0.93  (0.83-1.09)   10/14/19  10:13    














Problem List





- Problems


(1) Pneumonia


Assessment/Plan: 


iv abx


cxs sent


id on board


Code(s): J18.9 - PNEUMONIA, UNSPECIFIED ORGANISM   





(2) Seizure


Assessment/Plan: 


on meds


stable


Code(s): R56.9 - UNSPECIFIED CONVULSIONS   





Assessment/Plan





 Laboratory Tests











  10/14/19 10/14/19 10/14/19





  11:15 11:40 11:40


 


WBC    10.1 H


 


RBC    3.98 L


 


Hgb    12.8


 


Hct    38.6  D


 


MCV    96.8 H


 


MCH    32.3


 


MCHC    33.3


 


RDW    13.4


 


Plt Count    110 L D


 


MPV    8.5


 


Absolute Neuts (auto)    7.8


 


Neutrophils %    77.2  D


 


Neutrophils % (Manual)    59.2  D


 


Band Neutrophils %    9.2


 


Lymphocytes %    17.5  D


 


Lymphocytes % (Manual)    24.5  D


 


Monocytes %    4.8


 


Monocytes % (Manual)    5


 


Eosinophils %    0.2  D


 


Eosinophils % (Manual)    1.0


 


Basophils %    0.3


 


Basophils % (Manual)    0.0


 


Myelocytes % (Man)    0


 


Promyelocytes % (Man)    0


 


Blast Cells % (Manual)    0


 


Nucleated RBC %    0


 


Metamyelocytes    0


 


Hypochromia    0


 


Platelet Estimate    Decreased


 


Polychromasia    0


 


Poikilocytosis    0


 


Anisocytosis    1+


 


Macrocytosis    1+


 


PTT (Actin FS)   37.4 H 


 


VBG pH   


 


POC VBG pCO2   


 


POC VBG pO2   


 


VBG HCO3   


 


VBG O2 Sat (Mando)   


 


VBG Base Excess   


 


Sodium   


 


Potassium   


 


Chloride   


 


Carbon Dioxide   


 


Anion Gap   


 


BUN   


 


Creatinine   


 


Est GFR (CKD-EPI)AfAm   


 


Est GFR (CKD-EPI)NonAf   


 


Random Glucose   


 


Lactic Acid   


 


Calcium   


 


Total Bilirubin   


 


AST   


 


ALT   


 


Alkaline Phosphatase   


 


Creatine Kinase   


 


Troponin I   


 


Total Protein   


 


Albumin   


 


Urine Color  Yellow  


 


Urine Appearance  Clear  


 


Urine pH  7.0  


 


Ur Specific Gravity  1.006 L  


 


Urine Protein  Negative  


 


Urine Glucose (UA)  Negative  


 


Urine Ketones  Negative  


 


Urine Blood  Negative  


 


Urine Nitrite  Positive H  


 


Urine Bilirubin  Negative  


 


Urine Urobilinogen  0.2  


 


Ur Leukocyte Esterase  1+ H  


 


Urine WBC (Auto)  7  


 


Urine RBC (Auto)  2  


 


Urine Casts (Auto)  1  


 


U Epithel Cells (Auto)  2.3  


 


Urine Bacteria (Auto)  670.1  














  10/14/19 10/14/19 10/14/19





  11:40 11:40 11:40


 


WBC   


 


RBC   


 


Hgb   


 


Hct   


 


MCV   


 


MCH   


 


MCHC   


 


RDW   


 


Plt Count   


 


MPV   


 


Absolute Neuts (auto)   


 


Neutrophils %   


 


Neutrophils % (Manual)   


 


Band Neutrophils %   


 


Lymphocytes %   


 


Lymphocytes % (Manual)   


 


Monocytes %   


 


Monocytes % (Manual)   


 


Eosinophils %   


 


Eosinophils % (Manual)   


 


Basophils %   


 


Basophils % (Manual)   


 


Myelocytes % (Man)   


 


Promyelocytes % (Man)   


 


Blast Cells % (Manual)   


 


Nucleated RBC %   


 


Metamyelocytes   


 


Hypochromia   


 


Platelet Estimate   


 


Polychromasia   


 


Poikilocytosis   


 


Anisocytosis   


 


Macrocytosis   


 


PTT (Actin FS)   


 


VBG pH    7.29 L


 


POC VBG pCO2    46.1


 


POC VBG pO2    < 49 H


 


VBG HCO3    21.9 L


 


VBG O2 Sat (Mando)    63.6 L


 


VBG Base Excess    -4.2 L


 


Sodium  135 L  


 


Potassium  4.0  


 


Chloride  105  


 


Carbon Dioxide  23  


 


Anion Gap  7 L  


 


BUN  25.6 H  


 


Creatinine  0.8  


 


Est GFR (CKD-EPI)AfAm  135.08  


 


Est GFR (CKD-EPI)NonAf  116.55  


 


Random Glucose  126 H  


 


Lactic Acid   2.7 H* 


 


Calcium  9.6  


 


Total Bilirubin  0.4  


 


AST  26  


 


ALT  50  


 


Alkaline Phosphatase  112  


 


Creatine Kinase  95  


 


Troponin I  < 0.02  


 


Total Protein  7.9  


 


Albumin  3.5  


 


Urine Color   


 


Urine Appearance   


 


Urine pH   


 


Ur Specific Gravity   


 


Urine Protein   


 


Urine Glucose (UA)   


 


Urine Ketones   


 


Urine Blood   


 


Urine Nitrite   


 


Urine Bilirubin   


 


Urine Urobilinogen   


 


Ur Leukocyte Esterase   


 


Urine WBC (Auto)   


 


Urine RBC (Auto)   


 


Urine Casts (Auto)   


 


U Epithel Cells (Auto)   


 


Urine Bacteria (Auto)   














  10/14/19





  18:20


 


WBC 


 


RBC 


 


Hgb 


 


Hct 


 


MCV 


 


MCH 


 


MCHC 


 


RDW 


 


Plt Count 


 


MPV 


 


Absolute Neuts (auto) 


 


Neutrophils % 


 


Neutrophils % (Manual) 


 


Band Neutrophils % 


 


Lymphocytes % 


 


Lymphocytes % (Manual) 


 


Monocytes % 


 


Monocytes % (Manual) 


 


Eosinophils % 


 


Eosinophils % (Manual) 


 


Basophils % 


 


Basophils % (Manual) 


 


Myelocytes % (Man) 


 


Promyelocytes % (Man) 


 


Blast Cells % (Manual) 


 


Nucleated RBC % 


 


Metamyelocytes 


 


Hypochromia 


 


Platelet Estimate 


 


Polychromasia 


 


Poikilocytosis 


 


Anisocytosis 


 


Macrocytosis 


 


PTT (Actin FS) 


 


VBG pH 


 


POC VBG pCO2 


 


POC VBG pO2 


 


VBG HCO3 


 


VBG O2 Sat (Mando) 


 


VBG Base Excess 


 


Sodium 


 


Potassium 


 


Chloride 


 


Carbon Dioxide 


 


Anion Gap 


 


BUN 


 


Creatinine 


 


Est GFR (CKD-EPI)AfAm 


 


Est GFR (CKD-EPI)NonAf 


 


Random Glucose 


 


Lactic Acid  1.7


 


Calcium 


 


Total Bilirubin 


 


AST 


 


ALT 


 


Alkaline Phosphatase 


 


Creatine Kinase 


 


Troponin I 


 


Total Protein 


 


Albumin 


 


Urine Color 


 


Urine Appearance 


 


Urine pH 


 


Ur Specific Gravity 


 


Urine Protein 


 


Urine Glucose (UA) 


 


Urine Ketones 


 


Urine Blood 


 


Urine Nitrite 


 


Urine Bilirubin 


 


Urine Urobilinogen 


 


Ur Leukocyte Esterase 


 


Urine WBC (Auto) 


 


Urine RBC (Auto) 


 


Urine Casts (Auto) 


 


U Epithel Cells (Auto) 


 


Urine Bacteria (Auto) 








Active Medications











Generic Name Dose Route Start Last Admin





  Trade Name Freq  PRN Reason Stop Dose Admin


 


Albuterol Sulfate  1 amp  10/14/19 19:22  





  Ventolin 0.083% Nebulizer Soln -  NEB   





  Q4H PRN   





  SHORT OF BREATH/WHEEZING   





     





     





     


 


Clobazam  10 mg  10/14/19 22:00  





  Onfi -  GT   





  BID [facility]   





     





     





     





     


 


Heparin Sodium (Porcine)  5,000 unit  10/14/19 22:00  





  Heparin -  SQ   





  BID [facility]   





     





     





     





     


 


Sodium Chloride  1,000 mls @ 75 mls/hr  10/14/19 19:30  





  Normal Saline -  IV   





  ASDIR [facility]   





     





     





     





     


 


Multivitamins/Minerals/Vitamin C   tab  10/15/19 10:00  





  Tab-A-Vit -  PO   





  DAILY [facility]   





     





     





     





     


 


Non-Formulary Medication  200 mg  10/14/19 22:00  





  Lacosamide [Vimpat]  GT   





  BID [facility]   





     





     





     





     


 


Non-Formulary Medication  2 gm  10/14/19 22:00  





  Levetiracetam  GT   





  BID [facility]   





     





     





     





     


 


Non-Formulary Medication  7 ml  10/14/19 22:00  





  Levocarnitine [Levocarnitine]  GT   





  TID [facility]   





     





     





     





     


 


Pyridoxine HCl  100 mg  10/14/19 19:30  





  Vitamin B6 -  GT   





  ASDIR [facility]   





     





     





     





     


 


Sodium Chloride  3.5 gm  10/14/19 22:00  





  Sodium Chloride Tablet -  GT   





  BID [facility]   





     





     





     





     


 


Zonisamide  300 mg  10/14/19 22:00  





  Zonegran -  GT   





  BID [facility]

## 2019-10-17 LAB
ALBUMIN SERPL-MCNC: 3.1 G/DL (ref 3.4–5)
ALP SERPL-CCNC: 97 U/L (ref 45–117)
ALT SERPL-CCNC: 39 U/L (ref 13–61)
ANION GAP SERPL CALC-SCNC: 7 MMOL/L (ref 8–16)
AST SERPL-CCNC: 20 U/L (ref 15–37)
BASOPHILS # BLD: 0.4 % (ref 0–2)
BILIRUB SERPL-MCNC: 0.3 MG/DL (ref 0.2–1)
BUN SERPL-MCNC: 9.9 MG/DL (ref 7–18)
CALCIUM SERPL-MCNC: 9.2 MG/DL (ref 8.5–10.1)
CHLORIDE SERPL-SCNC: 108 MMOL/L (ref 98–107)
CO2 SERPL-SCNC: 24 MMOL/L (ref 21–32)
CREAT SERPL-MCNC: 0.6 MG/DL (ref 0.55–1.3)
DEPRECATED RDW RBC AUTO: 13.6 % (ref 11.9–15.9)
EOSINOPHIL # BLD: 3 % (ref 0–4.5)
GLUCOSE SERPL-MCNC: 121 MG/DL (ref 74–106)
HCT VFR BLD CALC: 34.5 % (ref 35.4–49)
HGB BLD-MCNC: 11.7 GM/DL (ref 11.7–16.9)
LYMPHOCYTES # BLD: 37.8 % (ref 8–40)
MCH RBC QN AUTO: 32.4 PG (ref 25.7–33.7)
MCHC RBC AUTO-ENTMCNC: 33.8 G/DL (ref 32–35.9)
MCV RBC: 95.8 FL (ref 80–96)
MONOCYTES # BLD AUTO: 5.8 % (ref 3.8–10.2)
NEUTROPHILS # BLD: 53 % (ref 42.8–82.8)
PLATELET # BLD AUTO: 145 K/MM3 (ref 134–434)
PMV BLD: 8.2 FL (ref 7.5–11.1)
POTASSIUM SERPLBLD-SCNC: 4.1 MMOL/L (ref 3.5–5.1)
PROT SERPL-MCNC: 7.4 G/DL (ref 6.4–8.2)
RBC # BLD AUTO: 3.6 M/MM3 (ref 4–5.6)
SODIUM SERPL-SCNC: 139 MMOL/L (ref 136–145)
WBC # BLD AUTO: 7.8 K/MM3 (ref 4–10)

## 2019-10-17 RX ADMIN — HEPARIN SODIUM SCH UNIT: 5000 INJECTION, SOLUTION INTRAVENOUS; SUBCUTANEOUS at 09:46

## 2019-10-17 RX ADMIN — LACOSAMIDE SCH MG: 10 SOLUTION ORAL at 09:46

## 2019-10-17 RX ADMIN — LEVOCARNITINE SCH ML: 1 SOLUTION ORAL at 05:55

## 2019-10-17 RX ADMIN — PIPERACILLIN SODIUM,TAZOBACTAM SODIUM SCH MLS/HR: 3; .375 INJECTION, POWDER, FOR SOLUTION INTRAVENOUS at 09:45

## 2019-10-17 RX ADMIN — LEVOCARNITINE SCH ML: 1 SOLUTION ORAL at 21:04

## 2019-10-17 RX ADMIN — PIPERACILLIN SODIUM,TAZOBACTAM SODIUM SCH MLS/HR: 3; .375 INJECTION, POWDER, FOR SOLUTION INTRAVENOUS at 01:32

## 2019-10-17 RX ADMIN — Medication SCH MG: at 17:41

## 2019-10-17 RX ADMIN — LACOSAMIDE SCH MG: 10 SOLUTION ORAL at 21:04

## 2019-10-17 RX ADMIN — ZONISAMIDE SCH MG: 100 CAPSULE ORAL at 09:54

## 2019-10-17 RX ADMIN — LEVETIRACETAM SCH MG: 100 SOLUTION ORAL at 21:03

## 2019-10-17 RX ADMIN — SODIUM CHLORIDE TAB 1 GM SCH GM: 1 TAB at 21:04

## 2019-10-17 RX ADMIN — PIPERACILLIN SODIUM,TAZOBACTAM SODIUM SCH MLS/HR: 3; .375 INJECTION, POWDER, FOR SOLUTION INTRAVENOUS at 17:39

## 2019-10-17 RX ADMIN — LEVETIRACETAM SCH MG: 100 SOLUTION ORAL at 09:50

## 2019-10-17 RX ADMIN — ZONISAMIDE SCH MG: 100 CAPSULE ORAL at 22:09

## 2019-10-17 RX ADMIN — ALBUTEROL SULFATE PRN AMP: 2.5 SOLUTION RESPIRATORY (INHALATION) at 20:39

## 2019-10-17 RX ADMIN — MULTIVITAMIN TABLET SCH TAB: TABLET at 09:46

## 2019-10-17 RX ADMIN — HEPARIN SODIUM SCH UNIT: 5000 INJECTION, SOLUTION INTRAVENOUS; SUBCUTANEOUS at 21:04

## 2019-10-17 RX ADMIN — SODIUM CHLORIDE TAB 1 GM SCH GM: 1 TAB at 09:50

## 2019-10-17 RX ADMIN — LEVOCARNITINE SCH ML: 1 SOLUTION ORAL at 14:46

## 2019-10-17 NOTE — PN
Progress Note, Physician





- Current Medication List


Current Medications: 


Active Medications





Albuterol Sulfate (Ventolin 0.083% Nebulizer Soln -)  1 amp NEB Q4H PRN


   PRN Reason: SHORT OF BREATH/WHEEZING


   Last Admin: 10/15/19 20:00 Dose:  1 amp


Clobazam (Onfi -)  10 mg GT BID [facility]


   Last Admin: 10/17/19 09:53 Dose:  10 mg


Heparin Sodium (Porcine) (Heparin -)  5,000 unit SQ BID RAKAN


   Last Admin: 10/17/19 09:46 Dose:  5,000 unit


Piperacillin Sod/Tazobactam (Sod 3.375 gm/ Dextrose)  50 mls @ 100 mls/hr IVPB 

Q8H-IV RAKAN; Protocol


   Last Admin: 10/17/19 09:45 Dose:  100 mls/hr


Lacosamide (Vimpat Liquid -)  200 mg GT BID [facility]


   Last Admin: 10/17/19 09:46 Dose:  200 mg


Levetiracetam (Keppra Oral Solution -)  2,000 mg GT BID [facility]


   Last Admin: 10/17/19 09:50 Dose:  2,000 mg


Levocarnitine (Carnitor Oral Solution -)  700 mg GT TID [facility]


   Last Admin: 10/17/19 14:46 Dose:  7 ml


Multivitamins/Minerals/Vitamin C (Tab-A-Vit -)  1 tab PO DAILY [facility]


   Last Admin: 10/17/19 09:46 Dose:  1 tab


Pyridoxine HCl (Vitamin B6 -)  100 mg GT DAILY@1800 [facility]


   Last Admin: 10/16/19 17:05 Dose:  100 mg


Sodium Chloride (Sodium Chloride Tablet -)  3.5 gm GT BID [facility]


   Last Admin: 10/17/19 09:50 Dose:  3.5 gm


Zonisamide (Zonisamide)  300 mg GT BID [facility]


   Last Admin: 10/17/19 09:54 Dose:  300 mg











- Objective


Vital Signs: 


 Vital Signs











Temperature  94.9 F L  10/17/19 14:15


 


Pulse Rate  63   10/17/19 14:15


 


Respiratory Rate  20   10/17/19 14:15


 


Blood Pressure  91/50 L  10/17/19 14:15


 


O2 Sat by Pulse Oximetry (%)  98   10/17/19 09:00











Constitutional: Yes: Calm


HENT: Yes: Atraumatic


Neck: Yes: Supple


Cardiovascular: Yes: Regular Rate and Rhythm


Respiratory: Yes: CTA Bilaterally


Gastrointestinal: Yes: Normal Bowel Sounds


Extremities: Yes: WNL


Neurological: Yes: Alert, Oriented


Labs: 


 CBC, BMP





 10/17/19 08:55 





 10/17/19 08:55 





 INR, PTT











INR  0.93  (0.83-1.09)   10/14/19  10:13    














Problem List





- Problems


(1) Pneumonia


Assessment/Plan: 


iv abx


cxs sent


id on board


Code(s): J18.9 - PNEUMONIA, UNSPECIFIED ORGANISM   





(2) Seizure


Assessment/Plan: 


on meds


stable


Code(s): R56.9 - UNSPECIFIED CONVULSIONS

## 2019-10-17 NOTE — PN
Progress Note, Physician


History of Present Illness: 





improving


wbc has normalized


still with secretions





- Current Medication List


Current Medications: 


Active Medications





Albuterol Sulfate (Ventolin 0.083% Nebulizer Soln -)  1 amp NEB Q4H PRN


   PRN Reason: SHORT OF BREATH/WHEEZING


   Last Admin: 10/15/19 20:00 Dose:  1 amp


Clobazam (Onfi -)  10 mg GT BID [facility]


   Last Admin: 10/17/19 09:53 Dose:  10 mg


Heparin Sodium (Porcine) (Heparin -)  5,000 unit SQ BID [facility]


   Last Admin: 10/17/19 09:46 Dose:  5,000 unit


Piperacillin Sod/Tazobactam (Sod 3.375 gm/ Dextrose)  50 mls @ 100 mls/hr IVPB 

Q8H-IV [facility]; Protocol


   Last Admin: 10/17/19 09:45 Dose:  100 mls/hr


Lacosamide (Vimpat Liquid -)  200 mg GT BID [facility]


   Last Admin: 10/17/19 09:46 Dose:  200 mg


Levetiracetam (Keppra Oral Solution -)  2,000 mg GT BID [facility]


   Last Admin: 10/17/19 09:50 Dose:  2,000 mg


Levocarnitine (Carnitor Oral Solution -)  700 mg GT TID [facility]


   Last Admin: 10/17/19 05:55 Dose:  5 ml


Multivitamins/Minerals/Vitamin C (Tab-A-Vit -)  1 tab PO DAILY [facility]


   Last Admin: 10/17/19 09:46 Dose:  1 tab


Pyridoxine HCl (Vitamin B6 -)  100 mg GT DAILY@1800 [facility]


   Last Admin: 10/16/19 17:05 Dose:  100 mg


Sodium Chloride (Sodium Chloride Tablet -)  3.5 gm GT BID [facility]


   Last Admin: 10/17/19 09:50 Dose:  3.5 gm


Zonisamide (Zonisamide)  300 mg GT BID [facility]


   Last Admin: 10/17/19 09:54 Dose:  300 mg











- Objective


Vital Signs: 


 Vital Signs











Temperature  96.2 F L  10/17/19 10:00


 


Pulse Rate  69   10/17/19 10:00


 


Respiratory Rate  20   10/17/19 10:00


 


Blood Pressure  124/78   10/17/19 10:00


 


O2 Sat by Pulse Oximetry (%)  98   10/16/19 21:00











Constitutional: Yes: No Distress, Calm


Cardiovascular: Yes: S1, S2


Respiratory: Yes: Regular, Rhonchi


Gastrointestinal: Yes: Normal Bowel Sounds, Soft, Other (peg)


Musculoskeletal: Yes: WNL


Extremities: Yes: WNL


Neurological: Yes: Alert, Other


Psychiatric: Yes: Other


Labs: 


 CBC, BMP





 10/17/19 08:55 





 10/17/19 08:55 





 INR, PTT











INR  0.93  (0.83-1.09)   10/14/19  10:13    














Assessment/Plan





 Problem List 





- Problems


(1) Pneumonia


Code(s): J18.9 - PNEUMONIA, UNSPECIFIED ORGANISM   





(2) Seizure


Code(s): R56.9 - UNSPECIFIED CONVULSIONS   





plan


continue abx


aspiration precaution


nutrition


rest as per the team

## 2019-10-18 VITALS — DIASTOLIC BLOOD PRESSURE: 72 MMHG | HEART RATE: 74 BPM | SYSTOLIC BLOOD PRESSURE: 96 MMHG | TEMPERATURE: 97.4 F

## 2019-10-18 RX ADMIN — LEVETIRACETAM SCH MG: 100 SOLUTION ORAL at 09:54

## 2019-10-18 RX ADMIN — ZONISAMIDE SCH MG: 100 CAPSULE ORAL at 11:12

## 2019-10-18 RX ADMIN — MULTIVITAMIN TABLET SCH TAB: TABLET at 10:09

## 2019-10-18 RX ADMIN — PIPERACILLIN SODIUM,TAZOBACTAM SODIUM SCH MLS/HR: 3; .375 INJECTION, POWDER, FOR SOLUTION INTRAVENOUS at 01:21

## 2019-10-18 RX ADMIN — PIPERACILLIN SODIUM,TAZOBACTAM SODIUM SCH MLS/HR: 3; .375 INJECTION, POWDER, FOR SOLUTION INTRAVENOUS at 10:10

## 2019-10-18 RX ADMIN — LEVOCARNITINE SCH ML: 1 SOLUTION ORAL at 13:50

## 2019-10-18 RX ADMIN — HEPARIN SODIUM SCH UNIT: 5000 INJECTION, SOLUTION INTRAVENOUS; SUBCUTANEOUS at 09:53

## 2019-10-18 RX ADMIN — LEVOCARNITINE SCH ML: 1 SOLUTION ORAL at 06:05

## 2019-10-18 RX ADMIN — SODIUM CHLORIDE TAB 1 GM SCH GM: 1 TAB at 09:55

## 2019-10-18 RX ADMIN — LACOSAMIDE SCH MG: 10 SOLUTION ORAL at 10:03

## 2019-10-18 NOTE — DS
Physical Examination


Vital Signs: 


 Vital Signs











Temperature  98.4 F   10/18/19 10:00


 


Pulse Rate  68   10/18/19 13:08


 


Respiratory Rate  18   10/18/19 13:08


 


Blood Pressure  119/42 L  10/18/19 13:08


 


O2 Sat by Pulse Oximetry (%)  98   10/18/19 08:44











Constitutional: Yes: No Distress


HENT: Yes: Atraumatic


Neck: Yes: Supple


Cardiovascular: Yes: Regular Rate and Rhythm


Respiratory: Yes: CTA Bilaterally


Gastrointestinal: Yes: Normal Bowel Sounds


Extremities: Yes: WNL


Edema: No


Peripheral Pulses WNL: Yes


Neurological: Yes: Alert


Labs: 


 CBC, BMP





 10/17/19 08:55 





 10/17/19 08:55 











Discharge Summary


Problems reviewed: Yes


Reason For Visit: PNEUMONIA


Current Active Problems





Pneumonia (Acute)


UTI (urinary tract infection) (Acute)








Condition: Guarded





- Instructions


Diet, Activity, Other Instructions: 


dc home


on po abx


Disposition: HOME





- Home Medications


Comprehensive Discharge Medication List: 


Ambulatory Orders





Albuterol 0.083% Nebulizer Sol [Ventolin 0.083% Nebulizer Soln -] 1 neb NEB Q4H 

PRN 09/13/19 


Calcium Carbonate/Vitamin D3 [Oyster Shell 500-Vit D3 200 Tb] 1 each GT BID 09/ 13/19 


Clobazam [Onfi -] 10 mg GT BID 09/13/19 


Fructooligosaccharides/Polydex [Fiber-Stat 15 gm/30 ml Liquid] 30 ml GT DAILY 09 /13/19 


Lacosamide [Vimpat] 200 mg GT BID 09/13/19 


Levocarnitine 7 ml GT TID 09/13/19 


Multivitamin [Multiple Vitamins] 30 ml GT DAILY 09/13/19 


Pyridoxine HCl [Vitamin B-6] 100 mg GT ASDIR 09/13/19 


Sennosides/Docusate Sodium [Senna-S Tablet] 2 tab GT HS 09/13/19 


Sodium Chloride Tablet - 3.5 gm GT BID 09/13/19 


Zonisamide 300 mg GT BID 09/13/19 


levETIRAcetam [levETIRAcetam ORAL SUSPENSION] 2 gm GT BID 09/13/19 


Ofloxacin 0.3% Ophth Soln [Ocuflox -] 2 drop AD Q4HWA #10 drops 09/18/19 


levoFLOXacin [Levaquin] 750 mg GT DAILY #2 tab 09/18/19 


Amoxicillin/Potassium Clav [Augmentin 875-125 Tablet] 1 each PO BID #10 tablet 

10/18/19

## 2019-10-18 NOTE — PN
Progress Note, Physician


History of Present Illness: 





patient stable


no new issues


afebrile


tolerating feeding





- Current Medication List


Current Medications: 


Active Medications





Albuterol Sulfate (Ventolin 0.083% Nebulizer Soln -)  1 amp NEB Q4H PRN


   PRN Reason: SHORT OF BREATH/WHEEZING


   Last Admin: 10/17/19 20:39 Dose:  1 amp


Clobazam (Onfi -)  10 mg GT BID [facility]


   Last Admin: 10/18/19 09:53 Dose:  10 mg


Heparin Sodium (Porcine) (Heparin -)  5,000 unit SQ BID [facility]


   Last Admin: 10/18/19 09:53 Dose:  5,000 unit


Piperacillin Sod/Tazobactam (Sod 3.375 gm/ Dextrose)  50 mls @ 100 mls/hr IVPB 

Q8H-IV [facility]; Protocol


   Last Admin: 10/18/19 10:10 Dose:  100 mls/hr


Lacosamide (Vimpat Liquid -)  200 mg GT BID [facility]


   Last Admin: 10/18/19 10:03 Dose:  200 mg


Levetiracetam (Keppra Oral Solution -)  2,000 mg GT BID [facility]


   Last Admin: 10/18/19 09:54 Dose:  2,000 mg


Levocarnitine (Carnitor Oral Solution -)  700 mg GT TID [facility]


   Last Admin: 10/18/19 06:05 Dose:  5 ml


Multivitamins/Minerals/Vitamin C (Tab-A-Vit -)  1 tab PO DAILY [facility]


   Last Admin: 10/18/19 10:09 Dose:  1 tab


Pyridoxine HCl (Vitamin B6 -)  100 mg GT DAILY@1800 [facility]


   Last Admin: 10/17/19 17:41 Dose:  100 mg


Sodium Chloride (Sodium Chloride Tablet -)  3.5 gm GT BID [facility]


   Last Admin: 10/18/19 09:55 Dose:  3.5 gm


Zonisamide (Zonisamide)  300 mg GT BID [facility]


   Last Admin: 10/18/19 11:12 Dose:  300 mg











- Objective


Vital Signs: 


 Vital Signs











Temperature  98.4 F   10/18/19 10:00


 


Pulse Rate  55 L  10/18/19 10:00


 


Respiratory Rate  20   10/18/19 10:00


 


Blood Pressure  92/62   10/18/19 10:00


 


O2 Sat by Pulse Oximetry (%)  98   10/18/19 08:44











Constitutional: Yes: No Distress, Calm


Cardiovascular: Yes: S1, S2


Respiratory: Yes: Regular, CTA Bilaterally


Gastrointestinal: Yes: Normal Bowel Sounds, Soft, Other (peg in place)


Musculoskeletal: Yes: WNL


Extremities: Yes: WNL


Neurological: Yes: Alert, Other


Psychiatric: Yes: Other


Labs: 


 CBC, BMP





 10/17/19 08:55 





 10/17/19 08:55 





 INR, PTT











INR  0.93  (0.83-1.09)   10/14/19  10:13    














Assessment/Plan





 Problem List 





- Problems


(1) Pneumonia


Code(s): J18.9 - PNEUMONIA, UNSPECIFIED ORGANISM   





(2) Seizure


Code(s): R56.9 - UNSPECIFIED CONVULSIONS   





plan


continue current mgmt


patient can be switched to oral when discharging


augmentin 875 mg tab or liquid po bid for another 5 days

## 2019-11-12 ENCOUNTER — HOSPITAL ENCOUNTER (EMERGENCY)
Dept: HOSPITAL 74 - JER | Age: 34
Discharge: NURSING, REHAB OR LONG-TERM CARE | End: 2019-11-12
Payer: COMMERCIAL

## 2019-11-12 VITALS — HEART RATE: 72 BPM

## 2019-11-12 VITALS — SYSTOLIC BLOOD PRESSURE: 120 MMHG | TEMPERATURE: 94.8 F | DIASTOLIC BLOOD PRESSURE: 72 MMHG

## 2019-11-12 VITALS — BODY MASS INDEX: 27.4 KG/M2

## 2019-11-12 DIAGNOSIS — Q02: ICD-10-CM

## 2019-11-12 DIAGNOSIS — G80.8: ICD-10-CM

## 2019-11-12 DIAGNOSIS — Z93.1: ICD-10-CM

## 2019-11-12 DIAGNOSIS — F73: ICD-10-CM

## 2019-11-12 DIAGNOSIS — K21.9: ICD-10-CM

## 2019-11-12 DIAGNOSIS — N39.0: Primary | ICD-10-CM

## 2019-11-12 DIAGNOSIS — Z98.1: ICD-10-CM

## 2019-11-12 DIAGNOSIS — Z93.59: ICD-10-CM

## 2019-11-12 DIAGNOSIS — G40.909: ICD-10-CM

## 2019-11-12 LAB
ALBUMIN SERPL-MCNC: 3.5 G/DL (ref 3.4–5)
ALP SERPL-CCNC: 117 U/L (ref 45–117)
ALT SERPL-CCNC: 42 U/L (ref 13–61)
ANION GAP SERPL CALC-SCNC: 7 MMOL/L (ref 8–16)
APPEARANCE UR: (no result)
AST SERPL-CCNC: 27 U/L (ref 15–37)
BACTERIA # UR AUTO: 3931.4 /HPF
BASOPHILS # BLD: 0.2 % (ref 0–2)
BILIRUB SERPL-MCNC: 0.1 MG/DL (ref 0.2–1)
BILIRUB UR STRIP.AUTO-MCNC: NEGATIVE MG/DL
BUN SERPL-MCNC: 18.7 MG/DL (ref 7–18)
CALCIUM SERPL-MCNC: 9.6 MG/DL (ref 8.5–10.1)
CASTS URNS QL MICRO: 10 /LPF (ref 0–8)
CHLORIDE SERPL-SCNC: 106 MMOL/L (ref 98–107)
CO2 SERPL-SCNC: 25 MMOL/L (ref 21–32)
COLOR UR: YELLOW
CREAT SERPL-MCNC: 0.6 MG/DL (ref 0.55–1.3)
DEPRECATED RDW RBC AUTO: 13.3 % (ref 11.9–15.9)
EOSINOPHIL # BLD: 0.6 % (ref 0–4.5)
EPITH CASTS URNS QL MICRO: 1.4 /HPF
GLUCOSE SERPL-MCNC: 93 MG/DL (ref 74–106)
HCT VFR BLD CALC: 37.9 % (ref 35.4–49)
HGB BLD-MCNC: 12.6 GM/DL (ref 11.7–16.9)
KETONES UR QL STRIP: NEGATIVE
LEUKOCYTE ESTERASE UR QL STRIP.AUTO: (no result)
LYMPHOCYTES # BLD: 29.5 % (ref 8–40)
MAGNESIUM SERPL-MCNC: 2.1 MG/DL (ref 1.8–2.4)
MCH RBC QN AUTO: 32.3 PG (ref 25.7–33.7)
MCHC RBC AUTO-ENTMCNC: 33.3 G/DL (ref 32–35.9)
MCV RBC: 97 FL (ref 80–96)
MONOCYTES # BLD AUTO: 3.2 % (ref 3.8–10.2)
NEUTROPHILS # BLD: 66.5 % (ref 42.8–82.8)
NITRITE UR QL STRIP: POSITIVE
PH UR: 7.5 [PH] (ref 5–8)
PHOSPHATE SERPL-MCNC: 4.5 MG/DL (ref 2.5–4.9)
PLATELET # BLD AUTO: 179 K/MM3 (ref 134–434)
PMV BLD: 7.7 FL (ref 7.5–11.1)
POTASSIUM SERPLBLD-SCNC: 4.1 MMOL/L (ref 3.5–5.1)
PROT SERPL-MCNC: 8.3 G/DL (ref 6.4–8.2)
PROT UR QL STRIP: NEGATIVE
PROT UR QL STRIP: NEGATIVE
RBC # BLD AUTO: 1 /HPF (ref 0–4)
RBC # BLD AUTO: 3.91 M/MM3 (ref 4–5.6)
SODIUM SERPL-SCNC: 138 MMOL/L (ref 136–145)
SP GR UR: 1.01 (ref 1.01–1.03)
UROBILINOGEN UR STRIP-MCNC: 0.2 MG/DL (ref 0.2–1)
WBC # BLD AUTO: 8.3 K/MM3 (ref 4–10)
WBC # UR AUTO: 141 /HPF (ref 0–5)

## 2019-11-12 NOTE — PDOC
Documentation entered by Tiffanie Merino SCRIBE, acting as scribe for Justin Varghese MD.








Justin Varghese MD:  This documentation has been prepared by the Wilber barba Xhesika, SCRIBE, under my direction and personally reviewed by me in its 

entirety.  I confirm that the documentation accurately reflects all work, 

treatment, procedures, and medical decision making performed by me.  





Attending Attestation





- Resident


Resident Name: Lakeisha Hickman





- ED Attending Attestation


I have performed the following: I have examined & evaluated the patient, The 

case was reviewed & discussed with the resident, I agree w/resident's findings 

& plan, Exceptions are as noted





- HPI


HPI: 





11/12/19 11:59


The patient is a 33 year old male with a significant PMH of microcephaly, 2/2 

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 

department from [place] for O2 desaturation into the 80s and 94% on nasal 

cannula. Per NH notes the patient was hypoxic. Patient is unable to provide 

history due to clinical condition. 





Allergies: NKDA


Past surgical history: G-tube, suprapubic tube, spinal fusion 




















- Physicial Exam


PE: 





11/12/19 12:00


GENERAL: Patient is awake, no acute distress


HEAD: Normocephalic, atraumatic.


EYES: extraocular movements intact, sclera anicteric, conjunctiva clear.


ENT: Moist mucous membranes.


NECK: supple without lymphadenopathy, JVD, or masses.


LUNGS:rhonchorous breath sounds, no acute respiratory distress or tachpynea


HEART: Regular rate and rhythm, normal S1 and S2 without murmur, rub or gallop.


ABDOMEN: Soft, nontender, normoactive bowel sounds.  No guarding, no rebound.  

No masses.


EXTREMITIES: contrcted extremities, no pedal edema


NEUROLOGICAL: Limited exam


PSYCH: Limited exam


SKIN: Warm, Dry, normal turgor, no rashes or lesions noted.














- Medical Decision Making





11/12/19 12:33


Patient was sent from [facility] for evaluation of hypoxia


Patient has no document hypoxia here, is no acute respiratory distress he does 

have some rhonchi. 


However chest x-ray was clear without any signs of pneumonia, lab work also 

unremarkable


The patient's UA does suggest a UTI we will treat the patient with antibiotics


As the patient is stable anticipate discharge back to [place] will discuss 

with Dr. Alvarez

## 2019-11-12 NOTE — PDOC
History of Present Illness





- General


Chief Complaint: Respiratory Distress


Stated Complaint: PNEUMONIA


History Source: Nursing Home Records, Old Records





- History of Present Illness


Initial Comments: 





11/12/19 10:12


32 yo M PMH of Cerebral Palsy, Profound mental retardation, epilepsy, 

microcephaly, GERD presents to ED from [place] for O2 desaturation into the 

80s. pt cannot provide history.  


11/12/19 10:13





11/12/19 10:14








Past History





- Past Medical History


Allergies/Adverse Reactions: 


 Allergies











Allergy/AdvReac Type Severity Reaction Status Date / Time


 


No Known Allergies Allergy   Verified 10/14/19 10:19











Home Medications: 


Ambulatory Orders





Albuterol 0.083% Nebulizer Sol [Ventolin 0.083% Nebulizer Soln -] 1 neb NEB Q4H 

PRN 09/13/19 


Calcium Carbonate/Vitamin D3 [Oyster Shell 500-Vit D3 200 Tb] 1 each GT BID 09/ 13/19 


Clobazam [Onfi -] 10 mg GT BID 09/13/19 


Fructooligosaccharides/Polydex [Fiber-Stat 15 gm/30 ml Liquid] 30 ml GT DAILY 09 /13/19 


Lacosamide [Vimpat] 200 mg GT BID 09/13/19 


Levocarnitine 7 ml GT TID 09/13/19 


Multivitamin [Multiple Vitamins] 30 ml GT DAILY 09/13/19 


Pyridoxine HCl [Vitamin B-6] 100 mg GT ASDIR 09/13/19 


Sennosides/Docusate Sodium [Senna-S Tablet] 2 tab GT HS 09/13/19 


Sodium Chloride Tablet - 3.5 gm GT BID 09/13/19 


Zonisamide 300 mg GT BID 09/13/19 


levETIRAcetam [levETIRAcetam ORAL SUSPENSION] 2 gm GT BID 09/13/19 


Ofloxacin 0.3% Ophth Soln [Ocuflox -] 2 drop AD Q4HWA #10 drops 09/18/19 


levoFLOXacin [Levaquin] 750 mg GT DAILY #2 tab 09/18/19 


Amoxicillin/Potassium Clav [Augmentin 875-125 Tablet] 1 each PO BID #10 tablet 

10/18/19 








Anemia: No


Asthma: No


Cancer: No


Cardiac Disorders: No


CVA: No


COPD: No


CHF: No


DVT: No


Dementia: No


Diabetes: No


GI Disorders: Yes (gerd)


 Disorders: Yes (SUPRAPUBIC CATHETER)


HTN: No


Hypercholesterolemia: No


Liver Disease: No


Seizures: Yes


Thyroid Disease: No





- Surgical History


Abdominal Surgery: Yes (G-tube)


Appendectomy: No


Cardiac Surgery: No


Cholecystectomy: No


GI Surgery: Yes (suprapubic tube)


Lung Surgery: No


Neurologic Surgery: Yes


Orthopedic Surgery: Yes (Spinal fusion)





- Immunization History


Immunization Up to Date: Yes





- Psycho Social/Smoking Cessation Hx


Smoking History: Never smoked


Have you smoked in the past 12 months: No


Hx Alcohol Use: No


Drug/Substance Use Hx: No


Substance Use Type: None


Hx Substance Use Treatment: No





**Review of Systems





- Review of Systems


Able to Perform ROS?: No





*Physical Exam





- Vital Signs


 Last Vital Signs











Temp Pulse Resp BP Pulse Ox


 


    72   20   134/102 H  99 


 


    11/12/19 10:06  11/12/19 10:06  11/12/19 10:06  11/12/19 10:06














- Physical Exam


General Appearance: Yes: Nourished, Appropriately Dressed.  No: Apparent 

Distress


HEENT: positive: CARLO, Other (microcephaly )


Respiratory/Chest: positive: Decreased Breath Sounds, Rhonchi (b/l)


Cardiovascular: positive: Regular Rhythm, Regular Rate, S1, S2


Gastrointestinal/Abdominal: positive: Normal Bowel Sounds, Soft, Other (PEG tube

, suprapubic cath).  negative: Tender


Extremity: negative: Calf Tenderness


Integumentary: positive: Normal Color, Dry, Cold.  negative: Swelling


Neurologic: negative: Fully Oriented





ED Treatment Course





- LABORATORY


CBC & Chemistry Diagram: 


 11/12/19 10:47





 11/12/19 10:47





Medical Decision Making





- Medical Decision Making





11/12/19 10:23


32 yo M presenting w/ desat 





-CXR


-CBC, CMP


- after suctioning- pt saturates 98%





11/12/19 12:12


-UA --> 3+ LE, +nitrite, + bacteria


-UCulture ordered 


11/12/19 12:55


-


11/12/19 13:11


- Communicated with Dr. Alvarez of [place] regarding the results, will DC on 

levaquin for UTI 





Discharge





- Discharge Information


Problems reviewed: Yes


Clinical Impression/Diagnosis: 


 UTI (urinary tract infection)





Condition: Stable


Disposition: SKILLED NURSING FACILITY





- Admission


No





- Follow up/Referral





- Patient Discharge Instructions


Patient Printed Discharge Instructions:  DI for Urinary Tract Infection (UTI)


Additional Instructions: 


You came into the hospital for low Oxygen ( hypoxic) . When you came into the 

hospital , your oxygen levels were normal. We tested your blood and your urine. 

Your urine shows that you have a UTI. 





We will call back regarding your urine culture.





Please follow up with your primary care physician regarding your improvement. 





Please continue to take Levaquin 750 mg daily for 7 days. 


Please continue your home medications as prescribed.





If you have any new, worsening, or concerning symptoms please return to the ED. 





- Post Discharge Activity

## 2019-11-20 NOTE — EKG
Py is back to her baseline pt came in very sleepy . She comes here pre op in this state where she keeps being sleepy as she was in pre op. Test Reason : 

Blood Pressure : ***/*** mmHG

Vent. Rate : 070 BPM     Atrial Rate : 070 BPM

   P-R Int : 150 ms          QRS Dur : 078 ms

    QT Int : 392 ms       P-R-T Axes : 057 056 054 degrees

   QTc Int : 423 ms

 

NORMAL SINUS RHYTHM

INCREASED R/S RATIO IN V1, CONSIDER EARLY TRANSITION OR POSTERIOR

INFARCT

ABNORMAL ECG

NO PREVIOUS ECGS AVAILABLE

Confirmed by MARILEE ARDON, LEATHA ([id number]) on 1/13/2019 8:32:34 AM

 

Referred By:             Confirmed By:LEATHA HUNT MD

## 2019-11-22 ENCOUNTER — HOSPITAL ENCOUNTER (INPATIENT)
Dept: HOSPITAL 74 - JER | Age: 34
LOS: 14 days | Discharge: HOME | DRG: 720 | End: 2019-12-06
Attending: NURSE PRACTITIONER | Admitting: INTERNAL MEDICINE
Payer: COMMERCIAL

## 2019-11-22 VITALS — BODY MASS INDEX: 28.1 KG/M2

## 2019-11-22 DIAGNOSIS — R09.02: ICD-10-CM

## 2019-11-22 DIAGNOSIS — E43: ICD-10-CM

## 2019-11-22 DIAGNOSIS — G40.909: ICD-10-CM

## 2019-11-22 DIAGNOSIS — A41.81: Primary | ICD-10-CM

## 2019-11-22 DIAGNOSIS — Z16.12: ICD-10-CM

## 2019-11-22 DIAGNOSIS — E86.0: ICD-10-CM

## 2019-11-22 DIAGNOSIS — Z93.1: ICD-10-CM

## 2019-11-22 DIAGNOSIS — F73: ICD-10-CM

## 2019-11-22 DIAGNOSIS — T68.XXXA: ICD-10-CM

## 2019-11-22 DIAGNOSIS — R53.2: ICD-10-CM

## 2019-11-22 DIAGNOSIS — G80.9: ICD-10-CM

## 2019-11-22 DIAGNOSIS — N39.0: ICD-10-CM

## 2019-11-22 DIAGNOSIS — B96.89: ICD-10-CM

## 2019-11-22 DIAGNOSIS — K21.9: ICD-10-CM

## 2019-11-22 DIAGNOSIS — I95.9: ICD-10-CM

## 2019-11-22 DIAGNOSIS — Q02: ICD-10-CM

## 2019-11-22 LAB
ALBUMIN SERPL-MCNC: 3.8 G/DL (ref 3.4–5)
ALP SERPL-CCNC: 123 U/L (ref 45–117)
ALT SERPL-CCNC: 38 U/L (ref 13–61)
ANION GAP SERPL CALC-SCNC: 5 MMOL/L (ref 8–16)
APPEARANCE UR: CLEAR
APTT BLD: 38.2 SECONDS (ref 25.2–36.5)
AST SERPL-CCNC: 24 U/L (ref 15–37)
BACTERIA # UR AUTO: 1828.5 /HPF
BASOPHILS # BLD: 0.2 % (ref 0–2)
BILIRUB SERPL-MCNC: 0.1 MG/DL (ref 0.2–1)
BILIRUB UR STRIP.AUTO-MCNC: NEGATIVE MG/DL
BUN SERPL-MCNC: 17.8 MG/DL (ref 7–18)
CALCIUM SERPL-MCNC: 9.4 MG/DL (ref 8.5–10.1)
CASTS URNS QL MICRO: 13 /LPF (ref 0–8)
CHLORIDE SERPL-SCNC: 105 MMOL/L (ref 98–107)
CO2 SERPL-SCNC: 27 MMOL/L (ref 21–32)
COLOR UR: YELLOW
CREAT SERPL-MCNC: 0.6 MG/DL (ref 0.55–1.3)
DEPRECATED RDW RBC AUTO: 13.1 % (ref 11.9–15.9)
EOSINOPHIL # BLD: 0.7 % (ref 0–4.5)
EPITH CASTS URNS QL MICRO: 0.5 /HPF
GLUCOSE SERPL-MCNC: 83 MG/DL (ref 74–106)
HCT VFR BLD CALC: 37.3 % (ref 35.4–49)
HGB BLD-MCNC: 12.5 GM/DL (ref 11.7–16.9)
INR BLD: 0.97 (ref 0.83–1.09)
KETONES UR QL STRIP: NEGATIVE
LEUKOCYTE ESTERASE UR QL STRIP.AUTO: (no result)
LYMPHOCYTES # BLD: 14.3 % (ref 8–40)
MAGNESIUM SERPL-MCNC: 2.1 MG/DL (ref 1.8–2.4)
MCH RBC QN AUTO: 32.2 PG (ref 25.7–33.7)
MCHC RBC AUTO-ENTMCNC: 33.6 G/DL (ref 32–35.9)
MCV RBC: 95.9 FL (ref 80–96)
MONOCYTES # BLD AUTO: 3.5 % (ref 3.8–10.2)
NEUTROPHILS # BLD: 81.3 % (ref 42.8–82.8)
NITRITE UR QL STRIP: POSITIVE
PH BLDV: 7.27 [PH] (ref 7.31–7.41)
PH UR: 7 [PH] (ref 5–8)
PHOSPHATE SERPL-MCNC: 3.8 MG/DL (ref 2.5–4.9)
PLATELET # BLD AUTO: 169 K/MM3 (ref 134–434)
PMV BLD: 7.2 FL (ref 7.5–11.1)
POTASSIUM SERPLBLD-SCNC: 4.1 MMOL/L (ref 3.5–5.1)
PROT SERPL-MCNC: 8.4 G/DL (ref 6.4–8.2)
PROT UR QL STRIP: NEGATIVE
PROT UR QL STRIP: NEGATIVE
PT PNL PPP: 11.5 SEC (ref 9.7–13)
RBC # BLD AUTO: 3.89 M/MM3 (ref 4–5.6)
RBC # BLD AUTO: 4 /HPF (ref 0–4)
SODIUM SERPL-SCNC: 137 MMOL/L (ref 136–145)
SP GR UR: 1.02 (ref 1.01–1.03)
UROBILINOGEN UR STRIP-MCNC: 0.2 MG/DL (ref 0.2–1)
VENOUS PC02: 56.9 MMHG (ref 38–52)
VENOUS PO2: < 49 MMHG (ref 28–48)
WBC # BLD AUTO: 11.6 K/MM3 (ref 4–10)
WBC # UR AUTO: 34 /HPF (ref 0–5)

## 2019-11-22 RX ADMIN — SODIUM CHLORIDE SCH MLS/HR: 9 INJECTION, SOLUTION INTRAVENOUS at 21:26

## 2019-11-22 RX ADMIN — LACOSAMIDE SCH MG: 10 SOLUTION ORAL at 22:18

## 2019-11-22 RX ADMIN — ZONISAMIDE SCH MG: 100 CAPSULE ORAL at 22:20

## 2019-11-22 RX ADMIN — Medication SCH TAB: at 22:20

## 2019-11-22 RX ADMIN — HEPARIN SODIUM SCH UNIT: 5000 INJECTION, SOLUTION INTRAVENOUS; SUBCUTANEOUS at 22:19

## 2019-11-22 RX ADMIN — LEVOCARNITINE SCH MG: 1 SOLUTION ORAL at 22:21

## 2019-11-22 RX ADMIN — LEVETIRACETAM SCH MG: 100 SOLUTION ORAL at 22:19

## 2019-11-22 NOTE — EKG
Test Reason : 

Blood Pressure : ***/*** mmHG

Vent. Rate : 088 BPM     Atrial Rate : 088 BPM

   P-R Int : 178 ms          QRS Dur : 082 ms

    QT Int : 374 ms       P-R-T Axes : 051 061 042 degrees

   QTc Int : 452 ms

 

*** POOR DATA QUALITY, INTERPRETATION MAY BE ADVERSELY AFFECTED

NORMAL SINUS RHYTHM

NORMAL ECG

WHEN COMPARED WITH ECG OF 14-OCT-2019 10:30,

NO SIGNIFICANT CHANGE WAS FOUND

Confirmed by MD Bela, Espinoza ([id number]) on 11/22/2019 11:36:45 PM

 

Referred By:             Confirmed By:Espinoza Cramer MD

## 2019-11-22 NOTE — HP
CHIEF COMPLAINT: lethargy and difficulty breathing.





PCP: Dr. Alvarez, [facility]





HISTORY OF PRESENT ILLNESS:


Patient is a 33 year old male form [facility] with a significant 

past medical history of seizures, microcephaly, urethral stricture s/p 

suprapubic catheter placement and MR.  He presents to the ED today accompanied 

by his HHA with hypothermia and hypotension.  Aide states that patient appeared 

more lethargic and less interactive today and not acting like himself.   

Initially patient evaluated in the ED for reports of  episode of difficulty 

breathing at [place] with low oxygen saturations that dropped to 88% on room 

air today.  Patient was placed on 4 liters and sent to the ED for further 

evaluation.  At baseline, patient is able to tolerate room air during the day 

and uses nasal cannula as needed at night.  In the ED his oxygen saturations on 

room air remained stable and a chest xray showed no acute chest pathology.  He 

was noted to be hypothermic and hypotensive on presentation. BP stabilized 

after 2.5 liter fluid bolus. He is noted to have a positive UA and is being 

admitted for urosepsis.  





ER course was notable for:


(1) Zosyn and Vancomycin


(2) bp 70/40s>90/60s s/p 2.5 liter fluid bolus


(3) hypothermia 96>97.3


(4) urinalysis + for bacteria/+3 leuk est.





Recent Travel: none





PAST MEDICAL/SURGICAL HISTORY:


 


Social History: Lives in [facility] with 24 hour care.  


Smoking: none


Alcohol: none


Drugs: none





Allergies





No Known Allergies Allergy (Verified 10/14/19 10:19)


 








HOME MEDICATIONS:


 Home Medications











 Medication  Instructions  Recorded


 


Albuterol 0.083% Nebulizer Sol 1 neb NEB Q4H PRN 09/13/19





[Ventolin 0.083% Nebulizer Soln -]  


 


Calcium Carbonate/Vitamin D3 1 each GT BID 09/13/19





[Oyster Shell 500-Vit D3 200 Tb]  


 


Clobazam [Onfi -] 10 mg GT BID 09/13/19


 


Fructooligosaccharides/Polydex 30 ml GT DAILY 09/13/19





[Fiber-Stat 15 gm/30 ml Liquid]  


 


Lacosamide [Vimpat] 200 mg GT BID 09/13/19


 


Levocarnitine 7 ml GT TID 09/13/19


 


Multivitamin [Multiple Vitamins] 30 ml GT DAILY 09/13/19


 


Pyridoxine HCl [Vitamin B-6] 100 mg GT ASDIR 09/13/19


 


Sennosides/Docusate Sodium 2 tab GT HS 09/13/19





[Senna-S Tablet]  


 


Sodium Chloride Tablet - 3.5 gm GT BID 09/13/19


 


Zonisamide 300 mg GT BID 09/13/19


 


levETIRAcetam [levETIRAcetam ORAL 2 gm GT BID 09/13/19





SUSPENSION]  


 


Ofloxacin 0.3% Ophth Soln [Ocuflox 2 drop AD Q4HWA #10 drops 09/18/19





-]  


 


levoFLOXacin [Levaquin] 750 mg GT DAILY #2 tab 09/18/19


 


Amoxicillin/Potassium Clav 1 each PO BID #10 tablet 10/18/19





[Augmentin 875-125 Tablet]  





 





PHYSICAL EXAMINATION


 Vital Signs - 24 hr











  11/22/19 11/22/19 11/22/19





  12:28 12:29 12:33


 


Temperature 96.3 F L 96.3 F L 


 


Pulse Rate 77 77 


 


Pulse Rate [ 84  





Right Radial]   


 


Respiratory 15 16 





Rate   


 


Blood Pressure 72/68 L 72/68 L 


 


Blood Pressure 133/96  





[Left Arm]   


 


O2 Sat by Pulse 100 99 99





Oximetry (%)   














  11/22/19





  16:43


 


Temperature 97.3 F L


 


Pulse Rate 


 


Pulse Rate [ 82





Right Radial] 


 


Respiratory 14





Rate 


 


Blood Pressure 


 


Blood Pressure 121/86





[Left Arm] 


 


O2 Sat by Pulse 100





Oximetry (%) 








GENERAL: lethargic, in no acute distress.


HEAD: hx of microcephaly, head w/o trauma


EYES: Pupils equal, round and reactive to light,  


EARS, NOSE, THROAT: Ears normal, nares patent,  .


NECK: Normal range of motion


LUNGS: Breath sounds equal/diminished clear to auscultation bilaterally. No 

wheezes


HEART: Regular rate and rhythm 


ABDOMEN: Soft, nontender, not distended, +peg tube.  suprapubic catheter 

draining clear yellow urine with foul odor.


MUSCULOSKELETAL: bed bound, countracted lower ext.


UPPER EXTREMITIES:  . No peripheral edema.


LOWER EXTREMITIES: contracted


NEUROLOGICAL:  nonverbal


PSYCHIATRIC:  lethargic


SKIN: Warm, dry, normal turgor, no rashes or lesions noted, normal capillary 

refill. 


 Laboratory Results - last 24 hr











  11/22/19 11/22/19 11/22/19





  12:00 12:00 12:30


 


WBC   


 


RBC   


 


Hgb   


 


Hct   


 


MCV   


 


MCH   


 


MCHC   


 


RDW   


 


Plt Count   


 


MPV   


 


Absolute Neuts (auto)   


 


Neutrophils %   


 


Lymphocytes %   


 


Monocytes %   


 


Eosinophils %   


 


Basophils %   


 


Nucleated RBC %   


 


PT with INR   


 


INR   


 


PTT (Actin FS)   


 


VBG pH   


 


POC VBG pCO2   


 


POC VBG pO2   


 


VBG HCO3   


 


VBG O2 Sat (Mando)   


 


VBG Base Excess   


 


Sodium  Cancelled   137


 


Potassium  Cancelled   4.1


 


Chloride  Cancelled   105


 


Carbon Dioxide  Cancelled   27


 


Anion Gap  Cancelled   5 L


 


BUN  Cancelled   17.8


 


Creatinine  Cancelled   0.6


 


Est GFR (CKD-EPI)AfAm  Cancelled   152.04


 


Est GFR (CKD-EPI)NonAf  Cancelled   131.18


 


Random Glucose  Cancelled   83


 


Lactic Acid   


 


Calcium  Cancelled   9.4


 


Phosphorus  Cancelled   3.8


 


Magnesium  Cancelled   2.1


 


Total Bilirubin  Cancelled   0.1 L


 


AST  Cancelled   24


 


ALT  Cancelled   38


 


Alkaline Phosphatase  Cancelled   123 H


 


Troponin I    < 0.02


 


Total Protein  Cancelled   8.4 H


 


Albumin  Cancelled   3.8


 


Urine Color   Yellow 


 


Urine Appearance   Clear 


 


Urine pH   7.0 


 


Ur Specific Gravity   1.016 


 


Urine Protein   Negative 


 


Urine Glucose (UA)   Negative 


 


Urine Ketones   Negative 


 


Urine Blood   Negative 


 


Urine Nitrite   Positive H 


 


Urine Bilirubin   Negative 


 


Urine Urobilinogen   0.2 


 


Ur Leukocyte Esterase   3+ H 


 


Urine WBC (Auto)   34 


 


Urine RBC (Auto)   4 


 


Urine Casts (Auto)   13 


 


U Epithel Cells (Auto)   0.5 


 


Urine Bacteria (Auto)   1828.5 














  11/22/19 11/22/19 11/22/19





  12:30 12:30 12:30


 


WBC  11.6 H  


 


RBC  3.89 L  


 


Hgb  12.5  


 


Hct  37.3  


 


MCV  95.9  


 


MCH  32.2  


 


MCHC  33.6  


 


RDW  13.1  


 


Plt Count  169  


 


MPV  7.2 L  


 


Absolute Neuts (auto)  9.5 H  


 


Neutrophils %  81.3  D  


 


Lymphocytes %  14.3  D  


 


Monocytes %  3.5 L  


 


Eosinophils %  0.7  


 


Basophils %  0.2  


 


Nucleated RBC %  0  


 


PT with INR    11.50


 


INR    0.97


 


PTT (Actin FS)    38.2 H


 


VBG pH   


 


POC VBG pCO2   


 


POC VBG pO2   


 


VBG HCO3   


 


VBG O2 Sat (Mando)   


 


VBG Base Excess   


 


Sodium   


 


Potassium   


 


Chloride   


 


Carbon Dioxide   


 


Anion Gap   


 


BUN   


 


Creatinine   


 


Est GFR (CKD-EPI)AfAm   


 


Est GFR (CKD-EPI)NonAf   


 


Random Glucose   


 


Lactic Acid   1.4 


 


Calcium   


 


Phosphorus   


 


Magnesium   


 


Total Bilirubin   


 


AST   


 


ALT   


 


Alkaline Phosphatase   


 


Troponin I   


 


Total Protein   


 


Albumin   


 


Urine Color   


 


Urine Appearance   


 


Urine pH   


 


Ur Specific Gravity   


 


Urine Protein   


 


Urine Glucose (UA)   


 


Urine Ketones   


 


Urine Blood   


 


Urine Nitrite   


 


Urine Bilirubin   


 


Urine Urobilinogen   


 


Ur Leukocyte Esterase   


 


Urine WBC (Auto)   


 


Urine RBC (Auto)   


 


Urine Casts (Auto)   


 


U Epithel Cells (Auto)   


 


Urine Bacteria (Auto)   














  11/22/19





  12:30


 


WBC 


 


RBC 


 


Hgb 


 


Hct 


 


MCV 


 


MCH 


 


MCHC 


 


RDW 


 


Plt Count 


 


MPV 


 


Absolute Neuts (auto) 


 


Neutrophils % 


 


Lymphocytes % 


 


Monocytes % 


 


Eosinophils % 


 


Basophils % 


 


Nucleated RBC % 


 


PT with INR 


 


INR 


 


PTT (Actin FS) 


 


VBG pH  7.27 L


 


POC VBG pCO2  56.9 H


 


POC VBG pO2  < 49 H


 


VBG HCO3  25.0


 


VBG O2 Sat (Mando)  29.9 L


 


VBG Base Excess  -3.7 L


 


Sodium 


 


Potassium 


 


Chloride 


 


Carbon Dioxide 


 


Anion Gap 


 


BUN 


 


Creatinine 


 


Est GFR (CKD-EPI)AfAm 


 


Est GFR (CKD-EPI)NonAf 


 


Random Glucose 


 


Lactic Acid 


 


Calcium 


 


Phosphorus 


 


Magnesium 


 


Total Bilirubin 


 


AST 


 


ALT 


 


Alkaline Phosphatase 


 


Troponin I 


 


Total Protein 


 


Albumin 


 


Urine Color 


 


Urine Appearance 


 


Urine pH 


 


Ur Specific Gravity 


 


Urine Protein 


 


Urine Glucose (UA) 


 


Urine Ketones 


 


Urine Blood 


 


Urine Nitrite 


 


Urine Bilirubin 


 


Urine Urobilinogen 


 


Ur Leukocyte Esterase 


 


Urine WBC (Auto) 


 


Urine RBC (Auto) 


 


Urine Casts (Auto) 


 


U Epithel Cells (Auto) 


 


Urine Bacteria (Auto) 











ASSESSMENT/PLAN:








Problem List





- Problem


(1) Severe sepsis


Assessment/Plan: 


Patient presents with hypothermia, hypotension and + UA


lactic acid within normal limits, chest xray with no acute chest pathology


blood and urine cultures pending


discussed with ID, and zosyn to be continued pending cultures


urine noted to be yellow in color with strong foul odor


Code(s): A41.9 - SEPSIS, UNSPECIFIED ORGANISM; R65.20 - SEVERE SEPSIS WITHOUT 

SEPTIC SHOCK   





(2) Hypothermia


Code(s): T68.XXXA - HYPOTHERMIA, INITIAL ENCOUNTER   





(3) UTI (urinary tract infection)


Assessment/Plan: 


on zosyn pending urine culture


Code(s): N39.0 - URINARY TRACT INFECTION, SITE NOT SPECIFIED   





(4) Seizure


Assessment/Plan: 


continue home seizure medications via g tube.  


Code(s): R56.9 - UNSPECIFIED CONVULSIONS   





(5) Prophylactic antibiotic


Assessment/Plan: 


on zosyn pending cultures. 


Code(s): Z79.2 - LONG TERM (CURRENT) USE OF ANTIBIOTICS   





(6) Prophylactic measure


Assessment/Plan: 


fen


ivf NS @ 75cc/hr


hold tube feeds until a.m. if no longer hypothermic/hypotensive


restart jevity feeds, dietary consulted





full code








Code(s): Z29.9 - ENCOUNTER FOR PROPHYLACTIC MEASURES, UNSPECIFIED   





Visit type





- Emergency Visit


Emergency Visit: Yes


ED Registration Date: 11/22/19


Care time: The patient presented to the Emergency Department on the above date 

and was hospitalized for further evaluation of their emergent condition.





- New Patient


This patient is new to me today: Yes


Date on this admission: 11/22/19





- Critical Care


Critical Care patient: No

## 2019-11-22 NOTE — PDOC
History of Present Illness





- General


Chief Complaint: Shortness of Breath


Stated Complaint: Shortness of Breath


Time Seen by Provider: 11/22/19 11:22





Past History





- Past Medical History


Allergies/Adverse Reactions: 


 Allergies











Allergy/AdvReac Type Severity Reaction Status Date / Time


 


No Known Allergies Allergy   Verified 10/14/19 10:19











Home Medications: 


Ambulatory Orders





Albuterol 0.083% Nebulizer Sol [Ventolin 0.083% Nebulizer Soln -] 1 neb NEB Q4H 

PRN 09/13/19 


Calcium Carbonate/Vitamin D3 [Oyster Shell 500-Vit D3 200 Tb] 1 each GT BID 09/ 13/19 


Clobazam [Onfi -] 10 mg GT BID 09/13/19 


Fructooligosaccharides/Polydex [Fiber-Stat 15 gm/30 ml Liquid] 30 ml GT DAILY 09 /13/19 


Lacosamide [Vimpat] 200 mg GT BID 09/13/19 


Levocarnitine 7 ml GT TID 09/13/19 


Multivitamin [Multiple Vitamins] 30 ml GT DAILY 09/13/19 


Pyridoxine HCl [Vitamin B-6] 100 mg GT ASDIR 09/13/19 


Sennosides/Docusate Sodium [Senna-S Tablet] 2 tab GT HS 09/13/19 


Sodium Chloride Tablet - 3.5 gm GT BID 09/13/19 


Zonisamide 300 mg GT BID 09/13/19 


levETIRAcetam [levETIRAcetam ORAL SUSPENSION] 2 gm GT BID 09/13/19 


Ofloxacin 0.3% Ophth Soln [Ocuflox -] 2 drop AD Q4HWA #10 drops 09/18/19 


levoFLOXacin [Levaquin] 750 mg GT DAILY #2 tab 09/18/19 


Amoxicillin/Potassium Clav [Augmentin 875-125 Tablet] 1 each PO BID #10 tablet 

10/18/19 








Anemia: No


Asthma: No


Cancer: No


Cardiac Disorders: No


CVA: No


COPD: No


CHF: No


DVT: No


Dementia: No


Diabetes: No


GI Disorders: Yes (gerd)


 Disorders: Yes (SUPRAPUBIC CATHETER)


HTN: No


Hypercholesterolemia: No


Liver Disease: No


Seizures: Yes


Thyroid Disease: No





- Surgical History


Abdominal Surgery: Yes (G-tube)


Appendectomy: No


Cardiac Surgery: No


Cholecystectomy: No


GI Surgery: Yes (suprapubic tube)


Lung Surgery: No


Neurologic Surgery: Yes


Orthopedic Surgery: Yes (Spinal fusion)





- Immunization History


Immunization Up to Date: Yes





- Psycho Social/Smoking Cessation Hx


Smoking History: Never smoked


Have you smoked in the past 12 months: No


Hx Alcohol Use: No


Drug/Substance Use Hx: No


Substance Use Type: None


Hx Substance Use Treatment: No





ED Treatment Course





- LABORATORY


CBC & Chemistry Diagram: 


 11/23/19 08:30





 11/23/19 08:30





Medical Decision Making





- Medical Decision Making





11/22/19 11:25


HPI:


34yo M hx cerebral palsy, profound intellectual disability, epilepsy, 

microcephaly, recurrent UTIs and PNA, and GERD BIBA from [place] for episode 

of difficulty breathing and drop in O2 sat to 88 this AM. Per NH aide, pt in 

[facility] prior to this episode. Pt gradually began having difficulty breathing, 

retractions, and drop in O2 sat. Placed on 4L NC and O2 sat improved. Normally 

on RA in day and on unknown NC at night. Aide denies knowledge of fever, cough, 

recent illness, leg swelling, change in behaviour or mental status from 

baseline.





ROS:


Unable to obtain 2/2 medical condition.





PE:


Gen: Well nourished, NAD, well dressed


HEENT: PERRL, EOMI, MMM, microcephalic, AT. No conjunctival pallor. Sclera are 

non-icteric. Oropharynx is clear.


CV: Regular rate and rhythm. No murmurs, rubs, or gallops.


PULM: No resp distress. CTAB, no wheezes, rales, or rhonchi.


ABD: soft, NT/ND, no rebound tenderness or guarding, no CVA tenderness. PEG 

tube in place, no signs of infection or malfunction. Suprapubic catheter in 

place, no signs of malfunction or infection.


BACK: No TTP of c/t/l-spine. No step-offs or deformities. No sacral ulcers.


MSK: No bony deformities. 2+ pulses in all extremities.


NEURO: Eyes open. PERRL. Limited exam. Baseline per health aide


EXTREMITIES: No cyanosis. No clubbing. No edema. No calf tenderness.


PSYCH: Limited exam


SKIN: Warm and dry. Normal capillary refill. No rashes. No jaundice.








MDM:


34yo M hx cerebral palsy, profound intellectual disability, epilepsy, 

microcephaly, recurrent UTIs and PNA, and GERD BIBA from [place] for episode 

of difficulty breathing and drop in O2 sat to 88 this AM. Soft BP 99/60 here 

dropped to 70s/30s while trying to get IV, hypothermic 96.3F, otherwise VSS, O2 

sat 100% on RA, lungs CTAB, no respiratory distress, pt at baseline per aide.





Desat episode concerning for PNA, effusion, UTI, metabolic derangement, anemia, 

cardiac pathology (ACS/MI or arrythmia).





-EKG


-CBC,CMP,Trop,Coags,Lact,VBG,BCx,UA/UC


-Rectal temp


-CXR


-IVF


-Dispo: pending w/u





11/22/19 13:43


EKG reviewed: NSR, 88bpm, normal intervals, normal axis, no TWIs or ST 

elevations or ST depressions


Labs reviewed. WBC 11.6, UA positive for UTI.


BP dropped 70s/30s.


Vanc/Zosyn started for UTI with concern for sepsis.





11/22/19 15:10


CXR reviewed - no e/o acute pathology.


Labs reviewed. Lact 1.4.


BP improved 130s/90s s/p 2.5L.


Will admit for UTI and AMS.





11/22/19 15:35


Spoke with hospitalist - admitted under Dr. Alvarado.








Discharge





- Discharge Information


Problems reviewed: Yes


Clinical Impression/Diagnosis: 


 UTI (urinary tract infection), Hypothermia





Condition: Improved





- Admission


Yes





- Follow up/Referral





- Patient Discharge Instructions





- Post Discharge Activity

## 2019-11-22 NOTE — PDOC
Attending Attestation





- Resident


Resident Name: Dania Kemp





- ED Attending Attestation


I have performed the following: I have examined & evaluated the patient, The 

case was reviewed & discussed with the resident, I agree w/resident's findings 

& plan, Exceptions are as noted





- HPI


HPI: 





33 years old from [place] with severe MR cerebral palsy presents to the ED 

with episode of hypoxia at group home in the emergency department found to be 

hypothermic





History limited by MRCP





- Physicial Exam


PE: 





11/22/19 16:41





Vitals: Triage Vital signs reviewed


General Appearance: No acute distress, well nourished well developed, 


Head: Atraumatic, 


Cardiac: Regular rate and rhythym, no murmurs, no rubs, no gallops, 


Lungs: Clear to auscultation bilateral, good air movement bilaterally,


Abdomen: Soft, non distended, normal bowel sounds, non tender to palpation


Extremities: Full range of motion to all extremities, no cyanosis, clubbing, or 

edema


Skin: Warm and dry, no rashes or lesions, no rash, no petechiae


Neuro: strength intact to all extremities, sensation intact to all extremities,


Psych: Normal mood, normal affect





- Medical Decision Making





11/22/19 16:42


33 years old with history of MR, CP presented with hypoxia resolved upon 

arrival to the emergency department however noted to be hypothermic and mildly 

hypotensive





30 cc per kg bolus ordered





Patient's blood pressure improved





Laboratory analysis notable for UTI





Will admit to hospital for urosepsis for further management.

## 2019-11-23 LAB
ANION GAP SERPL CALC-SCNC: 5 MMOL/L (ref 8–16)
BUN SERPL-MCNC: 11.5 MG/DL (ref 7–18)
CALCIUM SERPL-MCNC: 8.5 MG/DL (ref 8.5–10.1)
CHLORIDE SERPL-SCNC: 112 MMOL/L (ref 98–107)
CO2 SERPL-SCNC: 23 MMOL/L (ref 21–32)
CREAT SERPL-MCNC: 0.4 MG/DL (ref 0.55–1.3)
DEPRECATED RDW RBC AUTO: 12.9 % (ref 11.9–15.9)
GLUCOSE SERPL-MCNC: 113 MG/DL (ref 74–106)
HCT VFR BLD CALC: 30.1 % (ref 35.4–49)
HGB BLD-MCNC: 10.6 GM/DL (ref 11.7–16.9)
MAGNESIUM SERPL-MCNC: 2.1 MG/DL (ref 1.8–2.4)
MCH RBC QN AUTO: 33.3 PG (ref 25.7–33.7)
MCHC RBC AUTO-ENTMCNC: 35.1 G/DL (ref 32–35.9)
MCV RBC: 94.8 FL (ref 80–96)
PLATELET # BLD AUTO: 141 K/MM3 (ref 134–434)
PMV BLD: 7.3 FL (ref 7.5–11.1)
POTASSIUM SERPLBLD-SCNC: 3.8 MMOL/L (ref 3.5–5.1)
RBC # BLD AUTO: 3.17 M/MM3 (ref 4–5.6)
SODIUM SERPL-SCNC: 140 MMOL/L (ref 136–145)
WBC # BLD AUTO: 8.6 K/MM3 (ref 4–10)

## 2019-11-23 RX ADMIN — PIPERACILLIN SODIUM,TAZOBACTAM SODIUM SCH MLS/HR: 3; .375 INJECTION, POWDER, FOR SOLUTION INTRAVENOUS at 09:43

## 2019-11-23 RX ADMIN — HEPARIN SODIUM SCH UNIT: 5000 INJECTION, SOLUTION INTRAVENOUS; SUBCUTANEOUS at 22:11

## 2019-11-23 RX ADMIN — LEVOCARNITINE SCH MG: 1 SOLUTION ORAL at 05:48

## 2019-11-23 RX ADMIN — LEVOCARNITINE SCH MG: 1 SOLUTION ORAL at 22:14

## 2019-11-23 RX ADMIN — LEVETIRACETAM SCH MG: 100 SOLUTION ORAL at 09:49

## 2019-11-23 RX ADMIN — ZONISAMIDE SCH MG: 100 CAPSULE ORAL at 22:16

## 2019-11-23 RX ADMIN — HEPARIN SODIUM SCH UNIT: 5000 INJECTION, SOLUTION INTRAVENOUS; SUBCUTANEOUS at 09:49

## 2019-11-23 RX ADMIN — LEVETIRACETAM SCH MG: 100 SOLUTION ORAL at 22:11

## 2019-11-23 RX ADMIN — ZONISAMIDE SCH MG: 100 CAPSULE ORAL at 09:52

## 2019-11-23 RX ADMIN — SODIUM CHLORIDE SCH: 9 INJECTION, SOLUTION INTRAVENOUS at 17:33

## 2019-11-23 RX ADMIN — PIPERACILLIN SODIUM,TAZOBACTAM SODIUM SCH MLS/HR: 3; .375 INJECTION, POWDER, FOR SOLUTION INTRAVENOUS at 01:15

## 2019-11-23 RX ADMIN — Medication SCH MG: at 09:51

## 2019-11-23 RX ADMIN — LACOSAMIDE SCH MG: 10 SOLUTION ORAL at 09:50

## 2019-11-23 RX ADMIN — Medication SCH TAB: at 22:15

## 2019-11-23 RX ADMIN — LEVOCARNITINE SCH MG: 1 SOLUTION ORAL at 14:27

## 2019-11-23 RX ADMIN — LACOSAMIDE SCH MG: 10 SOLUTION ORAL at 22:21

## 2019-11-23 RX ADMIN — SODIUM CHLORIDE SCH MLS/HR: 9 INJECTION, SOLUTION INTRAVENOUS at 14:26

## 2019-11-23 RX ADMIN — PIPERACILLIN SODIUM,TAZOBACTAM SODIUM SCH MLS/HR: 3; .375 INJECTION, POWDER, FOR SOLUTION INTRAVENOUS at 17:33

## 2019-11-23 RX ADMIN — Medication SCH TAB: at 09:50

## 2019-11-23 NOTE — CON.ID
Consult





- History of Present Illness


History of Present Illness: 





33 y.o. male with PMH of Cerebral Palsy with mental retardation, urethral 

stricture s/p SPC, seizure d.o., with previous UTIs and PNA sent from group 

home for shortness of breath with low oxygen saturation (88% on RA) and less 

responsiveness. In the ER noted to be hypotensive (BP 70/40, improved with 

fluid resuscitation), hypothermia (96.4F), mild leukocytosis (11.6K). CXR 

without infiltrates but U/A indicative of UTI for which he was started on broad 

spectrum antibiotics. Pt is currently without distress, normotensive, and 

temperatures have normalized. 





- History Source


History Provided By: Medical Record


Limitations to Obtaining History: No Limitations





- Past Medical History


CNS: Yes: Seizure, Other (Cerebral Palsy, mental retardation)


Gastrointestinal: Yes: GERD


Renal/: Yes: Other (Urethral stricture)





- Alcohol/Substance Use


Hx Alcohol Use: No





- Smoking History


Smoking history: Never smoked


Have you smoked in the past 12 months: No





Home Medications





- Allergies


Allergies/Adverse Reactions: 


 Allergies











Allergy/AdvReac Type Severity Reaction Status Date / Time


 


No Known Allergies Allergy   Verified 10/14/19 10:19














- Home Medications


Home Medications: 


Ambulatory Orders





Albuterol 0.083% Nebulizer Sol [Ventolin 0.083% Nebulizer Soln -] 1 neb NEB Q4H 

PRN 09/13/19 


Calcium Carbonate/Vitamin D3 [Oyster Shell 500-Vit D3 200 Tb] 1 each GT BID 09/ 13/19 


Clobazam [Onfi -] 10 mg GT BID 09/13/19 


Fructooligosaccharides/Polydex [Fiber-Stat 15 gm/30 ml Liquid] 30 ml GT DAILY 09 /13/19 


Lacosamide [Vimpat] 200 mg GT BID 09/13/19 


Levocarnitine 7 ml GT TID 09/13/19 


Multivitamin [Multiple Vitamins] 30 ml GT DAILY 09/13/19 


Pyridoxine HCl [Vitamin B-6] 100 mg GT ASDIR 09/13/19 


Sennosides/Docusate Sodium [Senna-S Tablet] 2 tab GT HS 09/13/19 


Sodium Chloride Tablet - 3.5 gm GT BID 09/13/19 


Zonisamide 300 mg GT BID 09/13/19 


levETIRAcetam [levETIRAcetam ORAL SUSPENSION] 2 gm GT BID 09/13/19 


Ofloxacin 0.3% Ophth Soln [Ocuflox -] 2 drop AD Q4HWA #10 drops 09/18/19 


levoFLOXacin [Levaquin] 750 mg GT DAILY #2 tab 09/18/19 


Amoxicillin/Potassium Clav [Augmentin 875-125 Tablet] 1 each PO BID #10 tablet 

10/18/19 











Review of Systems


Unable to obtain ROS, reason: severe mental retardation





Physical Exam


Vital Signs: 


 Vital Signs











Temperature  98.4 F   11/23/19 06:00


 


Pulse Rate  63   11/23/19 06:00


 


Respiratory Rate  16   11/23/19 06:00


 


Blood Pressure  135/50 L  11/23/19 06:00


 


O2 Sat by Pulse Oximetry (%)  98   11/23/19 02:47











Constitutional: Yes: No Distress


Eyes: Yes: Conjunctiva Clear


HENT: Yes: Atraumatic


Neck: Yes: Supple


Cardiovascular: Yes: Regular Rate and Rhythm


Respiratory: Yes: CTA Bilaterally


Gastrointestinal: Yes: Normal Bowel Sounds, Soft


Renal/: Yes: Other (Suprapubic catheter)


Extremities: Yes: WNL


Integumentary: Yes: WNL


Neurological: Yes: Other (arousable)


Labs: 


 CBC, BMP





 11/23/19 08:30 





 11/23/19 08:30 





 Laboratory Tests











  11/22/19 11/22/19 11/22/19





  12:00 12:00 12:30


 


WBC   


 


RBC   


 


Hgb   


 


Hct   


 


MCV   


 


MCH   


 


MCHC   


 


RDW   


 


Plt Count   


 


MPV   


 


Absolute Neuts (auto)   


 


Neutrophils %   


 


Lymphocytes %   


 


Monocytes %   


 


Eosinophils %   


 


Basophils %   


 


Nucleated RBC %   


 


PT with INR   


 


INR   


 


PTT (Actin FS)   


 


VBG pH   


 


POC VBG pCO2   


 


POC VBG pO2   


 


VBG HCO3   


 


VBG O2 Sat (Mando)   


 


VBG Base Excess   


 


Sodium  Cancelled   137


 


Potassium  Cancelled   4.1


 


Chloride  Cancelled   105


 


Carbon Dioxide  Cancelled   27


 


Anion Gap  Cancelled   5 L


 


BUN  Cancelled   17.8


 


Creatinine  Cancelled   0.6


 


Est GFR (CKD-EPI)AfAm  Cancelled   152.04


 


Est GFR (CKD-EPI)NonAf  Cancelled   131.18


 


POC Glucometer   


 


Random Glucose  Cancelled   83


 


Lactic Acid   


 


Calcium  Cancelled   9.4


 


Phosphorus  Cancelled   3.8


 


Magnesium  Cancelled   2.1


 


Total Bilirubin  Cancelled   0.1 L


 


AST  Cancelled   24


 


ALT  Cancelled   38


 


Alkaline Phosphatase  Cancelled   123 H


 


Troponin I    < 0.02


 


Total Protein  Cancelled   8.4 H


 


Albumin  Cancelled   3.8


 


Urine Color   Yellow 


 


Urine Appearance   Clear 


 


Urine pH   7.0 


 


Ur Specific Gravity   1.016 


 


Urine Protein   Negative 


 


Urine Glucose (UA)   Negative 


 


Urine Ketones   Negative 


 


Urine Blood   Negative 


 


Urine Nitrite   Positive H 


 


Urine Bilirubin   Negative 


 


Urine Urobilinogen   0.2 


 


Ur Leukocyte Esterase   3+ H 


 


Urine WBC (Auto)   34 


 


Urine RBC (Auto)   4 


 


Urine Casts (Auto)   13 


 


U Epithel Cells (Auto)   0.5 


 


Urine Bacteria (Auto)   1828.5 














  11/22/19 11/22/19 11/22/19





  12:30 12:30 12:30


 


WBC  11.6 H  


 


RBC  3.89 L  


 


Hgb  12.5  


 


Hct  37.3  


 


MCV  95.9  


 


MCH  32.2  


 


MCHC  33.6  


 


RDW  13.1  


 


Plt Count  169  


 


MPV  7.2 L  


 


Absolute Neuts (auto)  9.5 H  


 


Neutrophils %  81.3  D  


 


Lymphocytes %  14.3  D  


 


Monocytes %  3.5 L  


 


Eosinophils %  0.7  


 


Basophils %  0.2  


 


Nucleated RBC %  0  


 


PT with INR    11.50


 


INR    0.97


 


PTT (Actin FS)    38.2 H


 


VBG pH   


 


POC VBG pCO2   


 


POC VBG pO2   


 


VBG HCO3   


 


VBG O2 Sat (Mando)   


 


VBG Base Excess   


 


Sodium   


 


Potassium   


 


Chloride   


 


Carbon Dioxide   


 


Anion Gap   


 


BUN   


 


Creatinine   


 


Est GFR (CKD-EPI)AfAm   


 


Est GFR (CKD-EPI)NonAf   


 


POC Glucometer   


 


Random Glucose   


 


Lactic Acid   1.4 


 


Calcium   


 


Phosphorus   


 


Magnesium   


 


Total Bilirubin   


 


AST   


 


ALT   


 


Alkaline Phosphatase   


 


Troponin I   


 


Total Protein   


 


Albumin   


 


Urine Color   


 


Urine Appearance   


 


Urine pH   


 


Ur Specific Gravity   


 


Urine Protein   


 


Urine Glucose (UA)   


 


Urine Ketones   


 


Urine Blood   


 


Urine Nitrite   


 


Urine Bilirubin   


 


Urine Urobilinogen   


 


Ur Leukocyte Esterase   


 


Urine WBC (Auto)   


 


Urine RBC (Auto)   


 


Urine Casts (Auto)   


 


U Epithel Cells (Auto)   


 


Urine Bacteria (Auto)   














  11/22/19 11/22/19 11/23/19





  12:30 23:51 03:16


 


WBC   


 


RBC   


 


Hgb   


 


Hct   


 


MCV   


 


MCH   


 


MCHC   


 


RDW   


 


Plt Count   


 


MPV   


 


Absolute Neuts (auto)   


 


Neutrophils %   


 


Lymphocytes %   


 


Monocytes %   


 


Eosinophils %   


 


Basophils %   


 


Nucleated RBC %   


 


PT with INR   


 


INR   


 


PTT (Actin FS)   


 


VBG pH  7.27 L  


 


POC VBG pCO2  56.9 H  


 


POC VBG pO2  < 49 H  


 


VBG HCO3  25.0  


 


VBG O2 Sat (Mando)  29.9 L  


 


VBG Base Excess  -3.7 L  


 


Sodium   


 


Potassium   


 


Chloride   


 


Carbon Dioxide   


 


Anion Gap   


 


BUN   


 


Creatinine   


 


Est GFR (CKD-EPI)AfAm   


 


Est GFR (CKD-EPI)NonAf   


 


POC Glucometer   124  75


 


Random Glucose   


 


Lactic Acid   


 


Calcium   


 


Phosphorus   


 


Magnesium   


 


Total Bilirubin   


 


AST   


 


ALT   


 


Alkaline Phosphatase   


 


Troponin I   


 


Total Protein   


 


Albumin   


 


Urine Color   


 


Urine Appearance   


 


Urine pH   


 


Ur Specific Gravity   


 


Urine Protein   


 


Urine Glucose (UA)   


 


Urine Ketones   


 


Urine Blood   


 


Urine Nitrite   


 


Urine Bilirubin   


 


Urine Urobilinogen   


 


Ur Leukocyte Esterase   


 


Urine WBC (Auto)   


 


Urine RBC (Auto)   


 


Urine Casts (Auto)   


 


U Epithel Cells (Auto)   


 


Urine Bacteria (Auto)   














  11/23/19 11/23/19 11/23/19





  05:12 06:13 08:30


 


WBC    8.6


 


RBC    3.17 L


 


Hgb    10.6 L


 


Hct    30.1 L D


 


MCV    94.8


 


MCH    33.3


 


MCHC    35.1


 


RDW    12.9


 


Plt Count    141


 


MPV    7.3 L


 


Absolute Neuts (auto)   


 


Neutrophils %   


 


Lymphocytes %   


 


Monocytes %   


 


Eosinophils %   


 


Basophils %   


 


Nucleated RBC %   


 


PT with INR   


 


INR   


 


PTT (Actin FS)   


 


VBG pH   


 


POC VBG pCO2   


 


POC VBG pO2   


 


VBG HCO3   


 


VBG O2 Sat (Mando)   


 


VBG Base Excess   


 


Sodium   


 


Potassium   


 


Chloride   


 


Carbon Dioxide   


 


Anion Gap   


 


BUN   


 


Creatinine   


 


Est GFR (CKD-EPI)AfAm   


 


Est GFR (CKD-EPI)NonAf   


 


POC Glucometer  69  209 


 


Random Glucose   


 


Lactic Acid   


 


Calcium   


 


Phosphorus   


 


Magnesium   


 


Total Bilirubin   


 


AST   


 


ALT   


 


Alkaline Phosphatase   


 


Troponin I   


 


Total Protein   


 


Albumin   


 


Urine Color   


 


Urine Appearance   


 


Urine pH   


 


Ur Specific Gravity   


 


Urine Protein   


 


Urine Glucose (UA)   


 


Urine Ketones   


 


Urine Blood   


 


Urine Nitrite   


 


Urine Bilirubin   


 


Urine Urobilinogen   


 


Ur Leukocyte Esterase   


 


Urine WBC (Auto)   


 


Urine RBC (Auto)   


 


Urine Casts (Auto)   


 


U Epithel Cells (Auto)   


 


Urine Bacteria (Auto)   














  11/23/19





  08:30


 


WBC 


 


RBC 


 


Hgb 


 


Hct 


 


MCV 


 


MCH 


 


MCHC 


 


RDW 


 


Plt Count 


 


MPV 


 


Absolute Neuts (auto) 


 


Neutrophils % 


 


Lymphocytes % 


 


Monocytes % 


 


Eosinophils % 


 


Basophils % 


 


Nucleated RBC % 


 


PT with INR 


 


INR 


 


PTT (Actin FS) 


 


VBG pH 


 


POC VBG pCO2 


 


POC VBG pO2 


 


VBG HCO3 


 


VBG O2 Sat (Mando) 


 


VBG Base Excess 


 


Sodium  140


 


Potassium  3.8


 


Chloride  112 H


 


Carbon Dioxide  23


 


Anion Gap  5 L


 


BUN  11.5


 


Creatinine  0.4 L


 


Est GFR (CKD-EPI)AfAm  179.61


 


Est GFR (CKD-EPI)NonAf  154.97


 


POC Glucometer 


 


Random Glucose  113 H


 


Lactic Acid 


 


Calcium  8.5


 


Phosphorus 


 


Magnesium  2.1


 


Total Bilirubin 


 


AST 


 


ALT 


 


Alkaline Phosphatase 


 


Troponin I 


 


Total Protein 


 


Albumin 


 


Urine Color 


 


Urine Appearance 


 


Urine pH 


 


Ur Specific Gravity 


 


Urine Protein 


 


Urine Glucose (UA) 


 


Urine Ketones 


 


Urine Blood 


 


Urine Nitrite 


 


Urine Bilirubin 


 


Urine Urobilinogen 


 


Ur Leukocyte Esterase 


 


Urine WBC (Auto) 


 


Urine RBC (Auto) 


 


Urine Casts (Auto) 


 


U Epithel Cells (Auto) 


 


Urine Bacteria (Auto) 














Imaging





- Results


Chest X-ray: Report Reviewed





Problem List





- Problems


(1) Hypothermia


Code(s): T68.XXXA - HYPOTHERMIA, INITIAL ENCOUNTER   





(2) Severe sepsis


Code(s): A41.9 - SEPSIS, UNSPECIFIED ORGANISM; R65.20 - SEVERE SEPSIS WITHOUT 

SEPTIC SHOCK   





(3) UTI (urinary tract infection)


Code(s): N39.0 - URINARY TRACT INFECTION, SITE NOT SPECIFIED   





(4) Seizure


Code(s): R56.9 - UNSPECIFIED CONVULSIONS   





Assessment/Plan





33 y.o. male with PMH of Cerebral Palsy with mental retardation, urethral 

stricture s/p SPC, seizure d.o., GERD, with previous UTIs and PNA sent from 

group home for shortness of breath with low oxygen saturation (88% on RA) and 

less responsiveness





Severe Sepsis


UTI 


Urethral strictue with SPC


Cerebral Palsy/MR


Seizure d.o.





-- continue Zosyn empirically


-- Urine culture isolates noted: GNR, strep/enterococcus


-- Follow up blood culture results


-- continue monitor vitals closely





Will follow


Thank you

## 2019-11-23 NOTE — PN
Physical Exam: 


SUBJECTIVE: Patient seen and examined. He is non-communicative. He appears 

comfortable.








OBJECTIVE:





 Vital Signs











 Period  Temp  Pulse  Resp  BP Sys/Curran  Pulse Ox


 


 Last 24 Hr  7.3 F-98.4 F  63-84  14-20  /50-96  











GENERAL: The patient is awake, in no acute distress.


LUNGS: Breath sounds equal, clear to auscultation bilaterally, no wheezes, no 

crackles, no accessory muscle use. 


HEART: Regular rate and rhythm, S1, S2 without murmur, rub or gallop.


ABDOMEN: Soft, nontender, nondistended, normoactive bowel sounds, no guarding.


EXTREMITIES: 2+ pulses, warm, well-perfused, no edema, contracted. 














 Laboratory Results - last 24 hr











  11/22/19 11/22/19 11/22/19





  12:00 12:00 12:30


 


WBC   


 


RBC   


 


Hgb   


 


Hct   


 


MCV   


 


MCH   


 


MCHC   


 


RDW   


 


Plt Count   


 


MPV   


 


Absolute Neuts (auto)   


 


Neutrophils %   


 


Lymphocytes %   


 


Monocytes %   


 


Eosinophils %   


 


Basophils %   


 


Nucleated RBC %   


 


PT with INR   


 


INR   


 


PTT (Actin FS)   


 


VBG pH   


 


POC VBG pCO2   


 


POC VBG pO2   


 


VBG HCO3   


 


VBG O2 Sat (Mando)   


 


VBG Base Excess   


 


Sodium  Cancelled   137


 


Potassium  Cancelled   4.1


 


Chloride  Cancelled   105


 


Carbon Dioxide  Cancelled   27


 


Anion Gap  Cancelled   5 L


 


BUN  Cancelled   17.8


 


Creatinine  Cancelled   0.6


 


Est GFR (CKD-EPI)AfAm  Cancelled   152.04


 


Est GFR (CKD-EPI)NonAf  Cancelled   131.18


 


POC Glucometer   


 


Random Glucose  Cancelled   83


 


Lactic Acid   


 


Calcium  Cancelled   9.4


 


Phosphorus  Cancelled   3.8


 


Magnesium  Cancelled   2.1


 


Total Bilirubin  Cancelled   0.1 L


 


AST  Cancelled   24


 


ALT  Cancelled   38


 


Alkaline Phosphatase  Cancelled   123 H


 


Troponin I    < 0.02


 


Total Protein  Cancelled   8.4 H


 


Albumin  Cancelled   3.8


 


Urine Color   Yellow 


 


Urine Appearance   Clear 


 


Urine pH   7.0 


 


Ur Specific Gravity   1.016 


 


Urine Protein   Negative 


 


Urine Glucose (UA)   Negative 


 


Urine Ketones   Negative 


 


Urine Blood   Negative 


 


Urine Nitrite   Positive H 


 


Urine Bilirubin   Negative 


 


Urine Urobilinogen   0.2 


 


Ur Leukocyte Esterase   3+ H 


 


Urine WBC (Auto)   34 


 


Urine RBC (Auto)   4 


 


Urine Casts (Auto)   13 


 


U Epithel Cells (Auto)   0.5 


 


Urine Bacteria (Auto)   1828.5 














  11/22/19 11/22/19 11/22/19





  12:30 12:30 12:30


 


WBC  11.6 H  


 


RBC  3.89 L  


 


Hgb  12.5  


 


Hct  37.3  


 


MCV  95.9  


 


MCH  32.2  


 


MCHC  33.6  


 


RDW  13.1  


 


Plt Count  169  


 


MPV  7.2 L  


 


Absolute Neuts (auto)  9.5 H  


 


Neutrophils %  81.3  D  


 


Lymphocytes %  14.3  D  


 


Monocytes %  3.5 L  


 


Eosinophils %  0.7  


 


Basophils %  0.2  


 


Nucleated RBC %  0  


 


PT with INR    11.50


 


INR    0.97


 


PTT (Actin FS)    38.2 H


 


VBG pH   


 


POC VBG pCO2   


 


POC VBG pO2   


 


VBG HCO3   


 


VBG O2 Sat (Mando)   


 


VBG Base Excess   


 


Sodium   


 


Potassium   


 


Chloride   


 


Carbon Dioxide   


 


Anion Gap   


 


BUN   


 


Creatinine   


 


Est GFR (CKD-EPI)AfAm   


 


Est GFR (CKD-EPI)NonAf   


 


POC Glucometer   


 


Random Glucose   


 


Lactic Acid   1.4 


 


Calcium   


 


Phosphorus   


 


Magnesium   


 


Total Bilirubin   


 


AST   


 


ALT   


 


Alkaline Phosphatase   


 


Troponin I   


 


Total Protein   


 


Albumin   


 


Urine Color   


 


Urine Appearance   


 


Urine pH   


 


Ur Specific Gravity   


 


Urine Protein   


 


Urine Glucose (UA)   


 


Urine Ketones   


 


Urine Blood   


 


Urine Nitrite   


 


Urine Bilirubin   


 


Urine Urobilinogen   


 


Ur Leukocyte Esterase   


 


Urine WBC (Auto)   


 


Urine RBC (Auto)   


 


Urine Casts (Auto)   


 


U Epithel Cells (Auto)   


 


Urine Bacteria (Auto)   














  11/22/19 11/22/19 11/23/19





  12:30 23:51 03:16


 


WBC   


 


RBC   


 


Hgb   


 


Hct   


 


MCV   


 


MCH   


 


MCHC   


 


RDW   


 


Plt Count   


 


MPV   


 


Absolute Neuts (auto)   


 


Neutrophils %   


 


Lymphocytes %   


 


Monocytes %   


 


Eosinophils %   


 


Basophils %   


 


Nucleated RBC %   


 


PT with INR   


 


INR   


 


PTT (Actin FS)   


 


VBG pH  7.27 L  


 


POC VBG pCO2  56.9 H  


 


POC VBG pO2  < 49 H  


 


VBG HCO3  25.0  


 


VBG O2 Sat (Mando)  29.9 L  


 


VBG Base Excess  -3.7 L  


 


Sodium   


 


Potassium   


 


Chloride   


 


Carbon Dioxide   


 


Anion Gap   


 


BUN   


 


Creatinine   


 


Est GFR (CKD-EPI)AfAm   


 


Est GFR (CKD-EPI)NonAf   


 


POC Glucometer   124  75


 


Random Glucose   


 


Lactic Acid   


 


Calcium   


 


Phosphorus   


 


Magnesium   


 


Total Bilirubin   


 


AST   


 


ALT   


 


Alkaline Phosphatase   


 


Troponin I   


 


Total Protein   


 


Albumin   


 


Urine Color   


 


Urine Appearance   


 


Urine pH   


 


Ur Specific Gravity   


 


Urine Protein   


 


Urine Glucose (UA)   


 


Urine Ketones   


 


Urine Blood   


 


Urine Nitrite   


 


Urine Bilirubin   


 


Urine Urobilinogen   


 


Ur Leukocyte Esterase   


 


Urine WBC (Auto)   


 


Urine RBC (Auto)   


 


Urine Casts (Auto)   


 


U Epithel Cells (Auto)   


 


Urine Bacteria (Auto)   














  11/23/19 11/23/19 11/23/19





  05:12 06:13 08:30


 


WBC    8.6


 


RBC    3.17 L


 


Hgb    10.6 L


 


Hct    30.1 L D


 


MCV    94.8


 


MCH    33.3


 


MCHC    35.1


 


RDW    12.9


 


Plt Count    141


 


MPV    7.3 L


 


Absolute Neuts (auto)   


 


Neutrophils %   


 


Lymphocytes %   


 


Monocytes %   


 


Eosinophils %   


 


Basophils %   


 


Nucleated RBC %   


 


PT with INR   


 


INR   


 


PTT (Actin FS)   


 


VBG pH   


 


POC VBG pCO2   


 


POC VBG pO2   


 


VBG HCO3   


 


VBG O2 Sat (Mando)   


 


VBG Base Excess   


 


Sodium   


 


Potassium   


 


Chloride   


 


Carbon Dioxide   


 


Anion Gap   


 


BUN   


 


Creatinine   


 


Est GFR (CKD-EPI)AfAm   


 


Est GFR (CKD-EPI)NonAf   


 


POC Glucometer  69  209 


 


Random Glucose   


 


Lactic Acid   


 


Calcium   


 


Phosphorus   


 


Magnesium   


 


Total Bilirubin   


 


AST   


 


ALT   


 


Alkaline Phosphatase   


 


Troponin I   


 


Total Protein   


 


Albumin   


 


Urine Color   


 


Urine Appearance   


 


Urine pH   


 


Ur Specific Gravity   


 


Urine Protein   


 


Urine Glucose (UA)   


 


Urine Ketones   


 


Urine Blood   


 


Urine Nitrite   


 


Urine Bilirubin   


 


Urine Urobilinogen   


 


Ur Leukocyte Esterase   


 


Urine WBC (Auto)   


 


Urine RBC (Auto)   


 


Urine Casts (Auto)   


 


U Epithel Cells (Auto)   


 


Urine Bacteria (Auto)   








Active Medications











Generic Name Dose Route Start Last Admin





  Trade Name Freq  PRN Reason Stop Dose Admin


 


Calcium Carbonate/Cholecalciferol  1 tab  11/22/19 22:00  11/23/19 09:50





  Os-Dale 500+D -  GT   1 tab





  BID RAKAN   Administration





     





     





     





     


 


Clobazam  10 mg  11/22/19 22:00  11/23/19 09:49





  Onfi -  GT   10 mg





  BID RAKAN   Administration





     





     





     





     


 


Heparin Sodium (Porcine)  5,000 unit  11/22/19 22:00  11/23/19 09:49





  Heparin -  SQ   5,000 unit





  BID RAKAN   Administration





     





     





     





     


 


Sodium Chloride  1,000 mls @ 75 mls/hr  11/22/19 18:15  11/22/19 21:26





  Normal Saline -  IV   75 mls/hr





  ASDIR RAKAN   Administration





     





     





     





     


 


Piperacillin Sod/Tazobactam  50 mls @ 100 mls/hr  11/23/19 02:00  11/23/19 09:43





  Sod 3.375 gm/ Dextrose  IVPB   100 mls/hr





  Q8H-IV RAKAN   Administration





     





     





  Protocol   





     


 


Lacosamide  200 mg  11/22/19 22:00  11/23/19 09:50





  Vimpat Liquid -  GT   200 mg





  BID RAKAN   Administration





     





     





     





     


 


Levetiracetam  2,000 mg  11/22/19 22:00  11/23/19 09:49





  Keppra Oral Solution -  GT   2,000 mg





  BID RAKAN   Administration





     





     





     





     


 


Levocarnitine  700 mg  11/22/19 22:00  11/23/19 05:48





  Carnitor Oral Solution -  GT   700 mg





  TID RAKAN   Administration





     





     





     





     


 


Non-Formulary Medication  30 ml  11/23/19 10:00  





  Fructooligosaccharides/Polydex [Fiber-Stat 15 Gm/30 Ml Liquid]  GT   





  DAILY RAKAN   





     





     





     





     


 


Pyridoxine HCl  100 mg  11/23/19 10:00  11/23/19 09:51





  Vitamin B6 -  GT   100 mg





  DAILY RAKAN   Administration





     





     





     





     


 


Zonisamide  300 mg  11/22/19 22:00  11/23/19 09:52





  Zonisamide  GT   300 mg





  BID RAKAN   Administration





     





     





     





     











ASSESSMENT/PLAN:


This is a 33 year old man with a history of cerebral palsy, mental retardation, 

urethral stricture, suprapubic catheter, seizure disorder who was sent to the 

ED from [facility] [place] for dyspnea and hypoxia.





1. UTI


   - No evidence of sepsis


   - Urine culture growing Enterococcus, gram neg rods - follow up 

identification and sensitivities


   - Blood cultures negtive after 24 hours


   - Continue Zosyn, IV fluid


   - ID consult


2. Cerebral palsy/mental retardation


3. Seizure disorder   


   - Continue Zonegran, Onfi, Keppra, Vimpat





Visit type





- Emergency Visit


Emergency Visit: Yes


ED Registration Date: 11/22/19


Care time: The patient presented to the Emergency Department on the above date 

and was hospitalized for further evaluation of their emergent condition.





- New Patient


This patient is new to me today: Yes


Date on this admission: 11/23/19





- Critical Care


Critical Care patient: No





- Discharge Referral


Referred to [facility] No

## 2019-11-24 LAB
ANION GAP SERPL CALC-SCNC: 4 MMOL/L (ref 8–16)
APPEARANCE UR: CLEAR
BACTERIA # UR AUTO: 104.1 /HPF
BASOPHILS # BLD: 0.5 % (ref 0–2)
BILIRUB UR STRIP.AUTO-MCNC: NEGATIVE MG/DL
BUN SERPL-MCNC: 8.6 MG/DL (ref 7–18)
CALCIUM SERPL-MCNC: 9 MG/DL (ref 8.5–10.1)
CASTS URNS QL MICRO: 1 /LPF (ref 0–8)
CHLORIDE SERPL-SCNC: 112 MMOL/L (ref 98–107)
CO2 SERPL-SCNC: 22 MMOL/L (ref 21–32)
COLOR UR: YELLOW
CREAT SERPL-MCNC: 0.5 MG/DL (ref 0.55–1.3)
DEPRECATED RDW RBC AUTO: 13.1 % (ref 11.9–15.9)
EOSINOPHIL # BLD: 2.6 % (ref 0–4.5)
EPITH CASTS URNS QL MICRO: 0.7 /HPF
GLUCOSE SERPL-MCNC: 104 MG/DL (ref 74–106)
HCT VFR BLD CALC: 31.4 % (ref 35.4–49)
HGB BLD-MCNC: 10.8 GM/DL (ref 11.7–16.9)
KETONES UR QL STRIP: NEGATIVE
LEUKOCYTE ESTERASE UR QL STRIP.AUTO: (no result)
LYMPHOCYTES # BLD: 57.3 % (ref 8–40)
MCH RBC QN AUTO: 33.2 PG (ref 25.7–33.7)
MCHC RBC AUTO-ENTMCNC: 34.4 G/DL (ref 32–35.9)
MCV RBC: 96.3 FL (ref 80–96)
MONOCYTES # BLD AUTO: 5.6 % (ref 3.8–10.2)
NEUTROPHILS # BLD: 34 % (ref 42.8–82.8)
NITRITE UR QL STRIP: NEGATIVE
PH UR: 7.5 [PH] (ref 5–8)
PLATELET # BLD AUTO: 135 K/MM3 (ref 134–434)
PMV BLD: 7.7 FL (ref 7.5–11.1)
POTASSIUM SERPLBLD-SCNC: 3.9 MMOL/L (ref 3.5–5.1)
PROT UR QL STRIP: NEGATIVE
PROT UR QL STRIP: NEGATIVE
RBC # BLD AUTO: 2 /HPF (ref 0–4)
RBC # BLD AUTO: 3.26 M/MM3 (ref 4–5.6)
SODIUM SERPL-SCNC: 138 MMOL/L (ref 136–145)
SP GR UR: 1.01 (ref 1.01–1.03)
UROBILINOGEN UR STRIP-MCNC: 0.2 MG/DL (ref 0.2–1)
WBC # BLD AUTO: 4.9 K/MM3 (ref 4–10)
WBC # UR AUTO: 5 /HPF (ref 0–5)

## 2019-11-24 RX ADMIN — LEVOCARNITINE SCH MG: 1 SOLUTION ORAL at 05:16

## 2019-11-24 RX ADMIN — ZONISAMIDE SCH MG: 100 CAPSULE ORAL at 22:49

## 2019-11-24 RX ADMIN — LEVETIRACETAM SCH MG: 100 SOLUTION ORAL at 22:40

## 2019-11-24 RX ADMIN — ZONISAMIDE SCH MG: 100 CAPSULE ORAL at 12:33

## 2019-11-24 RX ADMIN — AMOXICILLIN AND CLAVULANATE POTASSIUM SCH MG: 250; 62.5 POWDER, FOR SUSPENSION ORAL at 22:39

## 2019-11-24 RX ADMIN — LACOSAMIDE SCH MG: 10 SOLUTION ORAL at 10:43

## 2019-11-24 RX ADMIN — PIPERACILLIN SODIUM,TAZOBACTAM SODIUM SCH MLS/HR: 3; .375 INJECTION, POWDER, FOR SOLUTION INTRAVENOUS at 01:54

## 2019-11-24 RX ADMIN — PIPERACILLIN SODIUM,TAZOBACTAM SODIUM SCH MLS/HR: 3; .375 INJECTION, POWDER, FOR SOLUTION INTRAVENOUS at 10:42

## 2019-11-24 RX ADMIN — Medication SCH TAB: at 22:43

## 2019-11-24 RX ADMIN — LACOSAMIDE SCH MG: 10 SOLUTION ORAL at 22:41

## 2019-11-24 RX ADMIN — Medication SCH TAB: at 12:11

## 2019-11-24 RX ADMIN — LEVETIRACETAM SCH MG: 100 SOLUTION ORAL at 10:42

## 2019-11-24 RX ADMIN — HEPARIN SODIUM SCH UNIT: 5000 INJECTION, SOLUTION INTRAVENOUS; SUBCUTANEOUS at 10:43

## 2019-11-24 RX ADMIN — LEVOCARNITINE SCH MG: 1 SOLUTION ORAL at 13:09

## 2019-11-24 RX ADMIN — MEROPENEM SCH MLS/HR: 1 INJECTION, POWDER, FOR SOLUTION INTRAVENOUS at 17:22

## 2019-11-24 RX ADMIN — HEPARIN SODIUM SCH UNIT: 5000 INJECTION, SOLUTION INTRAVENOUS; SUBCUTANEOUS at 22:42

## 2019-11-24 RX ADMIN — Medication SCH MG: at 12:11

## 2019-11-24 RX ADMIN — LEVOCARNITINE SCH MG: 1 SOLUTION ORAL at 22:48

## 2019-11-24 NOTE — PN
Physical Exam: 


SUBJECTIVE: Patient seen and examined. He appears comfortable. Overnight, he 

became hypothermic and a warming blanket was applied.








OBJECTIVE:





 Vital Signs











 Period  Temp  Pulse  Resp  BP Sys/Curran  Pulse Ox


 


 Last 24 Hr  93.2 F-97.8 F  54-58  18-20  /51-86  100











GENERAL: The patient is awake, in no acute distress.


LUNGS: Breath sounds equal, clear to auscultation bilaterally, no wheezes, no 

crackles, no accessory muscle use. 


HEART: Regular rate and rhythm, S1, S2 without murmur, rub or gallop.


ABDOMEN: Soft, nontender, nondistended, normoactive bowel sounds, no guarding.


EXTREMITIES: 2+ pulses, warm, well-perfused, no edema, contracted. 











 Laboratory Results - last 24 hr











  11/23/19 11/23/19 11/23/19





  13:16 17:25 22:03


 


POC Glucometer  77  70  77


 


Urine Color   


 


Urine Appearance   


 


Urine pH   


 


Ur Specific Gravity   


 


Urine Protein   


 


Urine Glucose (UA)   


 


Urine Ketones   


 


Urine Blood   


 


Urine Nitrite   


 


Urine Bilirubin   


 


Urine Urobilinogen   


 


Ur Leukocyte Esterase   


 


Urine WBC (Auto)   


 


Urine RBC (Auto)   


 


Urine Casts (Auto)   


 


U Epithel Cells (Auto)   


 


Urine Bacteria (Auto)   


 


Urine Yeast (Auto)   














  11/24/19 11/24/19 11/24/19





  01:51 05:20 06:30


 


POC Glucometer  114  113 


 


Urine Color    Yellow


 


Urine Appearance    Clear


 


Urine pH    7.5


 


Ur Specific Gravity    1.006 L


 


Urine Protein    Negative


 


Urine Glucose (UA)    Negative


 


Urine Ketones    Negative


 


Urine Blood    Negative


 


Urine Nitrite    Negative


 


Urine Bilirubin    Negative


 


Urine Urobilinogen    0.2


 


Ur Leukocyte Esterase    1+ H


 


Urine WBC (Auto)    5


 


Urine RBC (Auto)    2


 


Urine Casts (Auto)    1


 


U Epithel Cells (Auto)    0.7


 


Urine Bacteria (Auto)    104.1


 


Urine Yeast (Auto)    none seen








Active Medications











Generic Name Dose Route Start Last Admin





  Trade Name Freq  PRN Reason Stop Dose Admin


 


Calcium Carbonate/Cholecalciferol  1 tab  11/22/19 22:00  11/23/19 22:15





  Os-Dale 500+D -  GT   1 tab





  BID RAKAN   Administration





     





     





     





     


 


Clobazam  10 mg  11/22/19 22:00  11/23/19 22:11





  Onfi -  GT   10 mg





  BID RAKAN   Administration





     





     





     





     


 


Heparin Sodium (Porcine)  5,000 unit  11/22/19 22:00  11/23/19 22:11





  Heparin -  SQ   5,000 unit





  BID RAKAN   Administration





     





     





     





     


 


Piperacillin Sod/Tazobactam  50 mls @ 100 mls/hr  11/23/19 02:00  11/24/19 01:54





  Sod 3.375 gm/ Dextrose  IVPB   100 mls/hr





  Q8H-IV RAKAN   Administration





     





     





  Protocol   





     


 


Dextrose/Sodium Chloride  1,000 mls @ 75 mls/hr  11/23/19 23:15  11/24/19 00:04





  D5-1/2ns -  IV   75 mls/hr





  ASDIR RAKAN   Administration





     





     





     





     


 


Lacosamide  200 mg  11/22/19 22:00  11/23/19 22:21





  Vimpat Liquid -  GT   200 mg





  BID RAKAN   Administration





     





     





     





     


 


Levetiracetam  2,000 mg  11/22/19 22:00  11/23/19 22:11





  Keppra Oral Solution -  GT   2,000 mg





  BID RAKAN   Administration





     





     





     





     


 


Levocarnitine  700 mg  11/22/19 22:00  11/24/19 05:16





  Carnitor Oral Solution -  GT   700 mg





  TID RAKAN   Administration





     





     





     





     


 


Pyridoxine HCl  100 mg  11/23/19 10:00  11/23/19 09:51





  Vitamin B6 -  GT   100 mg





  DAILY RAKAN   Administration





     





     





     





     


 


Zonisamide  300 mg  11/22/19 22:00  11/23/19 22:16





  Zonisamide  GT   300 mg





  BID RAKAN   Administration





     





     





     





     











ASSESSMENT/PLAN:


This is a 33 year old man with a history of cerebral palsy, mental retardation, 

urethral stricture, suprapubic catheter, seizure disorder who was sent to the 

ED from [facility] for dyspnea and hypoxia.





1. UTI


   - No evidence of sepsis


   - Urine culture growing ESBL E. coli and Enterococcus


   - Blood cultures negative after 24 hours


   - Continue Zosyn - abx as per ID


2. Dehydration


   - Improved


   - Discontinue IV fluid


3. Cerebral palsy/mental retardation


4. Seizure disorder   


   - Continue Zonegran, Onfi, Keppra, Vimpat


5. Nutrition


   - Jevity 1.5 via G tube





Visit type





- Emergency Visit


Emergency Visit: Yes


ED Registration Date: 11/22/19


Care time: The patient presented to the Emergency Department on the above date 

and was hospitalized for further evaluation of their emergent condition.





- New Patient


This patient is new to me today: No





- Critical Care


Critical Care patient: No





- Discharge Referral


Referred to [facility] No

## 2019-11-24 NOTE — PN
Progress Note, Physician


History of Present Illness: 





Pt was noted to be hypothermic (93.2F rectally) this a.m. currently with 

warming blanket on. Otherwise no apparent distress. Urine culture results noted.





- Current Medication List


Current Medications: 


Active Medications





Calcium Carbonate/Cholecalciferol (Os-Dale 500+D -)  1 tab GT BID [facility]


   Last Admin: 11/24/19 12:11 Dose:  1 tab


Clobazam (Onfi -)  10 mg GT BID [facility]


   Last Admin: 11/24/19 10:42 Dose:  10 mg


Heparin Sodium (Porcine) (Heparin -)  5,000 unit SQ BID [facility]


   Last Admin: 11/24/19 10:43 Dose:  5,000 unit


Meropenem 1 gm/ Dextrose  100 mls @ 200 mls/hr IVPB Q8H-IV RKAAN


Lacosamide (Vimpat Liquid -)  200 mg GT BID [facility]


   Last Admin: 11/24/19 10:43 Dose:  200 mg


Levetiracetam (Keppra Oral Solution -)  2,000 mg GT BID [facility]


   Last Admin: 11/24/19 10:42 Dose:  2,000 mg


Levocarnitine (Carnitor Oral Solution -)  700 mg GT TID [facility]


   Last Admin: 11/24/19 13:09 Dose:  700 mg


Pyridoxine HCl (Vitamin B6 -)  100 mg GT DAILY [facility]


   Last Admin: 11/24/19 12:11 Dose:  100 mg


Zonisamide (Zonisamide)  300 mg GT BID [facility]


   Last Admin: 11/24/19 12:33 Dose:  300 mg











- Objective


Vital Signs: 


 Vital Signs











Temperature  94.2 F L  11/24/19 11:46


 


Pulse Rate  54 L  11/24/19 04:00


 


Respiratory Rate  18   11/24/19 04:00


 


Blood Pressure  142/68   11/24/19 04:00


 


O2 Sat by Pulse Oximetry (%)  100   11/23/19 21:00











Constitutional: Yes: No Distress, Calm, Other (nonverbal)


Eyes: Yes: Conjunctiva Clear


Cardiovascular: Yes: Regular Rate and Rhythm


Respiratory: Yes: Regular


Gastrointestinal: Yes: Normal Bowel Sounds, Soft


Genitourinary: Yes: Other (+SPC)


Integumentary: Yes: WNL


Neurological: Yes: Other (non-communicative)


Labs: 


 CBC, BMP





 11/24/19 09:13 





 11/24/19 09:13 





 INR, PTT











INR  0.97  (0.83-1.09)   11/22/19  12:30    








 Microbiology





11/22/19 12:00   Urine - Urine Clean Catch   Urine Culture - Preliminary


                            Escherichia Coli Esbl 


                            Group D Strep Or Entero Coccus


11/22/19 12:30   Blood - Peripheral Venous   Blood Culture - Preliminary


                            NO GROWTH OBTAINED AFTER 48 HOURS, INCUBATION TO 

CONTINUE


                            FOR 3 DAYS.


11/22/19 12:30   Blood - Peripheral Venous   Blood Culture - Preliminary


                            NO GROWTH OBTAINED AFTER 48 HOURS, INCUBATION TO 

CONTINUE


                            FOR 3 DAYS.











- ....Imaging


Chest X-ray: Report Reviewed (no infiltrates)





Problem List





- Problems


(1) Hypothermia


Code(s): T68.XXXA - HYPOTHERMIA, INITIAL ENCOUNTER   





(2) Severe sepsis


Code(s): A41.9 - SEPSIS, UNSPECIFIED ORGANISM; R65.20 - SEVERE SEPSIS WITHOUT 

SEPTIC SHOCK   





(3) UTI (urinary tract infection)


Code(s): N39.0 - URINARY TRACT INFECTION, SITE NOT SPECIFIED   





(4) Seizure


Code(s): R56.9 - UNSPECIFIED CONVULSIONS   





Assessment/Plan





33 y.o. male with PMH of Cerebral Palsy with mental retardation, urethral 

stricture s/p SPC, seizure d.o., GERD, with previous UTIs and PNA sent from 

group home for shortness of breath with low oxygen saturation (88% on RA) and 

less responsiveness





Severe Sepsis


UTI 


Hypothermia


Urethral strictue with SPC


Cerebral Palsy/MR


Seizure d.o.





-- Urine Cx +ESBL E.coli/GPC - f/u final results


-- d/c Zosyn, switch to Meropenem and Augmentin 


-- repeat blood and urine cultures sent


-- monitor vitals closely


-- contact precautions


Pt without distress currently

## 2019-11-25 LAB
ANION GAP SERPL CALC-SCNC: 4 MMOL/L (ref 8–16)
BASOPHILS # BLD: 0.3 % (ref 0–2)
BUN SERPL-MCNC: 12.8 MG/DL (ref 7–18)
CALCIUM SERPL-MCNC: 8.5 MG/DL (ref 8.5–10.1)
CHLORIDE SERPL-SCNC: 108 MMOL/L (ref 98–107)
CO2 SERPL-SCNC: 25 MMOL/L (ref 21–32)
CREAT SERPL-MCNC: 0.6 MG/DL (ref 0.55–1.3)
DEPRECATED RDW RBC AUTO: 12.8 % (ref 11.9–15.9)
EOSINOPHIL # BLD: 0.7 % (ref 0–4.5)
GLUCOSE SERPL-MCNC: 124 MG/DL (ref 74–106)
HCT VFR BLD CALC: 31.9 % (ref 35.4–49)
HGB BLD-MCNC: 11 GM/DL (ref 11.7–16.9)
IRON SERPL-MCNC: 74 UG/DL (ref 50–175)
LYMPHOCYTES # BLD: 37.5 % (ref 8–40)
MCH RBC QN AUTO: 32.8 PG (ref 25.7–33.7)
MCHC RBC AUTO-ENTMCNC: 34.5 G/DL (ref 32–35.9)
MCV RBC: 95 FL (ref 80–96)
MONOCYTES # BLD AUTO: 4.3 % (ref 3.8–10.2)
NEUTROPHILS # BLD: 57.2 % (ref 42.8–82.8)
PLATELET # BLD AUTO: 166 K/MM3 (ref 134–434)
PMV BLD: 7.3 FL (ref 7.5–11.1)
POTASSIUM SERPLBLD-SCNC: 3.6 MMOL/L (ref 3.5–5.1)
RBC # BLD AUTO: 3.36 M/MM3 (ref 4–5.6)
SODIUM SERPL-SCNC: 137 MMOL/L (ref 136–145)
TIBC SERPL-MCNC: 292 UG/DL (ref 250–450)
WBC # BLD AUTO: 9.8 K/MM3 (ref 4–10)

## 2019-11-25 RX ADMIN — LEVETIRACETAM SCH MG: 100 SOLUTION ORAL at 09:40

## 2019-11-25 RX ADMIN — MEROPENEM SCH MLS/HR: 1 INJECTION, POWDER, FOR SOLUTION INTRAVENOUS at 01:19

## 2019-11-25 RX ADMIN — MEROPENEM SCH MLS/HR: 1 INJECTION, POWDER, FOR SOLUTION INTRAVENOUS at 09:40

## 2019-11-25 RX ADMIN — LACOSAMIDE SCH MG: 10 SOLUTION ORAL at 22:36

## 2019-11-25 RX ADMIN — MEROPENEM SCH MLS/HR: 1 INJECTION, POWDER, FOR SOLUTION INTRAVENOUS at 18:18

## 2019-11-25 RX ADMIN — ZONISAMIDE SCH MG: 100 CAPSULE ORAL at 09:42

## 2019-11-25 RX ADMIN — HEPARIN SODIUM SCH UNIT: 5000 INJECTION, SOLUTION INTRAVENOUS; SUBCUTANEOUS at 22:41

## 2019-11-25 RX ADMIN — AMOXICILLIN AND CLAVULANATE POTASSIUM SCH MG: 250; 62.5 POWDER, FOR SUSPENSION ORAL at 09:31

## 2019-11-25 RX ADMIN — Medication SCH TAB: at 09:39

## 2019-11-25 RX ADMIN — Medication SCH TAB: at 22:34

## 2019-11-25 RX ADMIN — LEVOCARNITINE SCH MG: 1 SOLUTION ORAL at 15:39

## 2019-11-25 RX ADMIN — AMOXICILLIN AND CLAVULANATE POTASSIUM SCH MG: 250; 62.5 POWDER, FOR SUSPENSION ORAL at 20:49

## 2019-11-25 RX ADMIN — ZONISAMIDE SCH MG: 100 CAPSULE ORAL at 22:39

## 2019-11-25 RX ADMIN — Medication SCH MG: at 09:39

## 2019-11-25 RX ADMIN — LACOSAMIDE SCH MG: 10 SOLUTION ORAL at 09:38

## 2019-11-25 RX ADMIN — LEVETIRACETAM SCH MG: 100 SOLUTION ORAL at 22:28

## 2019-11-25 RX ADMIN — LEVOCARNITINE SCH MG: 1 SOLUTION ORAL at 22:32

## 2019-11-25 RX ADMIN — LEVOCARNITINE SCH MG: 1 SOLUTION ORAL at 05:15

## 2019-11-25 RX ADMIN — HEPARIN SODIUM SCH UNIT: 5000 INJECTION, SOLUTION INTRAVENOUS; SUBCUTANEOUS at 09:41

## 2019-11-25 NOTE — PN
Physical Exam: 


SUBJECTIVE: Patient seen and examined. He appears comfortable.








OBJECTIVE:





 Vital Signs











 Period  Temp  Pulse  Resp  BP Sys/Curran  Pulse Ox


 


 Last 24 Hr  94.2 F-99.5 F  56-91  18-20  /47-80  











GENERAL: The patient is awake, in no acute distress.


LUNGS: Bilateral coarse breath sounds, no wheezes, no crackles, no accessory 

muscle use. 


HEART: Regular rate and rhythm, S1, S2 without murmur, rub or gallop.


ABDOMEN: Soft, nontender, nondistended, normoactive bowel sounds, no guarding.


EXTREMITIES: 2+ pulses, warm, well-perfused, no edema, contracted. 











 Laboratory Results - last 24 hr











  11/24/19 11/24/19 11/24/19





  09:13 09:13 11:05


 


WBC  4.9  


 


RBC  3.26 L  


 


Hgb  10.8 L  


 


Hct  31.4 L  


 


MCV  96.3 H  


 


MCH  33.2  


 


MCHC  34.4  


 


RDW  13.1  


 


Plt Count  135  


 


MPV  7.7  


 


Absolute Neuts (auto)  1.7  


 


Neutrophils %  34.0 L D  


 


Lymphocytes %  57.3 H D  


 


Monocytes %  5.6  


 


Eosinophils %  2.6  D  


 


Basophils %  0.5  


 


Nucleated RBC %  0  


 


Sodium   138 


 


Potassium   3.9 


 


Chloride   112 H 


 


Carbon Dioxide   22 


 


Anion Gap   4 L 


 


BUN   8.6 


 


Creatinine   0.5 L 


 


Est GFR (CKD-EPI)AfAm   163.87 


 


Est GFR (CKD-EPI)NonAf   141.39 


 


POC Glucometer    108


 


Random Glucose   104 


 


Lactic Acid   


 


Calcium   9.0 














  11/24/19 11/24/19 11/25/19





  12:42 17:22 05:11


 


WBC   


 


RBC   


 


Hgb   


 


Hct   


 


MCV   


 


MCH   


 


MCHC   


 


RDW   


 


Plt Count   


 


MPV   


 


Absolute Neuts (auto)   


 


Neutrophils %   


 


Lymphocytes %   


 


Monocytes %   


 


Eosinophils %   


 


Basophils %   


 


Nucleated RBC %   


 


Sodium   


 


Potassium   


 


Chloride   


 


Carbon Dioxide   


 


Anion Gap   


 


BUN   


 


Creatinine   


 


Est GFR (CKD-EPI)AfAm   


 


Est GFR (CKD-EPI)NonAf   


 


POC Glucometer   130  116


 


Random Glucose   


 


Lactic Acid  0.7  


 


Calcium   








Active Medications











Generic Name Dose Route Start Last Admin





  Trade Name Freq  PRN Reason Stop Dose Admin


 


Amoxicillin/Clavulanate Potassium  500 mg  11/24/19 17:30  11/25/19 09:31





  Augmentin 250 Mg/5 Ml Oral Suspension -  GT   500 mg





  BID@0800,1730 RAKAN   Administration





     





     





     





     


 


Calcium Carbonate/Cholecalciferol  1 tab  11/22/19 22:00  11/24/19 22:43





  Os-Dale 500+D -  GT   1 tab





  BID RAAKN   Administration





     





     





     





     


 


Clobazam  10 mg  11/22/19 22:00  11/24/19 22:41





  Onfi -  GT   10 mg





  BID RAKAN   Administration





     





     





     





     


 


Heparin Sodium (Porcine)  5,000 unit  11/22/19 22:00  11/24/19 22:42





  Heparin -  SQ   5,000 unit





  BID RAKAN   Administration





     





     





     





     


 


Meropenem 1 gm/ Dextrose  100 mls @ 200 mls/hr  11/24/19 16:00  11/25/19 01:19





  IVPB   200 mls/hr





  Q8H-IV RAKAN   Administration





     





     





     





     


 


Lacosamide  200 mg  11/22/19 22:00  11/24/19 22:41





  Vimpat Liquid -  GT   200 mg





  BID RAKAN   Administration





     





     





     





     


 


Levetiracetam  2,000 mg  11/22/19 22:00  11/24/19 22:40





  Keppra Oral Solution -  GT   2,000 mg





  BID RAKAN   Administration





     





     





     





     


 


Levocarnitine  700 mg  11/22/19 22:00  11/25/19 05:15





  Carnitor Oral Solution -  GT   700 mg





  TID RAKAN   Administration





     





     





     





     


 


Pyridoxine HCl  100 mg  11/23/19 10:00  11/24/19 12:11





  Vitamin B6 -  GT   100 mg





  DAILY RAKAN   Administration





     





     





     





     


 


Zonisamide  300 mg  11/22/19 22:00  11/24/19 22:49





  Zonisamide  GT   300 mg





  BID RAKAN   Administration





     





     





     





     











ASSESSMENT/PLAN:


This is a 33 year old man with a history of cerebral palsy, mental retardation, 

urethral stricture, suprapubic catheter, seizure disorder who was sent to the 

ED from [facility] for dyspnea and hypoxia.





1. UTI


   - Urine culture growing ESBL E. coli and E. faecalis


   - 1 of 2 blood cultures positive - follow up identification


   - Zosyn changed to meropenem and Augmentin


2. Hypothermia


   - Improved


3. Dehydration


   - Improved


4. Cerebral palsy/mental retardation


5. Seizure disorder   


   - Continue Zonegran, Onfi, Keppra, Vimpat


6. Nutrition


   - Continue Jevity 1.5 via G tube





Visit type





- Emergency Visit


Emergency Visit: Yes


ED Registration Date: 11/22/19


Care time: The patient presented to the Emergency Department on the above date 

and was hospitalized for further evaluation of their emergent condition.





- New Patient


This patient is new to me today: No





- Critical Care


Critical Care patient: No





- Discharge Referral


Referred to [facility] No

## 2019-11-25 NOTE — PN
Progress Note, Physician


History of Present Illness: 





stable


no new issues





- Current Medication List


Current Medications: 


Active Medications





Amoxicillin/Clavulanate Potassium (Augmentin 250 Mg/5 Ml Oral Suspension -)  

500 mg GT BID@0800,1730 [facility]


   Last Admin: 11/25/19 09:31 Dose:  500 mg


Calcium Carbonate/Cholecalciferol (Os-Dale 500+D -)  1 tab GT BID [facility]


   Last Admin: 11/25/19 09:39 Dose:  1 tab


Clobazam (Onfi -)  10 mg GT BID [facility]


   Last Admin: 11/25/19 09:38 Dose:  10 mg


Heparin Sodium (Porcine) (Heparin -)  5,000 unit SQ BID [facility]


   Last Admin: 11/25/19 09:41 Dose:  5,000 unit


Meropenem 1 gm/ Dextrose  100 mls @ 200 mls/hr IVPB Q8H-IV [facility]


   Last Admin: 11/25/19 09:40 Dose:  200 mls/hr


Lacosamide (Vimpat Liquid -)  200 mg GT BID [facility]


   Last Admin: 11/25/19 09:38 Dose:  200 mg


Levetiracetam (Keppra Oral Solution -)  2,000 mg GT BID [facility]


   Last Admin: 11/25/19 09:40 Dose:  2,000 mg


Levocarnitine (Carnitor Oral Solution -)  700 mg GT TID [facility]


   Last Admin: 11/25/19 05:15 Dose:  700 mg


Pyridoxine HCl (Vitamin B6 -)  100 mg GT DAILY [facility]


   Last Admin: 11/25/19 09:39 Dose:  100 mg


Zonisamide (Zonisamide)  300 mg GT BID [facility]


   Last Admin: 11/25/19 09:42 Dose:  300 mg











- Objective


Vital Signs: 


 Vital Signs











Temperature  97.7 F   11/25/19 10:00


 


Pulse Rate  77   11/25/19 09:35


 


Respiratory Rate  20   11/25/19 09:35


 


Blood Pressure  122/50 L  11/25/19 09:35


 


O2 Sat by Pulse Oximetry (%)  99   11/24/19 09:00











Constitutional: Yes: No Distress, Calm


Cardiovascular: Yes: S1, S2


Respiratory: Yes: Regular, Poor Air Entry (bases)


Gastrointestinal: Yes: Normal Bowel Sounds, Soft


Musculoskeletal: Yes: WNL


Extremities: Yes: WNL


Neurological: Yes: Alert, Other


Psychiatric: Yes: Other


Labs: 


 CBC, BMP





 11/25/19 09:47 





 11/25/19 09:47 





 INR, PTT











INR  0.97  (0.83-1.09)   11/22/19  12:30    














Assessment/Plan





Problem List





- Problems


(1) Hypothermia


Code(s): T68.XXXA - HYPOTHERMIA, INITIAL ENCOUNTER   





(2) Severe sepsis


Code(s): A41.9 - SEPSIS, UNSPECIFIED ORGANISM; R65.20 - SEVERE SEPSIS WITHOUT 

SEPTIC SHOCK   





(3) UTI (urinary tract infection)


Code(s): N39.0 - URINARY TRACT INFECTION, SITE NOT SPECIFIED   





(4) Seizure


Code(s): R56.9 - UNSPECIFIED CONVULSIONS   





Assessment/Plan





33 y.o. male with PMH of Cerebral Palsy with mental retardation, urethral 

stricture s/p SPC, seizure d.o., GERD, with previous UTIs and PNA sent from 

group home for shortness of breath with low oxygen saturation (88% on RA) and 

less responsiveness





Severe Sepsis


UTI 


Hypothermia


Urethral strictue with SPC


Cerebral Palsy/MR


Seizure d.o.





plan


repeat cx noted


continue current abx


will need it for some time


rest as per the team

## 2019-11-26 LAB
ANION GAP SERPL CALC-SCNC: 3 MMOL/L (ref 8–16)
BASOPHILS # BLD: 1 % (ref 0–2)
BUN SERPL-MCNC: 14.8 MG/DL (ref 7–18)
CALCIUM SERPL-MCNC: 8.7 MG/DL (ref 8.5–10.1)
CHLORIDE SERPL-SCNC: 109 MMOL/L (ref 98–107)
CO2 SERPL-SCNC: 25 MMOL/L (ref 21–32)
CREAT SERPL-MCNC: 0.5 MG/DL (ref 0.55–1.3)
DEPRECATED RDW RBC AUTO: 12.9 % (ref 11.9–15.9)
EOSINOPHIL # BLD: 2.3 % (ref 0–4.5)
GLUCOSE SERPL-MCNC: 117 MG/DL (ref 74–106)
HCT VFR BLD CALC: 30.9 % (ref 35.4–49)
HGB BLD-MCNC: 10.8 GM/DL (ref 11.7–16.9)
LYMPHOCYTES # BLD: 54.9 % (ref 8–40)
MCH RBC QN AUTO: 33.3 PG (ref 25.7–33.7)
MCHC RBC AUTO-ENTMCNC: 35 G/DL (ref 32–35.9)
MCV RBC: 95.2 FL (ref 80–96)
MONOCYTES # BLD AUTO: 3.3 % (ref 3.8–10.2)
NEUTROPHILS # BLD: 38.5 % (ref 42.8–82.8)
PLATELET # BLD AUTO: 151 K/MM3 (ref 134–434)
PLATELET BLD QL SMEAR: (no result)
PMV BLD: 8.3 FL (ref 7.5–11.1)
POTASSIUM SERPLBLD-SCNC: 4.2 MMOL/L (ref 3.5–5.1)
RBC # BLD AUTO: 3.24 M/MM3 (ref 4–5.6)
SODIUM SERPL-SCNC: 137 MMOL/L (ref 136–145)
WBC # BLD AUTO: 6.7 K/MM3 (ref 4–10)

## 2019-11-26 RX ADMIN — MEROPENEM SCH MLS/HR: 1 INJECTION, POWDER, FOR SOLUTION INTRAVENOUS at 18:44

## 2019-11-26 RX ADMIN — LACOSAMIDE SCH MG: 10 SOLUTION ORAL at 11:12

## 2019-11-26 RX ADMIN — LEVOCARNITINE SCH MG: 1 SOLUTION ORAL at 07:03

## 2019-11-26 RX ADMIN — MEROPENEM SCH MLS/HR: 1 INJECTION, POWDER, FOR SOLUTION INTRAVENOUS at 11:13

## 2019-11-26 RX ADMIN — Medication SCH MG: at 11:15

## 2019-11-26 RX ADMIN — SODIUM CHLORIDE SCH MLS/HR: 9 INJECTION, SOLUTION INTRAVENOUS at 15:45

## 2019-11-26 RX ADMIN — AMOXICILLIN AND CLAVULANATE POTASSIUM SCH MG: 250; 62.5 POWDER, FOR SUSPENSION ORAL at 08:58

## 2019-11-26 RX ADMIN — ZONISAMIDE SCH MG: 100 CAPSULE ORAL at 11:15

## 2019-11-26 RX ADMIN — HEPARIN SODIUM SCH UNIT: 5000 INJECTION, SOLUTION INTRAVENOUS; SUBCUTANEOUS at 11:14

## 2019-11-26 RX ADMIN — MEROPENEM SCH MLS/HR: 1 INJECTION, POWDER, FOR SOLUTION INTRAVENOUS at 02:23

## 2019-11-26 RX ADMIN — Medication SCH TAB: at 11:15

## 2019-11-26 RX ADMIN — AMOXICILLIN AND CLAVULANATE POTASSIUM SCH MG: 250; 62.5 POWDER, FOR SUSPENSION ORAL at 17:47

## 2019-11-26 RX ADMIN — LEVETIRACETAM SCH MG: 100 SOLUTION ORAL at 11:12

## 2019-11-26 RX ADMIN — LEVOCARNITINE SCH MG: 1 SOLUTION ORAL at 14:50

## 2019-11-26 NOTE — PN
Physical Exam: 


SUBJECTIVE: Patient seen and examined








OBJECTIVE:


Patient is a 33 year old male form [facility] with a significant 

past medical history of seizures, microcephaly, urethral stricture s/p 

suprapubic catheter placement and MR.  He presents to the ED today accompanied 

by his HHA with hypothermia and hypotension.   





 Vital Signs











 Period  Temp  Pulse  Resp  BP Sys/Curran  Pulse Ox


 


 Last 24 Hr  95.3 F-97.8 F  52-76  18-20  /60-86  98








GENERAL: lethargic, in no acute distress.


HEAD: hx of microcephaly, head w/o trauma


EYES: Pupils equal, round and reactive to light,  


EARS, NOSE, THROAT: Ears normal, nares patent,  .


NECK: Normal range of motion


LUNGS: Breath sounds equal/diminished clear to auscultation bilaterally. No 

wheezes


HEART: Regular rate and rhythm 


ABDOMEN: Soft, nontender, not distended, +peg tube.  suprapubic catheter 

draining clear yellow urine with foul odor.


MUSCULOSKELETAL: bed bound, countracted lower ext.


UPPER EXTREMITIES:  . No peripheral edema.


LOWER EXTREMITIES: contracted


NEUROLOGICAL:  nonverbal


PSYCHIATRIC:  lethargic


SKIN: Warm, dry, normal turgor, no rashes or lesions noted, normal capillary 

refill. 


 











 Laboratory Results - last 24 hr











  11/26/19 11/26/19





  07:10 07:10


 


WBC  6.7 


 


RBC  3.24 L 


 


Hgb  10.8 L 


 


Hct  30.9 L 


 


MCV  95.2 


 


MCH  33.3 


 


MCHC  35.0 


 


RDW  12.9 


 


Plt Count  151 


 


MPV  8.3  D 


 


Absolute Neuts (auto)  2.6 


 


Neutrophils %  38.5 L D 


 


Lymphocytes %  54.9 H D 


 


Monocytes %  3.3 L 


 


Eosinophils %  2.3  D 


 


Basophils %  1.0  D 


 


Nucleated RBC %  0 


 


Platelet Estimate  Decreased 


 


Sodium   137


 


Potassium   4.2


 


Chloride   109 H


 


Carbon Dioxide   25


 


Anion Gap   3 L


 


BUN   14.8


 


Creatinine   0.5 L


 


Est GFR (CKD-EPI)AfAm   163.87


 


Est GFR (CKD-EPI)NonAf   141.39


 


Random Glucose   117 H


 


Calcium   8.7








Active Medications











Generic Name Dose Route Start Last Admin





  Trade Name Freq  PRN Reason Stop Dose Admin


 


Amoxicillin/Clavulanate Potassium  500 mg  11/24/19 17:30  11/26/19 08:58





  Augmentin 250 Mg/5 Ml Oral Suspension -  GT   500 mg





  BID@0800,1730 RAKAN   Administration





     





     





     





     


 


Calcium Carbonate/Cholecalciferol  1 tab  11/22/19 22:00  11/26/19 11:15





  Os-Dale 500+D -  GT   1 tab





  BID RAKAN   Administration





     





     





     





     


 


Clobazam  10 mg  11/22/19 22:00  11/26/19 11:14





  Onfi -  GT   10 mg





  BID RAKAN   Administration





     





     





     





     


 


Heparin Sodium (Porcine)  5,000 unit  11/22/19 22:00  11/26/19 11:14





  Heparin -  SQ   5,000 unit





  BID RAKAN   Administration





     





     





     





     


 


Meropenem 1 gm/ Dextrose  100 mls @ 200 mls/hr  11/24/19 16:00  11/26/19 11:13





  IVPB   200 mls/hr





  Q8H-IV RAKAN   Administration





     





     





     





     


 


Lacosamide  200 mg  11/22/19 22:00  11/26/19 11:12





  Vimpat Liquid -  GT   200 mg





  BID RAKAN   Administration





     





     





     





     


 


Levetiracetam  2,000 mg  11/22/19 22:00  11/26/19 11:12





  Keppra Oral Solution -  GT   2,000 mg





  BID RAKAN   Administration





     





     





     





     


 


Levocarnitine  700 mg  11/22/19 22:00  11/26/19 07:03





  Carnitor Oral Solution -  GT   700 mg





  TID RAKAN   Administration





     





     





     





     


 


Pyridoxine HCl  100 mg  11/26/19 10:00  11/26/19 11:15





  Vitamin B6 -  GT   100 mg





  DAILY RAKAN   Administration





     





     





     





     


 


Zonisamide  300 mg  11/22/19 22:00  11/26/19 11:15





  Zonisamide  GT   300 mg





  BID RAKAN   Administration





     





     





     





     











ASSESSMENT/PLAN:








Problem List





- Problems


(1) Hypothermia


Assessment/Plan: 


improving, varsha santamaria placed overnight


Code(s): T68.XXXA - HYPOTHERMIA, INITIAL ENCOUNTER   





(2) UTI (urinary tract infection)


Assessment/Plan: 


Urine culture growing esbl ecoli and e faecalis.  


1/2 + blood cultures, follow up for bacterial identification.  discussed with 

ID.


On Meropenem and Augmentin


Code(s): N39.0 - URINARY TRACT INFECTION, SITE NOT SPECIFIED   





(3) Seizure


Assessment/Plan: 


continue home seizure medications via g tube.  


Code(s): R56.9 - UNSPECIFIED CONVULSIONS   





(4) Prophylactic measure


Assessment/Plan: 


fen


ivf NS @ 100cc/hr


on jevity feeds


full code








Code(s): Z29.9 - ENCOUNTER FOR PROPHYLACTIC MEASURES, UNSPECIFIED   





Visit type





- Emergency Visit


Emergency Visit: Yes


ED Registration Date: 11/22/19


Care time: The patient presented to the Emergency Department on the above date 

and was hospitalized for further evaluation of their emergent condition.





- New Patient


This patient is new to me today: No





- Critical Care


Critical Care patient: No





- Discharge Referral


Referred to [facility] No

## 2019-11-26 NOTE — PN
Progress Note, Physician


History of Present Illness: 





stable


no new issues


was hypothermic 


now with normal temp





- Current Medication List


Current Medications: 


Active Medications





Amoxicillin/Clavulanate Potassium (Augmentin 250 Mg/5 Ml Oral Suspension -)  

500 mg GT BID@0800,1730 [facility]


   Last Admin: 11/26/19 08:58 Dose:  500 mg


Calcium Carbonate/Cholecalciferol (Os-Dale 500+D -)  1 tab GT BID [facility]


   Last Admin: 11/26/19 11:15 Dose:  1 tab


Clobazam (Onfi -)  10 mg GT BID [facility]


   Last Admin: 11/26/19 11:14 Dose:  10 mg


Heparin Sodium (Porcine) (Heparin -)  5,000 unit SQ BID [facility]


   Last Admin: 11/26/19 11:14 Dose:  5,000 unit


Meropenem 1 gm/ Dextrose  100 mls @ 200 mls/hr IVPB Q8H-IV [facility]


   Last Admin: 11/26/19 11:13 Dose:  200 mls/hr


Lacosamide (Vimpat Liquid -)  200 mg GT BID [facility]


   Last Admin: 11/26/19 11:12 Dose:  200 mg


Levetiracetam (Keppra Oral Solution -)  2,000 mg GT BID [facility]


   Last Admin: 11/26/19 11:12 Dose:  2,000 mg


Levocarnitine (Carnitor Oral Solution -)  700 mg GT TID [facility]


   Last Admin: 11/26/19 07:03 Dose:  700 mg


Pyridoxine HCl (Vitamin B6 -)  100 mg GT DAILY [facility]


   Last Admin: 11/26/19 11:15 Dose:  100 mg


Zonisamide (Zonisamide)  300 mg GT BID [facility]


   Last Admin: 11/26/19 11:15 Dose:  300 mg











- Objective


Vital Signs: 


 Vital Signs











Temperature  97.8 F   11/26/19 11:03


 


Pulse Rate  71   11/26/19 11:03


 


Respiratory Rate  20   11/26/19 11:03


 


Blood Pressure  131/81   11/26/19 11:03


 


O2 Sat by Pulse Oximetry (%)  98   11/25/19 21:00











Constitutional: Yes: No Distress, Calm


Cardiovascular: Yes: S1, S2


Respiratory: Yes: Regular, CTA Bilaterally


Gastrointestinal: Yes: Normal Bowel Sounds, Soft


Genitourinary: Yes: Larry Present (suprapubic)


Musculoskeletal: Yes: WNL


Extremities: Yes: WNL


Neurological: Yes: Alert


Psychiatric: Yes: Other


Labs: 


 CBC, BMP





 11/26/19 07:10 





 11/26/19 07:10 





 INR, PTT











INR  0.97  (0.83-1.09)   11/22/19  12:30    














Assessment/Plan





Problem List





- Problems


(1) Hypothermia


Code(s): T68.XXXA - HYPOTHERMIA, INITIAL ENCOUNTER   





(2) Severe sepsis


Code(s): A41.9 - SEPSIS, UNSPECIFIED ORGANISM; R65.20 - SEVERE SEPSIS WITHOUT 

SEPTIC SHOCK   





(3) UTI (urinary tract infection)


Code(s): N39.0 - URINARY TRACT INFECTION, SITE NOT SPECIFIED   





(4) Seizure


Code(s): R56.9 - UNSPECIFIED CONVULSIONS   





Assessment/Plan





33 y.o. male with PMH of Cerebral Palsy with mental retardation, urethral 

stricture s/p SPC, seizure d.o., GERD, with previous UTIs and PNA sent from 

group home for shortness of breath with low oxygen saturation (88% on RA) and 

less responsiveness





Severe Sepsis


UTI 


Hypothermia


Urethral strictue with SPC


Cerebral Palsy/MR


Seizure d.o.





plan


repeat cx noted


continue current abx


will need it for some time


rest as per the team

## 2019-11-27 LAB
ALBUMIN SERPL-MCNC: 2.8 G/DL (ref 3.4–5)
ALP SERPL-CCNC: 97 U/L (ref 45–117)
ALT SERPL-CCNC: 28 U/L (ref 13–61)
ANION GAP SERPL CALC-SCNC: 4 MMOL/L (ref 8–16)
AST SERPL-CCNC: 16 U/L (ref 15–37)
BASOPHILS # BLD: 0.3 % (ref 0–2)
BILIRUB SERPL-MCNC: 0.1 MG/DL (ref 0.2–1)
BUN SERPL-MCNC: 12.6 MG/DL (ref 7–18)
CALCIUM SERPL-MCNC: 8.3 MG/DL (ref 8.5–10.1)
CHLORIDE SERPL-SCNC: 111 MMOL/L (ref 98–107)
CO2 SERPL-SCNC: 24 MMOL/L (ref 21–32)
CREAT SERPL-MCNC: 0.5 MG/DL (ref 0.55–1.3)
DEPRECATED RDW RBC AUTO: 13.2 % (ref 11.9–15.9)
EOSINOPHIL # BLD: 4.8 % (ref 0–4.5)
GLUCOSE SERPL-MCNC: 96 MG/DL (ref 74–106)
HCT VFR BLD CALC: 31.9 % (ref 35.4–49)
HGB BLD-MCNC: 10.8 GM/DL (ref 11.7–16.9)
LYMPHOCYTES # BLD: 49.1 % (ref 8–40)
MAGNESIUM SERPL-MCNC: 2.2 MG/DL (ref 1.8–2.4)
MCH RBC QN AUTO: 32.3 PG (ref 25.7–33.7)
MCHC RBC AUTO-ENTMCNC: 33.7 G/DL (ref 32–35.9)
MCV RBC: 95.8 FL (ref 80–96)
MONOCYTES # BLD AUTO: 5.9 % (ref 3.8–10.2)
NEUTROPHILS # BLD: 39.9 % (ref 42.8–82.8)
PLATELET # BLD AUTO: 157 K/MM3 (ref 134–434)
PMV BLD: 7.4 FL (ref 7.5–11.1)
POTASSIUM SERPLBLD-SCNC: 3.8 MMOL/L (ref 3.5–5.1)
PROT SERPL-MCNC: 6.8 G/DL (ref 6.4–8.2)
RBC # BLD AUTO: 3.33 M/MM3 (ref 4–5.6)
SODIUM SERPL-SCNC: 139 MMOL/L (ref 136–145)
WBC # BLD AUTO: 6.5 K/MM3 (ref 4–10)

## 2019-11-27 RX ADMIN — LACOSAMIDE SCH MG: 10 SOLUTION ORAL at 00:21

## 2019-11-27 RX ADMIN — LEVETIRACETAM SCH MG: 100 SOLUTION ORAL at 00:21

## 2019-11-27 RX ADMIN — AMOXICILLIN AND CLAVULANATE POTASSIUM SCH MG: 250; 62.5 POWDER, FOR SUSPENSION ORAL at 18:27

## 2019-11-27 RX ADMIN — Medication SCH MG: at 15:16

## 2019-11-27 RX ADMIN — HEPARIN SODIUM SCH UNIT: 5000 INJECTION, SOLUTION INTRAVENOUS; SUBCUTANEOUS at 10:16

## 2019-11-27 RX ADMIN — ZONISAMIDE SCH MG: 100 CAPSULE ORAL at 10:16

## 2019-11-27 RX ADMIN — Medication SCH TAB: at 15:15

## 2019-11-27 RX ADMIN — Medication SCH TAB: at 00:17

## 2019-11-27 RX ADMIN — ZONISAMIDE SCH MG: 100 CAPSULE ORAL at 00:19

## 2019-11-27 RX ADMIN — MEROPENEM SCH MLS/HR: 1 INJECTION, POWDER, FOR SOLUTION INTRAVENOUS at 03:26

## 2019-11-27 RX ADMIN — LACOSAMIDE SCH MG: 10 SOLUTION ORAL at 10:15

## 2019-11-27 RX ADMIN — LEVETIRACETAM SCH MG: 100 SOLUTION ORAL at 10:16

## 2019-11-27 RX ADMIN — LEVOCARNITINE SCH MG: 1 SOLUTION ORAL at 00:20

## 2019-11-27 RX ADMIN — AMOXICILLIN AND CLAVULANATE POTASSIUM SCH MG: 250; 62.5 POWDER, FOR SUSPENSION ORAL at 10:17

## 2019-11-27 RX ADMIN — SODIUM CHLORIDE SCH MLS/HR: 9 INJECTION, SOLUTION INTRAVENOUS at 03:28

## 2019-11-27 RX ADMIN — MEROPENEM SCH MLS/HR: 1 INJECTION, POWDER, FOR SOLUTION INTRAVENOUS at 18:27

## 2019-11-27 RX ADMIN — LEVOCARNITINE SCH MG: 1 SOLUTION ORAL at 06:35

## 2019-11-27 RX ADMIN — HEPARIN SODIUM SCH UNIT: 5000 INJECTION, SOLUTION INTRAVENOUS; SUBCUTANEOUS at 00:22

## 2019-11-27 RX ADMIN — LEVETIRACETAM SCH MG: 100 SOLUTION ORAL at 23:50

## 2019-11-27 RX ADMIN — LEVOCARNITINE SCH MG: 1 SOLUTION ORAL at 15:15

## 2019-11-27 RX ADMIN — SODIUM CHLORIDE SCH: 9 INJECTION, SOLUTION INTRAVENOUS at 18:29

## 2019-11-27 RX ADMIN — MEROPENEM SCH MLS/HR: 1 INJECTION, POWDER, FOR SOLUTION INTRAVENOUS at 10:15

## 2019-11-27 NOTE — PN
Physical Exam: 


SUBJECTIVE: Patient seen and examined - looks improved, tolerating room air.








OBJECTIVE:


Patient is a 33 year old male form [facility] with a significant 

past medical history of seizures, microcephaly, urethral stricture s/p 

suprapubic catheter placement and MR.  He presents to the ED today accompanied 

by his HHA with hypothermia and hypotension.   





hypothermic overnight, apply varsha hugger if remains hypothermic


will reduce ivf to 50cc/hr. ivf started on 11/26 after pt became hypotensive.





 Vital Signs











 Period  Temp  Pulse  Resp  BP Sys/Curran  Pulse Ox


 


 Last 24 Hr  97.2 F-97.4 F  64-70  18-20  /41-90  96-97








 GENERAL: lethargic, in no acute distress.


HEAD: hx of microcephaly, head w/o trauma


EYES: Pupils equal, round and reactive to light,  


EARS, NOSE, THROAT: Ears normal, nares patent,  .


NECK: Normal range of motion


LUNGS: Breath sounds equal/diminished clear to auscultation bilaterally. No 

wheezes


HEART: Regular rate and rhythm 


ABDOMEN: Soft, nontender, not distended, +peg tube.  suprapubic catheter 

draining clear yellow urine with foul odor.


MUSCULOSKELETAL: bed bound, countracted lower ext.


UPPER EXTREMITIES:  . No peripheral edema.


LOWER EXTREMITIES: contracted- no evidence of DTI on either heel.


NEUROLOGICAL:  nonverbal


PSYCHIATRIC:  lethargic


SKIN: Warm, dry, normal turgor, no rashes or lesions noted, normal capillary 

refill. 


 





Active Medications











Generic Name Dose Route Start Last Admin





  Trade Name Freq  PRN Reason Stop Dose Admin


 


Amoxicillin/Clavulanate Potassium  500 mg  11/24/19 17:30  11/27/19 10:17





  Augmentin 250 Mg/5 Ml Oral Suspension -  GT   500 mg





  BID@0800,1730 RAKAN   Administration





     





     





     





     


 


Calcium Carbonate/Cholecalciferol  1 tab  11/22/19 22:00  11/27/19 00:17





  Os-Dale 500+D -  GT   1 tab





  BID RAKAN   Administration





     





     





     





     


 


Clobazam  10 mg  11/22/19 22:00  11/27/19 10:16





  Onfi -  GT   10 mg





  BID RAKAN   Administration





     





     





     





     


 


Heparin Sodium (Porcine)  5,000 unit  11/22/19 22:00  11/27/19 10:16





  Heparin -  SQ   5,000 unit





  BID RAKAN   Administration





     





     





     





     


 


Meropenem 1 gm/ Dextrose  100 mls @ 200 mls/hr  11/24/19 16:00  11/27/19 10:15





  IVPB   200 mls/hr





  Q8H-IV RAKAN   Administration





     





     





     





     


 


Sodium Chloride  1,000 mls @ 100 mls/hr  11/26/19 15:15  11/27/19 03:28





  Normal Saline -  IV   100 mls/hr





  ASDIR RAKAN   Administration





     





     





     





     


 


Lacosamide  200 mg  11/22/19 22:00  11/27/19 10:15





  Vimpat Liquid -  GT   200 mg





  BID RAKAN   Administration





     





     





     





     


 


Levetiracetam  2,000 mg  11/22/19 22:00  11/27/19 10:16





  Keppra Oral Solution -  GT   2,000 mg





  BID RAKAN   Administration





     





     





     





     


 


Levocarnitine  700 mg  11/22/19 22:00  11/27/19 06:35





  Carnitor Oral Solution -  GT   700 mg





  TID RAKAN   Administration





     





     





     





     


 


Pyridoxine HCl  100 mg  11/26/19 10:00  11/26/19 11:15





  Vitamin B6 -  GT   100 mg





  DAILY RAKAN   Administration





     





     





     





     


 


Zonisamide  300 mg  11/22/19 22:00  11/27/19 10:16





  Zonisamide  GT   300 mg





  BID RAKAN   Administration





     





     





     





     











ASSESSMENT/PLAN:








Problem List





- Problems


(1) Hypothermia


Assessment/Plan: 


still hypothermic, monitor and apply varsha hugger if needed.





Code(s): T68.XXXA - HYPOTHERMIA, INITIAL ENCOUNTER   





(2) UTI (urinary tract infection)


Assessment/Plan: 


Urine culture growing esbl ecoli and e faecalis.  


1/2 + blood cultures, follow up for bacterial identification but likely 

contaminant.  discussed with ID.


On Meropenem and Augmentin


Code(s): N39.0 - URINARY TRACT INFECTION, SITE NOT SPECIFIED   





(3) Seizure


Assessment/Plan: 


continue home seizure medications via g tube.  


Code(s): R56.9 - UNSPECIFIED CONVULSIONS   





(4) Prophylactic measure


Assessment/Plan: 


fen


ivf NS @ 50cc/hr for hypotension continue jevity feeds.


full code








Code(s): Z29.9 - ENCOUNTER FOR PROPHYLACTIC MEASURES, UNSPECIFIED   





Visit type





- Emergency Visit


Emergency Visit: Yes


ED Registration Date: 11/22/19


Care time: The patient presented to the Emergency Department on the above date 

and was hospitalized for further evaluation of their emergent condition.





- New Patient


This patient is new to me today: No





- Critical Care


Critical Care patient: No





- Discharge Referral


Referred to [facility] No

## 2019-11-27 NOTE — PN
Progress Note, Physician


History of Present Illness: 





stable


no new issues


still low temp





- Current Medication List


Current Medications: 


Active Medications





Amoxicillin/Clavulanate Potassium (Augmentin 250 Mg/5 Ml Oral Suspension -)  

500 mg GT BID@0800,1730 [facility]


   Last Admin: 11/27/19 10:17 Dose:  500 mg


Calcium Carbonate/Cholecalciferol (Os-Dale 500+D -)  1 tab GT BID [facility]


   Last Admin: 11/27/19 00:17 Dose:  1 tab


Clobazam (Onfi -)  10 mg GT BID [facility]


   Last Admin: 11/27/19 10:16 Dose:  10 mg


Heparin Sodium (Porcine) (Heparin -)  5,000 unit SQ BID [facility]


   Last Admin: 11/27/19 10:16 Dose:  5,000 unit


Meropenem 1 gm/ Dextrose  100 mls @ 200 mls/hr IVPB Q8H-IV [facility]


   Last Admin: 11/27/19 10:15 Dose:  200 mls/hr


Sodium Chloride (Normal Saline -)  1,000 mls @ 100 mls/hr IV ASDIR [facility]


   Last Admin: 11/27/19 03:28 Dose:  100 mls/hr


Lacosamide (Vimpat Liquid -)  200 mg GT BID [facility]


   Last Admin: 11/27/19 10:15 Dose:  200 mg


Levetiracetam (Keppra Oral Solution -)  2,000 mg GT BID [facility]


   Last Admin: 11/27/19 10:16 Dose:  2,000 mg


Levocarnitine (Carnitor Oral Solution -)  700 mg GT TID [facility]


   Last Admin: 11/27/19 06:35 Dose:  700 mg


Pyridoxine HCl (Vitamin B6 -)  100 mg GT DAILY [facility]


   Last Admin: 11/26/19 11:15 Dose:  100 mg


Zonisamide (Zonisamide)  300 mg GT BID [facility]


   Last Admin: 11/27/19 10:16 Dose:  300 mg











- Objective


Vital Signs: 


 Vital Signs











Temperature  97.4 F L  11/27/19 06:00


 


Pulse Rate  70   11/27/19 06:00


 


Respiratory Rate  20   11/27/19 06:00


 


Blood Pressure  126/90   11/27/19 06:00


 


O2 Sat by Pulse Oximetry (%)  96   11/27/19 09:00











Constitutional: Yes: No Distress, Calm


Eyes: Yes: Conjunctiva Clear


Cardiovascular: Yes: S1, S2


Respiratory: Yes: Regular, CTA Bilaterally


Gastrointestinal: Yes: Normal Bowel Sounds, Soft


Musculoskeletal: Yes: WNL


Extremities: Yes: WNL


Neurological: Yes: Alert


Psychiatric: Yes: Alert


Labs: 


 CBC, BMP





 11/26/19 07:10 





 11/26/19 07:10 





 INR, PTT











INR  0.97  (0.83-1.09)   11/22/19  12:30    














Assessment/Plan





Problem List





- Problems


(1) Hypothermia


Code(s): T68.XXXA - HYPOTHERMIA, INITIAL ENCOUNTER   





(2) Severe sepsis


Code(s): A41.9 - SEPSIS, UNSPECIFIED ORGANISM; R65.20 - SEVERE SEPSIS WITHOUT 

SEPTIC SHOCK   





(3) UTI (urinary tract infection)


Code(s): N39.0 - URINARY TRACT INFECTION, SITE NOT SPECIFIED   





(4) Seizure


Code(s): R56.9 - UNSPECIFIED CONVULSIONS   





Assessment/Plan





33 y.o. male with PMH of Cerebral Palsy with mental retardation, urethral 

stricture s/p SPC, seizure d.o., GERD, with previous UTIs and PNA sent from 

group home for shortness of breath with low oxygen saturation (88% on RA) and 

less responsiveness





Severe Sepsis


UTI 


Hypothermia


Urethral strictue with SPC


Cerebral Palsy/MR


Seizure d.o.





plan


repeat cx noted


continue current abx


will need it for some time


rest as per the team

## 2019-11-28 LAB
ALBUMIN SERPL-MCNC: 2.7 G/DL (ref 3.4–5)
ALP SERPL-CCNC: 103 U/L (ref 45–117)
ALT SERPL-CCNC: 27 U/L (ref 13–61)
ANION GAP SERPL CALC-SCNC: 7 MMOL/L (ref 8–16)
AST SERPL-CCNC: 16 U/L (ref 15–37)
BASOPHILS # BLD: 0.3 % (ref 0–2)
BILIRUB SERPL-MCNC: 0.2 MG/DL (ref 0.2–1)
BUN SERPL-MCNC: 11.2 MG/DL (ref 7–18)
CALCIUM SERPL-MCNC: 8.4 MG/DL (ref 8.5–10.1)
CHLORIDE SERPL-SCNC: 111 MMOL/L (ref 98–107)
CO2 SERPL-SCNC: 23 MMOL/L (ref 21–32)
CREAT SERPL-MCNC: 0.5 MG/DL (ref 0.55–1.3)
DEPRECATED RDW RBC AUTO: 13 % (ref 11.9–15.9)
EOSINOPHIL # BLD: 6.2 % (ref 0–4.5)
GLUCOSE SERPL-MCNC: 93 MG/DL (ref 74–106)
HCT VFR BLD CALC: 31.3 % (ref 35.4–49)
HGB BLD-MCNC: 10.6 GM/DL (ref 11.7–16.9)
LYMPHOCYTES # BLD: 42.9 % (ref 8–40)
MAGNESIUM SERPL-MCNC: 2.1 MG/DL (ref 1.8–2.4)
MCH RBC QN AUTO: 32.3 PG (ref 25.7–33.7)
MCHC RBC AUTO-ENTMCNC: 33.9 G/DL (ref 32–35.9)
MCV RBC: 95.4 FL (ref 80–96)
MONOCYTES # BLD AUTO: 5.1 % (ref 3.8–10.2)
NEUTROPHILS # BLD: 45.5 % (ref 42.8–82.8)
PLATELET # BLD AUTO: 174 K/MM3 (ref 134–434)
PMV BLD: 7.7 FL (ref 7.5–11.1)
POTASSIUM SERPLBLD-SCNC: 4.1 MMOL/L (ref 3.5–5.1)
PROT SERPL-MCNC: 6.6 G/DL (ref 6.4–8.2)
RBC # BLD AUTO: 3.28 M/MM3 (ref 4–5.6)
SODIUM SERPL-SCNC: 140 MMOL/L (ref 136–145)
WBC # BLD AUTO: 5.7 K/MM3 (ref 4–10)

## 2019-11-28 RX ADMIN — LEVOCARNITINE SCH MG: 1 SOLUTION ORAL at 00:13

## 2019-11-28 RX ADMIN — Medication SCH TAB: at 23:47

## 2019-11-28 RX ADMIN — MEROPENEM SCH MLS/HR: 1 INJECTION, POWDER, FOR SOLUTION INTRAVENOUS at 01:38

## 2019-11-28 RX ADMIN — LACOSAMIDE SCH MG: 10 SOLUTION ORAL at 23:49

## 2019-11-28 RX ADMIN — HEPARIN SODIUM SCH UNIT: 5000 INJECTION, SOLUTION INTRAVENOUS; SUBCUTANEOUS at 00:12

## 2019-11-28 RX ADMIN — AMOXICILLIN AND CLAVULANATE POTASSIUM SCH MG: 250; 62.5 POWDER, FOR SUSPENSION ORAL at 10:15

## 2019-11-28 RX ADMIN — SODIUM CHLORIDE SCH MLS/HR: 9 INJECTION, SOLUTION INTRAVENOUS at 17:37

## 2019-11-28 RX ADMIN — SODIUM CHLORIDE SCH MLS/HR: 9 INJECTION, SOLUTION INTRAVENOUS at 06:31

## 2019-11-28 RX ADMIN — ZONISAMIDE SCH MG: 100 CAPSULE ORAL at 00:19

## 2019-11-28 RX ADMIN — MEROPENEM SCH MLS/HR: 1 INJECTION, POWDER, FOR SOLUTION INTRAVENOUS at 10:11

## 2019-11-28 RX ADMIN — LEVETIRACETAM SCH MG: 100 SOLUTION ORAL at 23:47

## 2019-11-28 RX ADMIN — LACOSAMIDE SCH MG: 10 SOLUTION ORAL at 00:12

## 2019-11-28 RX ADMIN — LEVOCARNITINE SCH MG: 1 SOLUTION ORAL at 23:46

## 2019-11-28 RX ADMIN — LACOSAMIDE SCH MG: 10 SOLUTION ORAL at 10:16

## 2019-11-28 RX ADMIN — Medication SCH TAB: at 10:18

## 2019-11-28 RX ADMIN — ZONISAMIDE SCH MG: 100 CAPSULE ORAL at 23:52

## 2019-11-28 RX ADMIN — ZONISAMIDE SCH MG: 100 CAPSULE ORAL at 10:15

## 2019-11-28 RX ADMIN — LEVOCARNITINE SCH MG: 1 SOLUTION ORAL at 15:28

## 2019-11-28 RX ADMIN — AMOXICILLIN AND CLAVULANATE POTASSIUM SCH MG: 250; 62.5 POWDER, FOR SUSPENSION ORAL at 17:37

## 2019-11-28 RX ADMIN — LEVOCARNITINE SCH MG: 1 SOLUTION ORAL at 06:31

## 2019-11-28 RX ADMIN — Medication SCH MG: at 10:18

## 2019-11-28 RX ADMIN — LEVETIRACETAM SCH MG: 100 SOLUTION ORAL at 10:16

## 2019-11-28 RX ADMIN — HEPARIN SODIUM SCH UNIT: 5000 INJECTION, SOLUTION INTRAVENOUS; SUBCUTANEOUS at 23:45

## 2019-11-28 RX ADMIN — MEROPENEM SCH MLS/HR: 1 INJECTION, POWDER, FOR SOLUTION INTRAVENOUS at 17:36

## 2019-11-28 RX ADMIN — Medication SCH TAB: at 00:13

## 2019-11-28 RX ADMIN — HEPARIN SODIUM SCH UNIT: 5000 INJECTION, SOLUTION INTRAVENOUS; SUBCUTANEOUS at 10:15

## 2019-11-28 NOTE — PN
Physical Exam: 


SUBJECTIVE: Patient seen and examined. He appears comfortable lying in bed. 








OBJECTIVE:





 Vital Signs











 Period  Temp  Pulse  Resp  BP Sys/Curran  Pulse Ox


 


 Last 24 Hr  96.3 F-98.3 F  55-88  18-18  /45-79  96











GENERAL: The patient is awake, in no acute distress.


LUNGS: Breath sounds equal, clear to auscultation bilaterally, no wheezes, no 

crackles, no accessory muscle use. 


HEART: Regular rate and rhythm, S1, S2 without murmur, rub or gallop.


ABDOMEN: Soft, nontender, nondistended, normoactive bowel sounds, no guarding.


EXTREMITIES: 2+ pulses, warm, well-perfused, no edema, contracted. 














 Laboratory Results - last 24 hr











  11/27/19 11/27/19 11/28/19





  13:00 13:00 07:13


 


WBC  6.5   5.7


 


RBC  3.33 L   3.28 L


 


Hgb  10.8 L   10.6 L


 


Hct  31.9 L   31.3 L


 


MCV  95.8   95.4


 


MCH  32.3   32.3


 


MCHC  33.7   33.9


 


RDW  13.2   13.0


 


Plt Count  157   174


 


MPV  7.4 L D   7.7


 


Absolute Neuts (auto)  2.6   2.6


 


Neutrophils %  39.9 L   45.5


 


Lymphocytes %  49.1 H   42.9 H


 


Monocytes %  5.9   5.1


 


Eosinophils %  4.8 H D   6.2 H


 


Basophils %  0.3   0.3


 


Nucleated RBC %  0   0


 


Sodium   139 


 


Potassium   3.8 


 


Chloride   111 H 


 


Carbon Dioxide   24 


 


Anion Gap   4 L 


 


BUN   12.6 


 


Creatinine   0.5 L 


 


Est GFR (CKD-EPI)AfAm   163.87 


 


Est GFR (CKD-EPI)NonAf   141.39 


 


Random Glucose   96 


 


Calcium   8.3 L 


 


Magnesium   2.2 


 


Total Bilirubin   0.1 L 


 


AST   16 


 


ALT   28 


 


Alkaline Phosphatase   97 


 


Total Protein   6.8 


 


Albumin   2.8 L 














  11/28/19





  07:13


 


WBC 


 


RBC 


 


Hgb 


 


Hct 


 


MCV 


 


MCH 


 


MCHC 


 


RDW 


 


Plt Count 


 


MPV 


 


Absolute Neuts (auto) 


 


Neutrophils % 


 


Lymphocytes % 


 


Monocytes % 


 


Eosinophils % 


 


Basophils % 


 


Nucleated RBC % 


 


Sodium  140


 


Potassium  4.1


 


Chloride  111 H


 


Carbon Dioxide  23


 


Anion Gap  7 L


 


BUN  11.2


 


Creatinine  0.5 L


 


Est GFR (CKD-EPI)AfAm  163.87


 


Est GFR (CKD-EPI)NonAf  141.39


 


Random Glucose  93


 


Calcium  8.4 L


 


Magnesium  2.1


 


Total Bilirubin  0.2


 


AST  16


 


ALT  27


 


Alkaline Phosphatase  103


 


Total Protein  6.6


 


Albumin  2.7 L








Active Medications











Generic Name Dose Route Start Last Admin





  Trade Name Mario  PRN Reason Stop Dose Admin


 


Amoxicillin/Clavulanate Potassium  500 mg  11/24/19 17:30  11/28/19 10:15





  Augmentin 250 Mg/5 Ml Oral Suspension -  GT   500 mg





  BID@0800,1730 RAKAN   Administration





     





     





     





     


 


Calcium Carbonate/Cholecalciferol  1 tab  11/22/19 22:00  11/28/19 10:18





  Os-Dale 500+D -  GT   1 tab





  BID RAKAN   Administration





     





     





     





     


 


Clobazam  10 mg  11/22/19 22:00  11/28/19 10:21





  Onfi -  GT   10 mg





  BID RAKAN   Administration





     





     





     





     


 


Heparin Sodium (Porcine)  5,000 unit  11/22/19 22:00  11/28/19 10:15





  Heparin -  SQ   5,000 unit





  BID RAKAN   Administration





     





     





     





     


 


Meropenem 1 gm/ Dextrose  100 mls @ 200 mls/hr  11/24/19 16:00  11/28/19 10:11





  IVPB   200 mls/hr





  Q8H-IV RAKAN   Administration





     





     





     





     


 


Sodium Chloride  1,000 mls @ 100 mls/hr  11/27/19 17:57  11/28/19 06:31





  Normal Saline -  IV   100 mls/hr





  ASDIR RAKAN   Administration





     





     





     





     


 


Lacosamide  200 mg  11/22/19 22:00  11/28/19 10:16





  Vimpat Liquid -  GT   200 mg





  BID RAKAN   Administration





     





     





     





     


 


Levetiracetam  2,000 mg  11/22/19 22:00  11/28/19 10:16





  Keppra Oral Solution -  GT   2,000 mg





  BID RAKAN   Administration





     





     





     





     


 


Levocarnitine  700 mg  11/22/19 22:00  11/28/19 06:31





  Carnitor Oral Solution -  GT   700 mg





  TID RAKAN   Administration





     





     





     





     


 


Pyridoxine HCl  100 mg  11/26/19 10:00  11/28/19 10:18





  Vitamin B6 -  GT   100 mg





  DAILY RAKAN   Administration





     





     





     





     


 


Zonisamide  300 mg  11/22/19 22:00  11/28/19 10:15





  Zonisamide  GT   300 mg





  BID RAKAN   Administration





     





     





     





     











ASSESSMENT/PLAN:


This is a 33 year old man with a history of cerebral palsy, mental retardation, 

urethral stricture, suprapubic catheter, seizure disorder who was sent to the 

ED from [facility] for dyspnea and hypoxia.





1. ESBL E. coli and E. faecalis UTI


   - 1 of 2 blood cultures positive for gram positive cocci - probable 

contaminant - follow up identification


   - Continue meropenem and Augmentin


2. Hypothermia


   - Continue to use warming blanket as needed


3. Hypotension


   - Improved


   - Decrease IV fluid and monitor BP


3. Dehydration


   - Improved


4. Cerebral palsy/mental retardation


5. Seizure disorder   


   - Continue Zonegran, Onfi, Keppra, Vimpat


6. Anemia


   - Iron studies, B12, folate normal


   - TSH normal 9/2019


   - Hgb stable


7. Nutrition


   - Continue Jevity 1.5 via G tube





Visit type





- Emergency Visit


Emergency Visit: Yes


ED Registration Date: 11/22/19


Care time: The patient presented to the Emergency Department on the above date 

and was hospitalized for further evaluation of their emergent condition.





- New Patient


This patient is new to me today: No





- Critical Care


Critical Care patient: No





- Discharge Referral


Referred to [facility] No

## 2019-11-29 LAB
ALBUMIN SERPL-MCNC: 2.8 G/DL (ref 3.4–5)
ALP SERPL-CCNC: 106 U/L (ref 45–117)
ALT SERPL-CCNC: 27 U/L (ref 13–61)
ANION GAP SERPL CALC-SCNC: 5 MMOL/L (ref 8–16)
AST SERPL-CCNC: 20 U/L (ref 15–37)
BASOPHILS # BLD: 0.6 % (ref 0–2)
BILIRUB SERPL-MCNC: 0.2 MG/DL (ref 0.2–1)
BUN SERPL-MCNC: 11.3 MG/DL (ref 7–18)
CALCIUM SERPL-MCNC: 8.5 MG/DL (ref 8.5–10.1)
CHLORIDE SERPL-SCNC: 111 MMOL/L (ref 98–107)
CO2 SERPL-SCNC: 23 MMOL/L (ref 21–32)
CREAT SERPL-MCNC: 0.4 MG/DL (ref 0.55–1.3)
DEPRECATED RDW RBC AUTO: 12.7 % (ref 11.9–15.9)
EOSINOPHIL # BLD: 5.8 % (ref 0–4.5)
GLUCOSE SERPL-MCNC: 93 MG/DL (ref 74–106)
HCT VFR BLD CALC: 30.1 % (ref 35.4–49)
HGB BLD-MCNC: 10.3 GM/DL (ref 11.7–16.9)
LYMPHOCYTES # BLD: 50.3 % (ref 8–40)
MAGNESIUM SERPL-MCNC: 2.2 MG/DL (ref 1.8–2.4)
MCH RBC QN AUTO: 32.5 PG (ref 25.7–33.7)
MCHC RBC AUTO-ENTMCNC: 34.2 G/DL (ref 32–35.9)
MCV RBC: 95.2 FL (ref 80–96)
MONOCYTES # BLD AUTO: 4.1 % (ref 3.8–10.2)
NEUTROPHILS # BLD: 39.2 % (ref 42.8–82.8)
PLATELET # BLD AUTO: 183 K/MM3 (ref 134–434)
PMV BLD: 7.6 FL (ref 7.5–11.1)
POTASSIUM SERPLBLD-SCNC: 3.9 MMOL/L (ref 3.5–5.1)
PROT SERPL-MCNC: 6.6 G/DL (ref 6.4–8.2)
RBC # BLD AUTO: 3.16 M/MM3 (ref 4–5.6)
SODIUM SERPL-SCNC: 139 MMOL/L (ref 136–145)
WBC # BLD AUTO: 7 K/MM3 (ref 4–10)

## 2019-11-29 RX ADMIN — Medication SCH MG: at 11:04

## 2019-11-29 RX ADMIN — LACOSAMIDE SCH MG: 10 SOLUTION ORAL at 11:01

## 2019-11-29 RX ADMIN — SODIUM CHLORIDE SCH MLS/HR: 9 INJECTION, SOLUTION INTRAVENOUS at 13:24

## 2019-11-29 RX ADMIN — LEVETIRACETAM SCH MG: 100 SOLUTION ORAL at 22:39

## 2019-11-29 RX ADMIN — ZONISAMIDE SCH MG: 100 CAPSULE ORAL at 22:40

## 2019-11-29 RX ADMIN — HEPARIN SODIUM SCH UNIT: 5000 INJECTION, SOLUTION INTRAVENOUS; SUBCUTANEOUS at 11:02

## 2019-11-29 RX ADMIN — LEVOCARNITINE SCH MG: 1 SOLUTION ORAL at 13:24

## 2019-11-29 RX ADMIN — LEVOCARNITINE SCH MG: 1 SOLUTION ORAL at 22:39

## 2019-11-29 RX ADMIN — Medication SCH TAB: at 22:40

## 2019-11-29 RX ADMIN — AMOXICILLIN AND CLAVULANATE POTASSIUM SCH MG: 250; 62.5 POWDER, FOR SUSPENSION ORAL at 11:01

## 2019-11-29 RX ADMIN — Medication SCH TAB: at 11:04

## 2019-11-29 RX ADMIN — AMOXICILLIN AND CLAVULANATE POTASSIUM SCH MG: 250; 62.5 POWDER, FOR SUSPENSION ORAL at 18:09

## 2019-11-29 RX ADMIN — HEPARIN SODIUM SCH UNIT: 5000 INJECTION, SOLUTION INTRAVENOUS; SUBCUTANEOUS at 22:39

## 2019-11-29 RX ADMIN — SODIUM CHLORIDE SCH: 9 INJECTION, SOLUTION INTRAVENOUS at 18:08

## 2019-11-29 RX ADMIN — MEROPENEM SCH MLS/HR: 1 INJECTION, POWDER, FOR SOLUTION INTRAVENOUS at 18:09

## 2019-11-29 RX ADMIN — LEVOCARNITINE SCH MG: 1 SOLUTION ORAL at 07:02

## 2019-11-29 RX ADMIN — LEVETIRACETAM SCH MG: 100 SOLUTION ORAL at 11:02

## 2019-11-29 RX ADMIN — LACOSAMIDE SCH MG: 10 SOLUTION ORAL at 22:38

## 2019-11-29 RX ADMIN — MEROPENEM SCH MLS/HR: 1 INJECTION, POWDER, FOR SOLUTION INTRAVENOUS at 11:03

## 2019-11-29 RX ADMIN — MEROPENEM SCH MLS/HR: 1 INJECTION, POWDER, FOR SOLUTION INTRAVENOUS at 01:12

## 2019-11-29 RX ADMIN — ZONISAMIDE SCH MG: 100 CAPSULE ORAL at 11:03

## 2019-11-29 NOTE — PN
Progress Note, Physician


History of Present Illness: 





stable


still hypothermic





- Current Medication List


Current Medications: 


Active Medications





Amoxicillin/Clavulanate Potassium (Augmentin 250 Mg/5 Ml Oral Suspension -)  

500 mg GT BID@0800,[facility]30 [facility]


   Last Admin: 11/29/19 11:01 Dose:  500 mg


Calcium Carbonate/Cholecalciferol (Os-Dale 500+D -)  1 tab GT BID [facility]


   Last Admin: 11/29/19 11:04 Dose:  1 tab


Clobazam (Onfi -)  10 mg GT BID [facility]


   Last Admin: 11/29/19 11:03 Dose:  10 mg


Heparin Sodium (Porcine) (Heparin -)  5,000 unit SQ BID [facility]


   Last Admin: 11/29/19 11:02 Dose:  5,000 unit


Meropenem 1 gm/ Dextrose  100 mls @ 200 mls/hr IVPB [facility]8H-IV [facility]


   Last Admin: 11/29/19 11:03 Dose:  200 mls/hr


Sodium Chloride (Normal Saline -)  1,000 mls @ 75 mls/hr IV ASDIR [facility]


   Last Admin: 11/29/19 13:24 Dose:  75 mls/hr


Lacosamide (Vimpat Liquid -)  200 mg GT BID [facility]


   Last Admin: 11/29/19 11:01 Dose:  200 mg


Levetiracetam (Keppra Oral Solution -)  2,000 mg GT BID [facility]


   Last Admin: 11/29/19 11:02 Dose:  2,000 mg


Levocarnitine (Carnitor Oral Solution -)  700 mg GT TID [facility]


   Last Admin: 11/29/19 13:24 Dose:  700 mg


Pyridoxine HCl (Vitamin B6 -)  100 mg GT DAILY [facility]


   Last Admin: 11/29/19 11:04 Dose:  100 mg


Zonisamide (Zonisamide)  300 mg GT BID [facility]


   Last Admin: 11/29/19 11:03 Dose:  300 mg











- Objective


Vital Signs: 


 Vital Signs











Temperature  98.5 F   11/29/19 06:25


 


Pulse Rate  60   11/29/19 06:25


 


Respiratory Rate  18   11/29/19 06:25


 


Blood Pressure  133/81   11/29/19 06:25


 


O2 Sat by Pulse Oximetry (%)  95   11/28/19 21:00











Constitutional: Yes: No Distress, Calm


Cardiovascular: Yes: S1, S2


Respiratory: Yes: Regular, CTA Bilaterally


Gastrointestinal: Yes: Normal Bowel Sounds, Soft


Musculoskeletal: Yes: WNL


Extremities: Yes: Other (contracted)


Neurological: Yes: Alert, Other


Psychiatric: Yes: Other


Labs: 


 CBC, BMP





 11/29/19 08:30 





 11/29/19 08:30 





 INR, PTT











INR  0.97  (0.83-1.09)   11/22/19  12:30    














Assessment/Plan





Problem List





- Problems


(1) Hypothermia


Code(s): T68.XXXA - HYPOTHERMIA, INITIAL ENCOUNTER   





(2) Severe sepsis


Code(s): A41.9 - SEPSIS, UNSPECIFIED ORGANISM; R65.20 - SEVERE SEPSIS WITHOUT 

SEPTIC SHOCK   





(3) UTI (urinary tract infection)


Code(s): N39.0 - URINARY TRACT INFECTION, SITE NOT SPECIFIED   





(4) Seizure


Code(s): R56.9 - UNSPECIFIED CONVULSIONS   





Assessment/Plan





33 y.o. male with PMH of Cerebral Palsy with mental retardation, urethral 

stricture s/p SPC, seizure d.o., GERD, with previous UTIs and PNA sent from 

group home for shortness of breath with low oxygen saturation (88% on RA) and 

less responsiveness





Severe Sepsis


UTI 


Hypothermia


Urethral strictue with SPC


Cerebral Palsy/MR


Seizure d.o.





plan


continue current abx


duration will be for 2 weeks '


warm as necessary


rest as per the team

## 2019-11-29 NOTE — PN
Physical Exam: 


SUBJECTIVE: Patient seen and examined at the bedside.  





OBJECTIVE:


Patient is a 33 year old male form [facility] with a significant 

past medical history of seizures, microcephaly, urethral stricture s/p 

suprapubic catheter placement and MR.  He presents to the ED on 11/22/2019 

accompanied by his HHA with hypothermia and hypotension and was found to have 

ESBL in urine and will need 2 weeks of IV antibiotics per ID.





 Vital Signs











 Period  Temp  Pulse  Resp  BP Sys/Curran  Pulse Ox


 


 Last 24 Hr  95.9 F-99.7 F  60-86  18-18  102-140/56-95  95








GENERAL: lethargic, in no acute distress.


HEAD: hx of microcephaly, head w/o trauma


EYES: Pupils equal, round and reactive to light,  


EARS, NOSE, THROAT: Ears normal, nares patent,  .


NECK: Normal range of motion


LUNGS: Breath sounds equal/diminished clear to auscultation bilaterally. No 

wheezes


HEART: Regular rate and rhythm 


ABDOMEN: Soft, nontender, not distended, +peg tube.  suprapubic catheter 

draining clear yellow urine with foul odor.


MUSCULOSKELETAL: bed bound, countracted lower ext.


UPPER EXTREMITIES:  . No peripheral edema.


LOWER EXTREMITIES: contracted- no evidence of DTI on either heel.


NEUROLOGICAL:  nonverbal


PSYCHIATRIC:  lethargic


SKIN: Warm, dry, normal turgor, no rashes or lesions noted, normal capillary 

refill. 


 


 Laboratory Results - last 24 hr











  11/29/19 11/29/19





  08:30 08:30


 


WBC  7.0 


 


RBC  3.16 L 


 


Hgb  10.3 L 


 


Hct  30.1 L 


 


MCV  95.2 


 


MCH  32.5 


 


MCHC  34.2 


 


RDW  12.7 


 


Plt Count  183 


 


MPV  7.6 


 


Absolute Neuts (auto)  2.7 


 


Neutrophils %  39.2 L 


 


Lymphocytes %  50.3 H 


 


Monocytes %  4.1 


 


Eosinophils %  5.8 H 


 


Basophils %  0.6 


 


Nucleated RBC %  0 


 


Sodium   139


 


Potassium   3.9


 


Chloride   111 H


 


Carbon Dioxide   23


 


Anion Gap   5 L


 


BUN   11.3


 


Creatinine   0.4 L


 


Est GFR (CKD-EPI)AfAm   179.61


 


Est GFR (CKD-EPI)NonAf   154.97


 


Random Glucose   93


 


Calcium   8.5


 


Magnesium   2.2


 


Total Bilirubin   0.2


 


AST   20


 


ALT   27


 


Alkaline Phosphatase   106


 


Total Protein   6.6


 


Albumin   2.8 L








Active Medications











Generic Name Dose Route Start Last Admin





  Trade Name Freq  PRN Reason Stop Dose Admin


 


Amoxicillin/Clavulanate Potassium  500 mg  11/24/19 17:30  11/29/19 11:01





  Augmentin 250 Mg/5 Ml Oral Suspension -  GT   500 mg





  BID@0800,1730 RAKAN   Administration





     





     





     





     


 


Calcium Carbonate/Cholecalciferol  1 tab  11/22/19 22:00  11/29/19 11:04





  Os-Dale 500+D -  GT   1 tab





  BID RAKAN   Administration





     





     





     





     


 


Clobazam  10 mg  11/22/19 22:00  11/29/19 11:03





  Onfi -  GT   10 mg





  BID RAKAN   Administration





     





     





     





     


 


Heparin Sodium (Porcine)  5,000 unit  11/22/19 22:00  11/29/19 11:02





  Heparin -  SQ   5,000 unit





  BID RAKAN   Administration





     





     





     





     


 


Meropenem 1 gm/ Dextrose  100 mls @ 200 mls/hr  11/24/19 16:00  11/29/19 11:03





  IVPB   200 mls/hr





  Q8H-IV RAKAN   Administration





     





     





     





     


 


Sodium Chloride  1,000 mls @ 75 mls/hr  11/28/19 15:50  11/29/19 13:24





  Normal Saline -  IV   75 mls/hr





  ASDIR RAKAN   Administration





     





     





     





     


 


Lacosamide  200 mg  11/22/19 22:00  11/29/19 11:01





  Vimpat Liquid -  GT   200 mg





  BID RAKAN   Administration





     





     





     





     


 


Levetiracetam  2,000 mg  11/22/19 22:00  11/29/19 11:02





  Keppra Oral Solution -  GT   2,000 mg





  BID RAKAN   Administration





     





     





     





     


 


Levocarnitine  700 mg  11/22/19 22:00  11/29/19 13:24





  Carnitor Oral Solution -  GT   700 mg





  TID RAKAN   Administration





     





     





     





     


 


Pyridoxine HCl  100 mg  11/26/19 10:00  11/29/19 11:04





  Vitamin B6 -  GT   100 mg





  DAILY RAKAN   Administration





     





     





     





     


 


Zonisamide  300 mg  11/22/19 22:00  11/29/19 11:03





  Zonisamide  GT   300 mg





  BID RAKAN   Administration





     





     





     





     











ASSESSMENT/PLAN:








Problem List





- Problems


(1) Hypothermia


Assessment/Plan: 


still hypothermic, monitor and apply varsha hugger if needed.





Code(s): T68.XXXA - HYPOTHERMIA, INITIAL ENCOUNTER   





(2) UTI (urinary tract infection)


Assessment/Plan: 


Urine culture growing esbl ecoli and e faecalis.  On Meropenem and Augmentin.  

per dr Garcia, patient will need an additional 2 weeks of IV antibiotics.  


Code(s): N39.0 - URINARY TRACT INFECTION, SITE NOT SPECIFIED   





(3) Seizure


Assessment/Plan: 


continue home seizure medications via g tube.  


Code(s): R56.9 - UNSPECIFIED CONVULSIONS   





(4) Prophylactic measure


Assessment/Plan: 


fen


ivf NS @ 50cc/hr for hypotension continue jevity feeds.


full code








Code(s): Z29.9 - ENCOUNTER FOR PROPHYLACTIC MEASURES, UNSPECIFIED   





Visit type





- Emergency Visit


Emergency Visit: Yes


ED Registration Date: 11/22/19


Care time: The patient presented to the Emergency Department on the above date 

and was hospitalized for further evaluation of their emergent condition.





- New Patient


This patient is new to me today: No





- Critical Care


Critical Care patient: No





- Discharge Referral


Referred to [facility] No

## 2019-11-30 LAB
ALBUMIN SERPL-MCNC: 2.9 G/DL (ref 3.4–5)
ALP SERPL-CCNC: 109 U/L (ref 45–117)
ALT SERPL-CCNC: 30 U/L (ref 13–61)
ANION GAP SERPL CALC-SCNC: 4 MMOL/L (ref 8–16)
AST SERPL-CCNC: 19 U/L (ref 15–37)
BASOPHILS # BLD: 0.3 % (ref 0–2)
BILIRUB SERPL-MCNC: 0.2 MG/DL (ref 0.2–1)
BUN SERPL-MCNC: 13.2 MG/DL (ref 7–18)
CALCIUM SERPL-MCNC: 8.6 MG/DL (ref 8.5–10.1)
CHLORIDE SERPL-SCNC: 111 MMOL/L (ref 98–107)
CO2 SERPL-SCNC: 24 MMOL/L (ref 21–32)
CREAT SERPL-MCNC: 0.5 MG/DL (ref 0.55–1.3)
DEPRECATED RDW RBC AUTO: 13.1 % (ref 11.9–15.9)
EOSINOPHIL # BLD: 5.1 % (ref 0–4.5)
GLUCOSE SERPL-MCNC: 73 MG/DL (ref 74–106)
HCT VFR BLD CALC: 31.5 % (ref 35.4–49)
HGB BLD-MCNC: 10.6 GM/DL (ref 11.7–16.9)
LYMPHOCYTES # BLD: 49.6 % (ref 8–40)
MAGNESIUM SERPL-MCNC: 2.3 MG/DL (ref 1.8–2.4)
MCH RBC QN AUTO: 32.3 PG (ref 25.7–33.7)
MCHC RBC AUTO-ENTMCNC: 33.7 G/DL (ref 32–35.9)
MCV RBC: 95.6 FL (ref 80–96)
MONOCYTES # BLD AUTO: 4.8 % (ref 3.8–10.2)
NEUTROPHILS # BLD: 40.2 % (ref 42.8–82.8)
PLATELET # BLD AUTO: 198 K/MM3 (ref 134–434)
PMV BLD: 7.3 FL (ref 7.5–11.1)
POTASSIUM SERPLBLD-SCNC: 4.2 MMOL/L (ref 3.5–5.1)
PROT SERPL-MCNC: 6.7 G/DL (ref 6.4–8.2)
RBC # BLD AUTO: 3.3 M/MM3 (ref 4–5.6)
SODIUM SERPL-SCNC: 140 MMOL/L (ref 136–145)
WBC # BLD AUTO: 6.8 K/MM3 (ref 4–10)

## 2019-11-30 RX ADMIN — MEROPENEM SCH MLS/HR: 1 INJECTION, POWDER, FOR SOLUTION INTRAVENOUS at 01:13

## 2019-11-30 RX ADMIN — AMOXICILLIN AND CLAVULANATE POTASSIUM SCH MG: 250; 62.5 POWDER, FOR SUSPENSION ORAL at 18:08

## 2019-11-30 RX ADMIN — LEVOCARNITINE SCH MG: 1 SOLUTION ORAL at 07:25

## 2019-11-30 RX ADMIN — LACOSAMIDE SCH MG: 10 SOLUTION ORAL at 11:04

## 2019-11-30 RX ADMIN — SODIUM CHLORIDE SCH: 9 INJECTION, SOLUTION INTRAVENOUS at 18:08

## 2019-11-30 RX ADMIN — Medication SCH MG: at 11:06

## 2019-11-30 RX ADMIN — MEROPENEM SCH MLS/HR: 1 INJECTION, POWDER, FOR SOLUTION INTRAVENOUS at 11:06

## 2019-11-30 RX ADMIN — Medication SCH TAB: at 11:06

## 2019-11-30 RX ADMIN — LEVOCARNITINE SCH MG: 1 SOLUTION ORAL at 15:37

## 2019-11-30 RX ADMIN — AMOXICILLIN AND CLAVULANATE POTASSIUM SCH MG: 250; 62.5 POWDER, FOR SUSPENSION ORAL at 10:09

## 2019-11-30 RX ADMIN — MEROPENEM SCH MLS/HR: 1 INJECTION, POWDER, FOR SOLUTION INTRAVENOUS at 18:08

## 2019-11-30 RX ADMIN — ZONISAMIDE SCH MG: 100 CAPSULE ORAL at 11:08

## 2019-11-30 RX ADMIN — SODIUM CHLORIDE SCH MLS/HR: 9 INJECTION, SOLUTION INTRAVENOUS at 08:29

## 2019-11-30 RX ADMIN — LEVETIRACETAM SCH MG: 100 SOLUTION ORAL at 11:04

## 2019-11-30 RX ADMIN — HEPARIN SODIUM SCH UNIT: 5000 INJECTION, SOLUTION INTRAVENOUS; SUBCUTANEOUS at 11:06

## 2019-11-30 NOTE — PN
Physical Exam: 


SUBJECTIVE: Patient seen and examined at the bedside.  patient placed back on  

varsha hugger.





OBJECTIVE:





Patient is a 33 year old male form [facility] with a significant 

past medical history of seizures, microcephaly, urethral stricture s/p 

suprapubic catheter placement and MR.  He presents to the ED on 11/22/2019 

accompanied by his HHA with hypothermia and hypotension and was found to have 

ESBL in urine and will need 2 weeks of IV antibiotics per ID.





 Vital Signs











 Period  Temp  Pulse  Resp  BP Sys/Curran  Pulse Ox


 


 Last 24 Hr  97.7 F-98.7 F  71-83  18-19  101-144/47-82  96








GENERAL: lethargic, in no acute distress.


HEAD: hx of microcephaly, head w/o trauma


EYES: Pupils equal, round and reactive to light,  


EARS, NOSE, THROAT: Ears normal, nares patent,  .


NECK: Normal range of motion


LUNGS: Breath sounds equal/diminished clear to auscultation bilaterally. No 

wheezes


HEART: Regular rate and rhythm 


ABDOMEN: Soft, nontender, not distended, +peg tube.  suprapubic catheter 

draining clear yellow urine with foul odor.


MUSCULOSKELETAL: bed bound, countracted lower ext.


UPPER EXTREMITIES:  . No peripheral edema.


LOWER EXTREMITIES: contracted- no evidence of DTI on either heel.


NEUROLOGICAL:  nonverbal


PSYCHIATRIC:  lethargic


SKIN: Warm, dry, normal turgor, no rashes or lesions noted, normal capillary 

refill. 


 


 Laboratory Results - last 24 hr











  11/30/19 11/30/19





  09:20 09:20


 


WBC  6.8 


 


RBC  3.30 L 


 


Hgb  10.6 L 


 


Hct  31.5 L 


 


MCV  95.6 


 


MCH  32.3 


 


MCHC  33.7 


 


RDW  13.1 


 


Plt Count  198 


 


MPV  7.3 L 


 


Absolute Neuts (auto)  2.7 


 


Neutrophils %  40.2 L 


 


Lymphocytes %  49.6 H 


 


Monocytes %  4.8 


 


Eosinophils %  5.1 H 


 


Basophils %  0.3 


 


Nucleated RBC %  0 


 


Sodium   140


 


Potassium   4.2


 


Chloride   111 H


 


Carbon Dioxide   24


 


Anion Gap   4 L


 


BUN   13.2


 


Creatinine   0.5 L


 


Est GFR (CKD-EPI)AfAm   163.87


 


Est GFR (CKD-EPI)NonAf   141.39


 


Random Glucose   73 L


 


Calcium   8.6


 


Magnesium   2.3


 


Total Bilirubin   0.2


 


AST   19


 


ALT   30


 


Alkaline Phosphatase   109


 


Total Protein   6.7


 


Albumin   2.9 L








Active Medications











Generic Name Dose Route Start Last Admin





  Trade Name Sagarq  PRN Reason Stop Dose Admin


 


Amoxicillin/Clavulanate Potassium  500 mg  11/24/19 17:30  11/30/19 10:09





  Augmentin 250 Mg/5 Ml Oral Suspension -  GT   500 mg





  BID@0800,1730 RAKAN   Administration





     





     





     





     


 


Calcium Carbonate/Cholecalciferol  1 tab  11/22/19 22:00  11/30/19 11:06





  Os-Dale 500+D -  GT   1 tab





  BID RAKAN   Administration





     





     





     





     


 


Clobazam  10 mg  11/22/19 22:00  11/30/19 11:26





  Onfi -  GT   10 mg





  BID RAKAN   Administration





     





     





     





     


 


Heparin Sodium (Porcine)  5,000 unit  11/22/19 22:00  11/30/19 11:06





  Heparin -  SQ   5,000 unit





  BID RAKAN   Administration





     





     





     





     


 


Meropenem 1 gm/ Dextrose  100 mls @ 200 mls/hr  11/24/19 16:00  11/30/19 11:06





  IVPB   200 mls/hr





  Q8H-IV RAKAN   Administration





     





     





     





     


 


Sodium Chloride  1,000 mls @ 75 mls/hr  11/28/19 15:50  11/30/19 08:29





  Normal Saline -  IV   75 mls/hr





  ASDIR RAKAN   Administration





     





     





     





     


 


Lacosamide  200 mg  11/22/19 22:00  11/30/19 11:04





  Vimpat Liquid -  GT   200 mg





  BID RAKAN   Administration





     





     





     





     


 


Levetiracetam  2,000 mg  11/22/19 22:00  11/30/19 11:04





  Keppra Oral Solution -  GT   2,000 mg





  BID RAKAN   Administration





     





     





     





     


 


Levocarnitine  700 mg  11/22/19 22:00  11/30/19 07:25





  Carnitor Oral Solution -  GT   700 mg





  TID RAKAN   Administration





     





     





     





     


 


Pyridoxine HCl  100 mg  11/26/19 10:00  11/30/19 11:06





  Vitamin B6 -  GT   100 mg





  DAILY RAKAN   Administration





     





     





     





     


 


Zonisamide  300 mg  11/22/19 22:00  11/30/19 11:08





  Zonisamide  GT   300 mg





  BID RAKAN   Administration





     





     





     





     











ASSESSMENT/PLAN:








Problem List





- Problems


(1) Enterococcus faecalis infection


Assessment/Plan: 


11/24 urine culture with entercoccus faecalis, pseudomonas aerugonosa and yeast 

organism


11/22 urine culture with esbl, entercoccus faecalis


on meropenem and augmentin per ID


per ID will need 2 weeks of antibiotic therapy, therefore may need picc line


continue ivf hydration


Code(s): B95.2 - ENTEROCOCCUS AS THE CAUSE OF DISEASES CLASSIFIED ELSEWHERE   





(2) Urinary tract infection due to ESBL Klebsiella


Assessment/Plan: 


11/24 urine culture with entercoccus faecalis, pseudomonas aerugonosa and yeast 

organism


11/22 urine culture with esbl, entercoccus faecalis


on meropenem and augmentin per ID


per ID will need 2 weeks of antibiotic therapy, therefore may need picc line


continue ivf hydration


Code(s): N39.0 - URINARY TRACT INFECTION, SITE NOT SPECIFIED; B96.89 - OTH 

BACTERIAL AGENTS AS THE CAUSE OF DISEASES CLASSD ELSWHR   





(3) Hypothermia


Assessment/Plan: 


still hypothermic, monitor and apply varsha hugger if needed.





Code(s): T68.XXXA - HYPOTHERMIA, INITIAL ENCOUNTER   





(4) UTI (urinary tract infection)


Assessment/Plan: 


Urine culture growing esbl ecoli and e faecalis.  On Meropenem and Augmentin.  

per dr Garcia, patient will need an additional 2 weeks of IV antibiotics.  


Code(s): N39.0 - URINARY TRACT INFECTION, SITE NOT SPECIFIED   





(5) Seizure


Assessment/Plan: 


continue home seizure medications via g tube.  


Code(s): R56.9 - UNSPECIFIED CONVULSIONS   





(6) Cerebral palsy


Assessment/Plan: 


supportive care


Code(s): G80.9 - CEREBRAL PALSY, UNSPECIFIED   





(7) Hypotension


Assessment/Plan: 


still hypotensive today, increased ivf to 100cc/hr


Code(s): I95.9 - HYPOTENSION, UNSPECIFIED   





(8) Prophylactic measure


Assessment/Plan: 


fen


ivf NS @ 100cc/hr for hypotension continue jevity feeds.


full code








Code(s): Z29.9 - ENCOUNTER FOR PROPHYLACTIC MEASURES, UNSPECIFIED   





Visit type





- Emergency Visit


Emergency Visit: Yes


ED Registration Date: 11/22/19


Care time: The patient presented to the Emergency Department on the above date 

and was hospitalized for further evaluation of their emergent condition.





- New Patient


This patient is new to me today: No





- Critical Care


Critical Care patient: No





- Discharge Referral


Referred to [facility] No

## 2019-11-30 NOTE — PN
Progress Note, Physician


History of Present Illness: 





stable


no new issues





- Current Medication List


Current Medications: 


Active Medications





Amoxicillin/Clavulanate Potassium (Augmentin 250 Mg/5 Ml Oral Suspension -)  

500 mg GT BID@0800,1730 [facility]


   Last Admin: 11/29/19 18:09 Dose:  500 mg


Calcium Carbonate/Cholecalciferol (Os-Dale 500+D -)  1 tab GT BID [facility]


   Last Admin: 11/29/19 22:40 Dose:  1 tab


Clobazam (Onfi -)  10 mg GT BID [facility]


   Last Admin: 11/29/19 22:39 Dose:  10 mg


Heparin Sodium (Porcine) (Heparin -)  5,000 unit SQ BID [facility]


   Last Admin: 11/29/19 22:39 Dose:  5,000 unit


Meropenem 1 gm/ Dextrose  100 mls @ 200 mls/hr IVPB Q8H-IV [facility]


   Last Admin: 11/30/19 01:13 Dose:  200 mls/hr


Sodium Chloride (Normal Saline -)  1,000 mls @ 75 mls/hr IV ASDIR [facility]


   Last Admin: 11/30/19 08:29 Dose:  75 mls/hr


Lacosamide (Vimpat Liquid -)  200 mg GT BID [facility]


   Last Admin: 11/29/19 22:38 Dose:  200 mg


Levetiracetam (Keppra Oral Solution -)  2,000 mg GT BID [facility]


   Last Admin: 11/29/19 22:39 Dose:  2,000 mg


Levocarnitine (Carnitor Oral Solution -)  700 mg GT TID [facility]


   Last Admin: 11/30/19 07:25 Dose:  700 mg


Pyridoxine HCl (Vitamin B6 -)  100 mg GT DAILY [facility]


   Last Admin: 11/29/19 11:04 Dose:  100 mg


Zonisamide (Zonisamide)  300 mg GT BID [facility]


   Last Admin: 11/29/19 22:40 Dose:  300 mg











- Objective


Vital Signs: 


 Vital Signs











Temperature  98.7 F   11/30/19 06:00


 


Pulse Rate  75   11/30/19 06:00


 


Respiratory Rate  18   11/30/19 06:00


 


Blood Pressure  144/73   11/30/19 06:00


 


O2 Sat by Pulse Oximetry (%)  96   11/29/19 21:00











Constitutional: Yes: No Distress, Calm


Cardiovascular: Yes: Regular Rate and Rhythm


Respiratory: Yes: Regular, CTA Bilaterally


Gastrointestinal: Yes: Normal Bowel Sounds, Soft


Musculoskeletal: Yes: WNL


Extremities: Yes: Other


Neurological: Yes: Alert, Other


Psychiatric: Yes: Other


Labs: 


 CBC, BMP





 11/30/19 09:20 





 11/30/19 09:20 





 INR, PTT











INR  0.97  (0.83-1.09)   11/22/19  12:30    














Assessment/Plan





Problem List





- Problems


(1) Hypothermia


Code(s): T68.XXXA - HYPOTHERMIA, INITIAL ENCOUNTER   





(2) Severe sepsis


Code(s): A41.9 - SEPSIS, UNSPECIFIED ORGANISM; R65.20 - SEVERE SEPSIS WITHOUT 

SEPTIC SHOCK   





(3) UTI (urinary tract infection)


Code(s): N39.0 - URINARY TRACT INFECTION, SITE NOT SPECIFIED   





(4) Seizure


Code(s): R56.9 - UNSPECIFIED CONVULSIONS   





Assessment/Plan





33 y.o. male with PMH of Cerebral Palsy with mental retardation, urethral 

stricture s/p SPC, seizure d.o., GERD, with previous UTIs and PNA sent from 

group home for shortness of breath with low oxygen saturation (88% on RA) and 

less responsiveness





Severe Sepsis


UTI 


Hypothermia


Urethral strictue with SPC


Cerebral Palsy/MR


Seizure d.o.





plan


continue current abx


duration will be for 2 weeks '


warm as necessary


rest as per the team

## 2019-12-01 LAB
ALBUMIN SERPL-MCNC: 2.7 G/DL (ref 3.4–5)
ALP SERPL-CCNC: 108 U/L (ref 45–117)
ALT SERPL-CCNC: 29 U/L (ref 13–61)
ANION GAP SERPL CALC-SCNC: 7 MMOL/L (ref 8–16)
AST SERPL-CCNC: 21 U/L (ref 15–37)
BASOPHILS # BLD: 0.5 % (ref 0–2)
BILIRUB SERPL-MCNC: 0.2 MG/DL (ref 0.2–1)
BUN SERPL-MCNC: 10.5 MG/DL (ref 7–18)
CALCIUM SERPL-MCNC: 8.6 MG/DL (ref 8.5–10.1)
CHLORIDE SERPL-SCNC: 113 MMOL/L (ref 98–107)
CO2 SERPL-SCNC: 23 MMOL/L (ref 21–32)
CREAT SERPL-MCNC: 0.4 MG/DL (ref 0.55–1.3)
DEPRECATED RDW RBC AUTO: 13.1 % (ref 11.9–15.9)
EOSINOPHIL # BLD: 5.6 % (ref 0–4.5)
GLUCOSE SERPL-MCNC: 102 MG/DL (ref 74–106)
HCT VFR BLD CALC: 31 % (ref 35.4–49)
HGB BLD-MCNC: 10.6 GM/DL (ref 11.7–16.9)
LYMPHOCYTES # BLD: 41.3 % (ref 8–40)
MAGNESIUM SERPL-MCNC: 2.2 MG/DL (ref 1.8–2.4)
MCH RBC QN AUTO: 32.5 PG (ref 25.7–33.7)
MCHC RBC AUTO-ENTMCNC: 34 G/DL (ref 32–35.9)
MCV RBC: 95.6 FL (ref 80–96)
MONOCYTES # BLD AUTO: 6 % (ref 3.8–10.2)
NEUTROPHILS # BLD: 46.6 % (ref 42.8–82.8)
PLATELET # BLD AUTO: 200 K/MM3 (ref 134–434)
PMV BLD: 7.7 FL (ref 7.5–11.1)
POTASSIUM SERPLBLD-SCNC: 4.2 MMOL/L (ref 3.5–5.1)
PROT SERPL-MCNC: 6.5 G/DL (ref 6.4–8.2)
RBC # BLD AUTO: 3.25 M/MM3 (ref 4–5.6)
SODIUM SERPL-SCNC: 143 MMOL/L (ref 136–145)
WBC # BLD AUTO: 6.2 K/MM3 (ref 4–10)

## 2019-12-01 RX ADMIN — Medication SCH TAB: at 00:39

## 2019-12-01 RX ADMIN — LEVETIRACETAM SCH MG: 100 SOLUTION ORAL at 12:26

## 2019-12-01 RX ADMIN — MEROPENEM SCH MLS/HR: 1 INJECTION, POWDER, FOR SOLUTION INTRAVENOUS at 18:03

## 2019-12-01 RX ADMIN — LEVOCARNITINE SCH MG: 1 SOLUTION ORAL at 14:49

## 2019-12-01 RX ADMIN — LACOSAMIDE SCH MG: 10 SOLUTION ORAL at 02:21

## 2019-12-01 RX ADMIN — Medication SCH TAB: at 12:28

## 2019-12-01 RX ADMIN — AMOXICILLIN AND CLAVULANATE POTASSIUM SCH MG: 250; 62.5 POWDER, FOR SUSPENSION ORAL at 14:51

## 2019-12-01 RX ADMIN — SODIUM CHLORIDE SCH MLS/HR: 9 INJECTION, SOLUTION INTRAVENOUS at 14:49

## 2019-12-01 RX ADMIN — ZONISAMIDE SCH MG: 100 CAPSULE ORAL at 00:38

## 2019-12-01 RX ADMIN — HEPARIN SODIUM SCH UNIT: 5000 INJECTION, SOLUTION INTRAVENOUS; SUBCUTANEOUS at 12:28

## 2019-12-01 RX ADMIN — MEROPENEM SCH MLS/HR: 1 INJECTION, POWDER, FOR SOLUTION INTRAVENOUS at 01:38

## 2019-12-01 RX ADMIN — ZONISAMIDE SCH MG: 100 CAPSULE ORAL at 14:53

## 2019-12-01 RX ADMIN — HEPARIN SODIUM SCH UNIT: 5000 INJECTION, SOLUTION INTRAVENOUS; SUBCUTANEOUS at 00:39

## 2019-12-01 RX ADMIN — LEVETIRACETAM SCH MG: 100 SOLUTION ORAL at 00:36

## 2019-12-01 RX ADMIN — SODIUM CHLORIDE SCH MLS/HR: 9 INJECTION, SOLUTION INTRAVENOUS at 01:13

## 2019-12-01 RX ADMIN — LEVOCARNITINE SCH MG: 1 SOLUTION ORAL at 00:37

## 2019-12-01 RX ADMIN — AMOXICILLIN AND CLAVULANATE POTASSIUM SCH MG: 250; 62.5 POWDER, FOR SUSPENSION ORAL at 18:03

## 2019-12-01 RX ADMIN — LACOSAMIDE SCH MG: 10 SOLUTION ORAL at 12:27

## 2019-12-01 RX ADMIN — MEROPENEM SCH MLS/HR: 1 INJECTION, POWDER, FOR SOLUTION INTRAVENOUS at 12:24

## 2019-12-01 RX ADMIN — LEVOCARNITINE SCH MG: 1 SOLUTION ORAL at 07:35

## 2019-12-01 RX ADMIN — Medication SCH MG: at 12:27

## 2019-12-01 NOTE — PN
Physical Exam: 


SUBJECTIVE: Patient seen and examined





OBJECTIVE:


Patient is a 33 year old male form [facility] with a significant 

past medical history of seizures, microcephaly, urethral stricture s/p 

suprapubic catheter placement and MR.  He presents to the ED on 11/22/2019 

accompanied by his HHA with hypothermia and hypotension and was found to have 

ESBL in urine and will need 2 weeks of IV antibiotics per ID.  





Meropenum started on 11/24/2019 and he will complete treatment on 12/7/2019.





 Vital Signs











 Period  Temp  Pulse  Resp  BP Sys/Curran  Pulse Ox


 


 Last 24 Hr  97.4 F-97.5 F  66-83  18-18  114-118/67-76  100








GENERAL: lethargic, in no acute distress.


HEAD: hx of microcephaly, head w/o trauma


EYES: Pupils equal, round and reactive to light,  


EARS, NOSE, THROAT: Ears normal, nares patent,  .


NECK: Normal range of motion


LUNGS: Breath sounds equal/diminished clear to auscultation bilaterally. No 

wheezes


HEART: Regular rate and rhythm 


ABDOMEN: Soft, nontender, not distended, +peg tube.  suprapubic catheter 

draining clear yellow urine with foul odor.


MUSCULOSKELETAL: bed bound, countracted lower ext.


UPPER EXTREMITIES:  . No peripheral edema.


LOWER EXTREMITIES: contracted- no evidence of DTI on either heel.


NEUROLOGICAL:  nonverbal


PSYCHIATRIC:  lethargic


SKIN: Warm, dry, normal turgor, no rashes or lesions noted, normal capillary 

refill. 





 Laboratory Results - last 24 hr











  12/01/19 12/01/19





  06:50 06:50


 


WBC  6.2 


 


RBC  3.25 L 


 


Hgb  10.6 L 


 


Hct  31.0 L 


 


MCV  95.6 


 


MCH  32.5 


 


MCHC  34.0 


 


RDW  13.1 


 


Plt Count  200 


 


MPV  7.7 


 


Absolute Neuts (auto)  2.9 


 


Neutrophils %  46.6 


 


Lymphocytes %  41.3 H 


 


Monocytes %  6.0 


 


Eosinophils %  5.6 H 


 


Basophils %  0.5 


 


Nucleated RBC %  0 


 


Sodium   143


 


Potassium   4.2


 


Chloride   113 H


 


Carbon Dioxide   23


 


Anion Gap   7 L


 


BUN   10.5


 


Creatinine   0.4 L


 


Est GFR (CKD-EPI)AfAm   178.35


 


Est GFR (CKD-EPI)NonAf   153.88


 


Random Glucose   102


 


Calcium   8.6


 


Magnesium   2.2


 


Total Bilirubin   0.2


 


AST   21


 


ALT   29


 


Alkaline Phosphatase   108


 


Total Protein   6.5


 


Albumin   2.7 L








Active Medications











Generic Name Dose Route Start Last Admin





  Trade Name Sagarq  PRN Reason Stop Dose Admin


 


Amoxicillin/Clavulanate Potassium  500 mg  11/24/19 17:30  11/30/19 18:08





  Augmentin 250 Mg/5 Ml Oral Suspension -  GT   500 mg





  BID@0800,1730 RAKAN   Administration





     





     





     





     


 


Calcium Carbonate/Cholecalciferol  1 tab  11/22/19 22:00  12/01/19 12:28





  Os-Dale 500+D -  GT   1 tab





  BID RAKAN   Administration





     





     





     





     


 


Clobazam  10 mg  11/22/19 22:00  12/01/19 12:27





  Onfi -  GT   10 mg





  BID RAKAN   Administration





     





     





     





     


 


Heparin Sodium (Porcine)  5,000 unit  11/22/19 22:00  12/01/19 12:28





  Heparin -  SQ   5,000 unit





  BID RAKAN   Administration





     





     





     





     


 


Meropenem 1 gm/ Dextrose  100 mls @ 200 mls/hr  11/24/19 16:00  12/01/19 12:24





  IVPB   200 mls/hr





  Q8H-IV RAKAN   Administration





     





     





     





     


 


Sodium Chloride  1,000 mls @ 100 mls/hr  11/30/19 15:17  12/01/19 01:13





  Normal Saline -  IV   100 mls/hr





  ASDIR RAKAN   Administration





     





     





     





     


 


Lacosamide  200 mg  11/22/19 22:00  12/01/19 12:27





  Vimpat Liquid -  GT   200 mg





  BID RAKAN   Administration





     





     





     





     


 


Levetiracetam  2,000 mg  11/22/19 22:00  12/01/19 12:26





  Keppra Oral Solution -  GT   2,000 mg





  BID RAKAN   Administration





     





     





     





     


 


Levocarnitine  700 mg  11/22/19 22:00  12/01/19 07:35





  Carnitor Oral Solution -  GT   700 mg





  TID RAKAN   Administration





     





     





     





     


 


Pyridoxine HCl  100 mg  11/26/19 10:00  12/01/19 12:27





  Vitamin B6 -  GT   100 mg





  DAILY RAKAN   Administration





     





     





     





     


 


Zonisamide  300 mg  11/22/19 22:00  12/01/19 00:38





  Zonisamide  GT   300 mg





  BID RAKAN   Administration





     





     





     





     











ASSESSMENT/PLAN:








Problem List





- Problems


(1) Enterococcus faecalis infection


Assessment/Plan: 


11/24 urine culture with entercoccus faecalis, pseudomonas aerugonosa and yeast 

organism


11/22 urine culture with esbl, entercoccus faecalis


on meropenem and augmentin per ID


per ID will need 2 weeks of antibiotic therapy, therefore may need picc line


continue ivf hydration


Code(s): B95.2 - ENTEROCOCCUS AS THE CAUSE OF DISEASES CLASSIFIED ELSEWHERE   





(2) Urinary tract infection due to ESBL Klebsiella


Assessment/Plan: 


11/24 urine culture with entercoccus faecalis, pseudomonas aerugonosa and yeast 

organism


11/22 urine culture with esbl, entercoccus faecalis


on meropenem and augmentin per ID


per ID will need 2 weeks of antibiotic therapy, therefore may need picc line


continue ivf hydration


Code(s): N39.0 - URINARY TRACT INFECTION, SITE NOT SPECIFIED; B96.89 - OTH 

BACTERIAL AGENTS AS THE CAUSE OF DISEASES CLASSD ELSWHR   





(3) Hypothermia


Assessment/Plan: 


still hypothermic, monitor and apply varsha hugger if needed.





Code(s): T68.XXXA - HYPOTHERMIA, INITIAL ENCOUNTER   





(4) UTI (urinary tract infection)


Assessment/Plan: 


Urine culture growing esbl ecoli and e faecalis.  On Meropenem and Augmentin.  

per dr Garcia, patient will need an additional 2 weeks of IV antibiotics.  


Code(s): N39.0 - URINARY TRACT INFECTION, SITE NOT SPECIFIED   





(5) Seizure


Assessment/Plan: 


continue home seizure medications via g tube.  


Code(s): R56.9 - UNSPECIFIED CONVULSIONS   





(6) Cerebral palsy


Assessment/Plan: 


supportive care


Code(s): G80.9 - CEREBRAL PALSY, UNSPECIFIED   





(7) Hypotension


Assessment/Plan: 


hypotensive overnight, increased ivf to 100cc/hr


Code(s): I95.9 - HYPOTENSION, UNSPECIFIED   





(8) Prophylactic measure


Assessment/Plan: 


fen


ivf NS @ 100cc/hr for hypotension continue jevity feeds.


full code








Code(s): Z29.9 - ENCOUNTER FOR PROPHYLACTIC MEASURES, UNSPECIFIED   





Visit type





- Emergency Visit


Emergency Visit: Yes


ED Registration Date: 11/22/19


Care time: The patient presented to the Emergency Department on the above date 

and was hospitalized for further evaluation of their emergent condition.





- New Patient


This patient is new to me today: No





- Critical Care


Critical Care patient: No





- Discharge Referral


Referred to [facility] No

## 2019-12-01 NOTE — PN
Progress Note, Physician


History of Present Illness: 





stable


no new issues





- Current Medication List


Current Medications: 


Active Medications





Amoxicillin/Clavulanate Potassium (Augmentin 250 Mg/5 Ml Oral Suspension -)  

500 mg GT BID@0800,1730 [facility]


   Last Admin: 11/30/19 18:08 Dose:  500 mg


Calcium Carbonate/Cholecalciferol (Os-Dale 500+D -)  1 tab GT BID [facility]


   Last Admin: 12/01/19 00:39 Dose:  1 tab


Clobazam (Onfi -)  10 mg GT BID [facility]


   Last Admin: 12/01/19 00:40 Dose:  10 mg


Heparin Sodium (Porcine) (Heparin -)  5,000 unit SQ BID [facility]


   Last Admin: 12/01/19 00:39 Dose:  5,000 unit


Meropenem 1 gm/ Dextrose  100 mls @ 200 mls/hr IVPB Q8H-IV [facility]


   Last Admin: 12/01/19 01:38 Dose:  200 mls/hr


Sodium Chloride (Normal Saline -)  1,000 mls @ 100 mls/hr IV ASDIR [facility]


   Last Admin: 12/01/19 01:13 Dose:  100 mls/hr


Lacosamide (Vimpat Liquid -)  200 mg GT BID [facility]


   Last Admin: 12/01/19 02:21 Dose:  200 mg


Levetiracetam (Keppra Oral Solution -)  2,000 mg GT BID [facility]


   Last Admin: 12/01/19 00:36 Dose:  2,000 mg


Levocarnitine (Carnitor Oral Solution -)  700 mg GT TID [facility]


   Last Admin: 12/01/19 07:35 Dose:  700 mg


Pyridoxine HCl (Vitamin B6 -)  100 mg GT DAILY [facility]


   Last Admin: 11/30/19 11:06 Dose:  100 mg


Zonisamide (Zonisamide)  300 mg GT BID [facility]


   Last Admin: 12/01/19 00:38 Dose:  300 mg











- Objective


Vital Signs: 


 Vital Signs











Temperature  97.5 F L  12/01/19 06:00


 


Pulse Rate  83   12/01/19 06:00


 


Respiratory Rate  18   12/01/19 06:00


 


Blood Pressure  118/76   12/01/19 06:00


 


O2 Sat by Pulse Oximetry (%)  100   11/30/19 21:00











Constitutional: Yes: No Distress, Calm


Cardiovascular: Yes: Regular Rate and Rhythm


Respiratory: Yes: CTA Bilaterally, Accessory Muscle Use


Gastrointestinal: Yes: Normal Bowel Sounds, Soft


Musculoskeletal: Yes: WNL


Extremities: Yes: Other


Neurological: Yes: Alert, Other


Labs: 


 CBC, BMP





 12/01/19 06:50 





 12/01/19 06:50 





 INR, PTT











INR  0.97  (0.83-1.09)   11/22/19  12:30    














Assessment/Plan





Problem List





- Problems


(1) Hypothermia


Code(s): T68.XXXA - HYPOTHERMIA, INITIAL ENCOUNTER   





(2) Severe sepsis


Code(s): A41.9 - SEPSIS, UNSPECIFIED ORGANISM; R65.20 - SEVERE SEPSIS WITHOUT 

SEPTIC SHOCK   





(3) UTI (urinary tract infection)


Code(s): N39.0 - URINARY TRACT INFECTION, SITE NOT SPECIFIED   





(4) Seizure


Code(s): R56.9 - UNSPECIFIED CONVULSIONS   





Assessment/Plan





33 y.o. male with PMH of Cerebral Palsy with mental retardation, urethral 

stricture s/p SPC, seizure d.o., GERD, with previous UTIs and PNA sent from 

group home for shortness of breath with low oxygen saturation (88% on RA) and 

less responsiveness





Severe Sepsis


UTI 


Hypothermia


Urethral strictue with SPC


Cerebral Palsy/MR


Seizure d.o.





plan


continue current abx


duration will be for 2 weeks '


warm as necessary


rest as per the team

## 2019-12-02 LAB
ALBUMIN SERPL-MCNC: 2.9 G/DL (ref 3.4–5)
ALP SERPL-CCNC: 119 U/L (ref 45–117)
ALT SERPL-CCNC: 30 U/L (ref 13–61)
ANION GAP SERPL CALC-SCNC: 5 MMOL/L (ref 8–16)
AST SERPL-CCNC: 22 U/L (ref 15–37)
BASOPHILS # BLD: 0.6 % (ref 0–2)
BILIRUB SERPL-MCNC: 0.4 MG/DL (ref 0.2–1)
BUN SERPL-MCNC: 9.8 MG/DL (ref 7–18)
CALCIUM SERPL-MCNC: 9.2 MG/DL (ref 8.5–10.1)
CHLORIDE SERPL-SCNC: 111 MMOL/L (ref 98–107)
CO2 SERPL-SCNC: 25 MMOL/L (ref 21–32)
CREAT SERPL-MCNC: 0.5 MG/DL (ref 0.55–1.3)
DEPRECATED RDW RBC AUTO: 13.1 % (ref 11.9–15.9)
EOSINOPHIL # BLD: 5.3 % (ref 0–4.5)
GLUCOSE SERPL-MCNC: 77 MG/DL (ref 74–106)
HCT VFR BLD CALC: 34.4 % (ref 35.4–49)
HGB BLD-MCNC: 11.7 GM/DL (ref 11.7–16.9)
LYMPHOCYTES # BLD: 47.3 % (ref 8–40)
MAGNESIUM SERPL-MCNC: 2.2 MG/DL (ref 1.8–2.4)
MCH RBC QN AUTO: 32.6 PG (ref 25.7–33.7)
MCHC RBC AUTO-ENTMCNC: 33.9 G/DL (ref 32–35.9)
MCV RBC: 96 FL (ref 80–96)
MONOCYTES # BLD AUTO: 5 % (ref 3.8–10.2)
NEUTROPHILS # BLD: 41.8 % (ref 42.8–82.8)
PLATELET # BLD AUTO: 232 K/MM3 (ref 134–434)
PMV BLD: 7.8 FL (ref 7.5–11.1)
POTASSIUM SERPLBLD-SCNC: 4.4 MMOL/L (ref 3.5–5.1)
PROT SERPL-MCNC: 7.1 G/DL (ref 6.4–8.2)
RBC # BLD AUTO: 3.58 M/MM3 (ref 4–5.6)
SODIUM SERPL-SCNC: 141 MMOL/L (ref 136–145)
WBC # BLD AUTO: 6.1 K/MM3 (ref 4–10)

## 2019-12-02 RX ADMIN — LEVETIRACETAM SCH MG: 100 SOLUTION ORAL at 12:13

## 2019-12-02 RX ADMIN — LACOSAMIDE SCH MG: 10 SOLUTION ORAL at 00:05

## 2019-12-02 RX ADMIN — SODIUM CHLORIDE SCH MLS/HR: 9 INJECTION, SOLUTION INTRAVENOUS at 02:13

## 2019-12-02 RX ADMIN — LACOSAMIDE SCH MG: 10 SOLUTION ORAL at 10:22

## 2019-12-02 RX ADMIN — AMOXICILLIN AND CLAVULANATE POTASSIUM SCH MG: 250; 62.5 POWDER, FOR SUSPENSION ORAL at 10:07

## 2019-12-02 RX ADMIN — MEROPENEM SCH MLS/HR: 1 INJECTION, POWDER, FOR SOLUTION INTRAVENOUS at 18:29

## 2019-12-02 RX ADMIN — AMOXICILLIN AND CLAVULANATE POTASSIUM SCH MG: 250; 62.5 POWDER, FOR SUSPENSION ORAL at 18:28

## 2019-12-02 RX ADMIN — HEPARIN SODIUM SCH UNIT: 5000 INJECTION, SOLUTION INTRAVENOUS; SUBCUTANEOUS at 10:22

## 2019-12-02 RX ADMIN — SODIUM CHLORIDE SCH: 9 INJECTION, SOLUTION INTRAVENOUS at 18:29

## 2019-12-02 RX ADMIN — LEVOCARNITINE SCH MG: 1 SOLUTION ORAL at 06:37

## 2019-12-02 RX ADMIN — Medication SCH TAB: at 00:09

## 2019-12-02 RX ADMIN — LEVETIRACETAM SCH MG: 100 SOLUTION ORAL at 23:30

## 2019-12-02 RX ADMIN — LACOSAMIDE SCH MG: 10 SOLUTION ORAL at 23:29

## 2019-12-02 RX ADMIN — Medication SCH MG: at 10:08

## 2019-12-02 RX ADMIN — Medication SCH TAB: at 23:30

## 2019-12-02 RX ADMIN — LEVETIRACETAM SCH MG: 100 SOLUTION ORAL at 00:04

## 2019-12-02 RX ADMIN — MEROPENEM SCH MLS/HR: 1 INJECTION, POWDER, FOR SOLUTION INTRAVENOUS at 10:23

## 2019-12-02 RX ADMIN — ZONISAMIDE SCH MG: 100 CAPSULE ORAL at 10:26

## 2019-12-02 RX ADMIN — LEVOCARNITINE SCH MG: 1 SOLUTION ORAL at 13:37

## 2019-12-02 RX ADMIN — MEROPENEM SCH MLS/HR: 1 INJECTION, POWDER, FOR SOLUTION INTRAVENOUS at 02:14

## 2019-12-02 RX ADMIN — LEVOCARNITINE SCH MG: 1 SOLUTION ORAL at 23:30

## 2019-12-02 RX ADMIN — HEPARIN SODIUM SCH UNIT: 5000 INJECTION, SOLUTION INTRAVENOUS; SUBCUTANEOUS at 23:30

## 2019-12-02 RX ADMIN — ZONISAMIDE SCH MG: 100 CAPSULE ORAL at 23:29

## 2019-12-02 RX ADMIN — LEVOCARNITINE SCH MG: 1 SOLUTION ORAL at 00:07

## 2019-12-02 RX ADMIN — Medication SCH TAB: at 10:08

## 2019-12-02 RX ADMIN — ZONISAMIDE SCH MG: 100 CAPSULE ORAL at 00:06

## 2019-12-02 RX ADMIN — SODIUM CHLORIDE SCH MLS/HR: 9 INJECTION, SOLUTION INTRAVENOUS at 13:36

## 2019-12-02 RX ADMIN — HEPARIN SODIUM SCH UNIT: 5000 INJECTION, SOLUTION INTRAVENOUS; SUBCUTANEOUS at 00:08

## 2019-12-02 NOTE — PN
Progress Note, Physician


History of Present Illness: 





still hypothermic








- Current Medication List


Current Medications: 


Active Medications





Amoxicillin/Clavulanate Potassium (Augmentin 250 Mg/5 Ml Oral Suspension -)  

500 mg GT BID@0800,1730 [facility]


   Last Admin: 12/02/19 10:07 Dose:  500 mg


Calcium Carbonate/Cholecalciferol (Os-Dale 500+D -)  1 tab GT BID [facility]


   Last Admin: 12/02/19 10:08 Dose:  1 tab


Clobazam (Onfi -)  10 mg GT BID [facility]


   Last Admin: 12/02/19 10:22 Dose:  10 mg


Heparin Sodium (Porcine) (Heparin -)  5,000 unit SQ BID [facility]


   Last Admin: 12/02/19 10:22 Dose:  5,000 unit


Meropenem 1 gm/ Dextrose  100 mls @ 200 mls/hr IVPB Q8H-IV [facility]


   Last Admin: 12/02/19 10:23 Dose:  200 mls/hr


Sodium Chloride (Normal Saline -)  1,000 mls @ 100 mls/hr IV ASDIR [facility]


   Last Admin: 12/02/19 02:13 Dose:  100 mls/hr


Lacosamide (Vimpat Liquid -)  200 mg GT BID [facility]


   Last Admin: 12/02/19 10:22 Dose:  200 mg


Levetiracetam (Keppra Oral Solution -)  2,000 mg GT BID [facility]


   Last Admin: 12/02/19 00:04 Dose:  2,000 mg


Levocarnitine (Carnitor Oral Solution -)  700 mg GT TID [facility]


   Last Admin: 12/02/19 06:37 Dose:  700 mg


Pyridoxine HCl (Vitamin B6 -)  100 mg GT DAILY [facility]


   Last Admin: 12/02/19 10:08 Dose:  100 mg


Zonisamide (Zonisamide)  300 mg GT BID [facility]


   Last Admin: 12/02/19 10:26 Dose:  300 mg











- Objective


Vital Signs: 


 Vital Signs











Temperature  96 F L  12/02/19 06:00


 


Pulse Rate  61   12/02/19 06:00


 


Respiratory Rate  18   12/02/19 06:00


 


Blood Pressure  125/76   12/02/19 06:00


 


O2 Sat by Pulse Oximetry (%)  100   12/01/19 21:00











Constitutional: Yes: No Distress, Calm


HENT: Yes: Atraumatic, Normocephalic


Neck: Yes: Supple


Cardiovascular: Yes: S1, S2


Respiratory: Yes: Regular, CTA Bilaterally


Gastrointestinal: Yes: Normal Bowel Sounds, Soft, Other (peg)


Musculoskeletal: Yes: WNL


Extremities: Yes: WNL


Neurological: Yes: Alert, Other


Psychiatric: Yes: Other


Labs: 


 CBC, BMP





 12/02/19 07:45 





 12/02/19 07:45 





 INR, PTT











INR  0.97  (0.83-1.09)   11/22/19  12:30    














Assessment/Plan





Problem List





- Problems


(1) Hypothermia


Code(s): T68.XXXA - HYPOTHERMIA, INITIAL ENCOUNTER   





(2) Severe sepsis


Code(s): A41.9 - SEPSIS, UNSPECIFIED ORGANISM; R65.20 - SEVERE SEPSIS WITHOUT 

SEPTIC SHOCK   





(3) UTI (urinary tract infection)


Code(s): N39.0 - URINARY TRACT INFECTION, SITE NOT SPECIFIED   





(4) Seizure


Code(s): R56.9 - UNSPECIFIED CONVULSIONS   





Assessment/Plan





33 y.o. male with PMH of Cerebral Palsy with mental retardation, urethral 

stricture s/p SPC, seizure d.o., GERD, with previous UTIs and PNA sent from 

group home for shortness of breath with low oxygen saturation (88% on RA) and 

less responsiveness





Severe Sepsis


UTI 


Hypothermia


Urethral strictue with SPC


Cerebral Palsy/MR


Seizure d.o.





plan


continue current abx


complete the course


continue warming


rest as per the team

## 2019-12-02 NOTE — PN
Physical Exam: 


SUBJECTIVE: Patient seen and examined at the bedside.  feels well, in no acute 

distress.  





OBJECTIVE:


Patient is a 33 year old male form [facility] with a significant 

past medical history of seizures, microcephaly, urethral stricture s/p 

suprapubic catheter placement and MR.  He presents to the ED on 11/22/2019 

accompanied by his HHA with hypothermia and hypotension and was found to have 

ESBL in urine and will need 2 weeks of IV antibiotics per ID.  





Meropenum started on 11/24/2019 and he will complete treatment on 12/7/2019.


 


 Vital Signs











 Period  Temp  Pulse  Resp  BP Sys/Curran  Pulse Ox


 


 Last 24 Hr  96 F-98.9 F  61-76  18-18  /57-76  100





GENERAL: lethargic, in no acute distress.


HEAD: hx of microcephaly, head w/o trauma


EYES: Pupils equal, round and reactive to light,  


EARS, NOSE, THROAT: Ears normal, nares patent,  .


NECK: Normal range of motion


LUNGS: Breath sounds equal/diminished clear to auscultation bilaterally. No 

wheezes


HEART: Regular rate and rhythm 


ABDOMEN: Soft, nontender, not distended, +peg tube.  suprapubic catheter 

draining clear yellow urine with foul odor.


MUSCULOSKELETAL: bed bound, countracted lower ext.


UPPER EXTREMITIES:  . No peripheral edema.


LOWER EXTREMITIES: contracted- no evidence of DTI on either heel.


NEUROLOGICAL:  nonverbal


PSYCHIATRIC:  lethargic


SKIN: Warm, dry, normal turgor, no rashes or lesions noted, normal capillary 

refill. 





 Laboratory Results - last 24 hr











  12/02/19 12/02/19





  07:45 07:45


 


WBC  6.1 


 


RBC  3.58 L 


 


Hgb  11.7 


 


Hct  34.4 L 


 


MCV  96.0 


 


MCH  32.6 


 


MCHC  33.9 


 


RDW  13.1 


 


Plt Count  232 


 


MPV  7.8 


 


Absolute Neuts (auto)  2.6 


 


Neutrophils %  41.8 L 


 


Lymphocytes %  47.3 H 


 


Monocytes %  5.0 


 


Eosinophils %  5.3 H 


 


Basophils %  0.6 


 


Nucleated RBC %  0 


 


Sodium   141


 


Potassium   4.4


 


Chloride   111 H


 


Carbon Dioxide   25


 


Anion Gap   5 L


 


BUN   9.8


 


Creatinine   0.5 L


 


Est GFR (CKD-EPI)AfAm   162.72


 


Est GFR (CKD-EPI)NonAf   140.40


 


Random Glucose   77


 


Calcium   9.2


 


Magnesium   2.2


 


Total Bilirubin   0.4


 


AST   22


 


ALT   30


 


Alkaline Phosphatase   119 H


 


Total Protein   7.1


 


Albumin   2.9 L








Active Medications











Generic Name Dose Route Start Last Admin





  Trade Name Mario  PRN Reason Stop Dose Admin


 


Amoxicillin/Clavulanate Potassium  500 mg  11/24/19 17:30  12/02/19 10:07





  Augmentin 250 Mg/5 Ml Oral Suspension -  GT   500 mg





  BID@0800,1730 RAKAN   Administration





     





     





     





     


 


Calcium Carbonate/Cholecalciferol  1 tab  11/22/19 22:00  12/02/19 10:08





  Os-Dale 500+D -  GT   1 tab





  BID RAKAN   Administration





     





     





     





     


 


Clobazam  10 mg  11/22/19 22:00  12/02/19 10:22





  Onfi -  GT   10 mg





  BID RAKAN   Administration





     





     





     





     


 


Heparin Sodium (Porcine)  5,000 unit  11/22/19 22:00  12/02/19 10:22





  Heparin -  SQ   5,000 unit





  BID RAKAN   Administration





     





     





     





     


 


Meropenem 1 gm/ Dextrose  100 mls @ 200 mls/hr  11/24/19 16:00  12/02/19 10:23





  IVPB   200 mls/hr





  Q8H-IV RAKAN   Administration





     





     





     





     


 


Sodium Chloride  1,000 mls @ 100 mls/hr  11/30/19 15:17  12/02/19 02:13





  Normal Saline -  IV   100 mls/hr





  ASDIR RAKAN   Administration





     





     





     





     


 


Lacosamide  200 mg  11/22/19 22:00  12/02/19 10:22





  Vimpat Liquid -  GT   200 mg





  BID RAKAN   Administration





     





     





     





     


 


Levetiracetam  2,000 mg  11/22/19 22:00  12/02/19 12:13





  Keppra Oral Solution -  GT   2,000 mg





  BID RAKAN   Administration





     





     





     





     


 


Levocarnitine  700 mg  11/22/19 22:00  12/02/19 06:37





  Carnitor Oral Solution -  GT   700 mg





  TID RAKAN   Administration





     





     





     





     


 


Pyridoxine HCl  100 mg  11/26/19 10:00  12/02/19 10:08





  Vitamin B6 -  GT   100 mg





  DAILY RAKAN   Administration





     





     





     





     


 


Zonisamide  300 mg  11/22/19 22:00  12/02/19 10:26





  Zonisamide  GT   300 mg





  BID RAKAN   Administration





     





     





     





     











ASSESSMENT/PLAN:








Problem List





- Problems


(1) Enterococcus faecalis infection


Assessment/Plan: 


11/24 urine culture with entercoccus faecalis, pseudomonas aerugonosa and yeast 

organism


11/22 urine culture with esbl, entercoccus faecalis


on meropenem and augmentin per ID


per ID will need 2 weeks of antibiotic therapy, therefore may need picc line


continue ivf hydration


Code(s): B95.2 - ENTEROCOCCUS AS THE CAUSE OF DISEASES CLASSIFIED ELSEWHERE   





(2) Urinary tract infection due to ESBL Klebsiella


Assessment/Plan: 


11/24 urine culture with entercoccus faecalis, pseudomonas aerugonosa and yeast 

organism


11/22 urine culture with esbl, entercoccus faecalis


on meropenem and augmentin per ID


per ID will need 2 weeks of antibiotic therapy, therefore may need picc line


continue ivf hydration


Code(s): N39.0 - URINARY TRACT INFECTION, SITE NOT SPECIFIED; B96.89 - OTH 

BACTERIAL AGENTS AS THE CAUSE OF DISEASES CLASSD ELSWHR   





(3) Hypothermia


Assessment/Plan: 


still hypothermic, monitor and apply varsha hugger if needed.





Code(s): T68.XXXA - HYPOTHERMIA, INITIAL ENCOUNTER   





(4) UTI (urinary tract infection)


Assessment/Plan: 


Urine culture growing esbl ecoli and e faecalis.  On Meropenem and Augmentin.  

per dr Garcia, patient will need an additional 2 weeks of IV antibiotics.  


Code(s): N39.0 - URINARY TRACT INFECTION, SITE NOT SPECIFIED   





(5) Seizure


Assessment/Plan: 


continue home seizure medications via g tube.  


Code(s): R56.9 - UNSPECIFIED CONVULSIONS   





(6) Cerebral palsy


Assessment/Plan: 


supportive care


Code(s): G80.9 - CEREBRAL PALSY, UNSPECIFIED   





(7) Hypotension


Assessment/Plan: 


hypotensive overnight, increased ivf to 100cc/hr


Code(s): I95.9 - HYPOTENSION, UNSPECIFIED   





(8) Prophylactic measure


Assessment/Plan: 


fen


ivf NS @ 100cc/hr for hypotension continue jevity feeds.


full code








Code(s): Z29.9 - ENCOUNTER FOR PROPHYLACTIC MEASURES, UNSPECIFIED   





Visit type





- Emergency Visit


Emergency Visit: Yes


ED Registration Date: 11/22/19


Care time: The patient presented to the Emergency Department on the above date 

and was hospitalized for further evaluation of their emergent condition.





- New Patient


This patient is new to me today: No





- Critical Care


Critical Care patient: No





- Discharge Referral


Referred to [facility] No

## 2019-12-03 LAB
ALBUMIN SERPL-MCNC: 2.6 G/DL (ref 3.4–5)
ALP SERPL-CCNC: 112 U/L (ref 45–117)
ALT SERPL-CCNC: 31 U/L (ref 13–61)
ANION GAP SERPL CALC-SCNC: 6 MMOL/L (ref 8–16)
AST SERPL-CCNC: 23 U/L (ref 15–37)
BASOPHILS # BLD: 0.3 % (ref 0–2)
BILIRUB SERPL-MCNC: 0.2 MG/DL (ref 0.2–1)
BUN SERPL-MCNC: 9 MG/DL (ref 7–18)
CALCIUM SERPL-MCNC: 8.5 MG/DL (ref 8.5–10.1)
CHLORIDE SERPL-SCNC: 112 MMOL/L (ref 98–107)
CO2 SERPL-SCNC: 23 MMOL/L (ref 21–32)
CREAT SERPL-MCNC: 0.4 MG/DL (ref 0.55–1.3)
DEPRECATED RDW RBC AUTO: 13.3 % (ref 11.9–15.9)
EOSINOPHIL # BLD: 5.3 % (ref 0–4.5)
GLUCOSE SERPL-MCNC: 81 MG/DL (ref 74–106)
HCT VFR BLD CALC: 30.1 % (ref 35.4–49)
HGB BLD-MCNC: 10.3 GM/DL (ref 11.7–16.9)
LYMPHOCYTES # BLD: 31.2 % (ref 8–40)
MAGNESIUM SERPL-MCNC: 2.2 MG/DL (ref 1.8–2.4)
MCH RBC QN AUTO: 32.8 PG (ref 25.7–33.7)
MCHC RBC AUTO-ENTMCNC: 34.2 G/DL (ref 32–35.9)
MCV RBC: 95.7 FL (ref 80–96)
MONOCYTES # BLD AUTO: 5.2 % (ref 3.8–10.2)
NEUTROPHILS # BLD: 58 % (ref 42.8–82.8)
PLATELET # BLD AUTO: 228 K/MM3 (ref 134–434)
PMV BLD: 8 FL (ref 7.5–11.1)
POTASSIUM SERPLBLD-SCNC: 4.4 MMOL/L (ref 3.5–5.1)
PROT SERPL-MCNC: 6.3 G/DL (ref 6.4–8.2)
RBC # BLD AUTO: 3.14 M/MM3 (ref 4–5.6)
SODIUM SERPL-SCNC: 141 MMOL/L (ref 136–145)
WBC # BLD AUTO: 6 K/MM3 (ref 4–10)

## 2019-12-03 RX ADMIN — Medication SCH TAB: at 23:40

## 2019-12-03 RX ADMIN — ZONISAMIDE SCH MG: 100 CAPSULE ORAL at 23:39

## 2019-12-03 RX ADMIN — AMOXICILLIN AND CLAVULANATE POTASSIUM SCH MG: 250; 62.5 POWDER, FOR SUSPENSION ORAL at 09:10

## 2019-12-03 RX ADMIN — ZONISAMIDE SCH MG: 100 CAPSULE ORAL at 09:12

## 2019-12-03 RX ADMIN — LEVOCARNITINE SCH MG: 1 SOLUTION ORAL at 05:26

## 2019-12-03 RX ADMIN — HEPARIN SODIUM SCH UNIT: 5000 INJECTION, SOLUTION INTRAVENOUS; SUBCUTANEOUS at 23:42

## 2019-12-03 RX ADMIN — LEVETIRACETAM SCH MG: 100 SOLUTION ORAL at 09:14

## 2019-12-03 RX ADMIN — LEVOCARNITINE SCH MG: 1 SOLUTION ORAL at 23:39

## 2019-12-03 RX ADMIN — HEPARIN SODIUM SCH UNIT: 5000 INJECTION, SOLUTION INTRAVENOUS; SUBCUTANEOUS at 09:13

## 2019-12-03 RX ADMIN — MEROPENEM SCH MLS/HR: 1 INJECTION, POWDER, FOR SOLUTION INTRAVENOUS at 09:13

## 2019-12-03 RX ADMIN — SODIUM CHLORIDE SCH MLS/HR: 9 INJECTION, SOLUTION INTRAVENOUS at 01:26

## 2019-12-03 RX ADMIN — LACOSAMIDE SCH MG: 10 SOLUTION ORAL at 09:12

## 2019-12-03 RX ADMIN — MEROPENEM SCH MLS/HR: 1 INJECTION, POWDER, FOR SOLUTION INTRAVENOUS at 17:20

## 2019-12-03 RX ADMIN — AMOXICILLIN AND CLAVULANATE POTASSIUM SCH MG: 250; 62.5 POWDER, FOR SUSPENSION ORAL at 17:20

## 2019-12-03 RX ADMIN — LEVETIRACETAM SCH MG: 100 SOLUTION ORAL at 23:37

## 2019-12-03 RX ADMIN — SODIUM CHLORIDE SCH MLS/HR: 9 INJECTION, SOLUTION INTRAVENOUS at 13:27

## 2019-12-03 RX ADMIN — LACOSAMIDE SCH MG: 10 SOLUTION ORAL at 23:43

## 2019-12-03 RX ADMIN — Medication SCH TAB: at 09:15

## 2019-12-03 RX ADMIN — LEVOCARNITINE SCH MG: 1 SOLUTION ORAL at 13:26

## 2019-12-03 RX ADMIN — MEROPENEM SCH MLS/HR: 1 INJECTION, POWDER, FOR SOLUTION INTRAVENOUS at 01:26

## 2019-12-03 RX ADMIN — Medication SCH MG: at 09:15

## 2019-12-03 NOTE — PN
Progress Note, Physician


History of Present Illness: 





still hypothermic


other wise stable





- Current Medication List


Current Medications: 


Active Medications





Amoxicillin/Clavulanate Potassium (Augmentin 250 Mg/5 Ml Oral Suspension -)  

500 mg GT BID@0800,1730 [facility]


   Last Admin: 12/03/19 09:10 Dose:  500 mg


Calcium Carbonate/Cholecalciferol (Os-Dale 500+D -)  1 tab GT BID [facility]


   Last Admin: 12/03/19 09:15 Dose:  1 tab


Clobazam (Onfi -)  10 mg GT BID [facility]


   Last Admin: 12/03/19 09:14 Dose:  10 mg


Heparin Sodium (Porcine) (Heparin -)  5,000 unit SQ BID [facility]


   Last Admin: 12/03/19 09:13 Dose:  5,000 unit


Meropenem 1 gm/ Dextrose  100 mls @ 200 mls/hr IVPB Q8H-IV [facility]


   Last Admin: 12/03/19 09:13 Dose:  200 mls/hr


Sodium Chloride (Normal Saline -)  1,000 mls @ 100 mls/hr IV ASDIR [facility]


   Last Admin: 12/03/19 01:26 Dose:  100 mls/hr


Lacosamide (Vimpat Liquid -)  200 mg GT BID [facility]


   Last Admin: 12/03/19 09:12 Dose:  200 mg


Levetiracetam (Keppra Oral Solution -)  2,000 mg GT BID [facility]


   Last Admin: 12/03/19 09:14 Dose:  2,000 mg


Levocarnitine (Carnitor Oral Solution -)  700 mg GT TID [facility]


   Last Admin: 12/03/19 05:26 Dose:  700 mg


Pyridoxine HCl (Vitamin B6 -)  100 mg GT DAILY [facility]


   Last Admin: 12/03/19 09:15 Dose:  100 mg


Zonisamide (Zonisamide)  300 mg GT BID [facility]


   Last Admin: 12/03/19 09:12 Dose:  300 mg











- Objective


Vital Signs: 


 Vital Signs











Temperature  97.4 F L  12/03/19 06:00


 


Pulse Rate  66   12/03/19 06:00


 


Respiratory Rate  18   12/03/19 06:00


 


Blood Pressure  119/61   12/03/19 06:00


 


O2 Sat by Pulse Oximetry (%)  98   12/02/19 21:00











Constitutional: Yes: No Distress, Calm


Cardiovascular: Yes: S1, S2


Respiratory: Yes: Regular, CTA Bilaterally


Gastrointestinal: Yes: Normal Bowel Sounds, Soft


Musculoskeletal: Yes: WNL


Extremities: Yes: WNL


Neurological: Yes: Alert, Oriented


Psychiatric: Yes: Alert, Oriented


Labs: 


 CBC, BMP





 12/03/19 07:35 





 12/03/19 07:35 





 INR, PTT











INR  0.97  (0.83-1.09)   11/22/19  12:30    














Assessment/Plan





Problem List





- Problems


(1) Hypothermia


Code(s): T68.XXXA - HYPOTHERMIA, INITIAL ENCOUNTER   





(2) Severe sepsis


Code(s): A41.9 - SEPSIS, UNSPECIFIED ORGANISM; R65.20 - SEVERE SEPSIS WITHOUT 

SEPTIC SHOCK   





(3) UTI (urinary tract infection)


Code(s): N39.0 - URINARY TRACT INFECTION, SITE NOT SPECIFIED   





(4) Seizure


Code(s): R56.9 - UNSPECIFIED CONVULSIONS   





Assessment/Plan





33 y.o. male with PMH of Cerebral Palsy with mental retardation, urethral 

stricture s/p SPC, seizure d.o., GERD, with previous UTIs and PNA sent from 

group home for shortness of breath with low oxygen saturation (88% on RA) and 

less responsiveness





Severe Sepsis


UTI 


Hypothermia


Urethral strictue with SPC


Cerebral Palsy/MR


Seizure d.o.





plan


continue current abx


complete the course


continue warming


rest as per the team


will d/w the team

## 2019-12-03 NOTE — PN
Progress Note, Physician


Chief Complaint: 





Patient non verbal in no acute distres 





- Current Medication List


Current Medications: 


Active Medications





Amoxicillin/Clavulanate Potassium (Augmentin 250 Mg/5 Ml Oral Suspension -)  

500 mg GT BID@0800,1730 [facility]


   Last Admin: 12/03/19 09:10 Dose:  500 mg


Calcium Carbonate/Cholecalciferol (Os-Dale 500+D -)  1 tab GT BID [facility]


   Last Admin: 12/03/19 09:15 Dose:  1 tab


Clobazam (Onfi -)  10 mg GT BID [facility]


   Last Admin: 12/03/19 09:14 Dose:  10 mg


Heparin Sodium (Porcine) (Heparin -)  5,000 unit SQ BID [facility]


   Last Admin: 12/03/19 09:13 Dose:  5,000 unit


Meropenem 1 gm/ Dextrose  100 mls @ 200 mls/hr IVPB Q8H-IV [facility]


   Last Admin: 12/03/19 09:13 Dose:  200 mls/hr


Sodium Chloride (Normal Saline -)  1,000 mls @ 100 mls/hr IV ASDIR [facility]


   Last Admin: 12/03/19 01:26 Dose:  100 mls/hr


Lacosamide (Vimpat Liquid -)  200 mg GT BID [facility]


   Last Admin: 12/03/19 09:12 Dose:  200 mg


Levetiracetam (Keppra Oral Solution -)  2,000 mg GT BID [facility]


   Last Admin: 12/03/19 09:14 Dose:  2,000 mg


Levocarnitine (Carnitor Oral Solution -)  700 mg GT TID [facility]


   Last Admin: 12/03/19 05:26 Dose:  700 mg


Pyridoxine HCl (Vitamin B6 -)  100 mg GT DAILY [facility]


   Last Admin: 12/03/19 09:15 Dose:  100 mg


Zonisamide (Zonisamide)  300 mg GT BID [facility]


   Last Admin: 12/03/19 09:12 Dose:  300 mg











- Objective


Vital Signs: 


 Vital Signs











Temperature  97.4 F L  12/03/19 06:00


 


Pulse Rate  66   12/03/19 06:00


 


Respiratory Rate  18   12/03/19 06:00


 


Blood Pressure  119/61   12/03/19 06:00


 


O2 Sat by Pulse Oximetry (%)  98   12/02/19 21:00











Constitutional: Yes: Well Nourished, No Distress, Calm


Eyes: Yes: Conjunctiva Clear


HENT: Yes: Atraumatic, Normocephalic


Neck: Yes: Supple


Cardiovascular: Yes: Regular Rate and Rhythm


Respiratory: Yes: Regular, CTA Bilaterally


Gastrointestinal: Yes: Normal Bowel Sounds, Soft


Musculoskeletal: Yes: Other (bed bound contracted extremities)


Extremities: Yes: Cool, Other (Contracted)


Edema: No


Labs: 


 CBC, BMP





 12/03/19 07:35 





 12/03/19 07:35 





 INR, PTT











INR  0.97  (0.83-1.09)   11/22/19  12:30    














Impression/Plan


Impression/Plan: 








# SEPSIS (now resolved) w/ hypothermia and hypotension 2/2 ESBL in urine in 

setting uf urethral stricture s/p suprabupic catheter placed in MR 


- c/w Meropenem as per ID recs to be completed on 12/7/19 


- blood cultures negative 





# Seizure 


c/w home meds via G tube 





#cerebral palsy 


-supportive care 





# c/w rest of chronic home meds 





# dvt ppx: heparin subq 





Visit type





- Emergency Visit


Emergency Visit: Yes


ED Registration Date: 11/22/19


Care time: The patient presented to the Emergency Department on the above date 

and was hospitalized for further evaluation of their emergent condition.





- New Patient


This patient is new to me today: Yes


Date on this admission: 12/03/19





- Critical Care


Critical Care patient: No





- Discharge Referral


Referred to [facility] No

## 2019-12-04 LAB
ALBUMIN SERPL-MCNC: 2.8 G/DL (ref 3.4–5)
ALP SERPL-CCNC: 126 U/L (ref 45–117)
ALT SERPL-CCNC: 33 U/L (ref 13–61)
ANION GAP SERPL CALC-SCNC: 5 MMOL/L (ref 8–16)
AST SERPL-CCNC: 23 U/L (ref 15–37)
BASOPHILS # BLD: 0.2 % (ref 0–2)
BILIRUB SERPL-MCNC: 0.2 MG/DL (ref 0.2–1)
BUN SERPL-MCNC: 10.2 MG/DL (ref 7–18)
CALCIUM SERPL-MCNC: 8.7 MG/DL (ref 8.5–10.1)
CHLORIDE SERPL-SCNC: 108 MMOL/L (ref 98–107)
CO2 SERPL-SCNC: 24 MMOL/L (ref 21–32)
CREAT SERPL-MCNC: 0.4 MG/DL (ref 0.55–1.3)
DEPRECATED RDW RBC AUTO: 13.2 % (ref 11.9–15.9)
EOSINOPHIL # BLD: 4.6 % (ref 0–4.5)
GLUCOSE SERPL-MCNC: 99 MG/DL (ref 74–106)
HCT VFR BLD CALC: 31.9 % (ref 35.4–49)
HGB BLD-MCNC: 10.8 GM/DL (ref 11.7–16.9)
LYMPHOCYTES # BLD: 34.6 % (ref 8–40)
MAGNESIUM SERPL-MCNC: 2.3 MG/DL (ref 1.8–2.4)
MCH RBC QN AUTO: 32.5 PG (ref 25.7–33.7)
MCHC RBC AUTO-ENTMCNC: 33.8 G/DL (ref 32–35.9)
MCV RBC: 96.2 FL (ref 80–96)
MONOCYTES # BLD AUTO: 4.2 % (ref 3.8–10.2)
NEUTROPHILS # BLD: 56.4 % (ref 42.8–82.8)
PLATELET # BLD AUTO: 259 K/MM3 (ref 134–434)
PMV BLD: 7.9 FL (ref 7.5–11.1)
POTASSIUM SERPLBLD-SCNC: 4.1 MMOL/L (ref 3.5–5.1)
PROT SERPL-MCNC: 6.8 G/DL (ref 6.4–8.2)
RBC # BLD AUTO: 3.31 M/MM3 (ref 4–5.6)
SODIUM SERPL-SCNC: 137 MMOL/L (ref 136–145)
WBC # BLD AUTO: 8.1 K/MM3 (ref 4–10)

## 2019-12-04 RX ADMIN — LEVOCARNITINE SCH MG: 1 SOLUTION ORAL at 22:47

## 2019-12-04 RX ADMIN — MEROPENEM SCH MLS/HR: 1 INJECTION, POWDER, FOR SOLUTION INTRAVENOUS at 17:18

## 2019-12-04 RX ADMIN — LEVETIRACETAM SCH MG: 100 SOLUTION ORAL at 22:46

## 2019-12-04 RX ADMIN — Medication SCH MG: at 09:05

## 2019-12-04 RX ADMIN — MEROPENEM SCH MLS/HR: 1 INJECTION, POWDER, FOR SOLUTION INTRAVENOUS at 09:05

## 2019-12-04 RX ADMIN — HEPARIN SODIUM SCH UNIT: 5000 INJECTION, SOLUTION INTRAVENOUS; SUBCUTANEOUS at 22:47

## 2019-12-04 RX ADMIN — MEROPENEM SCH MLS/HR: 1 INJECTION, POWDER, FOR SOLUTION INTRAVENOUS at 02:25

## 2019-12-04 RX ADMIN — ZONISAMIDE SCH MG: 100 CAPSULE ORAL at 11:14

## 2019-12-04 RX ADMIN — LEVETIRACETAM SCH MG: 100 SOLUTION ORAL at 11:14

## 2019-12-04 RX ADMIN — LEVOCARNITINE SCH MG: 1 SOLUTION ORAL at 13:25

## 2019-12-04 RX ADMIN — HEPARIN SODIUM SCH UNIT: 5000 INJECTION, SOLUTION INTRAVENOUS; SUBCUTANEOUS at 09:06

## 2019-12-04 RX ADMIN — Medication SCH TAB: at 09:05

## 2019-12-04 RX ADMIN — ZONISAMIDE SCH MG: 100 CAPSULE ORAL at 22:52

## 2019-12-04 RX ADMIN — AMOXICILLIN AND CLAVULANATE POTASSIUM SCH MG: 250; 62.5 POWDER, FOR SUSPENSION ORAL at 17:19

## 2019-12-04 RX ADMIN — LEVOCARNITINE SCH MG: 1 SOLUTION ORAL at 06:40

## 2019-12-04 RX ADMIN — LACOSAMIDE SCH MG: 10 SOLUTION ORAL at 11:34

## 2019-12-04 RX ADMIN — AMOXICILLIN AND CLAVULANATE POTASSIUM SCH MG: 250; 62.5 POWDER, FOR SUSPENSION ORAL at 09:04

## 2019-12-04 RX ADMIN — LACOSAMIDE SCH MG: 10 SOLUTION ORAL at 22:46

## 2019-12-04 NOTE — PN
Progress Note, Physician


History of Present Illness: 





stable





- Current Medication List


Current Medications: 


Active Medications





Amoxicillin/Clavulanate Potassium (Augmentin 250 Mg/5 Ml Oral Suspension -)  

500 mg GT BID@0800,1730 [facility]


   Last Admin: 12/04/19 09:04 Dose:  500 mg


Calcium Carbonate/Cholecalciferol (Os-Dale 500+D -)  1 tab GT BID [facility]


   Last Admin: 12/04/19 09:05 Dose:  1 tab


Clobazam (Onfi -)  10 mg GT BID [facility]


   Last Admin: 12/04/19 09:05 Dose:  10 mg


Heparin Sodium (Porcine) (Heparin -)  5,000 unit SQ BID [facility]


   Last Admin: 12/04/19 09:06 Dose:  5,000 unit


Meropenem 1 gm/ Dextrose  100 mls @ 200 mls/hr IVPB Q8H-IV [facility]


   Last Admin: 12/04/19 09:05 Dose:  200 mls/hr


Lacosamide (Vimpat Liquid -)  200 mg GT BID [facility]


   Last Admin: 12/04/19 11:34 Dose:  200 mg


Levetiracetam (Keppra Oral Solution -)  2,000 mg GT BID [facility]


   Last Admin: 12/04/19 11:14 Dose:  2,000 mg


Levocarnitine (Carnitor Oral Solution -)  700 mg GT TID [facility]


   Last Admin: 12/04/19 06:40 Dose:  700 mg


Pyridoxine HCl (Vitamin B6 -)  100 mg GT DAILY [facility]


   Last Admin: 12/04/19 09:05 Dose:  100 mg


Zonisamide (Zonisamide)  300 mg GT BID [facility]


   Last Admin: 12/04/19 11:14 Dose:  300 mg











- Objective


Vital Signs: 


 Vital Signs











Temperature  97.0 F L  12/04/19 09:00


 


Pulse Rate  78   12/04/19 09:00


 


Respiratory Rate  18   12/04/19 09:00


 


Blood Pressure  107/65   12/04/19 09:00


 


O2 Sat by Pulse Oximetry (%)  98   12/04/19 09:00











Constitutional: Yes: No Distress, Calm


Cardiovascular: Yes: S1, S2


Respiratory: Yes: Regular, CTA Bilaterally


Gastrointestinal: Yes: Normal Bowel Sounds, Soft


Musculoskeletal: Yes: WNL


Extremities: Yes: WNL


Neurological: Yes: Alert


Psychiatric: Yes: Alert


Labs: 


 CBC, BMP





 12/04/19 06:55 





 12/04/19 06:55 





 INR, PTT











INR  0.97  (0.83-1.09)   11/22/19  12:30    














Assessment/Plan





Problem List





- Problems


(1) Hypothermia


Code(s): T68.XXXA - HYPOTHERMIA, INITIAL ENCOUNTER   





(2) Severe sepsis


Code(s): A41.9 - SEPSIS, UNSPECIFIED ORGANISM; R65.20 - SEVERE SEPSIS WITHOUT 

SEPTIC SHOCK   





(3) UTI (urinary tract infection)


Code(s): N39.0 - URINARY TRACT INFECTION, SITE NOT SPECIFIED   





(4) Seizure


Code(s): R56.9 - UNSPECIFIED CONVULSIONS   





Assessment/Plan





33 y.o. male with PMH of Cerebral Palsy with mental retardation, urethral 

stricture s/p SPC, seizure d.o., GERD, with previous UTIs and PNA sent from 

group home for shortness of breath with low oxygen saturation (88% on RA) and 

less responsiveness





Severe Sepsis


UTI 


Hypothermia


Urethral strictue with SPC


Cerebral Palsy/MR


Seizure d.o.





plan


continue current abx


complete the course


continue warming


rest as per the team


will d/w the team

## 2019-12-04 NOTE — PN
Progress Note, Physician


Chief Complaint: 





Nonverbal. No grimacing noted on exam


History of Present Illness: 





Patient is a 33 year old male form [facility] with history of seizures, 

microcephaly, urethral stricture s/p suprapubic catheter placement and severe 

MR.  He presents to the ED on 11/22/2019 accompanied by his HHA with 

hypothermia and hypotension and was found to have ESBL in urine and will need 2 

weeks of IV antibiotics per ID.  





Meropenem started on 11/24/2019 and he will complete treatment on 12/7/2019.





- Current Medication List


Current Medications: 


Active Medications





Amoxicillin/Clavulanate Potassium (Augmentin 250 Mg/5 Ml Oral Suspension -)  

500 mg GT BID@0800,1730 [facility]


   Last Admin: 12/03/19 17:20 Dose:  500 mg


Calcium Carbonate/Cholecalciferol (Os-Dale 500+D -)  1 tab GT BID [facility]


   Last Admin: 12/03/19 23:40 Dose:  1 tab


Clobazam (Onfi -)  10 mg GT BID [facility]


   Last Admin: 12/03/19 23:40 Dose:  10 mg


Heparin Sodium (Porcine) (Heparin -)  5,000 unit SQ BID [facility]


   Last Admin: 12/03/19 23:42 Dose:  5,000 unit


Meropenem 1 gm/ Dextrose  100 mls @ 200 mls/hr IVPB Q8H-IV [facility]


   Last Admin: 12/04/19 02:25 Dose:  200 mls/hr


Lacosamide (Vimpat Liquid -)  200 mg GT BID [facility]


   Last Admin: 12/03/19 23:43 Dose:  200 mg


Levetiracetam (Keppra Oral Solution -)  2,000 mg GT BID [facility]


   Last Admin: 12/03/19 23:37 Dose:  2,000 mg


Levocarnitine (Carnitor Oral Solution -)  700 mg GT TID [facility]


   Last Admin: 12/04/19 06:40 Dose:  700 mg


Pyridoxine HCl (Vitamin B6 -)  100 mg GT DAILY [facility]


   Last Admin: 12/03/19 09:15 Dose:  100 mg


Zonisamide (Zonisamide)  300 mg GT BID [facility]


   Last Admin: 12/03/19 23:39 Dose:  300 mg











- Objective


Vital Signs: 


 Vital Signs











Temperature  98.9 F   12/04/19 06:43


 


Pulse Rate  79   12/04/19 06:43


 


Respiratory Rate  18   12/04/19 06:43


 


Blood Pressure  104/55 L  12/04/19 06:43


 


O2 Sat by Pulse Oximetry (%)  98   12/03/19 21:00











Labs: 


 CBC, BMP





 12/04/19 06:55 





 INR, PTT











INR  0.97  (0.83-1.09)   11/22/19  12:30    














Problem List





- Problems


(1) Cerebral palsy


Assessment/Plan: 


supportive care


reposition and turn q2H


will return back to [facility]


Code(s): G80.9 - CEREBRAL PALSY, UNSPECIFIED   





(2) Enterococcus faecalis infection


Assessment/Plan: 


11/24 urine culture with entercoccus faecalis, pseudomonas aerugonosa and yeast 

organism


11/22 urine culture with esbl, entercoccus faecalis


on meropenem until 12/7 and augmentin per ID





Code(s): B95.2 - ENTEROCOCCUS AS THE CAUSE OF DISEASES CLASSIFIED ELSEWHERE   





(3) Hypotension


Assessment/Plan: 


sbp 100s


appears euvolemic





Code(s): I95.9 - HYPOTENSION, UNSPECIFIED   





(4) Hypothermia


Assessment/Plan: 


barehugger as needed to maintain temp


Code(s): T68.XXXA - HYPOTHERMIA, INITIAL ENCOUNTER   





(5) Prophylactic measure


Assessment/Plan: 


FEN


C/w with jevity with additional water in GT


monitor electrolytes





DVT


heparin sq





Dispo


maintain as in patient


discharge planning back to [facility] after abx completion


full code


Code(s): Z29.9 - ENCOUNTER FOR PROPHYLACTIC MEASURES, UNSPECIFIED   





(6) Urinary tract infection due to ESBL Klebsiella


Code(s): N39.0 - URINARY TRACT INFECTION, SITE NOT SPECIFIED; B96.89 - OTH 

BACTERIAL AGENTS AS THE CAUSE OF DISEASES CLASSD ELSWHR   





(7) Seizure


Assessment/Plan: 


c/w zonisamide, keppra, lacosamide


sz precautions





Code(s): R56.9 - UNSPECIFIED CONVULSIONS   





(8) Functional quadriplegia


Assessment/Plan: 


supportive care


passive ROM


Code(s): R53.2 - FUNCTIONAL QUADRIPLEGIA   





(9) Sepsis


Assessment/Plan: 


presented  with hypothermia, hypotension and + UA


lactic acid within normal limits, chest xray with no acute chest pathology


blood Cx NGTD & urine cultures with ESBL


now hymodynamically stable, remains with low grade hypothermia improving with 

barehugger warming


WBC 8.1


sepsis resolving


\c/w zosyn


Code(s): A41.9 - SEPSIS, UNSPECIFIED ORGANISM   





Visit type





- Emergency Visit


Emergency Visit: Yes


ED Registration Date: 11/22/19


Care time: The patient presented to the Emergency Department on the above date 

and was hospitalized for further evaluation of their emergent condition.





- New Patient


This patient is new to me today: Yes


Date on this admission: 12/04/19





- Critical Care


Critical Care patient: No





- Discharge Referral


Referred to [facility] No

## 2019-12-05 LAB
ALBUMIN SERPL-MCNC: 2.9 G/DL (ref 3.4–5)
ALP SERPL-CCNC: 133 U/L (ref 45–117)
ALT SERPL-CCNC: 39 U/L (ref 13–61)
ANION GAP SERPL CALC-SCNC: 8 MMOL/L (ref 8–16)
AST SERPL-CCNC: 26 U/L (ref 15–37)
BASOPHILS # BLD: 1.1 % (ref 0–2)
BILIRUB SERPL-MCNC: 0.2 MG/DL (ref 0.2–1)
BUN SERPL-MCNC: 14.7 MG/DL (ref 7–18)
CALCIUM SERPL-MCNC: 9.2 MG/DL (ref 8.5–10.1)
CHLORIDE SERPL-SCNC: 105 MMOL/L (ref 98–107)
CO2 SERPL-SCNC: 22 MMOL/L (ref 21–32)
CREAT SERPL-MCNC: 0.5 MG/DL (ref 0.55–1.3)
DEPRECATED RDW RBC AUTO: 13.2 % (ref 11.9–15.9)
EOSINOPHIL # BLD: 4.5 % (ref 0–4.5)
GLUCOSE SERPL-MCNC: 86 MG/DL (ref 74–106)
HCT VFR BLD CALC: 32.8 % (ref 35.4–49)
HGB BLD-MCNC: 11.1 GM/DL (ref 11.7–16.9)
LYMPHOCYTES # BLD: 49.3 % (ref 8–40)
MAGNESIUM SERPL-MCNC: 2.4 MG/DL (ref 1.8–2.4)
MCH RBC QN AUTO: 32.6 PG (ref 25.7–33.7)
MCHC RBC AUTO-ENTMCNC: 33.9 G/DL (ref 32–35.9)
MCV RBC: 96.1 FL (ref 80–96)
MONOCYTES # BLD AUTO: 3.8 % (ref 3.8–10.2)
NEUTROPHILS # BLD: 41.3 % (ref 42.8–82.8)
PLATELET # BLD AUTO: 268 K/MM3 (ref 134–434)
PMV BLD: 8.2 FL (ref 7.5–11.1)
POTASSIUM SERPLBLD-SCNC: 4.3 MMOL/L (ref 3.5–5.1)
PROT SERPL-MCNC: 7.1 G/DL (ref 6.4–8.2)
RBC # BLD AUTO: 3.42 M/MM3 (ref 4–5.6)
SODIUM SERPL-SCNC: 135 MMOL/L (ref 136–145)
WBC # BLD AUTO: 6.2 K/MM3 (ref 4–10)

## 2019-12-05 RX ADMIN — Medication SCH TAB: at 22:52

## 2019-12-05 RX ADMIN — MEROPENEM SCH MLS/HR: 1 INJECTION, POWDER, FOR SOLUTION INTRAVENOUS at 18:20

## 2019-12-05 RX ADMIN — AMOXICILLIN AND CLAVULANATE POTASSIUM SCH MG: 250; 62.5 POWDER, FOR SUSPENSION ORAL at 09:38

## 2019-12-05 RX ADMIN — LEVETIRACETAM SCH MG: 100 SOLUTION ORAL at 22:53

## 2019-12-05 RX ADMIN — LEVOCARNITINE SCH MG: 1 SOLUTION ORAL at 22:52

## 2019-12-05 RX ADMIN — ZONISAMIDE SCH MG: 100 CAPSULE ORAL at 22:54

## 2019-12-05 RX ADMIN — Medication SCH MG: at 09:41

## 2019-12-05 RX ADMIN — MEROPENEM SCH MLS/HR: 1 INJECTION, POWDER, FOR SOLUTION INTRAVENOUS at 09:40

## 2019-12-05 RX ADMIN — MEROPENEM SCH MLS/HR: 1 INJECTION, POWDER, FOR SOLUTION INTRAVENOUS at 01:42

## 2019-12-05 RX ADMIN — Medication SCH TAB: at 00:30

## 2019-12-05 RX ADMIN — Medication SCH ML: at 09:41

## 2019-12-05 RX ADMIN — LACOSAMIDE SCH MG: 10 SOLUTION ORAL at 09:40

## 2019-12-05 RX ADMIN — Medication SCH TAB: at 09:41

## 2019-12-05 RX ADMIN — LEVOCARNITINE SCH MG: 1 SOLUTION ORAL at 06:37

## 2019-12-05 RX ADMIN — HEPARIN SODIUM SCH UNIT: 5000 INJECTION, SOLUTION INTRAVENOUS; SUBCUTANEOUS at 09:42

## 2019-12-05 RX ADMIN — LEVOCARNITINE SCH MG: 1 SOLUTION ORAL at 13:17

## 2019-12-05 RX ADMIN — LEVETIRACETAM SCH MG: 100 SOLUTION ORAL at 09:40

## 2019-12-05 RX ADMIN — HEPARIN SODIUM SCH UNIT: 5000 INJECTION, SOLUTION INTRAVENOUS; SUBCUTANEOUS at 22:52

## 2019-12-05 RX ADMIN — LACOSAMIDE SCH MG: 10 SOLUTION ORAL at 22:53

## 2019-12-05 RX ADMIN — ZONISAMIDE SCH MG: 100 CAPSULE ORAL at 09:42

## 2019-12-05 NOTE — PN
Progress Note, Physician


Chief Complaint: 





Nonverbal. No grimacing noted on exam


History of Present Illness: 





Patient is a 33 year old male form [facility] [place] with history of seizures, 

microcephaly, urethral stricture s/p suprapubic catheter placement and severe 

MR.  He presents to the ED on 11/22/2019 accompanied by his HHA with 

hypothermia and hypotension and was found to have ESBL in urine and will need 2 

weeks of IV antibiotics per ID.  





Meropenem started on 11/24/2019 and he will complete treatment on 12/7/2019.





- Current Medication List


Current Medications: 


Active Medications





Amino Acids (Prosource No Carb Liquid Pkt)  30 ml GT DAILY [facility]


Amoxicillin/Clavulanate Potassium (Augmentin 250 Mg/5 Ml Oral Suspension -)  

500 mg GT BID@0800,1730 [facility]


   Last Admin: 12/04/19 17:19 Dose:  500 mg


Calcium Carbonate/Cholecalciferol (Os-Dale 500+D -)  1 tab GT BID [facility]


   Last Admin: 12/05/19 00:30 Dose:  1 tab


Clobazam (Onfi -)  10 mg GT BID [facility]


   Last Admin: 12/04/19 22:47 Dose:  10 mg


Heparin Sodium (Porcine) (Heparin -)  5,000 unit SQ BID [facility]


   Last Admin: 12/04/19 22:47 Dose:  5,000 unit


Meropenem 1 gm/ Dextrose  100 mls @ 200 mls/hr IVPB Q8H-IV [facility]


   Last Admin: 12/05/19 01:42 Dose:  200 mls/hr


Lacosamide (Vimpat Liquid -)  200 mg GT BID [facility]


   Last Admin: 12/04/19 22:46 Dose:  200 mg


Levetiracetam (Keppra Oral Solution -)  2,000 mg GT BID [facility]


   Last Admin: 12/04/19 22:46 Dose:  2,000 mg


Levocarnitine (Carnitor Oral Solution -)  700 mg GT TID [facility]


   Last Admin: 12/05/19 06:37 Dose:  700 mg


Pyridoxine HCl (Vitamin B6 -)  100 mg GT DAILY [facility]


   Last Admin: 12/04/19 09:05 Dose:  100 mg


Zonisamide (Zonisamide)  300 mg GT BID [facility]


   Last Admin: 12/04/19 22:52 Dose:  300 mg











- Objective


Vital Signs: 


 Vital Signs











Temperature  98.3 F   12/05/19 06:00


 


Pulse Rate  75   12/05/19 06:00


 


Respiratory Rate  18   12/05/19 06:00


 


Blood Pressure  109/67   12/05/19 06:00


 


O2 Sat by Pulse Oximetry (%)  94 L  12/04/19 21:00











Constitutional: Yes: No Distress, Calm, Thin


Eyes: Yes: WNL, Conjunctiva Clear


HENT: Yes: WNL, Atraumatic, Normocephalic


Neck: Yes: WNL, Supple, Trachea Midline


Cardiovascular: Yes: WNL, Regular Rate and Rhythm


Respiratory: Yes: WNL, Regular, CTA Bilaterally, Diminished (at bases)


Gastrointestinal: Yes: WNL, Normal Bowel Sounds, Soft (GT noted)


...Rectal Exam: Yes: Deferred


Genitourinary: Yes: Other (suprapubic tube noted)


Musculoskeletal: Yes: Other (muscle wasting)


Extremities: Yes: Other (contractures to uppler and lower extrem BL)


Edema: No


Peripheral Pulses WNL: Yes


Peripheral Pulses: Left Radial: 2+, Right Radial: 2+, Left Doralis Pedis: 2+, 

Right Dorsalis Pedis: 2+, Left Femoral: 2+, Right Femoral: 2+


Integumentary: Yes: WNL


Neurological: Yes: Other (profound mental retardation, nonverbal)


Psychiatric: Yes: Other (non verbval, no grimacing)


Labs: 


 CBC, BMP





 12/04/19 06:55 





 12/04/19 06:55 





 INR, PTT











INR  0.97  (0.83-1.09)   11/22/19  12:30    














Problem List





- Problems


(1) Cerebral palsy


Assessment/Plan: 


supportive care


reposition and turn q2H


will return back to [facility] [place]


Code(s): G80.9 - CEREBRAL PALSY, UNSPECIFIED   





(2) Enterococcus faecalis infection


Assessment/Plan: 


11/24 urine culture with entercoccus faecalis, pseudomonas aerugonosa and yeast 

organism


11/22 urine culture with esbl, entercoccus faecalis


on meropenem until 12/7 and augmentin per ID





Code(s): B95.2 - ENTEROCOCCUS AS THE CAUSE OF DISEASES CLASSIFIED ELSEWHERE   





(3) Hypotension


Assessment/Plan: 


sbp 100s


appears euvolemic





Code(s): I95.9 - HYPOTENSION, UNSPECIFIED   





(4) Hypothermia


Assessment/Plan: 


barehugger as needed to maintain temp


Code(s): T68.XXXA - HYPOTHERMIA, INITIAL ENCOUNTER   





(5) Prophylactic measure


Assessment/Plan: 


FEN


C/w with jevity with additional water in GT


monitor electrolytes





DVT


heparin sq





Dispo


maintain as in patient


discharge planning back to [place] after abx completion


full code


Code(s): Z29.9 - ENCOUNTER FOR PROPHYLACTIC MEASURES, UNSPECIFIED   





(6) Urinary tract infection due to ESBL Klebsiella


Code(s): N39.0 - URINARY TRACT INFECTION, SITE NOT SPECIFIED; B96.89 - OTH 

BACTERIAL AGENTS AS THE CAUSE OF DISEASES CLASSD ELSWHR   





(7) Seizure


Assessment/Plan: 


c/w zonisamide, keppra, lacosamide


sz precautions





Code(s): R56.9 - UNSPECIFIED CONVULSIONS   





(8) Functional quadriplegia


Assessment/Plan: 


supportive care


passive ROM


Code(s): R53.2 - FUNCTIONAL QUADRIPLEGIA   





(9) Sepsis


Assessment/Plan: 


presented  with hypothermia, hypotension and + UA


lactic acid within normal limits, chest xray with no acute chest pathology


blood Cx NGTD & urine cultures with ESBL


now hymodynamically stable, remains with low grade hypothermia improving with 

barehugger warming


WBC 6.2


sepsis resolving


c/w zosyn


Code(s): A41.9 - SEPSIS, UNSPECIFIED ORGANISM   





(10) Severe protein-calorie malnutrition


Assessment/Plan: 


Clinical indicators: BMI 28, albumin 2.9, bedbound with funtional quadrapelegia

, sunken cheeks with prominent bones


Risk Factors: chronic illness, seizure disorder, microcephaly and GT feedins


C/w jevity TF with additional water boluses


c/w prosource via GT qd


Code(s): E43 - UNSPECIFIED SEVERE PROTEIN-CALORIE MALNUTRITION   





Visit type





- Emergency Visit


Emergency Visit: Yes


ED Registration Date: 11/22/19


Care time: The patient presented to the Emergency Department on the above date 

and was hospitalized for further evaluation of their emergent condition.





- New Patient


This patient is new to me today: No





- Critical Care


Critical Care patient: No





- Discharge Referral


Referred to [facility] No

## 2019-12-05 NOTE — PN
Progress Note, Physician





- Current Medication List


Current Medications: 


Active Medications





Amino Acids (Prosource No Carb Liquid Pkt)  30 ml GT DAILY [facility]


   Last Admin: 12/05/19 09:41 Dose:  30 ml


Calcium Carbonate/Cholecalciferol (Os-Dale 500+D -)  1 tab GT BID [facility]


   Last Admin: 12/05/19 09:41 Dose:  1 tab


Clobazam (Onfi -)  10 mg GT BID [facility]


   Last Admin: 12/05/19 09:41 Dose:  10 mg


Heparin Sodium (Porcine) (Heparin -)  5,000 unit SQ BID [facility]


   Last Admin: 12/05/19 09:42 Dose:  5,000 unit


Meropenem 1 gm/ Dextrose  100 mls @ 200 mls/hr IVPB Q8H-IV [facility]


   Last Admin: 12/05/19 09:40 Dose:  200 mls/hr


Lacosamide (Vimpat Liquid -)  200 mg GT BID [facility]


   Last Admin: 12/05/19 09:40 Dose:  200 mg


Levetiracetam (Keppra Oral Solution -)  2,000 mg GT BID [facility]


   Last Admin: 12/05/19 09:40 Dose:  2,000 mg


Levocarnitine (Carnitor Oral Solution -)  700 mg GT TID [facility]


   Last Admin: 12/05/19 06:37 Dose:  700 mg


Pyridoxine HCl (Vitamin B6 -)  100 mg GT DAILY [facility]


   Last Admin: 12/05/19 09:41 Dose:  100 mg


Zonisamide (Zonisamide)  300 mg GT BID [facility]


   Last Admin: 12/05/19 09:42 Dose:  300 mg











- Objective


Vital Signs: 


 Vital Signs











Temperature  98.3 F   12/05/19 06:00


 


Pulse Rate  75   12/05/19 06:00


 


Respiratory Rate  18   12/05/19 06:00


 


Blood Pressure  109/67   12/05/19 06:00


 


O2 Sat by Pulse Oximetry (%)  94 L  12/04/19 21:00











Labs: 


 CBC, BMP





 12/05/19 07:55 





 12/05/19 07:55 





 INR, PTT











INR  0.97  (0.83-1.09)   11/22/19  12:30

## 2019-12-06 VITALS — DIASTOLIC BLOOD PRESSURE: 63 MMHG | TEMPERATURE: 96.8 F | SYSTOLIC BLOOD PRESSURE: 148 MMHG

## 2019-12-06 VITALS — HEART RATE: 76 BPM

## 2019-12-06 LAB
ALBUMIN SERPL-MCNC: 3.1 G/DL (ref 3.4–5)
ALP SERPL-CCNC: 140 U/L (ref 45–117)
ALT SERPL-CCNC: 40 U/L (ref 13–61)
ANION GAP SERPL CALC-SCNC: 4 MMOL/L (ref 8–16)
AST SERPL-CCNC: 23 U/L (ref 15–37)
BASOPHILS # BLD: 0.6 % (ref 0–2)
BILIRUB SERPL-MCNC: 0.1 MG/DL (ref 0.2–1)
BUN SERPL-MCNC: 19.1 MG/DL (ref 7–18)
CALCIUM SERPL-MCNC: 8.9 MG/DL (ref 8.5–10.1)
CHLORIDE SERPL-SCNC: 107 MMOL/L (ref 98–107)
CO2 SERPL-SCNC: 24 MMOL/L (ref 21–32)
CREAT SERPL-MCNC: 0.5 MG/DL (ref 0.55–1.3)
DEPRECATED RDW RBC AUTO: 13.3 % (ref 11.9–15.9)
EOSINOPHIL # BLD: 4.4 % (ref 0–4.5)
GLUCOSE SERPL-MCNC: 99 MG/DL (ref 74–106)
HCT VFR BLD CALC: 32.2 % (ref 35.4–49)
HGB BLD-MCNC: 10.9 GM/DL (ref 11.7–16.9)
LYMPHOCYTES # BLD: 40.8 % (ref 8–40)
MCH RBC QN AUTO: 32.7 PG (ref 25.7–33.7)
MCHC RBC AUTO-ENTMCNC: 33.9 G/DL (ref 32–35.9)
MCV RBC: 96.4 FL (ref 80–96)
MONOCYTES # BLD AUTO: 4.9 % (ref 3.8–10.2)
NEUTROPHILS # BLD: 49.3 % (ref 42.8–82.8)
PLATELET # BLD AUTO: 296 K/MM3 (ref 134–434)
PMV BLD: 7.5 FL (ref 7.5–11.1)
POTASSIUM SERPLBLD-SCNC: 4.6 MMOL/L (ref 3.5–5.1)
PROT SERPL-MCNC: 7.4 G/DL (ref 6.4–8.2)
RBC # BLD AUTO: 3.34 M/MM3 (ref 4–5.6)
SODIUM SERPL-SCNC: 135 MMOL/L (ref 136–145)
WBC # BLD AUTO: 5.4 K/MM3 (ref 4–10)

## 2019-12-06 RX ADMIN — MEROPENEM SCH MLS/HR: 1 INJECTION, POWDER, FOR SOLUTION INTRAVENOUS at 02:41

## 2019-12-06 RX ADMIN — MEROPENEM SCH MLS/HR: 1 INJECTION, POWDER, FOR SOLUTION INTRAVENOUS at 09:42

## 2019-12-06 RX ADMIN — Medication SCH MG: at 09:44

## 2019-12-06 RX ADMIN — LEVOCARNITINE SCH MG: 1 SOLUTION ORAL at 14:25

## 2019-12-06 RX ADMIN — HEPARIN SODIUM SCH UNIT: 5000 INJECTION, SOLUTION INTRAVENOUS; SUBCUTANEOUS at 09:48

## 2019-12-06 RX ADMIN — Medication SCH ML: at 09:48

## 2019-12-06 RX ADMIN — Medication SCH TAB: at 09:44

## 2019-12-06 RX ADMIN — LEVOCARNITINE SCH MG: 1 SOLUTION ORAL at 06:15

## 2019-12-06 RX ADMIN — ZONISAMIDE SCH MG: 100 CAPSULE ORAL at 09:52

## 2019-12-06 RX ADMIN — LEVETIRACETAM SCH MG: 100 SOLUTION ORAL at 09:43

## 2019-12-06 RX ADMIN — LACOSAMIDE SCH MG: 10 SOLUTION ORAL at 09:43

## 2019-12-06 NOTE — PN
Progress Note, Physician





- Current Medication List


Current Medications: 


Active Medications





Amino Acids (Prosource No Carb Liquid Pkt)  30 ml GT DAILY [facility]


   Last Admin: 12/06/19 09:48 Dose:  30 ml


Calcium Carbonate/Cholecalciferol (Os-Dale 500+D -)  1 tab GT BID [facility]


   Last Admin: 12/06/19 09:44 Dose:  1 tab


Clobazam (Onfi -)  10 mg GT BID [facility]


   Last Admin: 12/06/19 09:44 Dose:  10 mg


Heparin Sodium (Porcine) (Heparin -)  5,000 unit SQ BID [facility]


   Last Admin: 12/06/19 09:48 Dose:  5,000 unit


Meropenem 1 gm/ Dextrose  100 mls @ 200 mls/hr IVPB Q8H-IV [facility]


   Last Admin: 12/06/19 09:42 Dose:  200 mls/hr


Lacosamide (Vimpat Liquid -)  200 mg GT BID [facility]


   Last Admin: 12/06/19 09:43 Dose:  200 mg


Levetiracetam (Keppra Oral Solution -)  2,000 mg GT BID [facility]


   Last Admin: 12/06/19 09:43 Dose:  2,000 mg


Levocarnitine (Carnitor Oral Solution -)  700 mg GT TID [facility]


   Last Admin: 12/06/19 06:15 Dose:  700 mg


Pyridoxine HCl (Vitamin B6 -)  100 mg GT DAILY [facility]


   Last Admin: 12/06/19 09:44 Dose:  100 mg


Zonisamide (Zonisamide)  300 mg GT BID [facility]


   Last Admin: 12/06/19 09:52 Dose:  300 mg











- Objective


Vital Signs: 


 Vital Signs











Temperature  98.2 F   12/06/19 06:08


 


Pulse Rate  77   12/05/19 22:00


 


Respiratory Rate  20   12/06/19 06:08


 


Blood Pressure  120/7 L  12/06/19 06:08


 


O2 Sat by Pulse Oximetry (%)  96   12/05/19 21:00











Labs: 


 CBC, BMP





 12/06/19 09:00 





 12/06/19 09:00 





 INR, PTT











INR  0.97  (0.83-1.09)   11/22/19  12:30

## 2019-12-06 NOTE — DS
Physical Exam: 


SUBJECTIVE: Patient seen and examined at the bedside.  for discharge to 

[facility].  Accepted by Dr. Alvarez.








OBJECTIVE:


Patient is a 33 year old male form [facility] with history of seizures, 

microcephaly, urethral stricture s/p suprapubic catheter placement and severe 

MR.  He presents to the ED on 11/22/2019 accompanied by his HHA with 

hypothermia and hypotension and was found to have ESBL in urine and has 

completed two weeks of IV antibiotics per ID (11/22 - 11/23 was on Zosyn and 

Vancomycin).  On 11/24 to present DATE, treated with Meropenem.  No further 

antbiotics.  Patient is no longer hypothermic and his blood pressure is stable.

  Cleared by ID for discharge home without any further need of antibiotics.





SEE BELOW PROBLEM LIST.


 Vital Signs











 Period  Temp  Pulse  Resp  BP Sys/Curran  Pulse Ox


 


 Last 24 Hr  96.4 F-98.8 F  67-77  18-20  /7-72  96








PHYSICAL EXAM


GENERAL: awake, in no acute distress. 


HEAD: hx of microcephaly, head w/o trauma


EYES: Pupils equal, round and reactive to light,  


EARS, NOSE, THROAT: Ears normal, nares patent,  .


NECK: Normal range of motion


LUNGS: Breath sounds equal/diminished clear to auscultation bilaterally. No 

wheezes


HEART: Regular rate and rhythm 


ABDOMEN: Soft, nontender, not distended, +peg tube.  suprapubic catheter 

draining clear yellow urine, no odor


MUSCULOSKELETAL: bed bound, countracted lower ext.


UPPER EXTREMITIES:  . No peripheral edema.


LOWER EXTREMITIES: contracted- no evidence of DTI on either heel.


NEUROLOGICAL:  nonverbal


PSYCHIATRIC:  awake, alert


SKIN: Warm, dry, normal turgor, no rashes or lesions noted, normal capillary 

refill. 





LABS


 Laboratory Results - last 24 hr











  12/06/19 12/06/19





  09:00 09:00


 


WBC  5.4 


 


RBC  3.34 L 


 


Hgb  10.9 L 


 


Hct  32.2 L 


 


MCV  96.4 H 


 


MCH  32.7 


 


MCHC  33.9 


 


RDW  13.3 


 


Plt Count  296 


 


MPV  7.5 


 


Absolute Neuts (auto)  2.7 


 


Neutrophils %  49.3 


 


Lymphocytes %  40.8 H 


 


Monocytes %  4.9 


 


Eosinophils %  4.4 


 


Basophils %  0.6 


 


Nucleated RBC %  0 


 


Sodium   135 L


 


Potassium   4.6


 


Chloride   107


 


Carbon Dioxide   24


 


Anion Gap   4 L


 


BUN   19.1 H


 


Creatinine   0.5 L


 


Est GFR (CKD-EPI)AfAm   162.72


 


Est GFR (CKD-EPI)NonAf   140.40


 


Random Glucose   99


 


Calcium   8.9


 


Total Bilirubin   0.1 L


 


AST   23


 


ALT   40


 


Alkaline Phosphatase   140 H


 


Total Protein   7.4


 


Albumin   3.1 L











HOSPITAL COURSE:





Date of Admission:11/22/19





Date of Discharge: 12/06/19











Minutes to complete discharge: 45





Discharge Summary


Problems reviewed: Yes


Reason For Visit: UTI, HYPOTHERMIA


Current Active Problems





Cerebral palsy (Acute)


Enterococcus faecalis infection (Acute)


Functional quadriplegia (Acute)


Hypotension (Acute)


Hypothermia (Acute)


Prophylactic antibiotic (Acute)


Prophylactic measure (Acute)


Sepsis (Acute)


Severe protein-calorie malnutrition (Acute)


Severe sepsis (Acute)


UTI (urinary tract infection) (Acute)


Urinary tract infection due to ESBL Klebsiella (Acute)








Condition: Fair





- Instructions


Diet, Activity, Other Instructions: 


Thank you for allowing us to care for Mr. Mendieta.





Mr. Mendieta was admitted for sepsis from UTI.  Has been treated since 11/22/2019 

with IV antibiotics and we will be sending him back to [facility] today.  He has 

completed antibiotics.  





Thank you for allowing us to care for him. 








Referrals: 


Olu Alvarez Jr [Non Staff, Medical] - 


Disposition: SKILLED NURSING FACILITY





- Home Medications


Comprehensive Discharge Medication List: 


Ambulatory Orders





Albuterol 0.083% Nebulizer Sol [Ventolin 0.083% Nebulizer Soln -] 1 neb NEB Q4H 

PRN 09/13/19 


Calcium Carbonate/Vitamin D3 [Oyster Shell 500-Vit D3 200 Tb] 1 each GT BID 09/ 13/19 


Clobazam [Onfi -] 10 mg GT BID 09/13/19 


Fructooligosaccharides/Polydex [Fiber-Stat 15 gm/30 ml Liquid] 30 ml GT DAILY 09 /13/19 


Lacosamide [Vimpat] 200 mg GT BID 09/13/19 


Levocarnitine 7 ml GT TID 09/13/19 


Multivitamin [Multiple Vitamins] 30 ml GT DAILY 09/13/19 


Pyridoxine HCl [Vitamin B-6] 100 mg GT ASDIR 09/13/19 


Sennosides/Docusate Sodium [Senna-S Tablet] 2 tab GT HS 09/13/19 


Sodium Chloride Tablet - 3.5 gm GT BID 09/13/19 


Zonisamide 300 mg GT BID 09/13/19 


levETIRAcetam [levETIRAcetam ORAL SUSPENSION] 2 gm GT BID 09/13/19 


Ofloxacin 0.3% Ophth Soln [Ocuflox -] 2 drop AD Q4HWA #10 drops 09/18/19 











Problem List





- Problems


(1) Enterococcus faecalis infection


Assessment/Plan: 


11/24 urine culture with entercoccus faecalis, pseudomonas aerugonosa and yeast 

organism


11/22 urine culture with esbl, entercoccus faecalis.  


Completed IV antibiotics.  No further antibiotics on discharge.


vitals and labs stable


Code(s): B95.2 - ENTEROCOCCUS AS THE CAUSE OF DISEASES CLASSIFIED ELSEWHERE   





(2) Urinary tract infection due to ESBL Klebsiella


Assessment/Plan: 


11/24 urine culture with entercoccus faecalis, pseudomonas aerugonosa and yeast 

organism


11/22 urine culture with esbl, entercoccus faecalis


No further antibiotics


Treated with 2 weeks of IV antibiotics


vitals and labs stable


Code(s): N39.0 - URINARY TRACT INFECTION, SITE NOT SPECIFIED; B96.89 - OTH 

BACTERIAL AGENTS AS THE CAUSE OF DISEASES CLASSD ELSWHR   





(3) Hypothermia


Assessment/Plan: 


resolved


Code(s): T68.XXXA - HYPOTHERMIA, INITIAL ENCOUNTER   





(4) UTI (urinary tract infection)


Assessment/Plan: 


treated.  see above.


Code(s): N39.0 - URINARY TRACT INFECTION, SITE NOT SPECIFIED   





(5) Seizure


Assessment/Plan: 


no seizures during hospital stay


Code(s): R56.9 - UNSPECIFIED CONVULSIONS   





(6) Cerebral palsy


Assessment/Plan: 


supportive care


Code(s): G80.9 - CEREBRAL PALSY, UNSPECIFIED   





(7) Hypotension


Assessment/Plan: 


resolved 


Code(s): I95.9 - HYPOTENSION, UNSPECIFIED   





(8) Prophylactic measure


Assessment/Plan: 


discharge back to [facility].


Code(s): Z29.9 - ENCOUNTER FOR PROPHYLACTIC MEASURES, UNSPECIFIED   


This patient is new to me today: No


Emergency Visit: Yes


ED Registration Date: 11/22/19


Care time: The patient presented to the Emergency Department on the above date 

and was hospitalized for further evaluation of their emergent condition.


Critical Care patient: No





- Discharge Referral


Referred to [facility] No

## 2019-12-13 ENCOUNTER — HOSPITAL ENCOUNTER (INPATIENT)
Dept: HOSPITAL 74 - JER | Age: 34
LOS: 10 days | Discharge: HOME | DRG: 720 | End: 2019-12-23
Attending: INTERNAL MEDICINE | Admitting: GENERAL ACUTE CARE HOSPITAL
Payer: COMMERCIAL

## 2019-12-13 VITALS — BODY MASS INDEX: 21.9 KG/M2

## 2019-12-13 DIAGNOSIS — D64.9: ICD-10-CM

## 2019-12-13 DIAGNOSIS — F72: ICD-10-CM

## 2019-12-13 DIAGNOSIS — Z93.1: ICD-10-CM

## 2019-12-13 DIAGNOSIS — Q02: ICD-10-CM

## 2019-12-13 DIAGNOSIS — Q78.8: ICD-10-CM

## 2019-12-13 DIAGNOSIS — R91.1: ICD-10-CM

## 2019-12-13 DIAGNOSIS — J98.11: ICD-10-CM

## 2019-12-13 DIAGNOSIS — J96.01: ICD-10-CM

## 2019-12-13 DIAGNOSIS — K21.9: ICD-10-CM

## 2019-12-13 DIAGNOSIS — R53.2: ICD-10-CM

## 2019-12-13 DIAGNOSIS — J18.9: ICD-10-CM

## 2019-12-13 DIAGNOSIS — B96.1: ICD-10-CM

## 2019-12-13 DIAGNOSIS — K59.00: ICD-10-CM

## 2019-12-13 DIAGNOSIS — E86.0: ICD-10-CM

## 2019-12-13 DIAGNOSIS — Z16.12: ICD-10-CM

## 2019-12-13 DIAGNOSIS — B96.20: ICD-10-CM

## 2019-12-13 DIAGNOSIS — A41.59: Primary | ICD-10-CM

## 2019-12-13 DIAGNOSIS — N39.0: ICD-10-CM

## 2019-12-13 DIAGNOSIS — E87.1: ICD-10-CM

## 2019-12-13 DIAGNOSIS — G80.9: ICD-10-CM

## 2019-12-13 LAB
ALBUMIN SERPL-MCNC: 3.4 G/DL (ref 3.4–5)
ALP SERPL-CCNC: 143 U/L (ref 45–117)
ALT SERPL-CCNC: 56 U/L (ref 13–61)
ANION GAP SERPL CALC-SCNC: 8 MMOL/L (ref 8–16)
APPEARANCE UR: (no result)
APTT BLD: 35.8 SECONDS (ref 25.2–36.5)
ARTERIAL BLOOD GAS PCO2: 38.3 MMHG (ref 35–45)
ARTERIAL PATENCY WRIST A: POSITIVE
AST SERPL-CCNC: 39 U/L (ref 15–37)
BACTERIA # UR AUTO: 3464.3 /HPF
BASE EXCESS BLDA CALC-SCNC: -5.2 MEQ/L (ref -2–2)
BASOPHILS # BLD: 0.2 % (ref 0–2)
BILIRUB SERPL-MCNC: 0.2 MG/DL (ref 0.2–1)
BILIRUB UR STRIP.AUTO-MCNC: NEGATIVE MG/DL
BUN SERPL-MCNC: 19.6 MG/DL (ref 7–18)
CALCIUM SERPL-MCNC: 9.3 MG/DL (ref 8.5–10.1)
CASTS URNS QL MICRO: 15 /LPF (ref 0–8)
CHLORIDE SERPL-SCNC: 102 MMOL/L (ref 98–107)
CO2 SERPL-SCNC: 25 MMOL/L (ref 21–32)
COLOR UR: YELLOW
CREAT SERPL-MCNC: 0.6 MG/DL (ref 0.55–1.3)
DEPRECATED RDW RBC AUTO: 13.1 % (ref 11.9–15.9)
EOSINOPHIL # BLD: 0.7 % (ref 0–4.5)
EPITH CASTS URNS QL MICRO: 0.3 /HPF
GLUCOSE SERPL-MCNC: 127 MG/DL (ref 74–106)
HCT VFR BLD CALC: 38.7 % (ref 35.4–49)
HGB BLD-MCNC: 13.1 GM/DL (ref 11.7–16.9)
INR BLD: 0.97 (ref 0.83–1.09)
KETONES UR QL STRIP: NEGATIVE
LEUKOCYTE ESTERASE UR QL STRIP.AUTO: (no result)
LYMPHOCYTES # BLD: 16.8 % (ref 8–40)
MCH RBC QN AUTO: 32.9 PG (ref 25.7–33.7)
MCHC RBC AUTO-ENTMCNC: 33.8 G/DL (ref 32–35.9)
MCV RBC: 97.2 FL (ref 80–96)
MONOCYTES # BLD AUTO: 3.6 % (ref 3.8–10.2)
NEUTROPHILS # BLD: 78.7 % (ref 42.8–82.8)
NITRITE UR QL STRIP: POSITIVE
PH BLDV: 7.37 [PH] (ref 7.31–7.41)
PH UR: 8.5 [PH] (ref 5–8)
PLATELET # BLD AUTO: 293 K/MM3 (ref 134–434)
PMV BLD: 7.5 FL (ref 7.5–11.1)
PO2 BLDA: 239 MMHG (ref 80–100)
POTASSIUM SERPLBLD-SCNC: 5 MMOL/L (ref 3.5–5.1)
PROT SERPL-MCNC: 8.4 G/DL (ref 6.4–8.2)
PROT UR QL STRIP: NEGATIVE
PROT UR QL STRIP: NEGATIVE
PT PNL PPP: 11.5 SEC (ref 9.7–13)
RBC # BLD AUTO: 1 /HPF (ref 0–4)
RBC # BLD AUTO: 3.98 M/MM3 (ref 4–5.6)
SAO2 % BLDA: 99.3 % (ref 95–98)
SODIUM SERPL-SCNC: 135 MMOL/L (ref 136–145)
SP GR UR: 1.01 (ref 1.01–1.03)
UROBILINOGEN UR STRIP-MCNC: 0.2 MG/DL (ref 0.2–1)
VENOUS PC02: 42 MMHG (ref 38–52)
VENOUS PO2: 52.5 MMHG (ref 28–48)
WBC # BLD AUTO: 6.1 K/MM3 (ref 4–10)
WBC # UR AUTO: 65 /HPF (ref 0–5)

## 2019-12-13 PROCEDURE — G0480 DRUG TEST DEF 1-7 CLASSES: HCPCS

## 2019-12-13 RX ADMIN — LACOSAMIDE SCH MG: 10 SOLUTION ORAL at 23:29

## 2019-12-13 RX ADMIN — OFLOXACIN SCH DROP: 3 SOLUTION/ DROPS OPHTHALMIC at 22:50

## 2019-12-13 RX ADMIN — HEPARIN SODIUM SCH UNIT: 5000 INJECTION, SOLUTION INTRAVENOUS; SUBCUTANEOUS at 22:45

## 2019-12-13 RX ADMIN — SODIUM CHLORIDE SCH MLS/HR: 9 INJECTION, SOLUTION INTRAVENOUS at 15:24

## 2019-12-13 RX ADMIN — PIPERACILLIN AND TAZOBACTAM SCH MLS/HR: 4; .5 INJECTION, POWDER, LYOPHILIZED, FOR SOLUTION INTRAVENOUS at 22:45

## 2019-12-13 RX ADMIN — LEVOCARNITINE SCH MG: 1 SOLUTION ORAL at 23:28

## 2019-12-13 RX ADMIN — ZONISAMIDE SCH MG: 100 CAPSULE ORAL at 23:30

## 2019-12-13 RX ADMIN — DOCUSATE SODIUM,SENNOSIDES SCH TABLET: 50; 8.6 TABLET, FILM COATED ORAL at 22:46

## 2019-12-13 RX ADMIN — PIPERACILLIN AND TAZOBACTAM SCH MLS/HR: 4; .5 INJECTION, POWDER, LYOPHILIZED, FOR SOLUTION INTRAVENOUS at 15:36

## 2019-12-13 RX ADMIN — LEVETIRACETAM SCH MG: 100 SOLUTION ORAL at 23:27

## 2019-12-13 RX ADMIN — SODIUM CHLORIDE TAB 1 GM SCH GM: 1 TAB at 23:29

## 2019-12-13 RX ADMIN — Medication SCH TAB: at 22:46

## 2019-12-13 NOTE — PDOC
Attending Attestation





- Resident


Resident Name: Haydee Del Rosario





- ED Attending Attestation


I have performed the following: I have examined & evaluated the patient, The 

case was reviewed & discussed with the resident, I agree w/resident's findings 

& plan





- HPI


HPI: 





12/13/19 09:16





34 YOM with h.o seizures, microcephaly, urethral stricture s/p suprapubic 

catheter placement and MR, recurrent pna presenting from [facility] with SOB and 

cough, change in breathing, hypoxia. Patient is nonverbal, does not track with 

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


history limited 2/2 MRCP and nonverbal status.


facility representative provided collateral information


 has had h/o ESBL UTIs most recently in 11/2019, requiring broad spec abx, 

previously finished course of meropenem.





12/13/19 10:01








- Physicial Exam


PE: 





12/13/19 09:17


Agree with the resident's HPI and PE as documented in the electronic medical 

record.


nonverbal, EOMI, PERRL,  nl conjunctiva, anicteric; neck supple. lungs b/l 

rhonchi, hypoxic on O2, RRR, abdomen soft nontender. no rebound, guarding. G 

tube in place, suprapubic catheter in place, draining clear yellow urine. Back 

nontender. MACARIO x4, contracted extremities.. No peripheral edema. normal color 

for ethnicity, WWP.








12/13/19 10:02








- Medical Decision Making





12/13/19 09:46


Vital Signs











Temp Pulse Resp BP Pulse Ox


 


 97.8 F   99 H  24 H  114/80   94 L


 


 12/13/19 09:17  12/13/19 09:17  12/13/19 09:17  12/13/19 09:17  12/13/19 09:17





Vital signs notable for no fever however patient does have history of 

hypothermia has presentation for his sepsis.  Pulse rate is normal, mildly 

tachypneic normotensive and borderline saturations 94%.  We will pursue 

infectious work-up, including basic labs, lactic acid, blood and urine cultures

, patient with chronic catheter in place with frequent UTIs.  Chest x-ray, 

influenza swab, duonebs, IVF, reassess


anticipate admission for presumed pneumonia vs viral syndrome.


abx vanc/zosyn for PNA coverage, for anaerobes and Pseudomonas given risk 

factors, recent hospitalization and prior ESBL UTI.


will treat for health care associated pneumonia


flu neg





Laboratory results are within normal limits, no WBC count, coags and VBG are 

normal, no retention or acidosis/alkalosis.  Electrolytes are within normal 

limits including creatinine.  Troponin negative, unlikely cardiac.  Influenza 

swab is negative.  Chest x-ray is nondiagnostic as patient is rotated to the 

left, clear right-sided lung fields are noted.  Will pursue CT angios chest to 

evaluate for PE for persistent hypoxia despite oxygenation and duo nebs, 

remains in 88 to 89% while on the Ventimask.  Deep suctioning performed for the 

secretions.  We will up titrate on the oxygen as required to maintain sats 

above 92%.





12/13/19 12:00


CTA with bilateral lower lobe opacities, reece with infection. official reads 

mention as atelectasis, but with hypoxia/risk factors and sx, treat as pna. no 

prior CTs.


no PE identified. no aneurysm. Suspected new right upper lung lesion, correlate 

with further imaging.





admission to medical service for continued management, monitoring, abx, 

respiratory support/nebs. 


admitting for acute respiratory failure, hypoxia, UTI[facility]PNA[facility]





12/13/19 13:23





12/13/19 13:24





12/13/19 13:25





12/13/19 13:53





12/13/19 13:53








**Heart Score/ECG Review


  ** #1


ECG reviewed & interpreted by me at: 09:40


General ECG Interpretation: Sinus Rhythm, Normal Rate, Normal Intervals


Compared to previous ECG there are: No significant change





12/13/19 09:45


EKG normal sinus rhythm 95 bpm, no interval abnormalities, narrow QRS, ST and T 

wave segments and morphology normal. Nonspecific T wave abnormalities

## 2019-12-13 NOTE — PN
Teaching Attending Note


Name of Resident: Gordy Niño





ATTENDING PHYSICIAN STATEMENT





I saw and evaluated the patient.


I reviewed the resident's note and discussed the case with the resident.


I agree with the resident's findings and plan as documented.








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 

seizure disorder, urethral stricture requiring a suprapubic catheter, recurrent 

UTIs, mental retardation, recurrent PNA's presented from [facility] 

after staff noticed increased work of breathing and hypoxia. Patient was seen 

by the staff at [facility] to be hypoxic on room air w/ O2 at 78% and was 

experiencing labored breathing. Patient was also noted to have a non-productive 

cough, increase in respiratory rate, and hypoxia. No other history was 

obtainable from the NH staff. During ED course, patient was seen to have 

increased work of breathing with underlying hypoxia and was placed on non-

rebreather where saturations improved to 100%. At baseline, the patient is 

nonverbal, does not track, does not follow commands.








 Vital Signs - 24 hr











  12/13/19 12/13/19 12/13/19





  09:17 09:35 09:45


 


Temperature 97.8 F  


 


Pulse Rate 99 H  


 


Pulse Rate [  95 H 95 H





Radial]   


 


Respiratory 24 H 30 H 29 H





Rate   


 


Blood Pressure 114/80  


 


Blood Pressure  118/87 122/90





[Right Arm]   


 


O2 Sat by Pulse 94 L 97 96





Oximetry (%)   














  12/13/19 12/13/19 12/13/19





  10:00 11:40 12:00


 


Temperature   97.8 F


 


Pulse Rate   


 


Pulse Rate [ 99 H 109 H 





Radial]   


 


Respiratory 27 H 24 H 24 H





Rate   


 


Blood Pressure   114/80


 


Blood Pressure 141/97 135/100 





[Right Arm]   


 


O2 Sat by Pulse 98 100 95





Oximetry (%)   














  12/13/19 12/13/19 12/13/19





  12:30 12:52 13:00


 


Temperature  98.0 F 


 


Pulse Rate  91 H 


 


Pulse Rate [ 93 H  96 H





Radial]   


 


Respiratory 24 H 18 26 H





Rate   


 


Blood Pressure  141/90 


 


Blood Pressure 125/86  118/86





[Right Arm]   


 


O2 Sat by Pulse 100  100





Oximetry (%)   














  12/13/19 12/13/19





  17:22 18:45


 


Temperature  


 


Pulse Rate  


 


Pulse Rate [ 87 





Radial]  


 


Respiratory 15 





Rate  


 


Blood Pressure  


 


Blood Pressure 143/91 





[Right Arm]  


 


O2 Sat by Pulse  99





Oximetry (%)  








PE


VS stable.


GENERAL: Non-verbal, non-responsive, appears comfortable, does not track with 

eyes


HEAD: notable microcephaly with head deformity, no visible lacerations or 

bruising


EYES: Pupils equal, round and reactive to light, extraocular movements intact, 

conjunctiva clear. 


EARS, NOSE, THROAT: Oropharynx clear without exudates. Moist mucous membranes.


LUNGS: Bibasilar crackles present. No notable wheezing. No accessory muscle 

use. 


HEART: Regular rate and rhythm, normal S1 and S2 without murmur.


ABDOMEN: Soft, nontender, not distended, normoactive bowel sounds, no guarding, 

no rebound, no masses. Suprapubic catheter and G tube in place without purulence

, erythema, or discharge. 


MUSCULOSKELETAL: Spastic extremities with contracted RUE.


UPPER EXTREMITIES: 2+ pulses, warm, well-perfused. No peripheral edema.


LOWER EXTREMITIES: 2+ pulses, warm, well-perfused. No peripheral edema. 


NEUROLOGICAL:  Spastic extremities. Groan response to painful stimuli. 


SKIN: Cool, dry, normal turgor, no rashes or lesions noted, normal capillary 

refill.





 Laboratory Results - last 24 hr











  12/13/19 12/13/19 12/13/19





  09:36 09:36 09:36


 


WBC  6.1  


 


RBC  3.98 L  


 


Hgb  13.1  


 


Hct  38.7  D  


 


MCV  97.2 H  


 


MCH  32.9  


 


MCHC  33.8  


 


RDW  13.1  


 


Plt Count  293  


 


MPV  7.5  


 


Absolute Neuts (auto)  4.8  


 


Neutrophils %  78.7  D  


 


Lymphocytes %  16.8  D  


 


Monocytes %  3.6 L  


 


Eosinophils %  0.7  D  


 


Basophils %  0.2  


 


Nucleated RBC %  0  


 


PT with INR   


 


INR   


 


PTT (Actin FS)   


 


Anticoagulation Therapy   


 


Puncture Site   


 


ABG pH   


 


ABG pCO2 at Pt Temp   


 


ABG pO2 at Pt Temp   


 


ABG HCO3   


 


ABG O2 Sat (Measured)   


 


ABG O2 Content   


 


ABG Base Excess   


 


Nabeel Test   


 


VBG pH   


 


POC VBG pCO2   


 


POC VBG pO2   


 


VBG HCO3   


 


VBG O2 Sat (Mando)   


 


VBG Base Excess   


 


O2 Delivery Device   


 


Oxygen Flow Rate   


 


Vent Mode   


 


Vent Rate   


 


Mechanical Rate   


 


Pressure Support Vent   


 


Sodium   135 L 


 


Potassium   5.0 


 


Chloride   102 


 


Carbon Dioxide   25 


 


Anion Gap   8 


 


BUN   19.6 H 


 


Creatinine   0.6 


 


Est GFR (CKD-EPI)AfAm   150.97 


 


Est GFR (CKD-EPI)NonAf   130.26 


 


Random Glucose   127 H 


 


Lactic Acid    1.2


 


Calcium   9.3 


 


Total Bilirubin   0.2 


 


AST   39 H 


 


ALT   56 


 


Alkaline Phosphatase   143 H 


 


Troponin I   < 0.02 


 


Total Protein   8.4 H 


 


Albumin   3.4 


 


Urine Color   


 


Urine Appearance   


 


Urine pH   


 


Ur Specific Gravity   


 


Urine Protein   


 


Urine Glucose (UA)   


 


Urine Ketones   


 


Urine Blood   


 


Urine Nitrite   


 


Urine Bilirubin   


 


Urine Urobilinogen   


 


Ur Leukocyte Esterase   


 


Urine WBC (Auto)   


 


Urine RBC (Auto)   


 


Urine Casts (Auto)   


 


U Epithel Cells (Auto)   


 


Urine Bacteria (Auto)   


 


Influenza A (Rapid)   


 


Influenza B (Rapid)   














  12/13/19 12/13/19 12/13/19





  09:36 09:40 09:40


 


WBC   


 


RBC   


 


Hgb   


 


Hct   


 


MCV   


 


MCH   


 


MCHC   


 


RDW   


 


Plt Count   


 


MPV   


 


Absolute Neuts (auto)   


 


Neutrophils %   


 


Lymphocytes %   


 


Monocytes %   


 


Eosinophils %   


 


Basophils %   


 


Nucleated RBC %   


 


PT with INR   11.50 


 


INR   0.97 


 


PTT (Actin FS)   35.8 


 


Anticoagulation Therapy   


 


Puncture Site   


 


ABG pH   


 


ABG pCO2 at Pt Temp   


 


ABG pO2 at Pt Temp   


 


ABG HCO3   


 


ABG O2 Sat (Measured)   


 


ABG O2 Content   


 


ABG Base Excess   


 


Nabeel Test   


 


VBG pH  7.37  


 


POC VBG pCO2  42.0  


 


POC VBG pO2  52.5 H  


 


VBG HCO3  23.8  


 


VBG O2 Sat (Mando)  83.3 H  


 


VBG Base Excess  -0.9  


 


O2 Delivery Device   


 


Oxygen Flow Rate   


 


Vent Mode   


 


Vent Rate   


 


Mechanical Rate   


 


Pressure Support Vent   


 


Sodium   


 


Potassium   


 


Chloride   


 


Carbon Dioxide   


 


Anion Gap   


 


BUN   


 


Creatinine   


 


Est GFR (CKD-EPI)AfAm   


 


Est GFR (CKD-EPI)NonAf   


 


Random Glucose   


 


Lactic Acid   


 


Calcium   


 


Total Bilirubin   


 


AST   


 


ALT   


 


Alkaline Phosphatase   


 


Troponin I   


 


Total Protein   


 


Albumin   


 


Urine Color   


 


Urine Appearance   


 


Urine pH   


 


Ur Specific Gravity   


 


Urine Protein   


 


Urine Glucose (UA)   


 


Urine Ketones   


 


Urine Blood   


 


Urine Nitrite   


 


Urine Bilirubin   


 


Urine Urobilinogen   


 


Ur Leukocyte Esterase   


 


Urine WBC (Auto)   


 


Urine RBC (Auto)   


 


Urine Casts (Auto)   


 


U Epithel Cells (Auto)   


 


Urine Bacteria (Auto)   


 


Influenza A (Rapid)    Negative


 


Influenza B (Rapid)    Negative














  12/13/19 12/13/19





  09:45 14:00


 


WBC  


 


RBC  


 


Hgb  


 


Hct  


 


MCV  


 


MCH  


 


MCHC  


 


RDW  


 


Plt Count  


 


MPV  


 


Absolute Neuts (auto)  


 


Neutrophils %  


 


Lymphocytes %  


 


Monocytes %  


 


Eosinophils %  


 


Basophils %  


 


Nucleated RBC %  


 


PT with INR  


 


INR  


 


PTT (Actin FS)  


 


Anticoagulation Therapy   No Result Required.


 


Puncture Site   Right radial


 


ABG pH   7.33 L


 


ABG pCO2 at Pt Temp   38.3


 


ABG pO2 at Pt Temp   239 H


 


ABG HCO3   19.7 L


 


ABG O2 Sat (Measured)   99.3 H


 


ABG O2 Content   No Result Required.


 


ABG Base Excess   -5.2 L


 


Nabeel Test   Positive


 


VBG pH  


 


POC VBG pCO2  


 


POC VBG pO2  


 


VBG HCO3  


 


VBG O2 Sat (Mando)  


 


VBG Base Excess  


 


O2 Delivery Device   Nrm


 


Oxygen Flow Rate   100


 


Vent Mode   No Result Required.


 


Vent Rate   No Result Required.


 


Mechanical Rate   No Result Required.


 


Pressure Support Vent   No Result Required.


 


Sodium  


 


Potassium  


 


Chloride  


 


Carbon Dioxide  


 


Anion Gap  


 


BUN  


 


Creatinine  


 


Est GFR (CKD-EPI)AfAm  


 


Est GFR (CKD-EPI)NonAf  


 


Random Glucose  


 


Lactic Acid  


 


Calcium  


 


Total Bilirubin  


 


AST  


 


ALT  


 


Alkaline Phosphatase  


 


Troponin I  


 


Total Protein  


 


Albumin  


 


Urine Color  Yellow 


 


Urine Appearance  Turbid 


 


Urine pH  8.5 H 


 


Ur Specific Gravity  1.015 


 


Urine Protein  Negative 


 


Urine Glucose (UA)  Negative 


 


Urine Ketones  Negative 


 


Urine Blood  Negative 


 


Urine Nitrite  Positive H 


 


Urine Bilirubin  Negative 


 


Urine Urobilinogen  0.2 


 


Ur Leukocyte Esterase  2+ H 


 


Urine WBC (Auto)  65 


 


Urine RBC (Auto)  1 


 


Urine Casts (Auto)  15 


 


U Epithel Cells (Auto)  0.3 


 


Urine Bacteria (Auto)  3464.3 


 


Influenza A (Rapid)  


 


Influenza B (Rapid)  








 Current Medications











Generic Name Dose Route Start Last Admin





  Trade Name Freq  PRN Reason Stop Dose Admin


 


Albuterol Sulfate  1 amp  12/13/19 14:54  





  Ventolin 0.083% Nebulizer Soln -  NEB   





  Q4H PRN   





  SHORT OF BREATH/WHEEZING   





     





     





     


 


Calcium Carbonate/Cholecalciferol  1 tab  12/13/19 22:00  





  Os-Dale 500+D -  GT   





  BID RAKAN   





     





     





     





     


 


Clobazam  10 mg  12/13/19 22:00  





  Onfi -  GT   





  BID RAKAN   





     





     





     





     


 


Diazepam   mg  12/13/19 15:00  





  Diastat Rectal Gel -  RC   





  PRN RAKAN   





     





     





     





     


 


Famotidine  20 mg  12/14/19 10:00  





  Pepcid  PEG   





  DAILY RAKAN   





     





     





     





     


 


Heparin Sodium (Porcine)  5,000 unit  12/13/19 22:00  





  Heparin -  SQ   





  TID RAKAN   





     





     





     





     


 


Sodium Chloride  1,000 mls @ 75 mls/hr  12/13/19 15:00  12/13/19 15:24





  Normal Saline -  IV   75 mls/hr





  ASDIR RAKAN   Administration





     





     





     





     


 


Piperacillin Sod/Tazobactam  100 mls @ 200 mls/hr  12/13/19 15:00  





  Sod 4.5 gm/ Dextrose  IVPB   





  Q6H-IV RAKAN   





     





     





  Protocol   





     


 


Piperacillin Sod/Tazobactam  100 mls @ 200 mls/hr  12/13/19 15:30  12/13/19 15:

36





  Sod 4.5 gm/ Dextrose  IVPB  12/14/19 15:29  200 mls/hr





  Q6H-IV RAKAN   Administration





     





     





  Protocol   





     


 


Lacosamide  200 mg  12/13/19 22:00  





  Vimpat Liquid -  GT   





  BID RAKAN   





     





     





     





     


 


Lactobacillus Acidophilus  2 tab  12/14/19 10:00  





  Bacid -  GT   





  DAILY RAKAN   





     





     





     





     


 


Levetiracetam  2,000 mg  12/13/19 22:00  





  Keppra Oral Solution -  GT   





  BID RAKAN   





     





     





     





     


 


Levocarnitine  700 mg  12/13/19 22:00  





  Carnitor Oral Solution -  GT   





  TID RAKAN   





     





     





     





     


 


Multivitamins/Minerals  15 ml  12/14/19 10:00  





  Certavite-Antioxidant Liquid  PO   





  DAILY RAKAN   





     





     





     





     


 


Ofloxacin  2 drop  12/13/19 18:00  





  Ocuflox 0.3% Eye Drops -  AD   





  Q4HWA [facility]   





     





     





     





     


 


Pyridoxine HCl  100 mg  12/14/19 10:00  





  Vitamin B6 -  GT   





  DAILY RAKAN   





     





     





     





     


 


Senna/Docusate Sodium  2 tablet  12/13/19 22:00  





  Pericolace -  PO   





  HS [facility]   





     





     





     





     


 


Sodium Chloride  3.5 gm  12/13/19 22:00  





  Sodium Chloride Tablet -  GT   





  BID RAKAN   





     





     





     





     


 


Zonisamide  300 mg  12/13/19 22:00  





  Zonisamide  GT   





  BID [facility]   





     





     





     





     








 Home Medications











 Medication  Instructions  Recorded


 


Albuterol 0.083% Nebulizer Sol 1 neb NEB Q4H PRN 09/13/19





[Ventolin 0.083% Nebulizer Soln -]  


 


Calcium Carbonate/Vitamin D3 1 each GT BID 09/13/19





[Oyster Shell 500-Vit D3 200 Tb]  


 


Clobazam [Onfi -] 10 mg GT BID 09/13/19


 


Fructooligosaccharides/Polydex 30 ml GT DAILY 09/13/19





[Fiber-Stat 15 gm/30 ml Liquid]  


 


Lacosamide [Vimpat] 200 mg GT BID 09/13/19


 


Levocarnitine 7 ml GT TID 09/13/19


 


Multivitamin [Multiple Vitamins] 30 ml GT DAILY 09/13/19


 


Pyridoxine HCl [Vitamin B-6] 100 mg GT ASDIR 09/13/19


 


Sennosides/Docusate Sodium 2 tab GT HS 09/13/19





[Senna-S Tablet]  


 


Sodium Chloride Tablet - 3.5 gm GT BID 09/13/19


 


Zonisamide 300 mg GT BID 09/13/19


 


levETIRAcetam [levETIRAcetam ORAL 2 gm GT BID 09/13/19





SUSPENSION]  


 


Ofloxacin 0.3% Ophth Soln [Ocuflox 2 drop AD Q4HWA #10 drops 09/18/19





-]  


 


Calcium Carbonate/Vitamin D3 1 each GT BID 12/13/19





[Oyster Shell 500-Vit D3 200 Pk]  


 


Diazepam [Diastat Acudial] 1 each RC PRN 12/13/19


 


Lactobacillus Acidophilus 2 each GT DAILY 12/13/19





[Acidophilus]  


 


Nutritional Supplement/Fiber 237 ml GT ASDIR 12/13/19





[Promote with Fiber Liquid]  








A/P:





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 

bedbound, non-verbal, non-communicative at baseline, with suprapubic catheter 

placed with underlying UTI. 





Plan:





#UTI


History of ESBL and pseudomonas, follow last cultures, Cont. Zosyn and await 

urine cultures


ID consult


Contact precaution


Cont. IVF hydration, no signs of sepsis





#ИРИНА lesion on CT chest


Pulmonary consult to evaluate need for PET imaging/possible biopsy


Duonebs PRN for SOB 





#Craniosynistosis with seizure disorder


No seizures observed by [facility] staff or in ED


Continue home seizure medications obtain appropriate levels to ensure they're 

in therapeutic range


Seizure precautions





#Hypoxia


now resolved, likely positional secondary to atelectasis


Cont. NC O2 2L 


Monitor VS





DVT ppx: SC Heparin


IVF with NS, monitor chem daily, nutrition consult for PEG feeds

## 2019-12-13 NOTE — EKG
Test Reason : 

Blood Pressure : ***/*** mmHG

Vent. Rate : 095 BPM     Atrial Rate : 095 BPM

   P-R Int : 154 ms          QRS Dur : 080 ms

    QT Int : 358 ms       P-R-T Axes : 059 067 057 degrees

   QTc Int : 449 ms

 

NORMAL SINUS RHYTHM

NORMAL ECG

WHEN COMPARED WITH ECG OF 22-NOV-2019 13:53,

NO SIGNIFICANT CHANGE WAS FOUND

Confirmed by GLORIA SOSA MD ([id number]) on 12/13/2019 1:04:26 PM

 

Referred By:             Confirmed By:GLORIA SOSA MD

## 2019-12-13 NOTE — HP
CHIEF COMPLAINT: hypoxia





PCP: Autumn Jeffery





HISTORY OF PRESENT ILLNESS:


Theodore Mendieta is a 34 year old male with a past medical history of seizures, 

microcephaly with cranial synostosis, urethral stricture s/p suprapubic 

catheter placement, hx of chronic UTI's with ESBL, MR, hx of PNA presented from 

[facility] for hypoxia. It was noted by the staff at [facility] that 

the patient was hypoxic on RA at 78% and was having increased work of 

breathing. Patient was noted to have a non-productive cough, increase in 

respiratory rate, and hypoxia. No other history was obtainable from the NH. 

Patient came to the ED where he had increased work of breath and was placed on 

non-rebreather. At baseline, the patient is nonverbal, does not track, does not 

follow commands. 





ER course was notable for:


(1) Given 3 amps of Duoneb, 1.5L of NS, Tylenol 1g, Zosyn, Vanco


(2) UA + nitrites, 2+ LE, 65 WBC, 3464 bacteria


(3) Tachycardic 109, RR 24-26


(4) CXR with no acute findings, CTA with atelectasis in LLL with new RULL 

lesion 1.9cm, no evidence of PE or PNA





Recent Travel: none





PAST MEDICAL HISTORY: as above





PAST SURGICAL HISTORY: suprapubic catheter, G tube placement, spinal fusion





Social History:


Smoking: none


Alcohol: none


Drugs: none





Resident at [facility]





Allergies





No Known Allergies Allergy (Verified 12/13/19 10:26)


 








HOME MEDICATIONS:


 Home Medications











 Medication  Instructions  Recorded


 


Albuterol 0.083% Nebulizer Sol 1 neb NEB Q4H PRN 09/13/19





[Ventolin 0.083% Nebulizer Soln -]  


 


Calcium Carbonate/Vitamin D3 1 each GT BID 09/13/19





[Oyster Shell 500-Vit D3 200 Tb]  


 


Clobazam [Onfi -] 10 mg GT BID 09/13/19


 


Fructooligosaccharides/Polydex 30 ml GT DAILY 09/13/19





[Fiber-Stat 15 gm/30 ml Liquid]  


 


Lacosamide [Vimpat] 200 mg GT BID 09/13/19


 


Levocarnitine 7 ml GT TID 09/13/19


 


Multivitamin [Multiple Vitamins] 30 ml GT DAILY 09/13/19


 


Pyridoxine HCl [Vitamin B-6] 100 mg GT ASDIR 09/13/19


 


Sennosides/Docusate Sodium 2 tab GT HS 09/13/19





[Senna-S Tablet]  


 


Sodium Chloride Tablet - 3.5 gm GT BID 09/13/19


 


Zonisamide 300 mg GT BID 09/13/19


 


levETIRAcetam [levETIRAcetam ORAL 2 gm GT BID 09/13/19





SUSPENSION]  


 


Ofloxacin 0.3% Ophth Soln [Ocuflox 2 drop AD Q4HWA #10 drops 09/18/19





-]  


 


Calcium Carbonate/Vitamin D3 1 each GT BID 12/13/19





[Oyster Shell 500-Vit D3 200 Pk]  


 


Diazepam [Diastat Acudial] 1 each RC PRN 12/13/19


 


Lactobacillus Acidophilus 2 each GT DAILY 12/13/19





[Acidophilus]  


 


Nutritional Supplement/Fiber 237 ml GT ASDIR 12/13/19





[Promote with Fiber Liquid]  








REVIEW OF SYSTEMS





Unable to provide ROS due to nonverbal status.





PHYSICAL EXAMINATION


 Vital Signs - 24 hr











  12/13/19 12/13/19 12/13/19





  09:17 09:35 09:45


 


Temperature 97.8 F  


 


Pulse Rate 99 H  


 


Pulse Rate [  95 H 95 H





Radial]   


 


Respiratory 24 H 30 H 29 H





Rate   


 


Blood Pressure 114/80  


 


Blood Pressure  118/87 122/90





[Right Arm]   


 


O2 Sat by Pulse 94 L 97 96





Oximetry (%)   














  12/13/19 12/13/19 12/13/19





  10:00 11:40 12:00


 


Temperature   97.8 F


 


Pulse Rate   


 


Pulse Rate [ 99 H 109 H 





Radial]   


 


Respiratory 27 H 24 H 24 H





Rate   


 


Blood Pressure   114/80


 


Blood Pressure 141/97 135/100 





[Right Arm]   


 


O2 Sat by Pulse 98 100 95





Oximetry (%)   














  12/13/19 12/13/19





  12:30 13:00


 


Temperature  


 


Pulse Rate  


 


Pulse Rate [ 93 H 96 H





Radial]  


 


Respiratory 24 H 26 H





Rate  


 


Blood Pressure  


 


Blood Pressure 125/86 118/86





[Right Arm]  


 


O2 Sat by Pulse 100 100





Oximetry (%)  











GENERAL: Non-verbal, non-responsive.


HEAD: Microcephalic.


EYES: Pupils equal, round and reactive to light, extraocular movements intact, 

conjunctiva clear. 


EARS, NOSE, THROAT: Oropharynx clear without exudates. Moist mucous membranes.


LUNGS: Noted bibasilar crackles. No wheezes appreciated. No accessory muscle 

use.


HEART: Regular rate and rhythm, normal S1 and S2 without murmur.


ABDOMEN: Soft, nontender, not distended, normoactive bowel sounds, no guarding, 

no rebound, no masses. Suprapubic catheter and G tube in place without purulence

, erythema, localized signs of infection


MUSCULOSKELETAL: Spastic extremities with contracted R UE.


UPPER EXTREMITIES: 2+ pulses, warm, well-perfused. No peripheral edema.


LOWER EXTREMITIES: 2+ pulses, warm, well-perfused. No peripheral edema. 


NEUROLOGICAL:  Spastic extremities. Groan response to painful stimuli. 


SKIN: Cool, dry, normal turgor, no rashes or lesions noted, normal capillary 

refill. 





 Laboratory Results - last 24 hr











  12/13/19 12/13/19 12/13/19





  09:36 09:36 09:36


 


WBC  6.1  


 


RBC  3.98 L  


 


Hgb  13.1  


 


Hct  38.7  D  


 


MCV  97.2 H  


 


MCH  32.9  


 


MCHC  33.8  


 


RDW  13.1  


 


Plt Count  293  


 


MPV  7.5  


 


Absolute Neuts (auto)  4.8  


 


Neutrophils %  78.7  D  


 


Lymphocytes %  16.8  D  


 


Monocytes %  3.6 L  


 


Eosinophils %  0.7  D  


 


Basophils %  0.2  


 


Nucleated RBC %  0  


 


PT with INR   


 


INR   


 


PTT (Actin FS)   


 


Anticoagulation Therapy   


 


Puncture Site   


 


ABG pH   


 


ABG pCO2 at Pt Temp   


 


ABG pO2 at Pt Temp   


 


ABG HCO3   


 


ABG O2 Sat (Measured)   


 


ABG O2 Content   


 


ABG Base Excess   


 


Nabeel Test   


 


VBG pH   


 


POC VBG pCO2   


 


POC VBG pO2   


 


VBG HCO3   


 


VBG O2 Sat (Mando)   


 


VBG Base Excess   


 


O2 Delivery Device   


 


Oxygen Flow Rate   


 


Vent Mode   


 


Vent Rate   


 


Mechanical Rate   


 


Pressure Support Vent   


 


Sodium   135 L 


 


Potassium   5.0 


 


Chloride   102 


 


Carbon Dioxide   25 


 


Anion Gap   8 


 


BUN   19.6 H 


 


Creatinine   0.6 


 


Est GFR (CKD-EPI)AfAm   150.97 


 


Est GFR (CKD-EPI)NonAf   130.26 


 


Random Glucose   127 H 


 


Lactic Acid    1.2


 


Calcium   9.3 


 


Total Bilirubin   0.2 


 


AST   39 H 


 


ALT   56 


 


Alkaline Phosphatase   143 H 


 


Troponin I   < 0.02 


 


Total Protein   8.4 H 


 


Albumin   3.4 


 


Urine Color   


 


Urine Appearance   


 


Urine pH   


 


Ur Specific Gravity   


 


Urine Protein   


 


Urine Glucose (UA)   


 


Urine Ketones   


 


Urine Blood   


 


Urine Nitrite   


 


Urine Bilirubin   


 


Urine Urobilinogen   


 


Ur Leukocyte Esterase   


 


Urine WBC (Auto)   


 


Urine RBC (Auto)   


 


Urine Casts (Auto)   


 


U Epithel Cells (Auto)   


 


Urine Bacteria (Auto)   


 


Influenza A (Rapid)   


 


Influenza B (Rapid)   














  12/13/19 12/13/19 12/13/19





  09:36 09:40 09:40


 


WBC   


 


RBC   


 


Hgb   


 


Hct   


 


MCV   


 


MCH   


 


MCHC   


 


RDW   


 


Plt Count   


 


MPV   


 


Absolute Neuts (auto)   


 


Neutrophils %   


 


Lymphocytes %   


 


Monocytes %   


 


Eosinophils %   


 


Basophils %   


 


Nucleated RBC %   


 


PT with INR   11.50 


 


INR   0.97 


 


PTT (Actin FS)   35.8 


 


Anticoagulation Therapy   


 


Puncture Site   


 


ABG pH   


 


ABG pCO2 at Pt Temp   


 


ABG pO2 at Pt Temp   


 


ABG HCO3   


 


ABG O2 Sat (Measured)   


 


ABG O2 Content   


 


ABG Base Excess   


 


Nabeel Test   


 


VBG pH  7.37  


 


POC VBG pCO2  42.0  


 


POC VBG pO2  52.5 H  


 


VBG HCO3  23.8  


 


VBG O2 Sat (Mando)  83.3 H  


 


VBG Base Excess  -0.9  


 


O2 Delivery Device   


 


Oxygen Flow Rate   


 


Vent Mode   


 


Vent Rate   


 


Mechanical Rate   


 


Pressure Support Vent   


 


Sodium   


 


Potassium   


 


Chloride   


 


Carbon Dioxide   


 


Anion Gap   


 


BUN   


 


Creatinine   


 


Est GFR (CKD-EPI)AfAm   


 


Est GFR (CKD-EPI)NonAf   


 


Random Glucose   


 


Lactic Acid   


 


Calcium   


 


Total Bilirubin   


 


AST   


 


ALT   


 


Alkaline Phosphatase   


 


Troponin I   


 


Total Protein   


 


Albumin   


 


Urine Color   


 


Urine Appearance   


 


Urine pH   


 


Ur Specific Gravity   


 


Urine Protein   


 


Urine Glucose (UA)   


 


Urine Ketones   


 


Urine Blood   


 


Urine Nitrite   


 


Urine Bilirubin   


 


Urine Urobilinogen   


 


Ur Leukocyte Esterase   


 


Urine WBC (Auto)   


 


Urine RBC (Auto)   


 


Urine Casts (Auto)   


 


U Epithel Cells (Auto)   


 


Urine Bacteria (Auto)   


 


Influenza A (Rapid)    Negative


 


Influenza B (Rapid)    Negative














  12/13/19 12/13/19





  09:45 14:00


 


WBC  


 


RBC  


 


Hgb  


 


Hct  


 


MCV  


 


MCH  


 


MCHC  


 


RDW  


 


Plt Count  


 


MPV  


 


Absolute Neuts (auto)  


 


Neutrophils %  


 


Lymphocytes %  


 


Monocytes %  


 


Eosinophils %  


 


Basophils %  


 


Nucleated RBC %  


 


PT with INR  


 


INR  


 


PTT (Actin FS)  


 


Anticoagulation Therapy   No Result Required.


 


Puncture Site   Right radial


 


ABG pH   7.33 L


 


ABG pCO2 at Pt Temp   38.3


 


ABG pO2 at Pt Temp   239 H


 


ABG HCO3   19.7 L


 


ABG O2 Sat (Measured)   99.3 H


 


ABG O2 Content   No Result Required.


 


ABG Base Excess   -5.2 L


 


Nabeel Test   Positive


 


VBG pH  


 


POC VBG pCO2  


 


POC VBG pO2  


 


VBG HCO3  


 


VBG O2 Sat (Mando)  


 


VBG Base Excess  


 


O2 Delivery Device   Nrm


 


Oxygen Flow Rate   100


 


Vent Mode   No Result Required.


 


Vent Rate   No Result Required.


 


Mechanical Rate   No Result Required.


 


Pressure Support Vent   No Result Required.


 


Sodium  


 


Potassium  


 


Chloride  


 


Carbon Dioxide  


 


Anion Gap  


 


BUN  


 


Creatinine  


 


Est GFR (CKD-EPI)AfAm  


 


Est GFR (CKD-EPI)NonAf  


 


Random Glucose  


 


Lactic Acid  


 


Calcium  


 


Total Bilirubin  


 


AST  


 


ALT  


 


Alkaline Phosphatase  


 


Troponin I  


 


Total Protein  


 


Albumin  


 


Urine Color  Yellow 


 


Urine Appearance  Turbid 


 


Urine pH  8.5 H 


 


Ur Specific Gravity  1.015 


 


Urine Protein  Negative 


 


Urine Glucose (UA)  Negative 


 


Urine Ketones  Negative 


 


Urine Blood  Negative 


 


Urine Nitrite  Positive H 


 


Urine Bilirubin  Negative 


 


Urine Urobilinogen  0.2 


 


Ur Leukocyte Esterase  2+ H 


 


Urine WBC (Auto)  65 


 


Urine RBC (Auto)  1 


 


Urine Casts (Auto)  15 


 


U Epithel Cells (Auto)  0.3 


 


Urine Bacteria (Auto)  3464.3 


 


Influenza A (Rapid)  


 


Influenza B (Rapid)  











EKG--> NSR, no ST segment changes, QTc 449





ASSESSMENT/PLAN:


Theodore Mendieta is a 34 year old male with a past medical history of seizures, 

microcephaly with cranial synostosis, urethral stricture s/p suprapubic 

catheter placement, hx of chronic UTI's with ESBL, MR, hx of PNA admitted for 

sepsis secondary to urinary tract infection.





Sepsis secondary to UTI vs lung pathology (PNA)


- Tachycardic 109, increased RR 26


- UA + nitrites, 2+ LE, 65 WBC, 3464 bacteria


- hx of ESBL and Pseudomonas in urine


- contact precautions


- urine and blood cxs pending


- ID consulted, Dr Jose Huang and Zosyn given in ED


- continue Zosyn 4.5gm q6h due to hx of Pseudomonas


- continue NS at 75cc/hr





Hypoxia


- likely in setting of sepsis


- Flu negative


- Legionella, Strep urine antigen, RSV


- saturating well currently


- remain on 2L O2 NC, tirate up as necessary


- albuterol prn





New Lung Lesion


- CT with evidence of new RUL lung lesion


- pulmonary consult





Seizure Disorder


- continue home anti-epileptics Vantin, Keppra, Onfi, zonisamide, levocarnitine





Hyponatremia


- continue sodium chloride tabs





Prophylaxis


- heparin 5000 units subq tid


- famotidine 20mg daily





FEN


- NS at 75cc/hr


- continue to monitor electrolytes and replete as necessary


- Tube feeds, Promote, dietary consult





Dispo


- admit to Med-surg





Family Medical History


Family History: Unable to Obtain





Visit type





- Emergency Visit


Emergency Visit: Yes


ED Registration Date: 12/13/19


Care time: The patient presented to the Emergency Department on the above date 

and was hospitalized for further evaluation of their emergent condition.





- New Patient


This patient is new to me today: Yes


Date on this admission: 12/13/19





- Critical Care


Critical Care patient: No

## 2019-12-14 LAB
ALBUMIN SERPL-MCNC: 3 G/DL (ref 3.4–5)
ALP SERPL-CCNC: 115 U/L (ref 45–117)
ALT SERPL-CCNC: 42 U/L (ref 13–61)
ANION GAP SERPL CALC-SCNC: 7 MMOL/L (ref 8–16)
AST SERPL-CCNC: 23 U/L (ref 15–37)
BILIRUB SERPL-MCNC: 0.2 MG/DL (ref 0.2–1)
BUN SERPL-MCNC: 12.6 MG/DL (ref 7–18)
CALCIUM SERPL-MCNC: 8.8 MG/DL (ref 8.5–10.1)
CHLORIDE SERPL-SCNC: 104 MMOL/L (ref 98–107)
CO2 SERPL-SCNC: 25 MMOL/L (ref 21–32)
CREAT SERPL-MCNC: 0.5 MG/DL (ref 0.55–1.3)
DEPRECATED RDW RBC AUTO: 13.4 % (ref 11.9–15.9)
GLUCOSE SERPL-MCNC: 138 MG/DL (ref 74–106)
HCT VFR BLD CALC: 31.7 % (ref 35.4–49)
HGB BLD-MCNC: 10.8 GM/DL (ref 11.7–16.9)
MAGNESIUM SERPL-MCNC: 2.3 MG/DL (ref 1.8–2.4)
MCH RBC QN AUTO: 32.9 PG (ref 25.7–33.7)
MCHC RBC AUTO-ENTMCNC: 33.9 G/DL (ref 32–35.9)
MCV RBC: 96.8 FL (ref 80–96)
PLATELET # BLD AUTO: 260 K/MM3 (ref 134–434)
PMV BLD: 7.7 FL (ref 7.5–11.1)
POTASSIUM SERPLBLD-SCNC: 4.2 MMOL/L (ref 3.5–5.1)
PROT SERPL-MCNC: 6.9 G/DL (ref 6.4–8.2)
RBC # BLD AUTO: 3.28 M/MM3 (ref 4–5.6)
SODIUM SERPL-SCNC: 137 MMOL/L (ref 136–145)
WBC # BLD AUTO: 10.3 K/MM3 (ref 4–10)

## 2019-12-14 RX ADMIN — OFLOXACIN SCH DROP: 3 SOLUTION/ DROPS OPHTHALMIC at 15:20

## 2019-12-14 RX ADMIN — PIPERACILLIN AND TAZOBACTAM SCH MLS/HR: 4; .5 INJECTION, POWDER, LYOPHILIZED, FOR SOLUTION INTRAVENOUS at 09:51

## 2019-12-14 RX ADMIN — OFLOXACIN SCH DROP: 3 SOLUTION/ DROPS OPHTHALMIC at 10:34

## 2019-12-14 RX ADMIN — OFLOXACIN SCH DROP: 3 SOLUTION/ DROPS OPHTHALMIC at 06:47

## 2019-12-14 RX ADMIN — Medication SCH TAB: at 22:40

## 2019-12-14 RX ADMIN — SODIUM CHLORIDE TAB 1 GM SCH GM: 1 TAB at 10:17

## 2019-12-14 RX ADMIN — LEVOCARNITINE SCH MG: 1 SOLUTION ORAL at 22:39

## 2019-12-14 RX ADMIN — Medication SCH TAB: at 10:17

## 2019-12-14 RX ADMIN — ZONISAMIDE SCH MG: 100 CAPSULE ORAL at 23:03

## 2019-12-14 RX ADMIN — LEVETIRACETAM SCH MG: 100 SOLUTION ORAL at 22:39

## 2019-12-14 RX ADMIN — OFLOXACIN SCH DROP: 3 SOLUTION/ DROPS OPHTHALMIC at 17:32

## 2019-12-14 RX ADMIN — PIPERACILLIN AND TAZOBACTAM SCH MLS/HR: 4; .5 INJECTION, POWDER, LYOPHILIZED, FOR SOLUTION INTRAVENOUS at 03:14

## 2019-12-14 RX ADMIN — DOCUSATE SODIUM,SENNOSIDES SCH TABLET: 50; 8.6 TABLET, FILM COATED ORAL at 22:40

## 2019-12-14 RX ADMIN — HEPARIN SODIUM SCH UNIT: 5000 INJECTION, SOLUTION INTRAVENOUS; SUBCUTANEOUS at 15:19

## 2019-12-14 RX ADMIN — SODIUM CHLORIDE TAB 1 GM SCH GM: 1 TAB at 23:06

## 2019-12-14 RX ADMIN — OFLOXACIN SCH DROP: 3 SOLUTION/ DROPS OPHTHALMIC at 22:58

## 2019-12-14 RX ADMIN — LEVOCARNITINE SCH MG: 1 SOLUTION ORAL at 06:48

## 2019-12-14 RX ADMIN — PIPERACILLIN SODIUM,TAZOBACTAM SODIUM SCH MLS/HR: 3; .375 INJECTION, POWDER, FOR SOLUTION INTRAVENOUS at 17:32

## 2019-12-14 RX ADMIN — FAMOTIDINE SCH MG: 40 POWDER, FOR SUSPENSION ORAL at 12:15

## 2019-12-14 RX ADMIN — HEPARIN SODIUM SCH UNIT: 5000 INJECTION, SOLUTION INTRAVENOUS; SUBCUTANEOUS at 06:48

## 2019-12-14 RX ADMIN — MULTIVITAMIN SCH ML: LIQUID ORAL at 12:15

## 2019-12-14 RX ADMIN — SODIUM CHLORIDE SCH MLS/HR: 9 INJECTION, SOLUTION INTRAVENOUS at 12:17

## 2019-12-14 RX ADMIN — LACOSAMIDE SCH MG: 10 SOLUTION ORAL at 10:17

## 2019-12-14 RX ADMIN — SODIUM CHLORIDE SCH: 9 INJECTION, SOLUTION INTRAVENOUS at 15:17

## 2019-12-14 RX ADMIN — HEPARIN SODIUM SCH UNIT: 5000 INJECTION, SOLUTION INTRAVENOUS; SUBCUTANEOUS at 22:40

## 2019-12-14 RX ADMIN — LEVOCARNITINE SCH MG: 1 SOLUTION ORAL at 15:19

## 2019-12-14 RX ADMIN — LACOSAMIDE SCH MG: 10 SOLUTION ORAL at 22:39

## 2019-12-14 RX ADMIN — LEVETIRACETAM SCH MG: 100 SOLUTION ORAL at 10:16

## 2019-12-14 RX ADMIN — ZONISAMIDE SCH MG: 100 CAPSULE ORAL at 12:16

## 2019-12-14 NOTE — PN
Progress Note, Physician


History of Present Illness: 





Patient seen and examined at bedside with his sitter present. Per nursing 

patient doing well. Message obtained from dietician to switch tube feeds to 

osmolite continuously @ 55ml/hr with 45ml/hr of free water flush. ROS could not 

be done as patient is non verbal. hemodynamically stable. Afebrile. WBC bumped 

up to 10.3 today. Afebrile. saturating 98% on 3L NC. seen by pulmonology who 

does not rec further work u for nodule due to multiple comorbidities/

developmental delay and thinks it may be Inflammatory/reactive but cannot def r/

o malignancy. 


UCx growing lactose fermenting GNB x2 and group D stre or enterococcus-still 

pending final ID and sensitivities





- Current Medication List


Current Medications: 


Active Medications





Albuterol Sulfate (Ventolin 0.083% Nebulizer Soln -)  1 amp NEB Q4H PRN


   PRN Reason: SHORT OF BREATH/WHEEZING


Calcium Carbonate/Cholecalciferol (Os-Dale 500+D -)  1 tab GT BID [facility]


   Last Admin: 12/14/19 10:17 Dose:  1 tab


Clobazam (Onfi -)  10 mg GT BID [facility]


   Last Admin: 12/14/19 10:18 Dose:  10 mg


Diazepam (Diastat Rectal Gel -)   mg RC PRN RAKAN


Famotidine (Pepcid)  20 mg PEG DAILY [facility]


   Last Admin: 12/14/19 12:15 Dose:  20 mg


Heparin Sodium (Porcine) (Heparin -)  5,000 unit SQ TID RAKAN


   Last Admin: 12/14/19 15:19 Dose:  5,000 unit


Sodium Chloride (Normal Saline -)  1,000 mls @ 75 mls/hr IV ASDIR RAKAN


   Last Admin: 12/14/19 15:17 Dose:  Not Given


Piperacillin Sod/Tazobactam (Sod 3.375 gm/ Dextrose)  50 mls @ 100 mls/hr IVPB 

Q8H-IV RAKAN; Protocol


   Last Admin: 12/14/19 17:32 Dose:  100 mls/hr


Lacosamide (Vimpat Liquid -)  200 mg GT BID RAKAN


   Last Admin: 12/14/19 10:17 Dose:  200 mg


Lactobacillus Acidophilus (Bacid -)  2 tab GT DAILY RAKAN


   Last Admin: 12/14/19 10:17 Dose:  2 tab


Levetiracetam (Keppra Oral Solution -)  2,000 mg GT BID [facility]


   Last Admin: 12/14/19 10:16 Dose:  2,000 mg


Levocarnitine (Carnitor Oral Solution -)  700 mg GT TID [facility]


   Last Admin: 12/14/19 15:19 Dose:  700 mg


Multivitamins/Minerals (Certavite-Antioxidant Liquid)  15 ml PO DAILY [facility]


   Last Admin: 12/14/19 12:15 Dose:  15 ml


Ofloxacin (Ocuflox 0.3% Eye Drops -)  2 drop AD Q4HWA [facility]


   Last Admin: 12/14/19 17:32 Dose:  2 drop


Pyridoxine HCl (Vitamin B6 -)  100 mg GT DAILY [facility]


   Last Admin: 12/14/19 12:16 Dose:  100 mg


Senna/Docusate Sodium (Pericolace -)  2 tablet PO HS [facility]


   Last Admin: 12/13/19 22:46 Dose:  2 tablet


Sodium Chloride (Sodium Chloride Tablet -)  3.5 gm GT BID [facility]


   Last Admin: 12/14/19 10:17 Dose:  3.5 gm


Zonisamide (Zonisamide)  300 mg GT BID [facility]


   Last Admin: 12/14/19 12:16 Dose:  300 mg











- Objective


Vital Signs: 


 Vital Signs











Temperature  97.9 F   12/14/19 14:01


 


Pulse Rate  81   12/14/19 14:01


 


Respiratory Rate  20   12/14/19 14:01


 


Blood Pressure  125/73   12/14/19 14:01


 


O2 Sat by Pulse Oximetry (%)  98   12/14/19 09:00











Constitutional: Yes: No Distress, Calm


Neck: Yes: Supple


Cardiovascular: Yes: Regular Rate and Rhythm


Respiratory: Yes: Rhonchi (bilaterally at bases)


Gastrointestinal: Yes: Normal Bowel Sounds, Soft, Other (PEG in place no 

erythema or drainage at insertion site)


Genitourinary: Yes: Other (suprapubic tube in place draining clear yellow urine.

)


Extremities: Yes: Other (contracted)


Edema: No


Labs: 


 CBC, BMP





 12/14/19 09:00 





 12/14/19 09:00 





 INR, PTT











INR  0.97  (0.83-1.09)   12/13/19  09:40    














Impression/Plan


Impression/Plan: 





34 year old male with a past medical history of seizures, microcephaly with 

cranio-synostosis, urethral stricture s/p suprapubic catheter placement, hx of 

chronic UTI's with ESBL, developmental delay, functional quadriplegia, hx of 

PNA admitted for sepsis secondary to urinary tract infection.





Sepsis secondary to UTI


continue zosyn per ID but may need carbapenem due to history of ESBL and 

pseudomonas


so far UCx GNB lactose fermenting x2 - f/u final ID and sensitivities


flu swab negative 


RSV negative 


urinary antigens negative





Hypoxia


Resolved


saturating well at this time on NC O2


Continue NC O2 3L 





RUL lung lesion


Pulm consult noted and appreciated-possible inflammatory but can not r/o 

malignancy


no further work up given medical history/developmental delay


inhaled bronchodilators





Anemia


Hb now at baseline today 10.8 was 13 likely due to dehydration 





Seizure Disorder


continue home anti-epileptics Vantin, Keppra, Onfi, zonisamide, levocarnitine





Hyponatremia


Resolved with NS IVF


continue sodium chloride tabs





Prophylaxis


DVT heparin 5000 units subq tid


GI famotidine 20mg daily


seizure prophylaxis-continue meds 





FEN


NS at 75cc/hr but stop IVF when starting tube feeding with 45ml/hr free water


continue to monitor electrolytes and replete as necessary-No issues


Tube feeds change to osmolite at 55ml/hr with 45ml/hr freewater flush











Visit type





- Emergency Visit


Emergency Visit: Yes


ED Registration Date: 12/13/19


Care time: The patient presented to the Emergency Department on the above date 

and was hospitalized for further evaluation of their emergent condition.





- New Patient


This patient is new to me today: Yes


Date on this admission: 12/14/19





- Critical Care


Critical Care patient: No

## 2019-12-14 NOTE — PN
Progress Note (short form)





- Note


Progress Note: 





PULMONARY





CONSULTATION DICTATED 12/14/19





IMP ACUTE HYPOXEMIC RESPIRATORY FAILURE


      URI/PNEUMONIA


      RUL NODULE ?INFLAMMATORY


      SEVERE MENTAL RETARDATION


      CEREBRAL PALSY


      MICROCEPHALY


      CRANIO-SYNOTOSIS


      H/O PNEUMONIA


      URETHRAL STRICTURE


      SEIZURE DISORDER





PLAN O2


        ABX AS PER ID


        INHALED BRONCHODILATORS


        CULTURES


       CONSERVATIVE MANAGEMENT REGARDING RUL NODULE IN VIEW OF MEDICAL PROBLEMS,

SEVERE MENTAL RETARDATION





DR BROOKE








       


 





Problem List





- Problems


(1) Microcephalic


Code(s): Q02 - MICROCEPHALY   





(2) Acute respiratory failure with hypoxia


Code(s): J96.01 - ACUTE RESPIRATORY FAILURE WITH HYPOXIA   





(3) Hypoxemia


Code(s): R09.02 - HYPOXEMIA   





(4) Pneumonia


Code(s): J18.9 - PNEUMONIA, UNSPECIFIED ORGANISM   


Qualifiers: 


   Pneumonia type: due to unspecified organism   Laterality: unspecified 

laterality   Lung location: unspecified part of lung   Qualified Code(s): J18.9 

- Pneumonia, unspecified organism   





(5) Cerebral palsy


Code(s): G80.9 - CEREBRAL PALSY, UNSPECIFIED   





(6) Functional quadriplegia


Code(s): R53.2 - FUNCTIONAL QUADRIPLEGIA   





(7) Seizure


Code(s): R56.9 - UNSPECIFIED CONVULSIONS   





(8) Cerebral palsy


Code(s): G80.9 - CEREBRAL PALSY, UNSPECIFIED   





(9) Severe mental handicap


Code(s): F72 - SEVERE INTELLECTUAL DISABILITIES

## 2019-12-14 NOTE — CONS
PULMONARY CONSULTATION

 

DATE OF CONSULTATION:  12/14/2019

 

REFERRING PHYSICIAN:  Kostas Hernandez MD

 

HISTORY OF PRESENT ILLNESS:  History was obtained from medical records, as the

patient has profound retardation.  Patient is a 34-year-old, male with past 
medical

history of microcephaly and craniosynostosis, history of urethral stricture 
status

post suprapubic, cerebral palsy, functional quadriplegia, severe mental 
retardation,

history of chronic UTIs with ESBL, history of pneumonia, who is a resident of

[facility], transferred to [facility], secondary 
to

hypoxemia and respiratory distress.  Patient apparently was at the [facility] and

was noted by the staff to be hypoxic with an O2 saturation of 78% and 
increasing work

of breathing.  He also was noted to have a nonproductive cough.  He was 
transferred

to [facility] ER with the above.  In the ER, he was treated with DuoNeb and

antibiotic therapy.  He underwent a CT scan of the chest, which showed 
atelectasis in

the left lower lobe and a right upper lobe lesion which has increased from 
previous

exam from 1 year ago.

 

Patient was admitted.  He was started on antibiotics by Infectious Disease.  
Blood

cultures were obtained and urinalysis positive for lactose-fermenting gram-
negative

bacilli.  Legionella and pneumococcal  antigens were negative.  No further 
history is

available, at this time.

 

PAST MEDICAL HISTORY:  Again, includes history of severe mental retardation, 
cerebral

palsy, microcephaly, craniosynostosis, history of pneumonia, history of urethral

stricture, seizure disorder, and functional quadriplegia.

 

REVIEW OF SYSTEMS:  Unable to obtain.

 

CURRENT MEDICATIONS:  Include Zosyn, heparin subcutaneous, lacosamide, Keppra,

zonisamide, lactobacillus, clonazepam, albuterol, senna, famotidine, 
multivitamins,

Ocuflox ophthalmic solution, calcium carbonate, pyridoxine.

 

PHYSICAL EXAMINATION:

General:  On physical examination, patient is a contracted male, awake, not

responsive.

Vital Signs:  He is afebrile, blood pressure is 125/73, respiratory rate is 20, 
O2

saturation is 98% on 3 L.

HEENT:  Exam is normocephalic, atraumatic.

Neck:  Supple.

Heart:  Regular S1 and S2.

Chest:  A few rhonchi bilaterally.

Abdomen:  Soft.  Bowel sounds are positive.

Extremities:  Lower extremity contractures.

 

LABORATORIES:  Blood gas:  7.33, PCO2 of 38, a PO2 of 239, a bicarbonate of 19, 
and a

saturation of 99.3.  WBC is 10.3, hemoglobin 10.8, hematocrit 31.7, with a 
platelet

count of 260,000.  Chemistries:  BUN 12, creatinine 0.5, lactate level is 1.2.

 

IMAGING:  Chest CT:  From the report, I cannot evaluate the right upper lobe 
process,

secondary to measurements obscuring the view.  There is atelectasis, left lower 
lobe.

 There is a 1.9-cm lesion, in the medial aspect of the right upper lobe that 
appears

increased in size from previous and from 2018.

 

IMPRESSION:

1.  Acute hypoxemic respiratory failure, secondary to likely upper respiratory

infection plus pneumonia.

2.  Right upper lobe nodule.  Questionable inflammatory.  Cannot exclude 
possible

malignant.

3.  Severe mental retardation.

4.  Cerebral palsy.

5.  Microcephaly.

6.  Craniosynostosis.

7.  History of pneumonia.

8.  Urethral stricture.

9.  Seizure disorder.

 

PLAN:  Continue supplemental oxygen.  Antibiotics, as per Infectious Disease. 

Inhaled bronchodilators.  Cultures.  Consider conservative management, regarding

right upper lobe nodule, in view of multiple medical problems, as well as severe

mental retardation.

 

 

ELPIDIO BROOKE M.D.

 

[id number]

DD: 12/14/2019 14:49

DT: 12/14/2019 15:45

Job #:  [id number]

MTDD

## 2019-12-14 NOTE — CON.ID
Consult


Consult Specialty:: infectious diseases


Referred by:: Eliza


Reason for Consultation:: uti





- History of Present Illness


Chief Complaint: uti


History of Present Illness: 





patient unable to give history because of his condition which is obtained from 

his chart


34 year old male with a past medical history of seizures, microcephaly with 

cranial synostosis, urethral stricture s/p suprapubic catheter placement, hx of 

chronic UTI's with ESBL, MR, hx of PNA presented from [facility] for 

hypoxia. It was noted by the staff at [facility] that the patient was hypoxic on 

RA at 78% and was having increased work of breathing. Patient was noted to have 

a non-productive cough, increase in respiratory rate, and hypoxia. No other 

history was obtainable from the NH. Patient came to the ED where he had 

increased work of breath and was placed on non-rebreather. At baseline, the 

patient is nonverbal, 





- History Source


History Provided By: Medical Record


Limitations to Obtaining History: Clinical Condition





- Past Medical History


CNS: Yes: Seizure, Other (Cerebral Palsy, mental retardation)


Gastrointestinal: Yes: GERD


Renal/: Yes: Other (Urethral stricture)





- Alcohol/Substance Use


Hx Alcohol Use: No





- Smoking History


Smoking history: Never smoked


Have you smoked in the past 12 months: No





Home Medications





- Allergies


Allergies/Adverse Reactions: 


 Allergies











Allergy/AdvReac Type Severity Reaction Status Date / Time


 


No Known Allergies Allergy   Verified 12/13/19 10:26














- Home Medications


Home Medications: 


Ambulatory Orders





Albuterol 0.083% Nebulizer Sol [Ventolin 0.083% Nebulizer Soln -] 1 neb NEB Q4H 

PRN 09/13/19 


Calcium Carbonate/Vitamin D3 [Oyster Shell 500-Vit D3 200 Tb] 1 each GT BID 09/ 13/19 


Clobazam [Onfi -] 10 mg GT BID 09/13/19 


Fructooligosaccharides/Polydex [Fiber-Stat 15 gm/30 ml Liquid] 30 ml GT DAILY 09 /13/19 


Lacosamide [Vimpat] 200 mg GT BID 09/13/19 


Levocarnitine 7 ml GT TID 09/13/19 


Multivitamin [Multiple Vitamins] 30 ml GT DAILY 09/13/19 


Pyridoxine HCl [Vitamin B-6] 100 mg GT ASDIR 09/13/19 


Sennosides/Docusate Sodium [Senna-S Tablet] 2 tab GT HS 09/13/19 


Sodium Chloride Tablet - 3.5 gm GT BID 09/13/19 


Zonisamide 300 mg GT BID 09/13/19 


levETIRAcetam [levETIRAcetam ORAL SUSPENSION] 2 gm GT BID 09/13/19 


Ofloxacin 0.3% Ophth Soln [Ocuflox -] 2 drop AD Q4HWA #10 drops 09/18/19 


Calcium Carbonate/Vitamin D3 [Oyster Shell 500-Vit D3 200 Pk] 1 each GT BID 12/ 13/19 


Diazepam [Diastat Acudial] 1 each RC PRN 12/13/19 


Lactobacillus Acidophilus [Acidophilus] 2 each GT DAILY 12/13/19 


Nutritional Supplement/Fiber [Promote with Fiber Liquid] 237 ml GT ASDIR 12/13/ 19 











Review of Systems


Unable to obtain ROS, reason: unable to obtain





Physical Exam


Vital Signs: 


 Vital Signs











Temperature  98.2 F   12/14/19 08:59


 


Pulse Rate  95 H  12/14/19 08:59


 


Respiratory Rate  20   12/14/19 08:59


 


Blood Pressure  109/55 L  12/14/19 08:59


 


O2 Sat by Pulse Oximetry (%)  98   12/13/19 21:00











Constitutional: Yes: Calm


Cardiovascular: Yes: Regular Rate and Rhythm


Respiratory: Yes: Regular, Poor Air Entry


Gastrointestinal: Yes: Normal Bowel Sounds, Soft, Other (peg)


Renal/: Yes: Larry Present


Musculoskeletal: Yes: WNL


Extremities: Yes: Other (contracted)


Neurological: Yes: Other


Psychiatric: Yes: Other


Labs: 


 CBC, BMP





 12/14/19 09:00 





 12/14/19 09:00 











Imaging





- Results


Chest X-ray: Report Reviewed, Image Reviewed





Assessment/Plan





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 

bedbound, non-verbal, non-communicative at baseline, with suprapubic catheter 

placed with underlying UTI. and patient coming in with hypoxia





i am worried about the new finding in the lung





continue zosyn


await for identification of the organism


work up for the lung findings


rest as per the team

## 2019-12-15 LAB
ANION GAP SERPL CALC-SCNC: 8 MMOL/L (ref 8–16)
BASOPHILS # BLD: 1 % (ref 0–2)
BUN SERPL-MCNC: 13.7 MG/DL (ref 7–18)
CALCIUM SERPL-MCNC: 9 MG/DL (ref 8.5–10.1)
CHLORIDE SERPL-SCNC: 106 MMOL/L (ref 98–107)
CO2 SERPL-SCNC: 23 MMOL/L (ref 21–32)
CREAT SERPL-MCNC: 0.5 MG/DL (ref 0.55–1.3)
DEPRECATED RDW RBC AUTO: 12.9 % (ref 11.9–15.9)
EOSINOPHIL # BLD: 4.3 % (ref 0–4.5)
GLUCOSE SERPL-MCNC: 153 MG/DL (ref 74–106)
HCT VFR BLD CALC: 30.2 % (ref 35.4–49)
HGB BLD-MCNC: 10.2 GM/DL (ref 11.7–16.9)
LYMPHOCYTES # BLD: 37.5 % (ref 8–40)
MAGNESIUM SERPL-MCNC: 2.3 MG/DL (ref 1.8–2.4)
MCH RBC QN AUTO: 33.1 PG (ref 25.7–33.7)
MCHC RBC AUTO-ENTMCNC: 33.8 G/DL (ref 32–35.9)
MCV RBC: 98 FL (ref 80–96)
MONOCYTES # BLD AUTO: 4.8 % (ref 3.8–10.2)
NEUTROPHILS # BLD: 52.4 % (ref 42.8–82.8)
PHOSPHATE SERPL-MCNC: 4.8 MG/DL (ref 2.5–4.9)
PLATELET # BLD AUTO: 235 K/MM3 (ref 134–434)
PMV BLD: 7.6 FL (ref 7.5–11.1)
POTASSIUM SERPLBLD-SCNC: 4 MMOL/L (ref 3.5–5.1)
RBC # BLD AUTO: 3.08 M/MM3 (ref 4–5.6)
SODIUM SERPL-SCNC: 137 MMOL/L (ref 136–145)
WBC # BLD AUTO: 7.3 K/MM3 (ref 4–10)

## 2019-12-15 RX ADMIN — ZONISAMIDE SCH MG: 100 CAPSULE ORAL at 22:39

## 2019-12-15 RX ADMIN — LEVOCARNITINE SCH MG: 1 SOLUTION ORAL at 22:35

## 2019-12-15 RX ADMIN — MULTIVITAMIN SCH ML: LIQUID ORAL at 09:52

## 2019-12-15 RX ADMIN — FAMOTIDINE SCH MG: 40 POWDER, FOR SUSPENSION ORAL at 09:54

## 2019-12-15 RX ADMIN — LEVOCARNITINE SCH MG: 1 SOLUTION ORAL at 13:18

## 2019-12-15 RX ADMIN — LACOSAMIDE SCH MG: 10 SOLUTION ORAL at 22:34

## 2019-12-15 RX ADMIN — Medication SCH TAB: at 09:49

## 2019-12-15 RX ADMIN — LEVOCARNITINE SCH MG: 1 SOLUTION ORAL at 05:57

## 2019-12-15 RX ADMIN — LACOSAMIDE SCH MG: 10 SOLUTION ORAL at 10:07

## 2019-12-15 RX ADMIN — OFLOXACIN SCH DROP: 3 SOLUTION/ DROPS OPHTHALMIC at 09:51

## 2019-12-15 RX ADMIN — HEPARIN SODIUM SCH UNIT: 5000 INJECTION, SOLUTION INTRAVENOUS; SUBCUTANEOUS at 22:34

## 2019-12-15 RX ADMIN — OFLOXACIN SCH DROP: 3 SOLUTION/ DROPS OPHTHALMIC at 05:57

## 2019-12-15 RX ADMIN — OFLOXACIN SCH DROP: 3 SOLUTION/ DROPS OPHTHALMIC at 13:20

## 2019-12-15 RX ADMIN — Medication SCH TAB: at 22:36

## 2019-12-15 RX ADMIN — Medication SCH MG: at 09:49

## 2019-12-15 RX ADMIN — OFLOXACIN SCH DROP: 3 SOLUTION/ DROPS OPHTHALMIC at 17:15

## 2019-12-15 RX ADMIN — PIPERACILLIN SODIUM,TAZOBACTAM SODIUM SCH MLS/HR: 3; .375 INJECTION, POWDER, FOR SOLUTION INTRAVENOUS at 17:16

## 2019-12-15 RX ADMIN — HEPARIN SODIUM SCH UNIT: 5000 INJECTION, SOLUTION INTRAVENOUS; SUBCUTANEOUS at 13:19

## 2019-12-15 RX ADMIN — SODIUM CHLORIDE TAB 1 GM SCH GM: 1 TAB at 22:36

## 2019-12-15 RX ADMIN — OFLOXACIN SCH DROP: 3 SOLUTION/ DROPS OPHTHALMIC at 13:17

## 2019-12-15 RX ADMIN — HEPARIN SODIUM SCH UNIT: 5000 INJECTION, SOLUTION INTRAVENOUS; SUBCUTANEOUS at 05:57

## 2019-12-15 RX ADMIN — LEVETIRACETAM SCH MG: 100 SOLUTION ORAL at 09:52

## 2019-12-15 RX ADMIN — Medication SCH TAB: at 09:53

## 2019-12-15 RX ADMIN — LEVETIRACETAM SCH MG: 100 SOLUTION ORAL at 22:34

## 2019-12-15 RX ADMIN — PIPERACILLIN SODIUM,TAZOBACTAM SODIUM SCH MLS/HR: 3; .375 INJECTION, POWDER, FOR SOLUTION INTRAVENOUS at 01:10

## 2019-12-15 RX ADMIN — ZONISAMIDE SCH MG: 100 CAPSULE ORAL at 09:48

## 2019-12-15 RX ADMIN — SODIUM CHLORIDE TAB 1 GM SCH GM: 1 TAB at 09:50

## 2019-12-15 RX ADMIN — DOCUSATE SODIUM,SENNOSIDES SCH TABLET: 50; 8.6 TABLET, FILM COATED ORAL at 22:36

## 2019-12-15 RX ADMIN — OFLOXACIN SCH DROP: 3 SOLUTION/ DROPS OPHTHALMIC at 22:38

## 2019-12-15 RX ADMIN — PIPERACILLIN SODIUM,TAZOBACTAM SODIUM SCH MLS/HR: 3; .375 INJECTION, POWDER, FOR SOLUTION INTRAVENOUS at 09:57

## 2019-12-15 NOTE — PN
Progress Note, Physician


History of Present Illness: 





patient looking better


calm


more awake and alert





- Current Medication List


Current Medications: 


Active Medications





Albuterol Sulfate (Ventolin 0.083% Nebulizer Soln -)  1 amp NEB Q4H PRN


   PRN Reason: SHORT OF BREATH/WHEEZING


Calcium Carbonate/Cholecalciferol (Os-Dale 500+D -)  1 tab GT BID Counts include 234 beds at the [facility]


   Last Admin: 12/14/19 22:40 Dose:  1 tab


Clobazam (Onfi -)  10 mg GT BID Counts include 234 beds at the [facility]


   Last Admin: 12/14/19 22:40 Dose:  10 mg


Diazepam (Diastat Rectal Gel -)   mg RC PRN RAKAN


Famotidine (Pepcid)  20 mg PEG DAILY Counts include 234 beds at the [facility]


   Last Admin: 12/14/19 12:15 Dose:  20 mg


Heparin Sodium (Porcine) (Heparin -)  5,000 unit SQ TID Counts include 234 beds at the [facility]


   Last Admin: 12/15/19 05:57 Dose:  5,000 unit


Piperacillin Sod/Tazobactam (Sod 3.375 gm/ Dextrose)  50 mls @ 100 mls/hr IVPB 

Q8H-IV RAKAN; Protocol


   Last Admin: 12/15/19 01:10 Dose:  100 mls/hr


Lacosamide (Vimpat Liquid -)  200 mg GT BID Counts include 234 beds at the [facility]


   Last Admin: 12/14/19 22:39 Dose:  200 mg


Lactobacillus Acidophilus (Bacid -)  2 tab GT DAILY Counts include 234 beds at the [facility]


   Last Admin: 12/14/19 10:17 Dose:  2 tab


Levetiracetam (Keppra Oral Solution -)  2,000 mg GT BID Counts include 234 beds at the [facility]


   Last Admin: 12/14/19 22:39 Dose:  2,000 mg


Levocarnitine (Carnitor Oral Solution -)  700 mg GT TID Counts include 234 beds at the [facility]


   Last Admin: 12/15/19 05:57 Dose:  700 mg


Multivitamins/Minerals (Certavite-Antioxidant Liquid)  15 ml PO DAILY Counts include 234 beds at the [facility]


   Last Admin: 12/14/19 12:15 Dose:  15 ml


Ofloxacin (Ocuflox 0.3% Eye Drops -)  2 drop AD Q4HWA Counts include 234 beds at the [facility]


   Last Admin: 12/15/19 05:57 Dose:  2 drop


Pyridoxine HCl (Vitamin B6 -)  100 mg GT DAILY Counts include 234 beds at the [facility]


Senna/Docusate Sodium (Pericolace -)  2 tablet PO HS Counts include 234 beds at the [facility]


   Last Admin: 12/14/19 22:40 Dose:  2 tablet


Sodium Chloride (Sodium Chloride Tablet -)  3.5 gm GT BID Counts include 234 beds at the [facility]


   Last Admin: 12/14/19 23:06 Dose:  3.5 gm


Zonisamide (Zonisamide)  300 mg GT BID Counts include 234 beds at the [facility]


   Last Admin: 12/14/19 23:03 Dose:  300 mg











- Objective


Vital Signs: 


 Vital Signs











Temperature  97.7 F   12/15/19 06:30


 


Pulse Rate  74   12/15/19 06:30


 


Respiratory Rate  20   12/15/19 06:30


 


Blood Pressure  111/65   12/15/19 06:30


 


O2 Sat by Pulse Oximetry (%)  98   12/14/19 21:00











Constitutional: Yes: No Distress, Calm


Cardiovascular: Yes: S1, S2


Respiratory: Yes: Regular, CTA Bilaterally


Gastrointestinal: Yes: Normal Bowel Sounds, Soft


Musculoskeletal: Yes: WNL


Extremities: Yes: Other


Neurological: Yes: Alert, Other


Psychiatric: Yes: Other


Labs: 


 CBC, BMP





 12/14/19 09:00 





 12/14/19 09:00 





 INR, PTT











INR  0.97  (0.83-1.09)   12/13/19  09:40    














Assessment/Plan





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 

bedbound, non-verbal, non-communicative at baseline, with suprapubic catheter 

placed with underlying UTI. and patient coming in with hypoxia


 Problem List 





- Problems


(1) Microcephalic


Code(s): Q02 - MICROCEPHALY   





(2) Acute respiratory failure with hypoxia


Code(s): J96.01 - ACUTE RESPIRATORY FAILURE WITH HYPOXIA   





(3) Hypoxemia


Code(s): R09.02 - HYPOXEMIA   





(4) Pneumonia


Code(s): J18.9 - PNEUMONIA, UNSPECIFIED ORGANISM   


Qualifiers: 


   Pneumonia type: due to unspecified organism   Laterality: unspecified 

laterality   Lung location: unspecified part of lung   Qualified Code(s): J18.9 

- Pneumonia, unspecified organism   





(5) Cerebral palsy


Code(s): G80.9 - CEREBRAL PALSY, UNSPECIFIED   





(6) Functional quadriplegia


Code(s): R53.2 - FUNCTIONAL QUADRIPLEGIA   





(7) Seizure


Code(s): R56.9 - UNSPECIFIED CONVULSIONS   





(8) Cerebral palsy


Code(s): G80.9 - CEREBRAL PALSY, UNSPECIFIED   





(9) Severe mental handicap


Code(s): F72 - SEVERE INTELLECTUAL DISABILITIES   





10 uti





plan


continue abx


nutrition

## 2019-12-15 NOTE — PN
Progress Note, Physician


History of Present Illness: 


Ucx growing 3 organisms Klebsiella E. Faecalis and one more GNB still pending. 

ID recs to continue Zosyn. ROS not done as patient is non verbal. Afebrile


No events or issues at this time per nursing. Tolerating tube feeding.





- Current Medication List


Current Medications: 


Active Medications





Albuterol Sulfate (Ventolin 0.083% Nebulizer Soln -)  1 amp NEB Q4H PRN


   PRN Reason: SHORT OF BREATH/WHEEZING


Calcium Carbonate/Cholecalciferol (Os-Dale 500+D -)  1 tab GT BID [facility]


   Last Admin: 12/15/19 09:53 Dose:  1 tab


Clobazam (Onfi -)  10 mg GT BID RAKAN


   Last Admin: 12/15/19 09:49 Dose:  10 mg


Diazepam (Diastat Rectal Gel -)   mg RC PRN RAKAN


Famotidine (Pepcid)  20 mg PEG DAILY [facility]


   Last Admin: 12/15/19 09:54 Dose:  20 mg


Heparin Sodium (Porcine) (Heparin -)  5,000 unit SQ TID RAKAN


   Last Admin: 12/15/19 13:19 Dose:  5,000 unit


Piperacillin Sod/Tazobactam (Sod 3.375 gm/ Dextrose)  50 mls @ 100 mls/hr IVPB 

Q8H-IV RAKAN; Protocol


   Last Admin: 12/15/19 09:57 Dose:  100 mls/hr


Lacosamide (Vimpat Liquid -)  200 mg GT BID [facility]


   Last Admin: 12/15/19 10:07 Dose:  200 mg


Lactobacillus Acidophilus (Bacid -)  2 tab GT DAILY [facility]


   Last Admin: 12/15/19 09:49 Dose:  2 tab


Levetiracetam (Keppra Oral Solution -)  2,000 mg GT BID [facility]


   Last Admin: 12/15/19 09:52 Dose:  2,000 mg


Levocarnitine (Carnitor Oral Solution -)  700 mg GT TID [facility]


   Last Admin: 12/15/19 13:18 Dose:  700 mg


Multivitamins/Minerals (Certavite-Antioxidant Liquid)  15 ml PO DAILY [facility]


   Last Admin: 12/15/19 09:52 Dose:  15 ml


Ofloxacin (Ocuflox 0.3% Eye Drops -)  2 drop AD Q4HWA [facility]


   Last Admin: 12/15/19 13:20 Dose:  2 drop


Pyridoxine HCl (Vitamin B6 -)  100 mg GT DAILY [facility]


   Last Admin: 12/15/19 09:49 Dose:  100 mg


Senna/Docusate Sodium (Pericolace -)  2 tablet PO HS [facility]


   Last Admin: 12/14/19 22:40 Dose:  2 tablet


Sodium Chloride (Sodium Chloride Tablet -)  3.5 gm GT BID [facility]


   Last Admin: 12/15/19 09:50 Dose:  3.5 gm


Zonisamide (Zonisamide)  300 mg GT BID [facility]


   Last Admin: 12/15/19 09:48 Dose:  300 mg











- Objective


Vital Signs: 


 Vital Signs











Temperature  97.6 F   12/15/19 13:59


 


Pulse Rate  85   12/15/19 13:59


 


Respiratory Rate  20   12/15/19 13:59


 


Blood Pressure  102/51 L  12/15/19 13:59


 


O2 Sat by Pulse Oximetry (%)  97   12/15/19 09:00








PE:


Constitutional: Yes: No Distress, Calm, Sleeping


Neck: Yes: Supple


Cardiovascular: Yes: Regular Rate and Rhythm


Respiratory: CTAB


Gastrointestinal: Yes: Normal Bowel Sounds, Soft, Other (PEG in place no 

erythema or drainage at insertion site)


Genitourinary: Yes: Other (suprapubic tube in place draining clear yellow urine.

)


Extremities: Yes: Other (contracted)


Edema: No


Labs: 


 CBC, BMP





 12/15/19 06:40 





 12/15/19 06:40 





 INR, PTT











INR  0.97  (0.83-1.09)   12/13/19  09:40    














Impression/Plan


Impression/Plan: 





34 year old male with a past medical history of seizures, microcephaly with 

cranio-synostosis, urethral stricture s/p suprapubic catheter placement, hx of 

chronic UTI's with ESBL, developmental delay, functional quadriplegia, hx of 

PNA admitted for sepsis secondary to urinary tract infection.





Sepsis secondary to UTI


continue zosyn per ID 


so far UCx klebsiella, E. Faecalis, and one other GNB lactose fermenting 

organism - f/u final ID and sensitivities


flu swab negative 


RSV negative 


urinary antigens negative


Leukocytosis resolved


Blood cultures no growth to date 





Hypoxia


Resolved


saturating well at this time on NC O2


Continue NC O2 3L 





RUL lung lesion


Pulm consult noted and appreciated-possible inflammatory but can not r/o 

malignancy


no further work up given medical history/developmental delay


inhaled bronchodilators





Anemia


Hb at baseline 





Seizure Disorder


continue home anti-epileptics Vantin, Keppra, Onfi, zonisamide, levocarnitine





Hyponatremia


Resolved with NS IVF


continue sodium chloride tabs





Prophylaxis


DVT heparin 5000 units subq tid


GI famotidine 20mg daily


seizure prophylaxis-continue meds 





FEN


Off IVF. Fluids with tube feeding flush


continue to monitor electrolytes and replete as necessary-No issues


Tube feeds osmolite at 55ml/hr with 45ml/hr freewater flush per dietician











Visit type





- Emergency Visit


Emergency Visit: Yes


ED Registration Date: 12/13/19


Care time: The patient presented to the Emergency Department on the above date 

and was hospitalized for further evaluation of their emergent condition.





- New Patient


This patient is new to me today: No





- Critical Care


Critical Care patient: No

## 2019-12-16 LAB
BASOPHILS # BLD: 0.6 % (ref 0–2)
DEPRECATED RDW RBC AUTO: 12.7 % (ref 11.9–15.9)
EOSINOPHIL # BLD: 5.3 % (ref 0–4.5)
HCT VFR BLD CALC: 28.7 % (ref 35.4–49)
HGB BLD-MCNC: 9.9 GM/DL (ref 11.7–16.9)
LYMPHOCYTES # BLD: 43.5 % (ref 8–40)
MCH RBC QN AUTO: 33.4 PG (ref 25.7–33.7)
MCHC RBC AUTO-ENTMCNC: 34.6 G/DL (ref 32–35.9)
MCV RBC: 96.5 FL (ref 80–96)
MONOCYTES # BLD AUTO: 7 % (ref 3.8–10.2)
NEUTROPHILS # BLD: 43.6 % (ref 42.8–82.8)
PLATELET # BLD AUTO: 234 K/MM3 (ref 134–434)
PMV BLD: 7.4 FL (ref 7.5–11.1)
RBC # BLD AUTO: 2.98 M/MM3 (ref 4–5.6)
WBC # BLD AUTO: 6.3 K/MM3 (ref 4–10)
ZONISAMIDE SERPL-MCNC: 8.8 UG/ML (ref 10–40)

## 2019-12-16 RX ADMIN — HEPARIN SODIUM SCH UNIT: 5000 INJECTION, SOLUTION INTRAVENOUS; SUBCUTANEOUS at 05:44

## 2019-12-16 RX ADMIN — LEVOCARNITINE SCH MG: 1 SOLUTION ORAL at 22:22

## 2019-12-16 RX ADMIN — OFLOXACIN SCH DROP: 3 SOLUTION/ DROPS OPHTHALMIC at 10:31

## 2019-12-16 RX ADMIN — ZONISAMIDE SCH MG: 100 CAPSULE ORAL at 22:21

## 2019-12-16 RX ADMIN — PIPERACILLIN SODIUM,TAZOBACTAM SODIUM SCH MLS/HR: 3; .375 INJECTION, POWDER, FOR SOLUTION INTRAVENOUS at 01:33

## 2019-12-16 RX ADMIN — Medication SCH TAB: at 10:30

## 2019-12-16 RX ADMIN — OFLOXACIN SCH DROP: 3 SOLUTION/ DROPS OPHTHALMIC at 13:11

## 2019-12-16 RX ADMIN — SODIUM CHLORIDE TAB 1 GM SCH GM: 1 TAB at 22:24

## 2019-12-16 RX ADMIN — PIPERACILLIN SODIUM,TAZOBACTAM SODIUM SCH MLS/HR: 3; .375 INJECTION, POWDER, FOR SOLUTION INTRAVENOUS at 10:30

## 2019-12-16 RX ADMIN — HEPARIN SODIUM SCH UNIT: 5000 INJECTION, SOLUTION INTRAVENOUS; SUBCUTANEOUS at 13:11

## 2019-12-16 RX ADMIN — Medication SCH MG: at 10:30

## 2019-12-16 RX ADMIN — HEPARIN SODIUM SCH UNIT: 5000 INJECTION, SOLUTION INTRAVENOUS; SUBCUTANEOUS at 22:23

## 2019-12-16 RX ADMIN — Medication SCH TAB: at 22:25

## 2019-12-16 RX ADMIN — OFLOXACIN SCH DROP: 3 SOLUTION/ DROPS OPHTHALMIC at 17:22

## 2019-12-16 RX ADMIN — LEVETIRACETAM SCH MG: 100 SOLUTION ORAL at 22:22

## 2019-12-16 RX ADMIN — LACOSAMIDE SCH MG: 10 SOLUTION ORAL at 10:30

## 2019-12-16 RX ADMIN — MULTIVITAMIN SCH ML: LIQUID ORAL at 10:31

## 2019-12-16 RX ADMIN — LEVOCARNITINE SCH MG: 1 SOLUTION ORAL at 05:44

## 2019-12-16 RX ADMIN — OFLOXACIN SCH DROP: 3 SOLUTION/ DROPS OPHTHALMIC at 22:26

## 2019-12-16 RX ADMIN — SODIUM CHLORIDE TAB 1 GM SCH GM: 1 TAB at 10:32

## 2019-12-16 RX ADMIN — LACOSAMIDE SCH MG: 10 SOLUTION ORAL at 22:23

## 2019-12-16 RX ADMIN — LEVETIRACETAM SCH MG: 100 SOLUTION ORAL at 10:31

## 2019-12-16 RX ADMIN — PIPERACILLIN SODIUM,TAZOBACTAM SODIUM SCH MLS/HR: 3; .375 INJECTION, POWDER, FOR SOLUTION INTRAVENOUS at 17:21

## 2019-12-16 RX ADMIN — ZONISAMIDE SCH MG: 100 CAPSULE ORAL at 10:33

## 2019-12-16 RX ADMIN — DOCUSATE SODIUM,SENNOSIDES SCH TABLET: 50; 8.6 TABLET, FILM COATED ORAL at 22:24

## 2019-12-16 RX ADMIN — FAMOTIDINE SCH MG: 40 POWDER, FOR SUSPENSION ORAL at 12:15

## 2019-12-16 RX ADMIN — OFLOXACIN SCH DROP: 3 SOLUTION/ DROPS OPHTHALMIC at 05:43

## 2019-12-16 RX ADMIN — LEVOCARNITINE SCH MG: 1 SOLUTION ORAL at 13:10

## 2019-12-16 NOTE — PN
Progress Note (short form)





- Note


Progress Note: 





PULMONARY





Appears comfortable. No fevers recorded.





 Vital Signs











 Period  Temp  Pulse  Resp  BP Sys/Curran  Pulse Ox


 


 Last 24 Hr  97.4 F-98 F  62-85  20-20  100-123/51-71  96








Gen:  breathing nonlabored


Heart: RRR


Lung: decreased breath sounds at the bases


Abd: soft, nontender


Ext: no edema





 CBC, BMP





 12/16/19 06:00 





 12/15/19 06:40 





Active Medications





Albuterol Sulfate (Ventolin 0.083% Nebulizer Soln -)  1 amp NEB Q4H PRN


   PRN Reason: SHORT OF BREATH/WHEEZING


Calcium Carbonate/Cholecalciferol (Os-Dale 500+D -)  1 tab GT BID [facility]


   Last Admin: 12/16/19 10:30 Dose:  1 tab


Clobazam (Onfi -)  10 mg GT BID [facility]


   Last Admin: 12/16/19 10:30 Dose:  10 mg


Diazepam (Diastat Rectal Gel -)   mg RC PRN RAKAN


Famotidine (Pepcid)  20 mg PEG DAILY [facility]


   Last Admin: 12/15/19 09:54 Dose:  20 mg


Heparin Sodium (Porcine) (Heparin -)  5,000 unit SQ TID [facility]


   Last Admin: 12/16/19 05:44 Dose:  5,000 unit


Piperacillin Sod/Tazobactam (Sod 3.375 gm/ Dextrose)  50 mls @ 100 mls/hr IVPB 

Q8H-IV RAKAN; Protocol


   Last Admin: 12/16/19 10:30 Dose:  100 mls/hr


Lacosamide (Vimpat Liquid -)  200 mg GT BID [facility]


   Last Admin: 12/16/19 10:30 Dose:  200 mg


Lactobacillus Acidophilus (Bacid -)  2 tab GT DAILY [facility]


   Last Admin: 12/16/19 10:30 Dose:  2 tab


Levetiracetam (Keppra Oral Solution -)  2,000 mg GT BID [facility]


   Last Admin: 12/16/19 10:31 Dose:  2,000 mg


Levocarnitine (Carnitor Oral Solution -)  700 mg GT TID [facility]


   Last Admin: 12/16/19 05:44 Dose:  700 mg


Multivitamins/Minerals (Certavite-Antioxidant Liquid)  15 ml PO DAILY [facility]


   Last Admin: 12/16/19 10:31 Dose:  15 ml


Ofloxacin (Ocuflox 0.3% Eye Drops -)  2 drop AD Q4HWA [facility]


   Last Admin: 12/16/19 10:31 Dose:  2 drop


Pyridoxine HCl (Vitamin B6 -)  100 mg GT DAILY [facility]


   Last Admin: 12/16/19 10:30 Dose:  100 mg


Senna/Docusate Sodium (Pericolace -)  2 tablet PO HS [facility]


   Last Admin: 12/15/19 22:36 Dose:  2 tablet


Sodium Chloride (Sodium Chloride Tablet -)  3.5 gm GT BID [facility]


   Last Admin: 12/16/19 10:32 Dose:  3.5 gm


Zonisamide (Zonisamide)  300 mg GT BID [facility]


   Last Admin: 12/16/19 10:33 Dose:  300 mg





A/P


Acute Hypoxic Respiratory Failure


Pneumonia


Lung Nodule


Cerebral Palsy


Mental Retardation


Seizure Disorder





-  continue antibiotics per ID


-  O2 to keep SpO2 >90%


-  aspiration precautions


-  inhaled bronchodilators as needed


-  DVT prophylaxis


-  agree with conservative management of lung nodule

## 2019-12-16 NOTE — PN
Progress Note (short form)





- Note


Progress Note: 


Hospitalist Medicine


Resting in bed, on 2L NC 02. D/w nursing to titrate down as able. For fraser 

exchange





 Vitals











  12/16/19





  13:55


 


Temperature 97.7 F


 


Pulse Rate 80


 


Respiratory 20





Rate 


 


Blood Pressure 99/63








Physical Exam


general: resting comfortably in bed, grunting occasionally. on 2L NC 


HEENT: +microcephaly. moist MM


neck: supple


cardio: S1, S2, RRR. no r/m/g


pulm: CTA B/l. no accessory m usage


abdomen: soft, nontender, nondistended. +PEG


: +suprapubic fraser


LE: no edema. pulses intact. contracted 


neuro: CNs appear to be grossly intact; however unable to follow commands. 

moves UE, LE





 Laboratory Tests











  12/16/19





  06:00


 


WBC  6.3


 


Hgb  9.9 L


 


Hct  28.7 L


 


Plt Count  234





Microbiology





12/13/19 17:02   Urine - Urine Fraser   Legionella Antigen - Final


12/13/19 17:02   Urine - Urine Fraser   Streptococcus pneumoniae Antigen (M - 

Final


12/13/19 09:45   Urine - Urine Suprapubic   Urine Culture - Final


                              Klebsiella Pneumoniae


                              Escherichia Coli Esbl 


                              Enterococcus Faecalis


12/13/19 09:36   Blood - Peripheral Venous   Blood Culture - Preliminary


                              NO GROWTH OBTAINED AFTER 72 HOURS, INCUBATION TO 

CONTINUE


                              FOR 2 DAYS.


12/13/19 09:36   Blood - Peripheral Venous   Blood Culture - Preliminary


                              NO GROWTH OBTAINED AFTER 72 HOURS, INCUBATION TO 

CONTINUE


                              FOR 2 DAYS.





Imaging





12/13/19: CXR: dextroscoliosis. no evidence of vascular congestive changes, 

pulm infiltrates. no pneumo or large pleural effusion, no blunting ot he 

costophrenic angles.





12/13/19: chest CTA: limited atelectasis of the left lower lung. suspect new 

RUL lesion and correlate with PET/CT may prove useful as clinically indicated. 


(1.9cm in the medial aspect of the upper right lung; appears to represent 

increase in size of a poorly visualized lesion seen previously.)





12/13/19: EKG: NSR, rate 95bpm, qtc 449ms





Assessment/Plan


34 year old male with past medical history of seizures, microcephaly with cranio

-synostosis, urethral stricture s/p suprapubic catheter placement, hx of 

chronic UTI's with ESBL, developmental delay, functional quadriplegia, hx of 

PNA admitted for sepsis secondary to urinary tract infection.





#Sepsis 2/2 UTI


-continue zosyn (12/14) per ID 


-ucx: +Kleb, ESBL, E. faecalis


-w/ suprapubic catheter, needs exchange as multiple org. uro consulted


-IV tylenol PRN pain/fever 


-flu, RSV, legionella (-)


-f/u blood cx - NGTD


-ID: Dr. Garcia


-Uro: Dr. Jo 





#Hypoxia- resolved 


-saturating well at this time on NC O2


-Continue NC O2 2L, wean as tolerated  


-aspiration precautions


-Pulm: Dr. Olguin 





#RUL lung lesion


-on conservative mgmt, continue for now





#Constipation


-c/w senna. added colace





#Seizure d/o


-continue home anti-epileptics Vantin, Keppra, Onfi, zonisamide, levocarnitine





#Hyponatremia


-Resolved with NS IVF


-continue sodium chloride tabs





#F/E/N


Off IVF. Fluids with tube feeding flush


continue to follow lytes


Tube feeds osmolite at 55ml/hr with 45ml/hr free water per dietician





#PPX


DVT: hep 5k tid 


GI: famotidine 20mg daily





#Dispo


cont'd monitoring on med-surg


needs fraser exchange, on IV abx.


on d/c will need to d/w Joe, and will need pre and post.





<Maria C Robin - Last Filed: 12/16/19 17:15>





- Note


Progress Note: 





I saw and evaluated the patient.  I reviewed the resident's note and discussed 

the case with the resident.  I agree with the resident's findings and plan as 

documented.





Agree with above; clinical condition limits history.  Pending fraser replacement.





VS lab imaging reveiwed


NAD AA resting in bed


No new neuro deficits, responds to verbal stimuli


NT ND +BS


HR wnl, +s1/2


Not agitated No sz activity noted





A/P


Agree with above resident assessment and plan.  Continue current medications 

and monitor on the medicine service with uro consult.  Abx, fraser replacement, 

and DC planning.  Continue home seizure medications. 





Agree with above problem list.





Full Code





<Alxeandre Alex - Last Filed: 12/21/19 04:34>

## 2019-12-16 NOTE — PN
Progress Note, Physician


History of Present Illness: 





patient looking better


calm








- Current Medication List


Current Medications: 


Active Medications





Albuterol Sulfate (Ventolin 0.083% Nebulizer Soln -)  1 amp NEB Q4H PRN


   PRN Reason: SHORT OF BREATH/WHEEZING


Calcium Carbonate/Cholecalciferol (Os-Dale 500+D -)  1 tab GT BID [facility]


   Last Admin: 12/15/19 22:36 Dose:  1 tab


Clobazam (Onfi -)  10 mg GT BID [facility]


   Last Admin: 12/15/19 22:33 Dose:  10 mg


Diazepam (Diastat Rectal Gel -)   mg RC PRN RAKAN


Famotidine (Pepcid)  20 mg PEG DAILY [facility]


   Last Admin: 12/15/19 09:54 Dose:  20 mg


Heparin Sodium (Porcine) (Heparin -)  5,000 unit SQ TID [facility]


   Last Admin: 12/16/19 05:44 Dose:  5,000 unit


Piperacillin Sod/Tazobactam (Sod 3.375 gm/ Dextrose)  50 mls @ 100 mls/hr IVPB 

Q8H-IV RAKAN; Protocol


   Last Admin: 12/16/19 01:33 Dose:  100 mls/hr


Lacosamide (Vimpat Liquid -)  200 mg GT BID [facility]


   Last Admin: 12/15/19 22:34 Dose:  200 mg


Lactobacillus Acidophilus (Bacid -)  2 tab GT DAILY [facility]


   Last Admin: 12/15/19 09:49 Dose:  2 tab


Levetiracetam (Keppra Oral Solution -)  2,000 mg GT BID [facility]


   Last Admin: 12/15/19 22:34 Dose:  2,000 mg


Levocarnitine (Carnitor Oral Solution -)  700 mg GT TID [facility]


   Last Admin: 12/16/19 05:44 Dose:  700 mg


Multivitamins/Minerals (Certavite-Antioxidant Liquid)  15 ml PO DAILY [facility]


   Last Admin: 12/15/19 09:52 Dose:  15 ml


Ofloxacin (Ocuflox 0.3% Eye Drops -)  2 drop AD Q4HWA [facility]


   Last Admin: 12/16/19 05:43 Dose:  2 drop


Pyridoxine HCl (Vitamin B6 -)  100 mg GT DAILY [facility]


   Last Admin: 12/15/19 09:49 Dose:  100 mg


Senna/Docusate Sodium (Pericolace -)  2 tablet PO HS [facility]


   Last Admin: 12/15/19 22:36 Dose:  2 tablet


Sodium Chloride (Sodium Chloride Tablet -)  3.5 gm GT BID [facility]


   Last Admin: 12/15/19 22:36 Dose:  3.5 gm


Zonisamide (Zonisamide)  300 mg GT BID [facility]


   Last Admin: 12/15/19 22:39 Dose:  300 mg











- Objective


Vital Signs: 


 Vital Signs











Temperature  97.6 F   12/16/19 05:00


 


Pulse Rate  76   12/16/19 05:00


 


Respiratory Rate  20   12/16/19 05:00


 


Blood Pressure  118/71   12/16/19 05:00


 


O2 Sat by Pulse Oximetry (%)  96   12/15/19 21:00











Constitutional: Yes: No Distress, Calm


Cardiovascular: Yes: S1, S2


Respiratory: Yes: Regular, CTA Bilaterally


Gastrointestinal: Yes: Normal Bowel Sounds, Soft


Musculoskeletal: Yes: WNL


Extremities: Yes: Other


Neurological: Yes: Alert, Oriented


Psychiatric: Yes: Alert, Oriented


Labs: 


 CBC, BMP





 12/16/19 06:00 





 12/15/19 06:40 





 INR, PTT











INR  0.97  (0.83-1.09)   12/13/19  09:40    














Assessment/Plan





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 

bedbound, non-verbal, non-communicative at baseline, with suprapubic catheter 

placed with underlying UTI. and patient coming in with hypoxia


 Problem List 





- Problems


(1) Microcephalic


Code(s): Q02 - MICROCEPHALY   





(2) Acute respiratory failure with hypoxia


Code(s): J96.01 - ACUTE RESPIRATORY FAILURE WITH HYPOXIA   





(3) Hypoxemia


Code(s): R09.02 - HYPOXEMIA   





(4) Pneumonia


Code(s): J18.9 - PNEUMONIA, UNSPECIFIED ORGANISM   


Qualifiers: 


   Pneumonia type: due to unspecified organism   Laterality: unspecified 

laterality   Lung location: unspecified part of lung   Qualified Code(s): J18.9 

- Pneumonia, unspecified organism   





(5) Cerebral palsy


Code(s): G80.9 - CEREBRAL PALSY, UNSPECIFIED   





(6) Functional quadriplegia


Code(s): R53.2 - FUNCTIONAL QUADRIPLEGIA   





(7) Seizure


Code(s): R56.9 - UNSPECIFIED CONVULSIONS   





(8) Cerebral palsy


Code(s): G80.9 - CEREBRAL PALSY, UNSPECIFIED   





(9) Severe mental handicap


Code(s): F72 - SEVERE INTELLECTUAL DISABILITIES   





10 uti





plan


continue abx


nutrition

## 2019-12-17 LAB
ANION GAP SERPL CALC-SCNC: 8 MMOL/L (ref 8–16)
BASOPHILS # BLD: 0.5 % (ref 0–2)
BUN SERPL-MCNC: 15.6 MG/DL (ref 7–18)
CALCIUM SERPL-MCNC: 9.4 MG/DL (ref 8.5–10.1)
CHLORIDE SERPL-SCNC: 105 MMOL/L (ref 98–107)
CO2 SERPL-SCNC: 24 MMOL/L (ref 21–32)
CREAT SERPL-MCNC: 0.6 MG/DL (ref 0.55–1.3)
DEPRECATED RDW RBC AUTO: 12.4 % (ref 11.9–15.9)
EOSINOPHIL # BLD: 5.2 % (ref 0–4.5)
GLUCOSE SERPL-MCNC: 92 MG/DL (ref 74–106)
HCT VFR BLD CALC: 31.9 % (ref 35.4–49)
HGB BLD-MCNC: 11 GM/DL (ref 11.7–16.9)
LYMPHOCYTES # BLD: 57.8 % (ref 8–40)
MAGNESIUM SERPL-MCNC: 2.1 MG/DL (ref 1.8–2.4)
MCH RBC QN AUTO: 33.3 PG (ref 25.7–33.7)
MCHC RBC AUTO-ENTMCNC: 34.6 G/DL (ref 32–35.9)
MCV RBC: 96.3 FL (ref 80–96)
MONOCYTES # BLD AUTO: 4.6 % (ref 3.8–10.2)
NEUTROPHILS # BLD: 31.9 % (ref 42.8–82.8)
PHOSPHATE SERPL-MCNC: 4.5 MG/DL (ref 2.5–4.9)
PLATELET # BLD AUTO: 244 K/MM3 (ref 134–434)
PMV BLD: 7.3 FL (ref 7.5–11.1)
POTASSIUM SERPLBLD-SCNC: 4.5 MMOL/L (ref 3.5–5.1)
RBC # BLD AUTO: 3.31 M/MM3 (ref 4–5.6)
SODIUM SERPL-SCNC: 137 MMOL/L (ref 136–145)
WBC # BLD AUTO: 6 K/MM3 (ref 4–10)

## 2019-12-17 RX ADMIN — ZONISAMIDE SCH: 100 CAPSULE ORAL at 11:03

## 2019-12-17 RX ADMIN — DOCUSATE SODIUM,SENNOSIDES SCH TABLET: 50; 8.6 TABLET, FILM COATED ORAL at 23:02

## 2019-12-17 RX ADMIN — LEVETIRACETAM SCH: 100 SOLUTION ORAL at 11:18

## 2019-12-17 RX ADMIN — SODIUM CHLORIDE TAB 1 GM SCH: 1 TAB at 11:02

## 2019-12-17 RX ADMIN — HEPARIN SODIUM SCH UNIT: 5000 INJECTION, SOLUTION INTRAVENOUS; SUBCUTANEOUS at 06:46

## 2019-12-17 RX ADMIN — FAMOTIDINE SCH: 40 POWDER, FOR SUSPENSION ORAL at 11:02

## 2019-12-17 RX ADMIN — LEVOCARNITINE SCH: 1 SOLUTION ORAL at 06:45

## 2019-12-17 RX ADMIN — OFLOXACIN SCH DROP: 3 SOLUTION/ DROPS OPHTHALMIC at 10:32

## 2019-12-17 RX ADMIN — LEVETIRACETAM SCH MG: 100 SOLUTION ORAL at 23:40

## 2019-12-17 RX ADMIN — LACOSAMIDE SCH: 10 SOLUTION ORAL at 11:02

## 2019-12-17 RX ADMIN — PIPERACILLIN SODIUM,TAZOBACTAM SODIUM SCH MLS/HR: 3; .375 INJECTION, POWDER, FOR SOLUTION INTRAVENOUS at 10:29

## 2019-12-17 RX ADMIN — OFLOXACIN SCH DROP: 3 SOLUTION/ DROPS OPHTHALMIC at 23:08

## 2019-12-17 RX ADMIN — OFLOXACIN SCH DROP: 3 SOLUTION/ DROPS OPHTHALMIC at 17:14

## 2019-12-17 RX ADMIN — PIPERACILLIN SODIUM,TAZOBACTAM SODIUM SCH MLS/HR: 3; .375 INJECTION, POWDER, FOR SOLUTION INTRAVENOUS at 17:14

## 2019-12-17 RX ADMIN — Medication SCH: at 11:02

## 2019-12-17 RX ADMIN — HEPARIN SODIUM SCH UNIT: 5000 INJECTION, SOLUTION INTRAVENOUS; SUBCUTANEOUS at 23:05

## 2019-12-17 RX ADMIN — Medication SCH TAB: at 23:02

## 2019-12-17 RX ADMIN — OFLOXACIN SCH DROP: 3 SOLUTION/ DROPS OPHTHALMIC at 15:37

## 2019-12-17 RX ADMIN — ZONISAMIDE SCH MG: 100 CAPSULE ORAL at 23:03

## 2019-12-17 RX ADMIN — LEVOCARNITINE SCH MG: 1 SOLUTION ORAL at 23:40

## 2019-12-17 RX ADMIN — LEVOCARNITINE SCH MG: 1 SOLUTION ORAL at 15:37

## 2019-12-17 RX ADMIN — LACOSAMIDE SCH MG: 10 SOLUTION ORAL at 23:02

## 2019-12-17 RX ADMIN — OFLOXACIN SCH DROP: 3 SOLUTION/ DROPS OPHTHALMIC at 06:45

## 2019-12-17 RX ADMIN — PIPERACILLIN SODIUM,TAZOBACTAM SODIUM SCH MLS/HR: 3; .375 INJECTION, POWDER, FOR SOLUTION INTRAVENOUS at 01:28

## 2019-12-17 RX ADMIN — HEPARIN SODIUM SCH UNIT: 5000 INJECTION, SOLUTION INTRAVENOUS; SUBCUTANEOUS at 15:37

## 2019-12-17 RX ADMIN — MULTIVITAMIN SCH: LIQUID ORAL at 11:02

## 2019-12-17 NOTE — PN
Progress Note (short form)





- Note


Progress Note: 





PULMONARY





Appears comfortable. No fevers recorded.





 Vital Signs











 Period  Temp  Pulse  Resp  BP Sys/Curran  Pulse Ox


 


 Last 24 Hr  97.4 F-98.5 F  49-80  20-20  /52-63  97











Gen:  breathing nonlabored


Heart: RRR


Lung: decreased breath sounds at the bases


Abd: soft, nontender


Ext: no edema





 CBC, BMP





 12/17/19 07:10 





 12/17/19 07:10 





Active Medications





Acetaminophen (Ofirmev Injection -)  1,000 mg IVPB Q6H PRN


   PRN Reason: PAIN LEVEL 7 - 10


   Last Admin: 12/16/19 13:10 Dose:  1,000 mg


Albuterol Sulfate (Ventolin 0.083% Nebulizer Soln -)  1 amp NEB Q4H PRN


   PRN Reason: SHORT OF BREATH/WHEEZING


Calcium Carbonate/Cholecalciferol (Os-Dale 500+D -)  1 tab GT BID [facility]


   Last Admin: 12/16/19 22:25 Dose:  1 tab


Clobazam (Onfi -)  10 mg GT BID [facility]


   Last Admin: 12/16/19 22:24 Dose:  10 mg


Diazepam (Diastat Rectal Gel -)   mg RC PRN RAKAN


Famotidine (Pepcid)  20 mg PEG DAILY [facility]


   Last Admin: 12/16/19 12:15 Dose:  20 mg


Heparin Sodium (Porcine) (Heparin -)  5,000 unit SQ TID [facility]


   Last Admin: 12/17/19 06:46 Dose:  5,000 unit


Piperacillin Sod/Tazobactam (Sod 3.375 gm/ Dextrose)  50 mls @ 100 mls/hr IVPB 

Q8H-IV RAKAN; Protocol


   Last Admin: 12/17/19 10:29 Dose:  100 mls/hr


Lacosamide (Vimpat Liquid -)  200 mg GT BID [facility]


   Last Admin: 12/16/19 22:23 Dose:  200 mg


Lactobacillus Acidophilus (Bacid -)  2 tab GT DAILY [facility]


   Last Admin: 12/16/19 10:30 Dose:  2 tab


Levetiracetam (Keppra Oral Solution -)  2,000 mg GT BID [facility]


   Last Admin: 12/16/19 22:22 Dose:  2,000 mg


Levocarnitine (Carnitor Oral Solution -)  700 mg GT TID [facility]


   Last Admin: 12/17/19 06:45 Dose:  Not Given


Multivitamins/Minerals (Certavite-Antioxidant Liquid)  15 ml PO DAILY [facility]


   Last Admin: 12/16/19 10:31 Dose:  15 ml


Ofloxacin (Ocuflox 0.3% Eye Drops -)  2 drop AD Q4HWA [facility]


   Last Admin: 12/17/19 10:32 Dose:  2 drop


Pyridoxine HCl (Vitamin B6 -)  100 mg GT DAILY [facility]


   Last Admin: 12/16/19 10:30 Dose:  100 mg


Senna/Docusate Sodium (Pericolace -)  2 tablet PO HS [facility]


   Last Admin: 12/16/19 22:24 Dose:  2 tablet


Sodium Chloride (Sodium Chloride Tablet -)  3.5 gm GT BID [facility]


   Last Admin: 12/16/19 22:24 Dose:  3.5 gm


Zonisamide (Zonisamide)  300 mg GT BID [facility]


   Last Admin: 12/16/19 22:21 Dose:  300 mg








A/P


Acute Hypoxic Respiratory Failure


Pneumonia


Lung Nodule


Cerebral Palsy


Mental Retardation


Seizure Disorder





-  continue antibiotics per ID


-  O2 to keep SpO2 >90%


-  aspiration precautions


-  inhaled bronchodilators as needed


-  DVT prophylaxis


-  agree with conservative management of lung nodule

## 2019-12-17 NOTE — PN
Progress Note (short form)





- Note


Progress Note: 


Uro service called for suprapubic cath exchange.

## 2019-12-17 NOTE — PN
Progress Note, Physician


History of Present Illness: 





Pt is alert, breathing comfortably on RA. Remains afebrile. Nonverbal. 





- Current Medication List


Current Medications: 


Active Medications





Acetaminophen (Ofirmev Injection -)  1,000 mg IVPB Q6H PRN


   PRN Reason: PAIN LEVEL 7 - 10


   Last Admin: 12/16/19 13:10 Dose:  1,000 mg


Albuterol Sulfate (Ventolin 0.083% Nebulizer Soln -)  1 amp NEB Q4H PRN


   PRN Reason: SHORT OF BREATH/WHEEZING


Calcium Carbonate/Cholecalciferol (Os-Dale 500+D -)  1 tab GT BID [facility]


   Last Admin: 12/17/19 11:02 Dose:  Not Given


Clobazam (Onfi -)  10 mg GT BID [facility]


   Last Admin: 12/17/19 11:02 Dose:  Not Given


Diazepam (Diastat Rectal Gel -)   mg RC PRN RAKAN


Famotidine (Pepcid)  20 mg PEG DAILY [facility]


   Last Admin: 12/17/19 11:02 Dose:  Not Given


Heparin Sodium (Porcine) (Heparin -)  5,000 unit SQ TID [facility]


   Last Admin: 12/17/19 15:37 Dose:  5,000 unit


Piperacillin Sod/Tazobactam (Sod 3.375 gm/ Dextrose)  50 mls @ 100 mls/hr IVPB 

Q8H-IV RAKAN; Protocol


   Last Admin: 12/17/19 10:29 Dose:  100 mls/hr


Lacosamide (Vimpat Liquid -)  200 mg GT BID RAKAN


Lactobacillus Acidophilus (Bacid -)  2 tab GT DAILY [facility]


   Last Admin: 12/17/19 11:02 Dose:  Not Given


Levetiracetam (Keppra Oral Solution -)  2,000 mg GT BID RAKAN


Levocarnitine (Carnitor Oral Solution -)  700 mg GT TID [facility]


   Last Admin: 12/17/19 15:37 Dose:  700 mg


Multivitamins/Minerals (Certavite-Antioxidant Liquid)  15 ml PO DAILY [facility]


   Last Admin: 12/17/19 11:02 Dose:  Not Given


Ofloxacin (Ocuflox 0.3% Eye Drops -)  2 drop AD Q4HWA [facility]


   Last Admin: 12/17/19 15:37 Dose:  2 drop


Pyridoxine HCl (Vitamin B6 -)  100 mg GT DAILY [facility]


   Last Admin: 12/17/19 11:02 Dose:  Not Given


Senna/Docusate Sodium (Pericolace -)  2 tablet PO HS [facility]


   Last Admin: 12/16/19 22:24 Dose:  2 tablet


Sodium Chloride (Sodium Chloride Tablet -)  3.5 gm GT BID [facility]


   Last Admin: 12/17/19 11:02 Dose:  Not Given


Zonisamide (Zonisamide)  300 mg GT BID [facility]


   Last Admin: 12/17/19 11:03 Dose:  Not Given











- Objective


Vital Signs: 


 Vital Signs











Temperature  98.1 F   12/17/19 13:52


 


Pulse Rate  49 L  12/17/19 13:52


 


Respiratory Rate  18   12/17/19 13:52


 


Blood Pressure  148/54 L  12/17/19 13:52


 


O2 Sat by Pulse Oximetry (%)  100   12/17/19 09:00











Constitutional: Yes: No Distress, Calm


Cardiovascular: Yes: Regular Rate and Rhythm


Respiratory: Yes: CTA Bilaterally


Gastrointestinal: Yes: Normal Bowel Sounds, Soft, Other (GT)


Genitourinary: Yes: Other (SPC)


Integumentary: Yes: WNL


Neurological: Yes: Alert


Labs: 


 CBC, BMP





 12/17/19 07:10 





 12/17/19 07:10 





 INR, PTT











INR  0.97  (0.83-1.09)   12/13/19  09:40    








 Microbiology





12/13/19 09:36   Blood - Peripheral Venous   Blood Culture - Preliminary


                            NO GROWTH OBTAINED AFTER 96 HOURS, INCUBATION TO 

CONTINUE


                            FOR 1 DAYS.


12/13/19 09:36   Blood - Peripheral Venous   Blood Culture - Preliminary


                            NO GROWTH OBTAINED AFTER 96 HOURS, INCUBATION TO 

CONTINUE


                            FOR 1 DAYS.


12/13/19 09:45   Urine - Urine Suprapubic   Urine Culture - Final


                            Klebsiella Pneumoniae


                            Escherichia Coli Esbl 


                            Enterococcus Faecalis


12/13/19 17:02   Urine - Urine Larry   Legionella Antigen - Final


12/13/19 17:02   Urine - Urine Larry   Streptococcus pneumoniae Antigen (M - 

Final











- ....Imaging


Chest X-ray: Report Reviewed


Cat Scan: Report Reviewed





Problem List





- Problems


(1) Acute respiratory failure with hypoxia


Code(s): J96.01 - ACUTE RESPIRATORY FAILURE WITH HYPOXIA   





(2) Cerebral palsy


Code(s): G80.9 - CEREBRAL PALSY, UNSPECIFIED   





(3) Microcephalic


Code(s): Q02 - MICROCEPHALY   





(4) Severe mental handicap


Code(s): F72 - SEVERE INTELLECTUAL DISABILITIES   





(5) Functional quadriplegia


Code(s): R53.2 - FUNCTIONAL QUADRIPLEGIA   





(6) Severe sepsis


Code(s): A41.9 - SEPSIS, UNSPECIFIED ORGANISM; R65.20 - SEVERE SEPSIS WITHOUT 

SEPTIC SHOCK   





(7) Urinary tract infection due to ESBL Klebsiella


Code(s): N39.0 - URINARY TRACT INFECTION, SITE NOT SPECIFIED; B96.89 - OTH 

BACTERIAL AGENTS AS THE CAUSE OF DISEASES CLASSD [id number]   





(8) Seizure


Code(s): R56.9 - UNSPECIFIED CONVULSIONS   





Assessment/Plan





Acute hypoxic respiratory failure


UTI


Hx of ESBL+ UTI


Urinary obstruction with SPC


Cerebral palsy with MR


Seizure d.o.





-- Pt is alert, afebrile, without distress at this time


-- Chest imaging without infiltrates - ? Aspiration pneumonitis 


-- UCx + multiple organisms, recently treated for resistant UTI


-- Awaiting change in urinary catheter


continue close monitoring

## 2019-12-17 NOTE — PN
Teaching Attending Note


Name of Resident: Maria C Robin





ATTENDING PHYSICIAN STATEMENT





I saw and evaluated the patient.


I reviewed the resident's note and discussed the case with the resident.


I agree with the resident's findings and plan as documented.





Seen and examined; discussed with resident.   Known from prior admits; recent 

DC from NP service. GT noted to be clogged; changing meds to IV but somePO 

only.  





VS lab imaging reveiwed


NAD AA resting in bed


No new neuro deficits, responds to verbal stimuli


NT ND +BS


HR wnl, +s1/2


Not agitated No sz activity noted





Pending consult


 Microbiology





12/13/19 09:36   Blood - Peripheral Venous   Blood Culture - Preliminary


                            NO GROWTH OBTAINED AFTER 96 HOURS, INCUBATION TO 

CONTINUE


                            FOR 1 DAYS.


12/13/19 09:36   Blood - Peripheral Venous   Blood Culture - Preliminary


                            NO GROWTH OBTAINED AFTER 96 HOURS, INCUBATION TO 

CONTINUE


                            FOR 1 DAYS.


12/13/19 09:45   Urine - Urine Suprapubic   Urine Culture - Final


                            Klebsiella Pneumoniae


                            Escherichia Coli Esbl 


                            Enterococcus Faecalis


12/13/19 17:02   Urine - Urine Larry   Legionella Antigen - Final


12/13/19 17:02   Urine - Urine Larry   Streptococcus pneumoniae Antigen (M - 

Final











OBJECTIVE:





-Larry replacement pending


-GT not flushing (Nursing reports tried with standard procedure.  [facility] GI 

for replacement.  This is an issue with respect to seizure meds.


-Dehydration


-Cerebral palsy/mental retardation


-Seizure disorder   


   -On Zonegran, Onfi, Keppra, Vimpat.  Onfi is PO only as is Zonegran. Cannot 

swallow due to underlying MRCP.  Will consult neuro for furthe guidance as well 

as pharmacy and start seizure precautions.  Consulting GI for GT replacement.  


6. Nutrition


   -Holding feedings (was on jevity)











****Spoke to Dr. Miles


-Resume PO meds when available through GT


-TID 100mg Phenytoin IV until GT issue resolved


-Valproate 1000mg IV BID


-Keppra 2000mg IV BID


-Vimpat 200mg IV BID


No need to follow levels monitor for seizure activity.  





Full Code

## 2019-12-18 LAB
ANION GAP SERPL CALC-SCNC: 10 MMOL/L (ref 8–16)
ANISOCYTOSIS BLD QL: 0
BASOPHILS # BLD: 0.7 % (ref 0–2)
BUN SERPL-MCNC: 17.2 MG/DL (ref 7–18)
CALCIUM SERPL-MCNC: 8.3 MG/DL (ref 8.5–10.1)
CHLORIDE SERPL-SCNC: 102 MMOL/L (ref 98–107)
CO2 SERPL-SCNC: 21 MMOL/L (ref 21–32)
CREAT SERPL-MCNC: 0.5 MG/DL (ref 0.55–1.3)
DEPRECATED RDW RBC AUTO: 12.4 % (ref 11.9–15.9)
EOSINOPHIL # BLD: 4.3 % (ref 0–4.5)
GLUCOSE SERPL-MCNC: 141 MG/DL (ref 74–106)
HCT VFR BLD CALC: 30.3 % (ref 35.4–49)
HGB BLD-MCNC: 10.5 GM/DL (ref 11.7–16.9)
LYMPHOCYTES # BLD: 64.9 % (ref 8–40)
MACROCYTES BLD QL: 0
MAGNESIUM SERPL-MCNC: 1.9 MG/DL (ref 1.8–2.4)
MCH RBC QN AUTO: 32.8 PG (ref 25.7–33.7)
MCHC RBC AUTO-ENTMCNC: 34.5 G/DL (ref 32–35.9)
MCV RBC: 94.9 FL (ref 80–96)
MONOCYTES # BLD AUTO: 5 % (ref 3.8–10.2)
NEUTROPHILS # BLD: 25.1 % (ref 42.8–82.8)
PHOSPHATE SERPL-MCNC: 4.7 MG/DL (ref 2.5–4.9)
PLATELET # BLD AUTO: 224 K/MM3 (ref 134–434)
PLATELET BLD QL SMEAR: NORMAL
PMV BLD: 7.3 FL (ref 7.5–11.1)
POTASSIUM SERPLBLD-SCNC: 3.9 MMOL/L (ref 3.5–5.1)
RBC # BLD AUTO: 3.19 M/MM3 (ref 4–5.6)
SODIUM SERPL-SCNC: 134 MMOL/L (ref 136–145)
WBC # BLD AUTO: 4.4 K/MM3 (ref 4–10)

## 2019-12-18 RX ADMIN — HEPARIN SODIUM SCH UNIT: 5000 INJECTION, SOLUTION INTRAVENOUS; SUBCUTANEOUS at 14:09

## 2019-12-18 RX ADMIN — PIPERACILLIN SODIUM,TAZOBACTAM SODIUM SCH MLS/HR: 3; .375 INJECTION, POWDER, FOR SOLUTION INTRAVENOUS at 02:59

## 2019-12-18 RX ADMIN — MULTIVITAMIN SCH ML: LIQUID ORAL at 09:38

## 2019-12-18 RX ADMIN — ZONISAMIDE SCH MG: 100 CAPSULE ORAL at 22:34

## 2019-12-18 RX ADMIN — OFLOXACIN SCH DROP: 3 SOLUTION/ DROPS OPHTHALMIC at 17:57

## 2019-12-18 RX ADMIN — DOCUSATE SODIUM,SENNOSIDES SCH TABLET: 50; 8.6 TABLET, FILM COATED ORAL at 22:30

## 2019-12-18 RX ADMIN — OFLOXACIN SCH DROP: 3 SOLUTION/ DROPS OPHTHALMIC at 11:24

## 2019-12-18 RX ADMIN — SODIUM CHLORIDE, POTASSIUM CHLORIDE, SODIUM LACTATE AND CALCIUM CHLORIDE SCH MLS/HR: 600; 310; 30; 20 INJECTION, SOLUTION INTRAVENOUS at 22:38

## 2019-12-18 RX ADMIN — LACOSAMIDE SCH MG: 10 SOLUTION ORAL at 22:31

## 2019-12-18 RX ADMIN — SODIUM CHLORIDE TAB 1 GM SCH GM: 1 TAB at 09:39

## 2019-12-18 RX ADMIN — LACOSAMIDE SCH MG: 10 SOLUTION ORAL at 09:37

## 2019-12-18 RX ADMIN — OFLOXACIN SCH DROP: 3 SOLUTION/ DROPS OPHTHALMIC at 14:10

## 2019-12-18 RX ADMIN — LEVOCARNITINE SCH MG: 1 SOLUTION ORAL at 22:33

## 2019-12-18 RX ADMIN — SODIUM CHLORIDE TAB 1 GM SCH GM: 1 TAB at 22:32

## 2019-12-18 RX ADMIN — LEVETIRACETAM SCH MG: 100 SOLUTION ORAL at 09:39

## 2019-12-18 RX ADMIN — Medication SCH TAB: at 09:38

## 2019-12-18 RX ADMIN — FAMOTIDINE SCH MG: 40 POWDER, FOR SUSPENSION ORAL at 09:39

## 2019-12-18 RX ADMIN — HEPARIN SODIUM SCH UNIT: 5000 INJECTION, SOLUTION INTRAVENOUS; SUBCUTANEOUS at 06:54

## 2019-12-18 RX ADMIN — OFLOXACIN SCH DROP: 3 SOLUTION/ DROPS OPHTHALMIC at 22:36

## 2019-12-18 RX ADMIN — Medication SCH TAB: at 22:32

## 2019-12-18 RX ADMIN — LEVOCARNITINE SCH MG: 1 SOLUTION ORAL at 06:54

## 2019-12-18 RX ADMIN — PIPERACILLIN SODIUM,TAZOBACTAM SODIUM SCH MLS/HR: 3; .375 INJECTION, POWDER, FOR SOLUTION INTRAVENOUS at 17:57

## 2019-12-18 RX ADMIN — PIPERACILLIN SODIUM,TAZOBACTAM SODIUM SCH MLS/HR: 3; .375 INJECTION, POWDER, FOR SOLUTION INTRAVENOUS at 09:37

## 2019-12-18 RX ADMIN — HEPARIN SODIUM SCH UNIT: 5000 INJECTION, SOLUTION INTRAVENOUS; SUBCUTANEOUS at 22:30

## 2019-12-18 RX ADMIN — SODIUM CHLORIDE, POTASSIUM CHLORIDE, SODIUM LACTATE AND CALCIUM CHLORIDE SCH MLS/HR: 600; 310; 30; 20 INJECTION, SOLUTION INTRAVENOUS at 09:37

## 2019-12-18 RX ADMIN — Medication SCH MG: at 09:38

## 2019-12-18 RX ADMIN — SODIUM CHLORIDE TAB 1 GM SCH GM: 1 TAB at 00:01

## 2019-12-18 RX ADMIN — OFLOXACIN SCH DROP: 3 SOLUTION/ DROPS OPHTHALMIC at 06:57

## 2019-12-18 RX ADMIN — LEVETIRACETAM SCH MG: 100 SOLUTION ORAL at 22:34

## 2019-12-18 RX ADMIN — ZONISAMIDE SCH MG: 100 CAPSULE ORAL at 09:40

## 2019-12-18 RX ADMIN — LEVOCARNITINE SCH MG: 1 SOLUTION ORAL at 17:57

## 2019-12-18 NOTE — PN
Teaching Attending Note


Name of Resident: Maria C Robin





ATTENDING PHYSICIAN STATEMENT





I saw and evaluated the patient.


I reviewed the resident's note and discussed the case with the resident.


I agree with the resident's findings and plan as documented.








SUBJECTIVE: Patient awake, alert, and appears comfortable.








OBJECTIVE:


 Vital Signs











 Period  Temp  Pulse  Resp  BP Sys/Curran  Pulse Ox


 


 Last 24 Hr  97 F-98.0 F  50-58  16-20  /49-94  98-99








HEART: S1S2, RRR


LUNGS: Clear


ABDOMEN: Soft, non-distended, normal BS, G-tube site clean


EXTREMITIES: No edema








 Laboratory Results - last 24 hr











  12/18/19 12/18/19





  06:32 06:32


 


WBC  4.4 


 


RBC  3.19 L 


 


Hgb  10.5 L 


 


Hct  30.3 L 


 


MCV  94.9 


 


MCH  32.8 


 


MCHC  34.5 


 


RDW  12.4 


 


Plt Count  224 


 


MPV  7.3 L 


 


Absolute Neuts (auto)  1.1 L 


 


Neutrophils %  25.1 L D 


 


Neutrophils % (Manual)  21.0 L D 


 


Band Neutrophils %  0.0 


 


Lymphocytes %  64.9 H 


 


Lymphocytes % (Manual)  70.0 H D 


 


Monocytes %  5.0 


 


Monocytes % (Manual)  4 


 


Eosinophils %  4.3 


 


Eosinophils % (Manual)  2.0  D 


 


Basophils %  0.7 


 


Basophils % (Manual)  0.0 


 


Myelocytes % (Man)  0 


 


Promyelocytes % (Man)  0 


 


Blast Cells % (Manual)  0 


 


Nucleated RBC %  0 


 


Metamyelocytes  0 


 


Hypochromia  0 


 


Platelet Estimate  Normal 


 


Polychromasia  0 


 


Poikilocytosis  0 


 


Anisocytosis  0 


 


Microcytosis  0 


 


Macrocytosis  0 


 


Sodium   134 L


 


Potassium   3.9


 


Chloride   102


 


Carbon Dioxide   21


 


Anion Gap   10


 


BUN   17.2


 


Creatinine   0.5 L


 


Est GFR (CKD-EPI)AfAm   162.72


 


Est GFR (CKD-EPI)NonAf   140.40


 


Random Glucose   141 H


 


Calcium   8.3 L


 


Phosphorus   4.7


 


Magnesium   1.9








 Current Medications











Generic Name Dose Route Start Last Admin





  Trade Name Freq  PRN Reason Stop Dose Admin


 


Acetaminophen  1,000 mg  12/16/19 12:57  12/16/19 13:10





  Ofirmev Injection -  IVPB   1,000 mg





  Q6H PRN   Administration





  PAIN LEVEL 7 - 10   





     





     





     


 


Albuterol Sulfate  1 amp  12/13/19 14:54  





  Ventolin 0.083% Nebulizer Soln -  NEB   





  Q4H PRN   





  SHORT OF BREATH/WHEEZING   





     





     





     


 


Calcium Carbonate/Cholecalciferol  1 tab  12/13/19 22:00  12/18/19 09:38





  Os-Dale 500+D -  GT   1 tab





  BID RAKAN   Administration





     





     





     





     


 


Clobazam  10 mg  12/13/19 22:00  12/18/19 09:38





  Onfi -  GT   10 mg





  BID RAKAN   Administration





     





     





     





     


 


Diazepam   mg  12/13/19 15:00  





  Diastat Rectal Gel -  RC   





  PRN RAKAN   





     





     





     





     


 


Famotidine  20 mg  12/14/19 10:00  12/18/19 09:39





  Pepcid  PEG   20 mg





  DAILY RAKAN   Administration





     





     





     





     


 


Heparin Sodium (Porcine)  5,000 unit  12/13/19 22:00  12/18/19 14:09





  Heparin -  SQ   5,000 unit





  TID RAKAN   Administration





     





     





     





     


 


Piperacillin Sod/Tazobactam  50 mls @ 100 mls/hr  12/14/19 18:00  12/18/19 09:37





  Sod 3.375 gm/ Dextrose  IVPB   100 mls/hr





  Q8H-IV RAKAN   Administration





     





     





  Protocol   





     


 


Lactated Ringer's  1,000 ml in 1,000 mls @ 75 mls/hr  12/18/19 07:45  12/18/19 

09:37





  Lactated Ringers Solution  IV   75 mls/hr





  ASDIR RAKAN   Administration





     





     





     





     


 


Lacosamide  200 mg  12/17/19 22:00  12/18/19 09:37





  Vimpat Liquid -  GT   200 mg





  BID RAKAN   Administration





     





     





     





     


 


Lactobacillus Acidophilus  2 tab  12/14/19 10:00  12/18/19 09:38





  Bacid -  GT   2 tab





  DAILY RAKAN   Administration





     





     





     





     


 


Levetiracetam  2,000 mg  12/17/19 22:00  12/18/19 09:39





  Keppra Oral Solution -  GT   2,000 mg





  BID RAKAN   Administration





     





     





     





     


 


Levocarnitine  700 mg  12/13/19 22:00  12/18/19 06:54





  Carnitor Oral Solution -  GT   700 mg





  TID RAKAN   Administration





     





     





     





     


 


Multivitamins/Minerals  15 ml  12/14/19 10:00  12/18/19 09:38





  Certavite-Antioxidant Liquid  PO   15 ml





  DAILY RAKAN   Administration





     





     





     





     


 


Ofloxacin  2 drop  12/13/19 18:00  12/18/19 14:10





  Ocuflox 0.3% Eye Drops -  AD   2 drop





  Q4HWA RAKAN   Administration





     





     





     





     


 


Pyridoxine HCl  100 mg  12/15/19 10:00  12/18/19 09:38





  Vitamin B6 -  GT   100 mg





  DAILY RAKAN   Administration





     





     





     





     


 


Senna/Docusate Sodium  2 tablet  12/13/19 22:00  12/17/19 23:02





  Pericolace -  PO   2 tablet





  HS RAKAN   Administration





     





     





     





     


 


Sodium Chloride  3.5 gm  12/13/19 22:00  12/18/19 09:39





  Sodium Chloride Tablet -  GT   3.5 gm





  BID RAKAN   Administration





     





     





     





     


 


Zonisamide  300 mg  12/13/19 22:00  12/18/19 09:40





  Zonisamide  GT   300 mg





  BID RAKAN   Administration





     





     





     





     














ASSESSMENT AND PLAN:


This is a 33 year old man with a history of cerebral palsy, mental retardation, 

urethral stricture, suprapubic catheter, seizure disorder who was sent to the 

ED from [facility] for cough, dyspnea, and hypoxia.





1. Sepsis secondary to UTI with ESBL E. coli, E. faecalis, Klebsiella


   - Continue Zosyn


   - Awaiting urology for suprapubic catheter exchange


2. Hyponatremia


3. Constipation


   - Continue PeriColace


4. Acute hypoxic respiratory failure


   - Resolved


5. Lung nodule


   - Conservative management


6. Cerebral palsy/mental retardation


7. Seizure disorder   


   - Continue Zonegran, Onfi, Keppra, Vimpat


8. Anemia


   - Hgb stable


9. Nutrition


   - Continue G tube feedings

## 2019-12-18 NOTE — PN
Progress Note (short form)





- Note


Progress Note: 


Hospitalist Medicine


Resting in bed, off 02 this AM. Awaiting suprapubic fraser exchange, uro has 

been called 





 Vitals











  12/18/19





  10:00


 


Temperature 98.0 F


 


Pulse Rate 50 L


 


Respiratory 16





Rate 


 


Blood Pressure 108/75








Physical Exam


general: resting comfortably in bed, grunting occasionally. on 2L NC 


HEENT: +microcephaly. moist MM


neck: supple


cardio: S1, S2, RRR. no r/m/g


pulm: CTA B/l. no accessory m usage


abdomen: soft, nontender, nondistended. +PEG


: +suprapubic fraser; needs exchange 


LE: no edema. pulses intact. contracted 


neuro: CNs appear to be grossly intact; unable to follow commands 





 Laboratory Tests











  12/18/19 12/18/19





  06:32 06:32


 


WBC  4.4 


 


Hgb  10.5 L 


 


Hct  30.3 L 


 


Plt Count  224 


 


Sodium   134 L


 


Potassium   3.9


 


Chloride   102


 


Carbon Dioxide   21


 


BUN   17.2


 


Creatinine   0.5 L


 


Random Glucose   141 H


 


Calcium   8.3 L


 


Magnesium   1.9








Microbiology





12/13/19 17:02   Urine - Urine Fraser   Legionella Antigen - Final


12/13/19 17:02   Urine - Urine Fraser   Streptococcus pneumoniae Antigen (M - 

Final


12/13/19 09:45   Urine - Urine Suprapubic   Urine Culture - Final


                              Klebsiella Pneumoniae


                              Escherichia Coli Esbl 


                              Enterococcus Faecalis


12/13/19 09:36   Blood - Peripheral Venous   Blood Culture - Final


                              NO GROWTH AFTER 5 DAYS INCUBATION


12/13/19 09:36   Blood - Peripheral Venous   Blood Culture - Final


                              NO GROWTH AFTER 5 DAYS INCUBATION








Imaging





12/13/19: CXR: dextroscoliosis. no evidence of vascular congestive changes, 

pulm infiltrates. no pneumo or large pleural effusion, no blunting ot he 

costophrenic angles.





12/13/19: chest CTA: limited atelectasis of the left lower lung. suspect new 

RUL lesion and correlate with PET/CT may prove useful as clinically indicated. 


(1.9cm in the medial aspect of the upper right lung; appears to represent 

increase in size of a poorly visualized lesion seen previously.)





12/13/19: EKG: NSR, rate 95bpm, qtc 449ms





12/17/19: CXR: scoliosis, with convexity to the right with clear lungs





Assessment/Plan


34 year old male with past medical history of seizures, microcephaly with cranio

-synostosis, urethral stricture s/p suprapubic catheter placement, hx of 

chronic UTI's with ESBL, developmental delay, functional quadriplegia, hx of 

PNA admitted for sepsis secondary to urinary tract infection.





#Sepsis 2/2 UTI


-continue zosyn (12/14) per ID 


-ucx: +Kleb, ESBL, E. faecalis


-w/ suprapubic catheter, needs exchange as multiple org. uro has been called 


-IV tylenol PRN pain/fever 


-flu, RSV, legionella (-)


-f/u blood cx - NGTD


-ID: Dr. Garcia


-Uro: Dr. Jo 





#RUL lung lesion


-c/w conservative mgmt





#Constipation


-c/w senna. added colace





#Seizure d/o


-continue home anti-epileptics Vantin, Keppra, Onfi, zonisamide, levocarnitine


-though keppra supratherapeutic, per neuro will continue d/t low seizure 

threshold


-neuro: Dr. Miles





#Hyponatremia-resolved


-continue sodium chloride tabs





#Hypoxia- resolved 


-aspiration precautions


-Pulm: Dr. Olguin 





#F/E/N


started on IV LR 75 cc/hr, for hydration 


continue to follow lytes


TF jevity 1.5 





#PPX


DVT: hep 5k tid 


GI: famotidine 20mg daily





#Dispo


cont'd monitoring on med-surg


needs fraser exchange, on IV abx.


off 02 currently. however on d/c will need to d/w Joe, and may need pre 

and post

## 2019-12-18 NOTE — PN
Progress Note (short form)





- Note


Progress Note: 





PULMONARY





Appears comfortable. No fevers recorded.





 Vital Signs











 Period  Temp  Pulse  Resp  BP Sys/Curran  Pulse Ox


 


 Last 24 Hr  97 F-98.0 F  50-58  16-20  /49-94  98-99











Gen:  breathing nonlabored


Heart: RRR


Lung: decreased breath sounds at the bases


Abd: soft, nontender


Ext: no edema





 CBC, BMP





 12/18/19 06:32 





 12/18/19 06:32 





Active Medications





Acetaminophen (Ofirmev Injection -)  1,000 mg IVPB Q6H PRN


   PRN Reason: PAIN LEVEL 7 - 10


   Last Admin: 12/16/19 13:10 Dose:  1,000 mg


Albuterol Sulfate (Ventolin 0.083% Nebulizer Soln -)  1 amp NEB Q4H PRN


   PRN Reason: SHORT OF BREATH/WHEEZING


Calcium Carbonate/Cholecalciferol (Os-Dale 500+D -)  1 tab GT BID [facility]


   Last Admin: 12/18/19 09:38 Dose:  1 tab


Clobazam (Onfi -)  10 mg GT BID [facility]


   Last Admin: 12/18/19 09:38 Dose:  10 mg


Diazepam (Diastat Rectal Gel -)   mg RC PRN [facility]


Famotidine (Pepcid)  20 mg PEG DAILY [facility]


   Last Admin: 12/18/19 09:39 Dose:  20 mg


Heparin Sodium (Porcine) (Heparin -)  5,000 unit SQ TID [facility]


   Last Admin: 12/18/19 14:09 Dose:  5,000 unit


Piperacillin Sod/Tazobactam (Sod 3.375 gm/ Dextrose)  50 mls @ 100 mls/hr IVPB 

Q8H-IV [facility]; Protocol


   Last Admin: 12/18/19 09:37 Dose:  100 mls/hr


Lactated Ringer's (Lactated Ringers Solution)  1,000 ml in 1,000 mls @ 75 mls/

hr IV ASDIR [facility]


   Last Admin: 12/18/19 09:37 Dose:  75 mls/hr


Lacosamide (Vimpat Liquid -)  200 mg GT BID [facility]


   Last Admin: 12/18/19 09:37 Dose:  200 mg


Lactobacillus Acidophilus (Bacid -)  2 tab GT DAILY [facility]


   Last Admin: 12/18/19 09:38 Dose:  2 tab


Levetiracetam (Keppra Oral Solution -)  2,000 mg GT BID [facility]


   Last Admin: 12/18/19 09:39 Dose:  2,000 mg


Levocarnitine (Carnitor Oral Solution -)  700 mg GT TID [facility]


   Last Admin: 12/18/19 06:54 Dose:  700 mg


Multivitamins/Minerals (Certavite-Antioxidant Liquid)  15 ml PO DAILY [facility]


   Last Admin: 12/18/19 09:38 Dose:  15 ml


Ofloxacin (Ocuflox 0.3% Eye Drops -)  2 drop AD Q4HWA [facility]


   Last Admin: 12/18/19 14:10 Dose:  2 drop


Pyridoxine HCl (Vitamin B6 -)  100 mg GT DAILY [facility]


   Last Admin: 12/18/19 09:38 Dose:  100 mg


Senna/Docusate Sodium (Pericolace -)  2 tablet PO HS [facility]


   Last Admin: 12/17/19 23:02 Dose:  2 tablet


Sodium Chloride (Sodium Chloride Tablet -)  3.5 gm GT BID [facility]


   Last Admin: 12/18/19 09:39 Dose:  3.5 gm


Zonisamide (Zonisamide)  300 mg GT BID [facility]


   Last Admin: 12/18/19 09:40 Dose:  300 mg











A/P


Acute Hypoxic Respiratory Failure


Pneumonia


Lung Nodule


Cerebral Palsy


Mental Retardation


Seizure Disorder





-  continue antibiotics per ID


-  O2 to keep SpO2 >90%


-  aspiration precautions


-  inhaled bronchodilators as needed


-  DVT prophylaxis


-  agree with conservative management of lung nodule

## 2019-12-18 NOTE — PN
Progress Note, Physician


History of Present Illness: 





stable


no new issues





- Current Medication List


Current Medications: 


Active Medications





Acetaminophen (Ofirmev Injection -)  1,000 mg IVPB Q6H PRN


   PRN Reason: PAIN LEVEL 7 - 10


   Last Admin: 12/16/19 13:10 Dose:  1,000 mg


Albuterol Sulfate (Ventolin 0.083% Nebulizer Soln -)  1 amp NEB Q4H PRN


   PRN Reason: SHORT OF BREATH/WHEEZING


Calcium Carbonate/Cholecalciferol (Os-Dale 500+D -)  1 tab GT BID [facility]


   Last Admin: 12/18/19 09:38 Dose:  1 tab


Clobazam (Onfi -)  10 mg GT BID [facility]


   Last Admin: 12/18/19 09:38 Dose:  10 mg


Diazepam (Diastat Rectal Gel -)   mg RC PRN [facility]AKAN


Famotidine (Pepcid)  20 mg PEG DAILY [facility]


   Last Admin: 12/18/19 09:39 Dose:  20 mg


Heparin Sodium (Porcine) (Heparin -)  5,000 unit SQ TID [facility]


   Last Admin: 12/18/19 06:54 Dose:  5,000 unit


Piperacillin Sod/Tazobactam (Sod 3.375 gm/ Dextrose)  50 mls @ 100 mls/hr IVPB 

Q8H-IV [facility]; Protocol


   Last Admin: 12/18/19 09:37 Dose:  100 mls/hr


Lactated Ringer's (Lactated Ringers Solution)  1,000 ml in 1,000 mls @ 75 mls/

hr IV ASDIR [facility]


   Last Admin: 12/18/19 09:37 Dose:  75 mls/hr


Lacosamide (Vimpat Liquid -)  200 mg GT BID [facility]


   Last Admin: 12/18/19 09:37 Dose:  200 mg


Lactobacillus Acidophilus (Bacid -)  2 tab GT DAILY [facility]


   Last Admin: 12/18/19 09:38 Dose:  2 tab


Levetiracetam (Keppra Oral Solution -)  2,000 mg GT BID [facility]


   Last Admin: 12/18/19 09:39 Dose:  2,000 mg


Levocarnitine (Carnitor Oral Solution -)  700 mg GT TID [facility]


   Last Admin: 12/18/19 06:54 Dose:  700 mg


Multivitamins/Minerals (Certavite-Antioxidant Liquid)  15 ml PO DAILY [facility]


   Last Admin: 12/18/19 09:38 Dose:  15 ml


Ofloxacin (Ocuflox 0.3% Eye Drops -)  2 drop AD Q4HWA [facility]


   Last Admin: 12/18/19 11:24 Dose:  2 drop


Pyridoxine HCl (Vitamin B6 -)  100 mg GT DAILY [facility]


   Last Admin: 12/18/19 09:38 Dose:  100 mg


Senna/Docusate Sodium (Pericolace -)  2 tablet PO HS [facility]


   Last Admin: 12/17/19 23:02 Dose:  2 tablet


Sodium Chloride (Sodium Chloride Tablet -)  3.5 gm GT BID [facility]


   Last Admin: 12/18/19 09:39 Dose:  3.5 gm


Zonisamide (Zonisamide)  300 mg GT BID [facility]


   Last Admin: 12/18/19 09:40 Dose:  300 mg











- Objective


Vital Signs: 


 Vital Signs











Temperature  98.0 F   12/18/19 10:00


 


Pulse Rate  50 L  12/18/19 10:00


 


Respiratory Rate  16   12/18/19 10:00


 


Blood Pressure  108/75   12/18/19 10:00


 


O2 Sat by Pulse Oximetry (%)  99   12/18/19 09:00











Constitutional: Yes: No Distress, Calm


Cardiovascular: Yes: S1, S2


Respiratory: Yes: Regular, CTA Bilaterally


Gastrointestinal: Yes: Normal Bowel Sounds, Soft


Genitourinary: Yes: Larry Present


Musculoskeletal: Yes: WNL


Extremities: Yes: Other


Neurological: Yes: Alert, Other


Psychiatric: Yes: Other


Labs: 


 CBC, BMP





 12/18/19 06:32 





 12/18/19 06:32 





 INR, PTT











INR  0.97  (0.83-1.09)   12/13/19  09:40    














Assessment/Plan





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 

bedbound, non-verbal, non-communicative at baseline, with suprapubic catheter 

placed with underlying UTI. and patient coming in with hypoxia


 Problem List 





- Problems


(1) Microcephalic


Code(s): Q02 - MICROCEPHALY   





(2) Acute respiratory failure with hypoxia


Code(s): J96.01 - ACUTE RESPIRATORY FAILURE WITH HYPOXIA   





(3) Hypoxemia


Code(s): R09.02 - HYPOXEMIA   





(4) Pneumonia


Code(s): J18.9 - PNEUMONIA, UNSPECIFIED ORGANISM   


Qualifiers: 


   Pneumonia type: due to unspecified organism   Laterality: unspecified 

laterality   Lung location: unspecified part of lung   Qualified Code(s): J18.9 

- Pneumonia, unspecified organism   





(5) Cerebral palsy


Code(s): G80.9 - CEREBRAL PALSY, UNSPECIFIED   





(6) Functional quadriplegia


Code(s): R53.2 - FUNCTIONAL QUADRIPLEGIA   





(7) Seizure


Code(s): R56.9 - UNSPECIFIED CONVULSIONS   





(8) Cerebral palsy


Code(s): G80.9 - CEREBRAL PALSY, UNSPECIFIED   





(9) Severe mental handicap


Code(s): F72 - SEVERE INTELLECTUAL DISABILITIES   





10 uti





plan


continue abx


nutrition


change of foleys


rest as per the team

## 2019-12-18 NOTE — CONSULT
Consult - text type





- Consultation


Consultation Note: 





 Neurology 


CHIEF COMPLAINT: hypoxia





PCP: Autumn Jeffery





HISTORY OF PRESENT ILLNESS:


Theodore Mendieta is a 34 year old male with a past medical history of seizures, 

microcephaly with cranial synostosis, urethral stricture s/p suprapubic 

catheter placement, hx of chronic UTI's with ESBL, MR, hx of PNA presented from 

[facility] for hypoxia. It was noted by the staff at [facility] that 

the patient was hypoxic on RA at 78% and was having increased work of 

breathing. Patient was noted to have a non-productive cough, increase in 

respiratory rate, and hypoxia. No other history was obtainable from the NH. 

Patient came to the ED where he had increased work of breath and was placed on 

non-rebreather. At baseline, the patient is nonverbal, does not track, does not 

follow commands. Given 3 amps of Duoneb, 1.5L of NS, Tylenol 1g, Zosyn, Vanco.  

UA + nitrites, 2+ LE, 65 WBC, 3464 bacteria. CXR with no acute findings, CTA 

with atelectasis in LLL with new RULL lesion 1.9cm, no evidence of PE or PNA. I 

wasconsulted for further management of his seizure medications which included 

Onfi, Keppra, zonegran and vimpat. The concern was that his G-tube was clogged 

and therefore multiple medications were not  able to be administered. Discussed 

with hospitalist on 12/17 and advised using depakote and dilantin instead of 

the two medications that could not given IV. This Am, nurse reports G tube no 

longer clogged. The patient can be on outpatient medications aand doses  if 

that is the case   His dose of  Keppra is very high but I do not know  the 

patient's seizure background and do not want to have any seizure recurrence at 

this time and therefore advised continuing outpatient dose.  He should follow 

up with his regular neurologist to determine if Keppra dose can be reduced.





Recent Travel: none





PAST MEDICAL HISTORY: as above





PAST SURGICAL HISTORY: suprapubic catheter, G tube placement, spinal fusion





Social History:


Smoking: none


Alcohol: none


Drugs: none





Resident at [facility]


Family history: unable to obtain 





Allergies





No Known Allergies Allergy 








HOME MEDICATIONS:


 Home Medications











 Medication  Instructions  Recorded


 


Albuterol 0.083% Nebulizer Sol 1 neb NEB Q4H PRN 09/13/19





[Ventolin 0.083% Nebulizer Soln -]  


 


Calcium Carbonate/Vitamin D3 1 each GT BID 09/13/19





[Oyster Shell 500-Vit D3 200 Tb]  


 


Clobazam [Onfi -] 10 mg GT BID 09/13/19


 


Fructooligosaccharides/Polydex 30 ml GT DAILY 09/13/19





[Fiber-Stat 15 gm/30 ml Liquid]  


 


Lacosamide [Vimpat] 200 mg GT BID 09/13/19


 


Levocarnitine 7 ml GT TID 09/13/19


 


Multivitamin [Multiple Vitamins] 30 ml GT DAILY 09/13/19


 


Pyridoxine HCl [Vitamin B-6] 100 mg GT ASDIR 09/13/19


 


Sennosides/Docusate Sodium 2 tab GT HS 09/13/19





[Senna-S Tablet]  


 


Sodium Chloride Tablet - 3.5 gm GT BID 09/13/19


 


Zonisamide 300 mg GT BID 09/13/19


 


levETIRAcetam [levETIRAcetam ORAL 2 gm GT BID 09/13/19





SUSPENSION]  


 


Ofloxacin 0.3% Ophth Soln [Ocuflox 2 drop AD Q4HWA #10 drops 09/18/19





-]  


 


Calcium Carbonate/Vitamin D3 1 each GT BID 12/13/19





[Oyster Shell 500-Vit D3 200 Pk]  


 


Diazepam [Diastat Acudial] 1 each RC PRN 12/13/19


 


Lactobacillus Acidophilus 2 each GT DAILY 12/13/19





[Acidophilus]  


 


Nutritional Supplement/Fiber 237 ml GT ASDIR 12/13/19





[Promote with Fiber Liquid]  








REVIEW OF SYSTEMS





Unable to provide ROS due to nonverbal status.





PHYSICAL EXAMINATION


  Vital Signs











 Period  Temp  Pulse  Resp  BP Sys/Curran  Pulse Ox


 


 Last 24 Hr  97.4 F-98.1 F  49-58  17-18  /51-94  98














GENERAL: Non-verbal, non-responsive.


HEAD: Microcephalic.


EYES: Pupils equal, round and reactive to light, extraocular movements intact, 

conjunctiva clear. 


EARS, NOSE, THROAT: Oropharynx clear without exudates. Moist mucous membranes.


LUNGS: Noted bibasilar crackles. No wheezes appreciated. No accessory muscle 

use.


HEART: Regular rate and rhythm, normal S1 and S2 without murmur.


ABDOMEN: Soft, nontender, not distended, normoactive bowel sounds, no guarding, 

no rebound, no masses. Suprapubic catheter and G tube in place without purulence

, erythema, localized signs of infection


MUSCULOSKELETAL: Spastic extremities with contracted R UE.


UPPER EXTREMITIES: 2+ pulses, warm, well-perfused. No peripheral edema.


LOWER EXTREMITIES: 2+ pulses, warm, well-perfused. No peripheral edema. 


NEUROLOGICAL:  Spastic extremities. Groan response to painful stimuli. Moves 

extremities grossly, sensory intact to LT


SKIN: Cool, dry, normal turgor, no rashes or lesions noted, normal capillary 

refill. 





  CBCD











WBC  4.4 K/mm3 (4.0-10.0)   12/18/19  06:32    


 


RBC  3.19 M/mm3 (4.00-5.60)  L  12/18/19  06:32    


 


Hgb  10.5 GM/dL (11.7-16.9)  L  12/18/19  06:32    


 


Hct  30.3 % (35.4-49)  L  12/18/19  06:32    


 


MCV  94.9 fl (80-96)   12/18/19  06:32    


 


MCHC  34.5 g/dl (32.0-35.9)   12/18/19  06:32    


 


RDW  12.4 % (11.9-15.9)   12/18/19  06:32    


 


Plt Count  224 K/MM3 (134-434)   12/18/19  06:32    


 


MPV  7.3 fl (7.5-11.1)  L  12/18/19  06:32    








 CMP











Sodium  134 mmol/L (136-145)  L  12/18/19  06:32    


 


Potassium  3.9 mmol/L (3.5-5.1)   12/18/19  06:32    


 


Chloride  102 mmol/L ()   12/18/19  06:32    


 


Carbon Dioxide  21 mmol/L (21-32)   12/18/19  06:32    


 


Anion Gap  10 MMOL/L (8-16)   12/18/19  06:32    


 


BUN  17.2 mg/dL (7-18)   12/18/19  06:32    


 


Creatinine  0.5 mg/dL (0.55-1.3)  L  12/18/19  06:32    


 


Random Glucose  141 mg/dL ()  H  12/18/19  06:32    


 


Calcium  8.3 mg/dL (8.5-10.1)  L  12/18/19  06:32    


 


Total Bilirubin  0.2 mg/dL (0.2-1)   12/14/19  09:00    


 


AST  23 U/L (15-37)   12/14/19  09:00    


 


ALT  42 U/L (13-61)   12/14/19  09:00    


 


Alkaline Phosphatase  115 U/L ()   12/14/19  09:00    


 


Total Protein  6.9 g/dl (6.4-8.2)   12/14/19  09:00    


 


Albumin  3.0 g/dl (3.4-5.0)  L  12/14/19  09:00    








 CARDIAC ENZYMES











Troponin I  < 0.02 ng/ml (0.00-0.05)   12/13/19  09:36    














ASSESSMENT/PLAN:


Theodore Mendieta is a 34 year old male with a past medical history of seizures, 

microcephaly with cranial synostosis, urethral stricture s/p suprapubic 

catheter placement, hx of chronic UTI's with ESBL, MR, hx of PNA presented from 

[facility] for hypoxia. It was noted by the staff at [facility] that 

the patient was hypoxic on RA at 78% and was having increased work of 

breathing. Patient was noted to have a non-productive cough, increase in 

respiratory rate, and hypoxia. No other history was obtainable from the NH. 

Patient came to the ED where he had increased work of breath and was placed on 

non-rebreather. At baseline, the patient is nonverbal, does not track, does not 

follow commands. Given 3 amps of Duoneb, 1.5L of NS, Tylenol 1g, Zosyn, Vanco.  

UA + nitrites, 2+ LE, 65 WBC, 3464 bacteria. CXR with no acute findings, CTA 

with atelectasis in LLL with new RULL lesion 1.9cm, no evidence of PE or PNA. I 

wasconsulted for further management of his seizure medications which included 

Onfi, Keppra, zonegran and vimpat. The concern was that his G-tube was clogged 

and therefore multiple medications were not  able to be administered. Discussed 

with hospitalist on 12/17 and advised using depakote and dilantin instead of 

the two medications that could not given IV. This Am, nurse reports G tube no 

longer clogged. The patient can be on outpatient medications aand doses  if 

that is the case   His dose of  Keppra is very high but I do not know  the 

patient's seizure background and do not want to have any seizure recurrence at 

this time and therefore advised continuing outpatient dose.  He should follow 

up with his regular neurologist to determine if Keppra dose can be reduced. 

Continue medical mgmt and optimization, infectious mgmt. Monitor G tube 

functionality. Medication compliance. Maintain adequate hydration

## 2019-12-19 LAB
ANION GAP SERPL CALC-SCNC: 7 MMOL/L (ref 8–16)
BASOPHILS # BLD: 0.6 % (ref 0–2)
BUN SERPL-MCNC: 15.2 MG/DL (ref 7–18)
CALCIUM SERPL-MCNC: 8.4 MG/DL (ref 8.5–10.1)
CHLORIDE SERPL-SCNC: 108 MMOL/L (ref 98–107)
CO2 SERPL-SCNC: 22 MMOL/L (ref 21–32)
CREAT SERPL-MCNC: 0.5 MG/DL (ref 0.55–1.3)
DEPRECATED RDW RBC AUTO: 12.7 % (ref 11.9–15.9)
EOSINOPHIL # BLD: 2.5 % (ref 0–4.5)
GLUCOSE SERPL-MCNC: 146 MG/DL (ref 74–106)
HCT VFR BLD CALC: 30.5 % (ref 35.4–49)
HGB BLD-MCNC: 10.5 GM/DL (ref 11.7–16.9)
LYMPHOCYTES # BLD: 43.3 % (ref 8–40)
MAGNESIUM SERPL-MCNC: 2 MG/DL (ref 1.8–2.4)
MCH RBC QN AUTO: 32.8 PG (ref 25.7–33.7)
MCHC RBC AUTO-ENTMCNC: 34.4 G/DL (ref 32–35.9)
MCV RBC: 95.5 FL (ref 80–96)
MONOCYTES # BLD AUTO: 5.5 % (ref 3.8–10.2)
NEUTROPHILS # BLD: 48.1 % (ref 42.8–82.8)
PHOSPHATE SERPL-MCNC: 3.5 MG/DL (ref 2.5–4.9)
PLATELET # BLD AUTO: 211 K/MM3 (ref 134–434)
PMV BLD: 7.4 FL (ref 7.5–11.1)
POTASSIUM SERPLBLD-SCNC: 4.2 MMOL/L (ref 3.5–5.1)
RBC # BLD AUTO: 3.19 M/MM3 (ref 4–5.6)
SODIUM SERPL-SCNC: 137 MMOL/L (ref 136–145)
WBC # BLD AUTO: 4 K/MM3 (ref 4–10)

## 2019-12-19 RX ADMIN — SODIUM CHLORIDE, POTASSIUM CHLORIDE, SODIUM LACTATE AND CALCIUM CHLORIDE SCH MLS/HR: 600; 310; 30; 20 INJECTION, SOLUTION INTRAVENOUS at 17:33

## 2019-12-19 RX ADMIN — DOCUSATE SODIUM,SENNOSIDES SCH TABLET: 50; 8.6 TABLET, FILM COATED ORAL at 21:08

## 2019-12-19 RX ADMIN — LACOSAMIDE SCH MG: 10 SOLUTION ORAL at 10:16

## 2019-12-19 RX ADMIN — LEVOCARNITINE SCH MG: 1 SOLUTION ORAL at 21:08

## 2019-12-19 RX ADMIN — OFLOXACIN SCH DROP: 3 SOLUTION/ DROPS OPHTHALMIC at 10:33

## 2019-12-19 RX ADMIN — Medication SCH TAB: at 21:08

## 2019-12-19 RX ADMIN — OFLOXACIN SCH DROP: 3 SOLUTION/ DROPS OPHTHALMIC at 21:09

## 2019-12-19 RX ADMIN — LACOSAMIDE SCH MG: 10 SOLUTION ORAL at 21:08

## 2019-12-19 RX ADMIN — SODIUM CHLORIDE TAB 1 GM SCH GM: 1 TAB at 10:17

## 2019-12-19 RX ADMIN — HEPARIN SODIUM SCH UNIT: 5000 INJECTION, SOLUTION INTRAVENOUS; SUBCUTANEOUS at 17:32

## 2019-12-19 RX ADMIN — OFLOXACIN SCH DROP: 3 SOLUTION/ DROPS OPHTHALMIC at 06:45

## 2019-12-19 RX ADMIN — LEVOCARNITINE SCH MG: 1 SOLUTION ORAL at 06:45

## 2019-12-19 RX ADMIN — LEVETIRACETAM SCH MG: 100 SOLUTION ORAL at 10:33

## 2019-12-19 RX ADMIN — OFLOXACIN SCH DROP: 3 SOLUTION/ DROPS OPHTHALMIC at 17:37

## 2019-12-19 RX ADMIN — LEVETIRACETAM SCH MG: 100 SOLUTION ORAL at 21:09

## 2019-12-19 RX ADMIN — Medication SCH TAB: at 10:15

## 2019-12-19 RX ADMIN — FAMOTIDINE SCH MG: 40 POWDER, FOR SUSPENSION ORAL at 10:18

## 2019-12-19 RX ADMIN — MEROPENEM SCH MLS/HR: 500 INJECTION, POWDER, FOR SOLUTION INTRAVENOUS at 17:35

## 2019-12-19 RX ADMIN — Medication SCH MG: at 10:16

## 2019-12-19 RX ADMIN — HEPARIN SODIUM SCH UNIT: 5000 INJECTION, SOLUTION INTRAVENOUS; SUBCUTANEOUS at 21:09

## 2019-12-19 RX ADMIN — ZONISAMIDE SCH MG: 100 CAPSULE ORAL at 21:09

## 2019-12-19 RX ADMIN — ZONISAMIDE SCH MG: 100 CAPSULE ORAL at 10:19

## 2019-12-19 RX ADMIN — MULTIVITAMIN SCH ML: LIQUID ORAL at 10:33

## 2019-12-19 RX ADMIN — OFLOXACIN SCH DROP: 3 SOLUTION/ DROPS OPHTHALMIC at 17:34

## 2019-12-19 RX ADMIN — LEVOCARNITINE SCH MG: 1 SOLUTION ORAL at 15:00

## 2019-12-19 RX ADMIN — PIPERACILLIN SODIUM,TAZOBACTAM SODIUM SCH MLS/HR: 3; .375 INJECTION, POWDER, FOR SOLUTION INTRAVENOUS at 01:57

## 2019-12-19 RX ADMIN — HEPARIN SODIUM SCH UNIT: 5000 INJECTION, SOLUTION INTRAVENOUS; SUBCUTANEOUS at 06:45

## 2019-12-19 RX ADMIN — Medication SCH TAB: at 10:16

## 2019-12-19 RX ADMIN — SODIUM CHLORIDE TAB 1 GM SCH GM: 1 TAB at 21:08

## 2019-12-19 RX ADMIN — PIPERACILLIN SODIUM,TAZOBACTAM SODIUM SCH MLS/HR: 3; .375 INJECTION, POWDER, FOR SOLUTION INTRAVENOUS at 10:25

## 2019-12-19 NOTE — PN
Progress Note, Physician


History of Present Illness: 





stable


no new issues





- Current Medication List


Current Medications: 


Active Medications





Acetaminophen (Ofirmev Injection -)  1,000 mg IVPB Q6H PRN


   PRN Reason: PAIN LEVEL 7 - 10


   Last Admin: 12/16/19 13:10 Dose:  1,000 mg


Albuterol Sulfate (Ventolin 0.083% Nebulizer Soln -)  1 amp NEB Q4H PRN


   PRN Reason: SHORT OF BREATH/WHEEZING


Calcium Carbonate/Cholecalciferol (Os-Dale 500+D -)  1 tab GT BID [facility]


   Last Admin: 12/19/19 10:16 Dose:  1 tab


Clobazam (Onfi -)  10 mg GT BID [facility]


   Last Admin: 12/19/19 10:25 Dose:  10 mg


Diazepam (Diastat Rectal Gel -)   mg RC PRN [facility]


Famotidine (Pepcid)  20 mg PEG DAILY [facility]


   Last Admin: 12/19/19 10:18 Dose:  20 mg


Heparin Sodium (Porcine) (Heparin -)  5,000 unit SQ TID [facility]


   Last Admin: 12/19/19 06:45 Dose:  5,000 unit


Lactated Ringer's (Lactated Ringers Solution)  1,000 ml in 1,000 mls @ 75 mls/

hr IV ASDIR [facility]


   Last Admin: 12/18/19 22:38 Dose:  75 mls/hr


Meropenem 500 mg/ Dextrose  100 mls @ 200 mls/hr IVPB Q12H [facility]


Meropenem 500 mg/ Dextrose  100 mls @ 200 mls/hr IVPB Q12H [facility]


   Stop: 12/20/19 04:29


Lacosamide (Vimpat Liquid -)  200 mg GT BID [facility]


   Last Admin: 12/19/19 10:16 Dose:  200 mg


Lactobacillus Acidophilus (Bacid -)  2 tab GT DAILY [facility]


   Last Admin: 12/19/19 10:15 Dose:  2 tab


Levetiracetam (Keppra Oral Solution -)  2,000 mg GT BID [facility]


   Last Admin: 12/19/19 10:33 Dose:  2,000 mg


Levocarnitine (Carnitor Oral Solution -)  700 mg GT TID [facility]


   Last Admin: 12/19/19 06:45 Dose:  700 mg


Multivitamins/Minerals (Certavite-Antioxidant Liquid)  15 ml PO DAILY [facility]


   Last Admin: 12/19/19 10:33 Dose:  15 ml


Ofloxacin (Ocuflox 0.3% Eye Drops -)  2 drop AD Q4HWA [facility]


   Last Admin: 12/19/19 10:33 Dose:  2 drop


Pyridoxine HCl (Vitamin B6 -)  100 mg GT DAILY [facility]


   Last Admin: 12/19/19 10:16 Dose:  100 mg


Senna/Docusate Sodium (Pericolace -)  2 tablet PO HS [facility]


   Last Admin: 12/18/19 22:30 Dose:  2 tablet


Sodium Chloride (Sodium Chloride Tablet -)  3.5 gm GT BID [facility]


   Last Admin: 12/19/19 10:17 Dose:  3.5 gm


Zonisamide (Zonisamide)  300 mg GT BID [facility]


   Last Admin: 12/19/19 10:19 Dose:  300 mg











- Objective


Vital Signs: 


 Vital Signs











Temperature  98.5 F   12/19/19 14:38


 


Pulse Rate  81   12/19/19 14:38


 


Respiratory Rate  20   12/19/19 14:38


 


Blood Pressure  119/65   12/19/19 14:38


 


O2 Sat by Pulse Oximetry (%)  97   12/19/19 08:55











Constitutional: Yes: No Distress, Calm


Cardiovascular: Yes: S1, S2


Respiratory: Yes: Regular, CTA Bilaterally


Gastrointestinal: Yes: Normal Bowel Sounds, Soft


Musculoskeletal: Yes: WNL


Extremities: Yes: WNL


Neurological: Yes: Alert, Oriented


Psychiatric: Yes: Alert, Oriented


Labs: 


 CBC, BMP





 12/19/19 06:15 





 12/19/19 06:15 





 INR, PTT











INR  0.97  (0.83-1.09)   12/13/19  09:40    














Assessment/Plan





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 

bedbound, non-verbal, non-communicative at baseline, with suprapubic catheter 

placed with underlying UTI. and patient coming in with hypoxia


 Problem List 





- Problems


(1) Microcephalic


Code(s): Q02 - MICROCEPHALY   





(2) Acute respiratory failure with hypoxia


Code(s): J96.01 - ACUTE RESPIRATORY FAILURE WITH HYPOXIA   





(3) Hypoxemia


Code(s): R09.02 - HYPOXEMIA   





(4) Pneumonia


Code(s): J18.9 - PNEUMONIA, UNSPECIFIED ORGANISM   


Qualifiers: 


   Pneumonia type: due to unspecified organism   Laterality: unspecified 

laterality   Lung location: unspecified part of lung   Qualified Code(s): J18.9 

- Pneumonia, unspecified organism   





(5) Cerebral palsy


Code(s): G80.9 - CEREBRAL PALSY, UNSPECIFIED   





(6) Functional quadriplegia


Code(s): R53.2 - FUNCTIONAL QUADRIPLEGIA   





(7) Seizure


Code(s): R56.9 - UNSPECIFIED CONVULSIONS   





(8) Cerebral palsy


Code(s): G80.9 - CEREBRAL PALSY, UNSPECIFIED   





(9) Severe mental handicap


Code(s): F72 - SEVERE INTELLECTUAL DISABILITIES   





10 uti





plan


continue abx


nutrition


change of foleys


rest as per the team

## 2019-12-19 NOTE — PN
Progress Note (short form)





- Note


Progress Note: 





                                                                               

                          Neurology 


CHIEF COMPLAINT: hypoxia





PCP: Autumn Jeffery





HISTORY OF PRESENT ILLNESS:


Theodore Mendieta is a 34 year old male with a past medical history of seizures, 

microcephaly with cranial synostosis, urethral stricture s/p suprapubic 

catheter placement, hx of chronic UTI's with ESBL, MR, hx of PNA presented from 

[facility] for hypoxia. It was noted by the staff at [facility] that 

the patient was hypoxic on RA at 78% and was having increased work of 

breathing. Patient was noted to have a non-productive cough, increase in 

respiratory rate, and hypoxia. No other history was obtainable from the NH. 

Patient came to the ED where he had increased work of breath and was placed on 

non-rebreather. At baseline, the patient is nonverbal, does not track, does not 

follow commands. Given 3 amps of Duoneb, 1.5L of NS, Tylenol 1g, Zosyn, Vanco.  

UA + nitrites, 2+ LE, 65 WBC, 3464 bacteria. CXR with no acute findings, CTA 

with atelectasis in LLL with new RULL lesion 1.9cm, no evidence of PE or PNA. I 

wasconsulted for further management of his seizure medications which included 

Onfi, Keppra, zonegran and vimpat. The concern was that his G-tube was clogged 

and therefore multiple medications were not  able to be administered. Discussed 

with hospitalist on 12/17 and advised using depakote and dilantin instead of 

the two medications that could not given IV. Following morning, nurse reports G 

tube no longer clogged, patient back on outpatient medications aand doses. 

Spoke with resident and his dose of  Keppra is very high but I do not know  the 

patient's seizure background and do not want to have any seizure recurrence at 

this time and therefore advised continuing outpatient dose.  He should follow 

up with his regular neurologist to determine if Keppra dose can be reduced.





Active Medications





Acetaminophen (Ofirmev Injection -)  1,000 mg IVPB Q6H PRN


   PRN Reason: PAIN LEVEL 7 - 10


   Last Admin: 12/16/19 13:10 Dose:  1,000 mg


Albuterol Sulfate (Ventolin 0.083% Nebulizer Soln -)  1 amp NEB Q4H PRN


   PRN Reason: SHORT OF BREATH/WHEEZING


Calcium Carbonate/Cholecalciferol (Os-Dale 500+D -)  1 tab GT BID [facility]


   Last Admin: 12/18/19 22:32 Dose:  1 tab


Clobazam (Onfi -)  10 mg GT BID [facility]


   Last Admin: 12/18/19 22:38 Dose:  10 mg


Diazepam (Diastat Rectal Gel -)   mg RC PRN [facility]


Famotidine (Pepcid)  20 mg PEG DAILY [facility]


   Last Admin: 12/18/19 09:39 Dose:  20 mg


Heparin Sodium (Porcine) (Heparin -)  5,000 unit SQ TID [facility]


   Last Admin: 12/19/19 06:45 Dose:  5,000 unit


Piperacillin Sod/Tazobactam (Sod 3.375 gm/ Dextrose)  50 mls @ 100 mls/hr IVPB 

Q8H-IV [facility]AN; Protocol


   Last Admin: 12/19/19 01:57 Dose:  100 mls/hr


Lactated Ringer's (Lactated Ringers Solution)  1,000 ml in 1,000 mls @ 75 mls/

hr IV ASDIR [facility]


   Last Admin: 12/18/19 22:38 Dose:  75 mls/hr


Lacosamide (Vimpat Liquid -)  200 mg GT BID [facility]


   Last Admin: 12/18/19 22:31 Dose:  200 mg


Lactobacillus Acidophilus (Bacid -)  2 tab GT DAILY [facility]


   Last Admin: 12/18/19 09:38 Dose:  2 tab


Levetiracetam (Keppra Oral Solution -)  2,000 mg GT BID [facility]


   Last Admin: 12/18/19 22:34 Dose:  2,000 mg


Levocarnitine (Carnitor Oral Solution -)  700 mg GT TID [facility]


   Last Admin: 12/19/19 06:45 Dose:  700 mg


Multivitamins/Minerals (Certavite-Antioxidant Liquid)  15 ml PO DAILY [facility]


   Last Admin: 12/18/19 09:38 Dose:  15 ml


Ofloxacin (Ocuflox 0.3% Eye Drops -)  2 drop AD Q4HWA [facility]


   Last Admin: 12/19/19 06:45 Dose:  2 drop


Pyridoxine HCl (Vitamin B6 -)  100 mg GT DAILY [facility]


   Last Admin: 12/18/19 09:38 Dose:  100 mg


Senna/Docusate Sodium (Pericolace -)  2 tablet PO HS [facility]


   Last Admin: 12/18/19 22:30 Dose:  2 tablet


Sodium Chloride (Sodium Chloride Tablet -)  3.5 gm GT BID [facility]


   Last Admin: 12/18/19 22:32 Dose:  3.5 gm


Zonisamide (Zonisamide)  300 mg GT BID [facility]


   Last Admin: 12/18/19 22:34 Dose:  300 mg








PHYSICAL EXAMINATION


  


 Vital Signs











 Period  Temp  Pulse  Resp  BP Sys/Curran  Pulse Ox


 


 Last 24 Hr  96.3 F-98.8 F  50-77  16-20  105-124/49-78  98-99








GENERAL: Non-verbal, non-responsive.


HEAD: Microcephalic.


EYES: Pupils equal, round and reactive to light, extraocular movements intact, 

conjunctiva clear. 


EARS, NOSE, THROAT: Oropharynx clear without exudates. Moist mucous membranes.


LUNGS: Noted bibasilar crackles. No wheezes appreciated. No accessory muscle 

use.


HEART: Regular rate and rhythm, normal S1 and S2 without murmur.


ABDOMEN: Soft, nontender, not distended, normoactive bowel sounds, no guarding, 

no rebound, no masses. Suprapubic catheter and G tube in place without purulence

, erythema, localized signs of infection


MUSCULOSKELETAL: Spastic extremities with contracted R UE.


UPPER EXTREMITIES: 2+ pulses, warm, well-perfused. No peripheral edema.


LOWER EXTREMITIES: 2+ pulses, warm, well-perfused. No peripheral edema. 


NEUROLOGICAL:  Spastic extremities. Groan response to painful stimuli. Moves 

extremities grossly, sensory intact to LT


SKIN: Cool, dry, normal turgor, no rashes or lesions noted, normal capillary 

refill. 





  


 CBCD











WBC  4.0 K/mm3 (4.0-10.0)   12/19/19  06:15    


 


RBC  3.19 M/mm3 (4.00-5.60)  L  12/19/19  06:15    


 


Hgb  10.5 GM/dL (11.7-16.9)  L  12/19/19  06:15    


 


Hct  30.5 % (35.4-49)  L  12/19/19  06:15    


 


MCV  95.5 fl (80-96)   12/19/19  06:15    


 


MCHC  34.4 g/dl (32.0-35.9)   12/19/19  06:15    


 


RDW  12.7 % (11.9-15.9)   12/19/19  06:15    


 


Plt Count  211 K/MM3 (134-434)   12/19/19  06:15    


 


MPV  7.4 fl (7.5-11.1)  L  12/19/19  06:15    








 CMP











Sodium  137 mmol/L (136-145)   12/19/19  06:15    


 


Potassium  4.2 mmol/L (3.5-5.1)   12/19/19  06:15    


 


Chloride  108 mmol/L ()  H  12/19/19  06:15    


 


Carbon Dioxide  22 mmol/L (21-32)   12/19/19  06:15    


 


Anion Gap  7 MMOL/L (8-16)  L  12/19/19  06:15    


 


BUN  15.2 mg/dL (7-18)   12/19/19  06:15    


 


Creatinine  0.5 mg/dL (0.55-1.3)  L  12/19/19  06:15    


 


Random Glucose  146 mg/dL ()  H  12/19/19  06:15    


 


Calcium  8.4 mg/dL (8.5-10.1)  L  12/19/19  06:15    


 


Total Bilirubin  0.2 mg/dL (0.2-1)   12/14/19  09:00    


 


AST  23 U/L (15-37)   12/14/19  09:00    


 


ALT  42 U/L (13-61)   12/14/19  09:00    


 


Alkaline Phosphatase  115 U/L ()   12/14/19  09:00    


 


Total Protein  6.9 g/dl (6.4-8.2)   12/14/19  09:00    


 


Albumin  3.0 g/dl (3.4-5.0)  L  12/14/19  09:00    








 CARDIAC ENZYMES











Troponin I  < 0.02 ng/ml (0.00-0.05)   12/13/19  09:36    











ASSESSMENT/PLAN:


Theodore Mendieta is a 34 year old male with a past medical history of seizures, 

microcephaly with cranial synostosis, urethral stricture s/p suprapubic 

catheter placement, hx of chronic UTI's with ESBL, MR, hx of PNA presented from 

[facility] for hypoxia. It was noted by the staff at [facility] that 

the patient was hypoxic on RA at 78% and was having increased work of 

breathing. Patient was noted to have a non-productive cough, increase in 

respiratory rate, and hypoxia. No other history was obtainable from the NH. 

Patient came to the ED where he had increased work of breath and was placed on 

non-rebreather. At baseline, the patient is nonverbal, does not track, does not 

follow commands. Given 3 amps of Duoneb, 1.5L of NS, Tylenol 1g, Zosyn, Vanco.  

UA + nitrites, 2+ LE, 65 WBC, 3464 bacteria. CXR with no acute findings, CTA 

with atelectasis in LLL with new RULL lesion 1.9cm, no evidence of PE or PNA. I 

wasconsulted for further management of his seizure medications which included 

Onfi, Keppra, zonegran and vimpat. The concern was that his G-tube was clogged 

and therefore multiple medications were not  able to be administered. Discussed 

with hospitalist on 12/17 and advised using depakote and dilantin instead of 

the two medications that could not given IV. Following morning, nurse reports G 

tube no longer clogged, patient back on outpatient medications aand doses. 

Spoke with resident and his dose of  Keppra is very high but I do not know  the 

patient's seizure background and do not want to have any seizure recurrence at 

this time and therefore advised continuing outpatient dose.  He should follow 

up with his regular neurologist to determine if Keppra dose can be reduced. 

Discussed with the nurse and no seizure events overnight. Continue medical mgmt 

and optimization, infectious mgmt. Monitor G tube functionality. Medication 

compliance. Maintain adequate hydration

## 2019-12-19 NOTE — PN
Progress Note (short form)





- Note


Progress Note: 


Hospitalist Medicine


Resting in bed, off 02 this AM. No longer hypothermic. SP fraser d/w uro; will 

be changed today 





 Vitals











  12/19/19





  10:00


 


Pulse Rate 97 H


 


Respiratory 20





Rate 


 


Blood Pressure 95/57 L








Physical Exam


general: resting comfortably in bed, grunting occasionally. 


HEENT: +microcephaly. moist MM


neck: supple


cardio: S1, S2, RRR. no r/m/g


pulm: CTA B/l. no accessory m usage


abdomen: soft, nontender, nondistended. +PEG


: +suprapubic fraser; needs exchange 


LE: no edema. pulses intact. contracted 


neuro: CNs appear to be grossly intact; unable to follow commands 





 Laboratory Tests











  12/19/19 12/19/19





  06:15 06:15


 


WBC  4.0 


 


Hgb  10.5 L 


 


Hct  30.5 L 


 


Plt Count  211 


 


Sodium   137


 


Potassium   4.2


 


Chloride   108 H


 


Carbon Dioxide   22


 


BUN   15.2


 


Creatinine   0.5 L


 


Random Glucose   146 H








Microbiology





12/13/19 17:02   Urine - Urine Fraser   Legionella Antigen - Final


12/13/19 17:02   Urine - Urine Fraser   Streptococcus pneumoniae Antigen (M - 

Final


12/13/19 09:45   Urine - Urine Suprapubic   Urine Culture - Final


                              Klebsiella Pneumoniae


                              Escherichia Coli Esbl 


                              Enterococcus Faecalis


12/13/19 09:36   Blood - Peripheral Venous   Blood Culture - Final


                              NO GROWTH AFTER 5 DAYS INCUBATION


12/13/19 09:36   Blood - Peripheral Venous   Blood Culture - Final


                              NO GROWTH AFTER 5 DAYS INCUBATION





Imaging





12/13/19: CXR: dextroscoliosis. no evidence of vascular congestive changes, 

pulm infiltrates. no pneumo or large pleural effusion, no blunting ot he 

costophrenic angles.





12/13/19: chest CTA: limited atelectasis of the left lower lung. suspect new 

RUL lesion and correlate with PET/CT may prove useful as clinically indicated. 


(1.9cm in the medial aspect of the upper right lung; appears to represent 

increase in size of a poorly visualized lesion seen previously.)





12/13/19: EKG: NSR, rate 95bpm, qtc 449ms





12/17/19: CXR: scoliosis, with convexity to the right with clear lungs





Assessment/Plan


34 year old male with past medical history of seizures, microcephaly with cranio

-synostosis, urethral stricture s/p suprapubic catheter placement, hx of 

chronic UTI's with ESBL, developmental delay, functional quadriplegia, hx of 

PNA admitted for sepsis secondary to urinary tract infection.





#Sepsis 2/2 UTI


-was on zosyn (12/14-12/19), started on meropenem given ESBL (12/19)


-ucx: +Kleb, ESBL, E. faecalis


-suprapubic cath changed today (12/19)


-IV tylenol PRN pain/fever 


-flu, RSV, legionella (-)


-f/u blood cx - NGTD


-ID: Dr. Garcia


-Uro: Dr. Jo 





#RUL lung lesion


-c/w conservative mgmt





#Constipation


-c/w senna. added colace





#Seizure d/o


-continue home anti-epileptics Vantin, Keppra, Onfi, zonisamide, levocarnitine


-though keppra supratherapeutic, per neuro will continue d/t low seizure 

threshold


-neuro: Dr. Miles





#Hyponatremia-resolved


-continue sodium chloride tabs





#Hypoxia- resolved 


-aspiration precautions


-Pulm: Dr. Olguin 





#F/E/N


on IV LR 75 cc/hr, for hydration 


continue to follow lytes


TF jevity 1.5 





#PPX


DVT: hep 5k tid 


GI: famotidine 20mg daily





#Dispo


cont'd monitoring on med-surg


on IV abx 


off 02 currently. however on d/c will need to d/w Joe, and may need pre 

and post 





<Maria C Robin - Last Filed: 12/19/19 16:32>





- Note


Progress Note: 





I saw and evaluated the patient.  I reviewed the resident's note and discussed 

the case with the resident.  I agree with the resident's findings and plan as 

documented.





Fraser replacement today; continue with abx per ID.  Micro noted (ESBL EC, EF, 

Klebsiella).  Limited history secondary to clinical status. Continues to have 

no issues reported with seizures or agitation.  GTube is functioning properly.





10 sys ROS not obtainable 2/2 clinical condition





VS lab imaging reveiwed


NAD AA resting in bed


No new neuro deficits, responds to verbal stimuli


NT ND +BS


HR wnl, +s1/2


Not agitated No sz activity noted





A/P


Presents with MDRO+ multiorganism UTI with catheter exchange today; sepsis 

resolved.  Will need prolonged abx and elucidating final course per ID.  No 

worsening s/s sepsis. 





-Sepsis secondary to UTI with ESBL E. coli, E. faecalis, Klebsiella


-Pending catheter echange


-Chronic hypoNa


-Chronic constipation


-Acute hypoxia, improved (may need pre and post prior to DC per [facility] policy 

but please note is nto on chronic O2)


-Lung nodule, FU per rads guidelines


-Hx seizures (continue home meds through GT and seizure precautions)


-Anemia (stable)





Full Code








<Alexandre Alex - Last Filed: 12/21/19 04:40>

## 2019-12-20 LAB
ANION GAP SERPL CALC-SCNC: 4 MMOL/L (ref 8–16)
BASOPHILS # BLD: 0.3 % (ref 0–2)
BUN SERPL-MCNC: 11.4 MG/DL (ref 7–18)
CALCIUM SERPL-MCNC: 8.6 MG/DL (ref 8.5–10.1)
CHLORIDE SERPL-SCNC: 111 MMOL/L (ref 98–107)
CO2 SERPL-SCNC: 23 MMOL/L (ref 21–32)
CREAT SERPL-MCNC: 0.5 MG/DL (ref 0.55–1.3)
DEPRECATED RDW RBC AUTO: 12.3 % (ref 11.9–15.9)
EOSINOPHIL # BLD: 1.3 % (ref 0–4.5)
GLUCOSE SERPL-MCNC: 147 MG/DL (ref 74–106)
HCT VFR BLD CALC: 30.1 % (ref 35.4–49)
HGB BLD-MCNC: 10.4 GM/DL (ref 11.7–16.9)
LYMPHOCYTES # BLD: 36.3 % (ref 8–40)
MAGNESIUM SERPL-MCNC: 2.1 MG/DL (ref 1.8–2.4)
MCH RBC QN AUTO: 33.4 PG (ref 25.7–33.7)
MCHC RBC AUTO-ENTMCNC: 34.6 G/DL (ref 32–35.9)
MCV RBC: 96.3 FL (ref 80–96)
MONOCYTES # BLD AUTO: 4.5 % (ref 3.8–10.2)
NEUTROPHILS # BLD: 57.6 % (ref 42.8–82.8)
PHOSPHATE SERPL-MCNC: 3.8 MG/DL (ref 2.5–4.9)
PLATELET # BLD AUTO: 186 K/MM3 (ref 134–434)
PMV BLD: 7.2 FL (ref 7.5–11.1)
POTASSIUM SERPLBLD-SCNC: 3.6 MMOL/L (ref 3.5–5.1)
RBC # BLD AUTO: 3.13 M/MM3 (ref 4–5.6)
SODIUM SERPL-SCNC: 137 MMOL/L (ref 136–145)
WBC # BLD AUTO: 8.3 K/MM3 (ref 4–10)

## 2019-12-20 RX ADMIN — Medication SCH TAB: at 21:32

## 2019-12-20 RX ADMIN — Medication SCH MG: at 09:46

## 2019-12-20 RX ADMIN — SODIUM CHLORIDE TAB 1 GM SCH GM: 1 TAB at 21:31

## 2019-12-20 RX ADMIN — HEPARIN SODIUM SCH UNIT: 5000 INJECTION, SOLUTION INTRAVENOUS; SUBCUTANEOUS at 05:05

## 2019-12-20 RX ADMIN — FAMOTIDINE SCH MG: 40 POWDER, FOR SUSPENSION ORAL at 09:48

## 2019-12-20 RX ADMIN — MEROPENEM SCH MLS/HR: 500 INJECTION, POWDER, FOR SOLUTION INTRAVENOUS at 16:43

## 2019-12-20 RX ADMIN — OFLOXACIN SCH DROP: 3 SOLUTION/ DROPS OPHTHALMIC at 05:05

## 2019-12-20 RX ADMIN — LEVOCARNITINE SCH MG: 1 SOLUTION ORAL at 15:14

## 2019-12-20 RX ADMIN — LEVOCARNITINE SCH MG: 1 SOLUTION ORAL at 21:33

## 2019-12-20 RX ADMIN — SODIUM CHLORIDE, POTASSIUM CHLORIDE, SODIUM LACTATE AND CALCIUM CHLORIDE SCH MLS/HR: 600; 310; 30; 20 INJECTION, SOLUTION INTRAVENOUS at 11:17

## 2019-12-20 RX ADMIN — LEVETIRACETAM SCH MG: 100 SOLUTION ORAL at 21:33

## 2019-12-20 RX ADMIN — Medication SCH TAB: at 09:46

## 2019-12-20 RX ADMIN — OFLOXACIN SCH DROP: 3 SOLUTION/ DROPS OPHTHALMIC at 15:17

## 2019-12-20 RX ADMIN — ZONISAMIDE SCH MG: 100 CAPSULE ORAL at 10:18

## 2019-12-20 RX ADMIN — LEVOCARNITINE SCH MG: 1 SOLUTION ORAL at 05:04

## 2019-12-20 RX ADMIN — OFLOXACIN SCH DROP: 3 SOLUTION/ DROPS OPHTHALMIC at 11:18

## 2019-12-20 RX ADMIN — Medication SCH TAB: at 09:45

## 2019-12-20 RX ADMIN — LACOSAMIDE SCH MG: 10 SOLUTION ORAL at 21:31

## 2019-12-20 RX ADMIN — MEROPENEM SCH MLS/HR: 500 INJECTION, POWDER, FOR SOLUTION INTRAVENOUS at 04:35

## 2019-12-20 RX ADMIN — SODIUM CHLORIDE TAB 1 GM SCH GM: 1 TAB at 09:47

## 2019-12-20 RX ADMIN — MULTIVITAMIN SCH ML: LIQUID ORAL at 11:18

## 2019-12-20 RX ADMIN — LACOSAMIDE SCH MG: 10 SOLUTION ORAL at 09:46

## 2019-12-20 RX ADMIN — OFLOXACIN SCH DROP: 3 SOLUTION/ DROPS OPHTHALMIC at 21:35

## 2019-12-20 RX ADMIN — OFLOXACIN SCH DROP: 3 SOLUTION/ DROPS OPHTHALMIC at 18:32

## 2019-12-20 RX ADMIN — HEPARIN SODIUM SCH UNIT: 5000 INJECTION, SOLUTION INTRAVENOUS; SUBCUTANEOUS at 15:17

## 2019-12-20 RX ADMIN — DOCUSATE SODIUM,SENNOSIDES SCH TABLET: 50; 8.6 TABLET, FILM COATED ORAL at 21:32

## 2019-12-20 RX ADMIN — ZONISAMIDE SCH MG: 100 CAPSULE ORAL at 21:34

## 2019-12-20 RX ADMIN — LEVETIRACETAM SCH MG: 100 SOLUTION ORAL at 09:50

## 2019-12-20 NOTE — PN
Progress Note (short form)





- Note


Progress Note: 


Hospitalist Medicine


Looks comfortable. SP fraser exchanged yesterday. 





 Vitals











  12/20/19 12/20/19





  10:00 14:27


 


Temperature  97.7 F


 


Pulse Rate 59 L 


 


Respiratory 18 





Rate  


 


Blood Pressure 116/63 











Physical Exam


general: resting comfortably in bed


HEENT: +microcephaly. moist MM


neck: supple


cardio: S1, S2, RRR. no r/m/g


pulm: CTA B/l. 


abdomen: soft, nontender, nondistended. +PEG


: +suprapubic fraser, intact


LE: no edema. pulses intact. contracted 


neuro: CNs appear to be grossly intact





 Laboratory Tests











  12/20/19 12/20/19





  07:22 07:22


 


WBC  8.3 


 


Hgb  10.4 L 


 


Hct  30.1 L 


 


Plt Count  186 


 


Sodium   137


 


Potassium   3.6


 


Chloride   111 H


 


Carbon Dioxide   23


 


BUN   11.4


 


Creatinine   0.5 L


 


Random Glucose   147 H











Microbiology





12/13/19 17:02   Urine - Urine Fraser   Legionella Antigen - Final


12/13/19 17:02   Urine - Urine Fraser   Streptococcus pneumoniae Antigen (M - 

Final


12/13/19 09:45   Urine - Urine Suprapubic   Urine Culture - Final


                              Klebsiella Pneumoniae


                              Escherichia Coli Esbl 


                              Enterococcus Faecalis


12/13/19 09:36   Blood - Peripheral Venous   Blood Culture - Final


                              NO GROWTH AFTER 5 DAYS INCUBATION


12/13/19 09:36   Blood - Peripheral Venous   Blood Culture - Final


                              NO GROWTH AFTER 5 DAYS INCUBATION





Imaging





12/13/19: CXR: dextroscoliosis. no evidence of vascular congestive changes, 

pulm infiltrates. no pneumo or large pleural effusion, no blunting ot he 

costophrenic angles.





12/13/19: chest CTA: limited atelectasis of the left lower lung. suspect new 

RUL lesion and correlate with PET/CT may prove useful as clinically indicated. 


(1.9cm in the medial aspect of the upper right lung; appears to represent 

increase in size of a poorly visualized lesion seen previously.)





12/13/19: EKG: NSR, rate 95bpm, qtc 449ms





12/17/19: CXR: scoliosis, with convexity to the right with clear lungs





Assessment/Plan


34 year old male with past medical history of seizures, microcephaly with cranio

-synostosis, urethral stricture s/p suprapubic catheter placement, hx of 

chronic UTI's with ESBL, developmental delay, functional quadriplegia, hx of 

PNA admitted for sepsis secondary to urinary tract infection.





#Sepsis 2/2 UTI


-was on zosyn (12/14-12/19), on meropenem given ESBL (12/19)


-ucx: +Kleb, ESBL, E. faecalis


-suprapubic cath changed (12/19)


-IV tylenol PRN pain/fever 


-flu, RSV, legionella (-)


-f/u blood cx - NGTD


-ID: Dr. Garcia


-Uro: Dr. Jo 





#RUL lung lesion


-c/w conservative mgmt





#Constipation


-c/w senna. added colace





#Seizure d/o


-continue home anti-epileptics Vantin, Keppra, Onfi, zonisamide, levocarnitine


-though keppra supratherapeutic, per neuro will continue d/t low seizure 

threshold


-neuro: Dr. Miles





#Hyponatremia-resolved


-continue sodium chloride tabs





#Hypoxia- resolved 


-aspiration precautions


-Pulm: Dr. Olguin 





#F/E/N


on IV LR 75 cc/hr, for hydration 


continue to follow lytes


TF jevity 1.5 





#PPX


DVT: hep 5k tid 


GI: famotidine 20mg daily





#Dispo


cont'd monitoring on med-surg


on IV abx 


off 02 currently. however on d/c will need to d/w Joe, and may need pre 

and post 








<Maria C Robin - Last Filed: 12/20/19 18:37>





- Note


Progress Note: 





I saw and evaluated the patient.  I reviewed the resident's note and discussed 

the case with the resident.  I agree with the resident's findings and plan as 

documented.





Limited history secondary to clinical status. Continues to have no issues 

reported with seizures or agitation.  GTube is functioning properly.





10 sys ROS not obtainable 2/2 clinical condition





VS lab imaging reveiwed


NAD AA resting in bed


No new neuro deficits, responds to verbal stimuli


NT ND +BS


HR wnl, +s1/2


Not agitated No sz activity noted





A/P


Presents with MDRO+ multiorganism UTI with catheter exchange; sepsis resolved.  

Will need prolonged abx and elucidating final course per ID.  No worsening s/s 

sepsis. DC planning.





-Sepsis secondary to UTI with ESBL E. coli, E. faecalis, Klebsiella.  Final abx 

recs per ID. 


-Chronic urinary cath SP


-Chronic hypoNa


-Chronic constipation


-Acute hypoxia, improved (may need pre and post prior to DC per [facility] policy 

but please note is nto on chronic O2)


-Lung nodule, FU per rads guidelines, conservative management per Dr. Finley


-Hx seizures (continue home meds through GT and seizure precautions)


-Anemia (stable)





Full Code








<Alexandre Alex - Last Filed: 12/21/19 04:41>

## 2019-12-20 NOTE — PN
Progress Note (short form)





- Note


Progress Note: 





PULMONARY





VSS/AFEBRILE





Gen:  breathing nonlabored


Heart: RRR


Lung: decreased breath sounds at the bases


Abd: soft, nontender


Ext: no edema





LABS/MEDS/NOTES/IMAGES REVIEWED





A/P


Acute Hypoxic Respiratory Failure


Pneumonia


Lung Nodule


Cerebral Palsy


Mental Retardation


Seizure Disorder





-  suprapubic catheter exchanged


-  continue antibiotics per ID


-  O2 to keep SpO2 >90%


-  aspiration precautions


-  inhaled bronchodilators as needed


-  DVT prophylaxis


-  conservative management of lung nodule





MONICA ACOSTA MD

## 2019-12-20 NOTE — PN
Progress Note (short form)





- Note


Progress Note: 





                                                                               

                          Neurology 


CHIEF COMPLAINT: hypoxia





PCP: Autumn Jeffery





HISTORY OF PRESENT ILLNESS:


Theodore Mendieta is a 34 year old male with a past medical history of seizures, 

microcephaly with cranial synostosis, urethral stricture s/p suprapubic 

catheter placement, hx of chronic UTI's with ESBL, MR, hx of PNA presented from 

[facility] for hypoxia. It was noted by the staff at [place] that 

the patient was hypoxic on RA at 78% and was having increased work of 

breathing. Patient was noted to have a non-productive cough, increase in 

respiratory rate, and hypoxia. No other history was obtainable from the NH. 

Patient came to the ED where he had increased work of breath and was placed on 

non-rebreather. At baseline, the patient is nonverbal, does not track, does not 

follow commands. Given 3 amps of Duoneb, 1.5L of NS, Tylenol 1g, Zosyn, Vanco.  

UA + nitrites, 2+ LE, 65 WBC, 3464 bacteria. CXR with no acute findings, CTA 

with atelectasis in LLL with new RULL lesion 1.9cm, no evidence of PE or PNA. I 

wasconsulted for further management of his seizure medications which included 

Onfi, Keppra, zonegran and vimpat. The concern was that his G-tube was clogged 

and therefore multiple medications were not  able to be administered. Discussed 

with hospitalist on 12/17 and advised using depakote and dilantin instead of 

the two medications that could not given IV. Following morning, nurse reports G 

tube no longer clogged, patient back on outpatient medications aand doses. 

Spoke with resident and his dose of  Keppra is very high but I do not know  the 

patient's seizure background and do not want to have any seizure recurrence at 

this time and therefore advised continuing outpatient dose.  He should follow 

up with his regular neurologist to determine if Keppra dose can be reduced. No 

new seizure like movements or events. Remains stable at this time. 





Active Medications





Acetaminophen (Ofirmev Injection -)  1,000 mg IVPB Q6H PRN


   PRN Reason: PAIN LEVEL 7 - 10


   Last Admin: 12/16/19 13:10 Dose:  1,000 mg


Albuterol Sulfate (Ventolin 0.083% Nebulizer Soln -)  1 amp NEB Q4H PRN


   PRN Reason: SHORT OF BREATH/WHEEZING


Calcium Carbonate/Cholecalciferol (Os-Dale 500+D -)  1 tab GT BID [facility]


   Last Admin: 12/19/19 21:08 Dose:  1 tab


Clobazam (Onfi -)  10 mg GT BID [facility]


   Last Admin: 12/19/19 21:08 Dose:  10 mg


Diazepam (Diastat Rectal Gel -)   mg RC PRN [facility]


Famotidine (Pepcid)  20 mg PEG DAILY [facility]


   Last Admin: 12/19/19 10:18 Dose:  20 mg


Heparin Sodium (Porcine) (Heparin -)  5,000 unit SQ TID [facility]


   Last Admin: 12/20/19 05:05 Dose:  5,000 unit


Lactated Ringer's (Lactated Ringers Solution)  1,000 ml in 1,000 mls @ 75 mls/

hr IV ASDIR [facility]


   Last Admin: 12/19/19 17:33 Dose:  75 mls/hr


Meropenem 500 mg/ Dextrose  100 mls @ 200 mls/hr IVPB Q12H [facility]


Lacosamide (Vimpat Liquid -)  200 mg GT BID [facility]


   Last Admin: 12/19/19 21:08 Dose:  200 mg


Lactobacillus Acidophilus (Bacid -)  2 tab GT DAILY [facility]


   Last Admin: 12/19/19 10:15 Dose:  2 tab


Levetiracetam (Keppra Oral Solution -)  2,000 mg GT BID [facility]


   Last Admin: 12/19/19 21:09 Dose:  2,000 mg


Levocarnitine (Carnitor Oral Solution -)  700 mg GT TID [facility]


   Last Admin: 12/20/19 05:04 Dose:  700 mg


Multivitamins/Minerals (Certavite-Antioxidant Liquid)  15 ml PO DAILY [facility]


   Last Admin: 12/19/19 10:33 Dose:  15 ml


Ofloxacin (Ocuflox 0.3% Eye Drops -)  2 drop AD Q4HWA [facility]


   Last Admin: 12/20/19 05:05 Dose:  2 drop


Pyridoxine HCl (Vitamin B6 -)  100 mg GT DAILY [facility]


   Last Admin: 12/19/19 10:16 Dose:  100 mg


Senna/Docusate Sodium (Pericolace -)  2 tablet PO HS [facility]


   Last Admin: 12/19/19 21:08 Dose:  2 tablet


Sodium Chloride (Sodium Chloride Tablet -)  3.5 gm GT BID [facility]


   Last Admin: 12/19/19 21:08 Dose:  3.5 gm


Zonisamide (Zonisamide)  300 mg GT BID [facility]


   Last Admin: 12/19/19 21:09 Dose:  300 mg








PHYSICAL EXAMINATION


  


 Vital Signs











 Period  Temp  Pulse  Resp  BP Sys/Curran  Pulse Ox


 


 Last 24 Hr  97.9 F-98.9 F  65-97  18-20  /55-77  96-97











GENERAL: Non-verbal, non-responsive.


HEAD: Microcephalic.


EYES: Pupils equal, round and reactive to light, extraocular movements intact, 

conjunctiva clear. 


EARS, NOSE, THROAT: Oropharynx clear without exudates. Moist mucous membranes.


LUNGS: Noted bibasilar crackles. No wheezes appreciated. No accessory muscle 

use.


HEART: Regular rate and rhythm, normal S1 and S2 without murmur.


ABDOMEN: Soft, nontender, not distended, normoactive bowel sounds, no guarding, 

no rebound, no masses. Suprapubic catheter and G tube in place without purulence

, erythema, localized signs of infection


MUSCULOSKELETAL: Spastic extremities with contracted R UE.


UPPER EXTREMITIES: 2+ pulses, warm, well-perfused. No peripheral edema.


LOWER EXTREMITIES: 2+ pulses, warm, well-perfused. No peripheral edema. 


NEUROLOGICAL:  Spastic extremities. Groan response to painful stimuli. Moves 

extremities grossly, sensory intact to LT


SKIN: Cool, dry, normal turgor, no rashes or lesions noted, normal capillary 

refill. 





 CBCD











WBC  4.0 K/mm3 (4.0-10.0)   12/19/19  06:15    


 


RBC  3.19 M/mm3 (4.00-5.60)  L  12/19/19  06:15    


 


Hgb  10.5 GM/dL (11.7-16.9)  L  12/19/19  06:15    


 


Hct  30.5 % (35.4-49)  L  12/19/19  06:15    


 


MCV  95.5 fl (80-96)   12/19/19  06:15    


 


MCHC  34.4 g/dl (32.0-35.9)   12/19/19  06:15    


 


RDW  12.7 % (11.9-15.9)   12/19/19  06:15    


 


Plt Count  211 K/MM3 (134-434)   12/19/19  06:15    


 


MPV  7.4 fl (7.5-11.1)  L  12/19/19  06:15    








 CMP











Sodium  137 mmol/L (136-145)   12/20/19  07:22    


 


Potassium  3.6 mmol/L (3.5-5.1)   12/20/19  07:22    


 


Chloride  111 mmol/L ()  H  12/20/19  07:22    


 


Carbon Dioxide  23 mmol/L (21-32)   12/20/19  07:22    


 


Anion Gap  4 MMOL/L (8-16)  L  12/20/19  07:22    


 


BUN  11.4 mg/dL (7-18)   12/20/19  07:22    


 


Creatinine  0.5 mg/dL (0.55-1.3)  L  12/20/19  07:22    


 


Random Glucose  147 mg/dL ()  H  12/20/19  07:22    


 


Calcium  8.6 mg/dL (8.5-10.1)   12/20/19  07:22    


 


Total Bilirubin  0.2 mg/dL (0.2-1)   12/14/19  09:00    


 


AST  23 U/L (15-37)   12/14/19  09:00    


 


ALT  42 U/L (13-61)   12/14/19  09:00    


 


Alkaline Phosphatase  115 U/L ()   12/14/19  09:00    


 


Total Protein  6.9 g/dl (6.4-8.2)   12/14/19  09:00    


 


Albumin  3.0 g/dl (3.4-5.0)  L  12/14/19  09:00    








 CARDIAC ENZYMES











Troponin I  < 0.02 ng/ml (0.00-0.05)   12/13/19  09:36    











ASSESSMENT/PLAN:


Theodore Mendieta is a 34 year old male with a past medical history of seizures, 

microcephaly with cranial synostosis, urethral stricture s/p suprapubic 

catheter placement, hx of chronic UTI's with ESBL, MR, hx of PNA presented from 

[facility] for hypoxia. It was noted by the staff at [place] that 

the patient was hypoxic on RA at 78% and was having increased work of 

breathing. Patient was noted to have a non-productive cough, increase in 

respiratory rate, and hypoxia. No other history was obtainable from the NH. 

Patient came to the ED where he had increased work of breath and was placed on 

non-rebreather. At baseline, the patient is nonverbal, does not track, does not 

follow commands. Given 3 amps of Duoneb, 1.5L of NS, Tylenol 1g, Zosyn, Vanco.  

UA + nitrites, 2+ LE, 65 WBC, 3464 bacteria. CXR with no acute findings, CTA 

with atelectasis in LLL with new RULL lesion 1.9cm, no evidence of PE or PNA. I 

wasconsulted for further management of his seizure medications which included 

Onfi, Keppra, zonegran and vimpat. The concern was that his G-tube was clogged 

and therefore multiple medications were not  able to be administered. Discussed 

with hospitalist on 12/17 and advised using depakote and dilantin instead of 

the two medications that could not given IV. Following morning, nurse reports G 

tube no longer clogged, patient back on outpatient medications aand doses. 

Spoke with resident and his dose of  Keppra is very high but I do not know  the 

patient's seizure background and do not want to have any seizure recurrence at 

this time and therefore advised continuing outpatient dose.  He should follow 

up with his regular neurologist to determine if Keppra dose can be reduced. 

Discussed with the nurse and no seizure events overnight. Continue medical mgmt 

and optimization, infectious mgmt. Monitor G tube functionality. Medication 

compliance. Maintain adequate hydration. No new seizure like movements or 

events. Remains stable at this time. No further recommendations at this time.

## 2019-12-20 NOTE — PN
Progress Note, Physician


History of Present Illness: 





stable


no new issues





- Current Medication List


Current Medications: 


Active Medications





Acetaminophen (Ofirmev Injection -)  1,000 mg IVPB Q6H PRN


   PRN Reason: PAIN LEVEL 7 - 10


   Last Admin: 12/16/19 13:10 Dose:  1,000 mg


Albuterol Sulfate (Ventolin 0.083% Nebulizer Soln -)  1 amp NEB Q4H PRN


   PRN Reason: SHORT OF BREATH/WHEEZING


Calcium Carbonate/Cholecalciferol (Os-Dale 500+D -)  1 tab GT BID [facility]


   Last Admin: 12/19/19 21:08 Dose:  1 tab


Clobazam (Onfi -)  10 mg GT BID [facility]


   Last Admin: 12/19/19 21:08 Dose:  10 mg


Diazepam (Diastat Rectal Gel -)   mg RC PRN RAKAN


Famotidine (Pepcid)  20 mg PEG DAILY [facility]


   Last Admin: 12/19/19 10:18 Dose:  20 mg


Heparin Sodium (Porcine) (Heparin -)  5,000 unit SQ TID [facility]


   Last Admin: 12/20/19 05:05 Dose:  5,000 unit


Lactated Ringer's (Lactated Ringers Solution)  1,000 ml in 1,000 mls @ 75 mls/

hr IV ASDIR [facility]


   Last Admin: 12/19/19 17:33 Dose:  75 mls/hr


Meropenem 500 mg/ Dextrose  100 mls @ 200 mls/hr IVPB Q12H [facility]


Lacosamide (Vimpat Liquid -)  200 mg GT BID [facility]


   Last Admin: 12/19/19 21:08 Dose:  200 mg


Lactobacillus Acidophilus (Bacid -)  2 tab GT DAILY [facility]


   Last Admin: 12/19/19 10:15 Dose:  2 tab


Levetiracetam (Keppra Oral Solution -)  2,000 mg GT BID [facility]


   Last Admin: 12/19/19 21:09 Dose:  2,000 mg


Levocarnitine (Carnitor Oral Solution -)  700 mg GT TID [facility]


   Last Admin: 12/20/19 05:04 Dose:  700 mg


Multivitamins/Minerals (Certavite-Antioxidant Liquid)  15 ml PO DAILY [facility]


   Last Admin: 12/19/19 10:33 Dose:  15 ml


Ofloxacin (Ocuflox 0.3% Eye Drops -)  2 drop AD Q4HWA [facility]


   Last Admin: 12/20/19 05:05 Dose:  2 drop


Pyridoxine HCl (Vitamin B6 -)  100 mg GT DAILY [facility]


   Last Admin: 12/19/19 10:16 Dose:  100 mg


Senna/Docusate Sodium (Pericolace -)  2 tablet PO HS [facility]


   Last Admin: 12/19/19 21:08 Dose:  2 tablet


Sodium Chloride (Sodium Chloride Tablet -)  3.5 gm GT BID [facility]


   Last Admin: 12/19/19 21:08 Dose:  3.5 gm


Zonisamide (Zonisamide)  300 mg GT BID [facility]


   Last Admin: 12/19/19 21:09 Dose:  300 mg











- Objective


Vital Signs: 


 Vital Signs











Temperature  97.9 F   12/20/19 05:54


 


Pulse Rate  66   12/20/19 08:28


 


Respiratory Rate  20   12/20/19 08:57


 


Blood Pressure  110/55 L  12/20/19 05:54


 


O2 Sat by Pulse Oximetry (%)  96   12/20/19 08:57











Constitutional: Yes: No Distress, Calm


Cardiovascular: Yes: S1, S2


Respiratory: Yes: Regular, CTA Bilaterally


Gastrointestinal: Yes: Normal Bowel Sounds, Soft


Musculoskeletal: Yes: WNL


Extremities: Yes: Other


Neurological: Yes: Alert, Oriented


Psychiatric: Yes: Alert, Oriented


Labs: 


 CBC, BMP





 12/20/19 07:22 





 12/20/19 07:22 





 INR, PTT











INR  0.97  (0.83-1.09)   12/13/19  09:40    














Assessment/Plan





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 

bedbound, non-verbal, non-communicative at baseline, with suprapubic catheter 

placed with underlying UTI. and patient coming in with hypoxia


 Problem List 





- Problems


(1) Microcephalic


Code(s): Q02 - MICROCEPHALY   





(2) Acute respiratory failure with hypoxia


Code(s): J96.01 - ACUTE RESPIRATORY FAILURE WITH HYPOXIA   





(3) Hypoxemia


Code(s): R09.02 - HYPOXEMIA   





(4) Pneumonia


Code(s): J18.9 - PNEUMONIA, UNSPECIFIED ORGANISM   


Qualifiers: 


   Pneumonia type: due to unspecified organism   Laterality: unspecified 

laterality   Lung location: unspecified part of lung   Qualified Code(s): J18.9 

- Pneumonia, unspecified organism   





(5) Cerebral palsy


Code(s): G80.9 - CEREBRAL PALSY, UNSPECIFIED   





(6) Functional quadriplegia


Code(s): R53.2 - FUNCTIONAL QUADRIPLEGIA   





(7) Seizure


Code(s): R56.9 - UNSPECIFIED CONVULSIONS   





(8) Cerebral palsy


Code(s): G80.9 - CEREBRAL PALSY, UNSPECIFIED   





(9) Severe mental handicap


Code(s): F72 - SEVERE INTELLECTUAL DISABILITIES   





10 uti





plan


continue abx


nutrition


change of foleys


rest as per the team

## 2019-12-21 LAB
ANION GAP SERPL CALC-SCNC: 7 MMOL/L (ref 8–16)
BASOPHILS # BLD: 0.4 % (ref 0–2)
BUN SERPL-MCNC: 12.4 MG/DL (ref 7–18)
CALCIUM SERPL-MCNC: 8.4 MG/DL (ref 8.5–10.1)
CHLORIDE SERPL-SCNC: 104 MMOL/L (ref 98–107)
CO2 SERPL-SCNC: 25 MMOL/L (ref 21–32)
CREAT SERPL-MCNC: 0.5 MG/DL (ref 0.55–1.3)
DEPRECATED RDW RBC AUTO: 12.4 % (ref 11.9–15.9)
EOSINOPHIL # BLD: 3 % (ref 0–4.5)
GLUCOSE SERPL-MCNC: 189 MG/DL (ref 74–106)
HCT VFR BLD CALC: 30.4 % (ref 35.4–49)
HGB BLD-MCNC: 10.4 GM/DL (ref 11.7–16.9)
LYMPHOCYTES # BLD: 43.7 % (ref 8–40)
MAGNESIUM SERPL-MCNC: 2 MG/DL (ref 1.8–2.4)
MCH RBC QN AUTO: 33 PG (ref 25.7–33.7)
MCHC RBC AUTO-ENTMCNC: 34.3 G/DL (ref 32–35.9)
MCV RBC: 96.4 FL (ref 80–96)
MONOCYTES # BLD AUTO: 4 % (ref 3.8–10.2)
NEUTROPHILS # BLD: 48.9 % (ref 42.8–82.8)
PHOSPHATE SERPL-MCNC: 4.4 MG/DL (ref 2.5–4.9)
PLATELET # BLD AUTO: 187 K/MM3 (ref 134–434)
PMV BLD: 8 FL (ref 7.5–11.1)
POTASSIUM SERPLBLD-SCNC: 4 MMOL/L (ref 3.5–5.1)
RBC # BLD AUTO: 3.16 M/MM3 (ref 4–5.6)
SODIUM SERPL-SCNC: 136 MMOL/L (ref 136–145)
WBC # BLD AUTO: 7.1 K/MM3 (ref 4–10)

## 2019-12-21 RX ADMIN — MEROPENEM SCH MLS/HR: 500 INJECTION, POWDER, FOR SOLUTION INTRAVENOUS at 05:26

## 2019-12-21 RX ADMIN — SODIUM CHLORIDE, POTASSIUM CHLORIDE, SODIUM LACTATE AND CALCIUM CHLORIDE SCH MLS/HR: 600; 310; 30; 20 INJECTION, SOLUTION INTRAVENOUS at 10:16

## 2019-12-21 RX ADMIN — Medication SCH TAB: at 10:09

## 2019-12-21 RX ADMIN — Medication SCH TAB: at 21:06

## 2019-12-21 RX ADMIN — OFLOXACIN SCH DROP: 3 SOLUTION/ DROPS OPHTHALMIC at 10:16

## 2019-12-21 RX ADMIN — SODIUM CHLORIDE, POTASSIUM CHLORIDE, SODIUM LACTATE AND CALCIUM CHLORIDE SCH MLS/HR: 600; 310; 30; 20 INJECTION, SOLUTION INTRAVENOUS at 21:07

## 2019-12-21 RX ADMIN — MEROPENEM SCH MLS/HR: 500 INJECTION, POWDER, FOR SOLUTION INTRAVENOUS at 16:45

## 2019-12-21 RX ADMIN — ZONISAMIDE SCH MG: 100 CAPSULE ORAL at 10:12

## 2019-12-21 RX ADMIN — FAMOTIDINE SCH MG: 40 POWDER, FOR SUSPENSION ORAL at 10:12

## 2019-12-21 RX ADMIN — SODIUM CHLORIDE TAB 1 GM SCH GM: 1 TAB at 21:06

## 2019-12-21 RX ADMIN — MULTIVITAMIN SCH ML: LIQUID ORAL at 10:15

## 2019-12-21 RX ADMIN — Medication SCH MG: at 10:10

## 2019-12-21 RX ADMIN — OFLOXACIN SCH DROP: 3 SOLUTION/ DROPS OPHTHALMIC at 21:06

## 2019-12-21 RX ADMIN — LEVOCARNITINE SCH MG: 1 SOLUTION ORAL at 05:25

## 2019-12-21 RX ADMIN — LACOSAMIDE SCH MG: 10 SOLUTION ORAL at 21:06

## 2019-12-21 RX ADMIN — LEVOCARNITINE SCH MG: 1 SOLUTION ORAL at 21:06

## 2019-12-21 RX ADMIN — LACOSAMIDE SCH MG: 10 SOLUTION ORAL at 10:10

## 2019-12-21 RX ADMIN — LEVOCARNITINE SCH: 1 SOLUTION ORAL at 14:07

## 2019-12-21 RX ADMIN — OFLOXACIN SCH DROP: 3 SOLUTION/ DROPS OPHTHALMIC at 14:45

## 2019-12-21 RX ADMIN — ZONISAMIDE SCH MG: 100 CAPSULE ORAL at 21:06

## 2019-12-21 RX ADMIN — OFLOXACIN SCH DROP: 3 SOLUTION/ DROPS OPHTHALMIC at 18:03

## 2019-12-21 RX ADMIN — DOCUSATE SODIUM,SENNOSIDES SCH TABLET: 50; 8.6 TABLET, FILM COATED ORAL at 21:06

## 2019-12-21 RX ADMIN — LEVETIRACETAM SCH MG: 100 SOLUTION ORAL at 10:16

## 2019-12-21 RX ADMIN — SODIUM CHLORIDE TAB 1 GM SCH GM: 1 TAB at 10:12

## 2019-12-21 RX ADMIN — OFLOXACIN SCH DROP: 3 SOLUTION/ DROPS OPHTHALMIC at 05:26

## 2019-12-21 RX ADMIN — Medication SCH TAB: at 10:10

## 2019-12-21 RX ADMIN — LEVETIRACETAM SCH MG: 100 SOLUTION ORAL at 21:06

## 2019-12-21 NOTE — PN
Progress Note (short form)





- Note


Progress Note: 





PULMONARY





Appears comfortable. No fevers recorded.





 Vital Signs











 Period  Temp  Pulse  Resp  BP Sys/Curran  Pulse Ox


 


 Last 24 Hr  97.3 F-97.9 F  50-78  16-20  /52-85  95-96











Gen:  breathing nonlabored


Heart: RRR


Lung: decreased breath sounds at the bases


Abd: soft, nontender


Ext: no edema





 CBC, BMP





 12/21/19 06:30 





 12/21/19 06:30 





Active Medications





Acetaminophen (Ofirmev Injection -)  1,000 mg IVPB Q6H PRN


   PRN Reason: PAIN LEVEL 7 - 10


   Last Admin: 12/16/19 13:10 Dose:  1,000 mg


Albuterol Sulfate (Ventolin 0.083% Nebulizer Soln -)  1 amp NEB Q4H PRN


   PRN Reason: SHORT OF BREATH/WHEEZING


Calcium Carbonate/Cholecalciferol (Os-Dale 500+D -)  1 tab GT BID [facility]


   Last Admin: 12/21/19 10:10 Dose:  1 tab


Diazepam (Diastat Rectal Gel -)   mg RC PRN [facility]


Famotidine (Pepcid)  20 mg PEG DAILY [facility]


   Last Admin: 12/21/19 10:12 Dose:  20 mg


Lactated Ringer's (Lactated Ringers Solution)  1,000 ml in 1,000 mls @ 75 mls/

hr IV ASDIR [facility]


   Last Admin: 12/21/19 10:16 Dose:  75 mls/hr


Meropenem 500 mg/ Dextrose  100 mls @ 200 mls/hr IVPB Q12H [facility]


   Last Admin: 12/21/19 05:26 Dose:  200 mls/hr


Lacosamide (Vimpat Liquid -)  200 mg GT BID [facility]


   Last Admin: 12/21/19 10:10 Dose:  200 mg


Lactobacillus Acidophilus (Bacid -)  2 tab GT DAILY [facility]


   Last Admin: 12/21/19 10:09 Dose:  2 tab


Levetiracetam (Keppra Oral Solution -)  2,000 mg GT BID [facility]


   Last Admin: 12/21/19 10:16 Dose:  2,000 mg


Levocarnitine (Carnitor Oral Solution -)  700 mg GT TID [facility]


   Last Admin: 12/21/19 05:25 Dose:  700 mg


Multivitamins/Minerals (Certavite-Antioxidant Liquid)  15 ml PO DAILY [facility]


   Last Admin: 12/21/19 10:15 Dose:  15 ml


Ofloxacin (Ocuflox 0.3% Eye Drops -)  2 drop AD Q4HWA [facility]


   Last Admin: 12/21/19 10:16 Dose:  2 drop


Pyridoxine HCl (Vitamin B6 -)  100 mg GT DAILY [facility]


   Last Admin: 12/21/19 10:10 Dose:  100 mg


Senna/Docusate Sodium (Pericolace -)  2 tablet PO HS [facility]


   Last Admin: 12/20/19 21:32 Dose:  2 tablet


Sodium Chloride (Sodium Chloride Tablet -)  3.5 gm GT BID [facility]


   Last Admin: 12/21/19 10:12 Dose:  3.5 gm


Zonisamide (Zonisamide)  300 mg GT BID [facility]


   Last Admin: 12/21/19 10:12 Dose:  300 mg








A/P


Acute Hypoxic Respiratory Failure


Pneumonia


Lung Nodule


Cerebral Palsy


Mental Retardation


Seizure Disorder





-  continue antibiotics per ID


-  O2 to keep SpO2 >90%


-  aspiration precautions


-  inhaled bronchodilators as needed


-  DVT prophylaxis


-  conservative management of lung nodule


-  d/c planning

## 2019-12-21 NOTE — PN
Progress Note, Physician


History of Present Illness: 





stable


no new issues


s/p foleys exchanged





- Current Medication List


Current Medications: 


Active Medications





Acetaminophen (Ofirmev Injection -)  1,000 mg IVPB Q6H PRN


   PRN Reason: PAIN LEVEL 7 - 10


   Last Admin: 12/16/19 13:10 Dose:  1,000 mg


Albuterol Sulfate (Ventolin 0.083% Nebulizer Soln -)  1 amp NEB Q4H PRN


   PRN Reason: SHORT OF BREATH/WHEEZING


Calcium Carbonate/Cholecalciferol (Os-Dale 500+D -)  1 tab GT BID [facility]


   Last Admin: 12/21/19 10:10 Dose:  1 tab


Diazepam (Diastat Rectal Gel -)   mg RC PRN [facility]AKAN


Famotidine (Pepcid)  20 mg PEG DAILY [facility]


   Last Admin: 12/21/19 10:12 Dose:  20 mg


Lactated Ringer's (Lactated Ringers Solution)  1,000 ml in 1,000 mls @ 75 mls/

hr IV ASDIR [facility]


   Last Admin: 12/21/19 10:16 Dose:  75 mls/hr


Meropenem 500 mg/ Dextrose  100 mls @ 200 mls/hr IVPB Q12H [facility]


   Last Admin: 12/21/19 05:26 Dose:  200 mls/hr


Lacosamide (Vimpat Liquid -)  200 mg GT BID [facility]


   Last Admin: 12/21/19 10:10 Dose:  200 mg


Lactobacillus Acidophilus (Bacid -)  2 tab GT DAILY [facility]


   Last Admin: 12/21/19 10:09 Dose:  2 tab


Levetiracetam (Keppra Oral Solution -)  2,000 mg GT BID [facility]


   Last Admin: 12/21/19 10:16 Dose:  2,000 mg


Levocarnitine (Carnitor Oral Solution -)  700 mg GT TID [facility]


   Last Admin: 12/21/19 05:25 Dose:  700 mg


Multivitamins/Minerals (Certavite-Antioxidant Liquid)  15 ml PO DAILY [facility]


   Last Admin: 12/21/19 10:15 Dose:  15 ml


Ofloxacin (Ocuflox 0.3% Eye Drops -)  2 drop AD Q4HWA [facility]


   Last Admin: 12/21/19 10:16 Dose:  2 drop


Pyridoxine HCl (Vitamin B6 -)  100 mg GT DAILY [facility]


   Last Admin: 12/21/19 10:10 Dose:  100 mg


Senna/Docusate Sodium (Pericolace -)  2 tablet PO HS [facility]


   Last Admin: 12/20/19 21:32 Dose:  2 tablet


Sodium Chloride (Sodium Chloride Tablet -)  3.5 gm GT BID [facility]


   Last Admin: 12/21/19 10:12 Dose:  3.5 gm


Zonisamide (Zonisamide)  300 mg GT BID [facility]


   Last Admin: 12/21/19 10:12 Dose:  300 mg











- Objective


Vital Signs: 


 Vital Signs











Temperature  97.7 F   12/21/19 06:10


 


Pulse Rate  78   12/21/19 06:10


 


Respiratory Rate  16   12/21/19 08:29


 


Blood Pressure  112/67   12/21/19 06:10


 


O2 Sat by Pulse Oximetry (%)  95   12/21/19 08:29











Constitutional: Yes: No Distress, Calm


Cardiovascular: Yes: S1, S2


Respiratory: Yes: Regular, CTA Bilaterally


Gastrointestinal: Yes: Normal Bowel Sounds, Soft


Genitourinary: Yes: Larry Present


Musculoskeletal: Yes: WNL


Extremities: Yes: WNL


Neurological: Yes: Alert


Psychiatric: Yes: Other


Labs: 


 CBC, BMP





 12/21/19 06:30 





 12/21/19 06:30 





 INR, PTT











INR  0.97  (0.83-1.09)   12/13/19  09:40    














Assessment/Plan





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 

bedbound, non-verbal, non-communicative at baseline, with suprapubic catheter 

placed with underlying UTI. and patient coming in with hypoxia


 Problem List 





- Problems


(1) Microcephalic


Code(s): Q02 - MICROCEPHALY   





(2) Acute respiratory failure with hypoxia


Code(s): J96.01 - ACUTE RESPIRATORY FAILURE WITH HYPOXIA   





(3) Hypoxemia


Code(s): R09.02 - HYPOXEMIA   





(4) Pneumonia


Code(s): J18.9 - PNEUMONIA, UNSPECIFIED ORGANISM   


Qualifiers: 


   Pneumonia type: due to unspecified organism   Laterality: unspecified 

laterality   Lung location: unspecified part of lung   Qualified Code(s): J18.9 

- Pneumonia, unspecified organism   





(5) Cerebral palsy


Code(s): G80.9 - CEREBRAL PALSY, UNSPECIFIED   





(6) Functional quadriplegia


Code(s): R53.2 - FUNCTIONAL QUADRIPLEGIA   





(7) Seizure


Code(s): R56.9 - UNSPECIFIED CONVULSIONS   





(8) Cerebral palsy


Code(s): G80.9 - CEREBRAL PALSY, UNSPECIFIED   





(9) Severe mental handicap


Code(s): F72 - SEVERE INTELLECTUAL DISABILITIES   





10 uti





plan


continue abx


nutrition


will probably deescalate abx my monday

## 2019-12-21 NOTE — CON.GI
Consult


Consult Specialty:: Gastroenterology


Referred by:: Dr. Anna Nieves


Reason for Consultation:: Clogged G tube





- History of Present Illness


Chief Complaint: Unable to complain


History of Present Illness: 





34M [place] resident admitted for pneumonia has developed clogging of his 

PEG tube which cannot be cleared.  





- History Source


History Provided By: Medical Record





- Past Medical History


CNS: Yes: Seizure, Other (Cerebral Palsy, microcephaly, mental retardation)


Gastrointestinal: Yes: GERD


Renal/: Yes: Other (Urethral stricture)





- Past Surgical History


Additional Surgical History: PEG tube insertion.  Suprapubic cystostomy tube 

insertion





- Alcohol/Substance Use


Hx Alcohol Use: No





- Smoking History


Smoking history: Never smoked


Have you smoked in the past 12 months: No





- Social History


Usual Living Arrangement: Nursing Home


ADL: Support Services





Home Medications





- Allergies


Allergies/Adverse Reactions: 


 Allergies











Allergy/AdvReac Type Severity Reaction Status Date / Time


 


No Known Allergies Allergy   Verified 12/13/19 10:26














- Home Medications


Home Medications: 


Ambulatory Orders





Albuterol 0.083% Nebulizer Sol [Ventolin 0.083% Nebulizer Soln -] 1 neb NEB Q4H 

PRN 09/13/19 


Calcium Carbonate/Vitamin D3 [Oyster Shell 500-Vit D3 200 Tb] 1 each GT BID 09/ 13/19 


Clobazam [Onfi -] 10 mg GT BID 09/13/19 


Fructooligosaccharides/Polydex [Fiber-Stat 15 gm/30 ml Liquid] 30 ml GT DAILY 09 /13/19 


Lacosamide [Vimpat] 200 mg GT BID 09/13/19 


Levocarnitine 7 ml GT TID 09/13/19 


Multivitamin [Multiple Vitamins] 30 ml GT DAILY 09/13/19 


Pyridoxine HCl [Vitamin B-6] 100 mg GT ASDIR 09/13/19 


Sennosides/Docusate Sodium [Senna-S Tablet] 2 tab GT HS 09/13/19 


Sodium Chloride Tablet - 3.5 gm GT BID 09/13/19 


Zonisamide 300 mg GT BID 09/13/19 


levETIRAcetam [levETIRAcetam ORAL SUSPENSION] 2 gm GT BID 09/13/19 


Ofloxacin 0.3% Ophth Soln [Ocuflox -] 2 drop AD Q4HWA #10 drops 09/18/19 


Calcium Carbonate/Vitamin D3 [Oyster Shell 500-Vit D3 200 Pk] 1 each GT BID 12/ 13/19 


Diazepam [Diastat Acudial] 1 each RC PRN 12/13/19 


Lactobacillus Acidophilus [Acidophilus] 2 each GT DAILY 12/13/19 


Nutritional Supplement/Fiber [Promote with Fiber Liquid] 237 ml GT ASDIR 12/13/ 19 











Family Medical History


Family History: Unable to Obtain





Review of Systems


Unable to obtain ROS, reason: cerebral palsy





Physical Exam-GI


Vital Signs: 


 Vital Signs











Temperature  97.3 F L  12/21/19 14:25


 


Pulse Rate  50 L  12/21/19 14:25


 


Respiratory Rate  20   12/21/19 14:25


 


Blood Pressure  139/85   12/21/19 14:25


 


O2 Sat by Pulse Oximetry (%)  95   12/21/19 08:29








CBC,CMP











WBC  7.1 K/mm3 (4.0-10.0)   12/21/19  06:30    


 


RBC  3.16 M/mm3 (4.00-5.60)  L  12/21/19  06:30    


 


Hgb  10.4 GM/dL (11.7-16.9)  L  12/21/19  06:30    


 


Hct  30.4 % (35.4-49)  L  12/21/19  06:30    


 


MCV  96.4 fl (80-96)  H  12/21/19  06:30    


 


MCH  33.0 pg (25.7-33.7)   12/21/19  06:30    


 


MCHC  34.3 g/dl (32.0-35.9)   12/21/19  06:30    


 


RDW  12.4 % (11.9-15.9)   12/21/19  06:30    


 


Plt Count  187 K/MM3 (134-434)   12/21/19  06:30    


 


MPV  8.0 fl (7.5-11.1)  D 12/21/19  06:30    


 


Absolute Neuts (auto)  3.4 K/mm3 (1.5-8.0)   12/21/19  06:30    


 


Neutrophils %  48.9 % (42.8-82.8)   12/21/19  06:30    


 


Neutrophils % (Manual)  21.0 % (42.8-82.8)  L D 12/18/19  06:32    


 


Band Neutrophils %  0.0 %  12/18/19  06:32    


 


Lymphocytes %  43.7 % (8-40)  H D 12/21/19  06:30    


 


Lymphocytes % (Manual)  70.0 % (8-40)  H D 12/18/19  06:32    


 


Monocytes %  4.0 % (3.8-10.2)   12/21/19  06:30    


 


Monocytes % (Manual)  4 % (3.8-10.2)   12/18/19  06:32    


 


Eosinophils %  3.0 % (0-4.5)  D 12/21/19  06:30    


 


Eosinophils % (Manual)  2.0 % (0-4.5)  D 12/18/19  06:32    


 


Basophils %  0.4 % (0-2.0)   12/21/19  06:30    


 


Basophils % (Manual)  0.0 % (0-2.0)   12/18/19  06:32    


 


Myelocytes % (Man)  0 % (0-2)   12/18/19  06:32    


 


Promyelocytes % (Man)  0 % (0-2)   12/18/19  06:32    


 


Blast Cells % (Manual)  0 % (0-0)   12/18/19  06:32    


 


Nucleated RBC %  0 % (0-0)   12/21/19  06:30    


 


Metamyelocytes  0 % (0-2)   12/18/19  06:32    


 


Hypochromia  0   12/18/19  06:32    


 


Platelet Estimate  Normal   12/18/19  06:32    


 


Polychromasia  0   12/18/19  06:32    


 


Poikilocytosis  0   12/18/19  06:32    


 


Anisocytosis  0   12/18/19  06:32    


 


Microcytosis  0   12/18/19  06:32    


 


Macrocytosis  0   12/18/19  06:32    


 


Sodium  136 mmol/L (136-145)   12/21/19  06:30    


 


Potassium  4.0 mmol/L (3.5-5.1)   12/21/19  06:30    


 


Chloride  104 mmol/L ()   12/21/19  06:30    


 


Carbon Dioxide  25 mmol/L (21-32)   12/21/19  06:30    


 


Anion Gap  7 MMOL/L (8-16)  L  12/21/19  06:30    


 


BUN  12.4 mg/dL (7-18)   12/21/19  06:30    


 


Creatinine  0.5 mg/dL (0.55-1.3)  L  12/21/19  06:30    


 


Est GFR (CKD-EPI)AfAm  162.72   12/21/19  06:30    


 


Est GFR (CKD-EPI)NonAf  140.40   12/21/19  06:30    


 


Random Glucose  189 mg/dL ()  H  12/21/19  06:30    


 


Lactic Acid  1.2 mmol/L (0.4-2.0)   12/13/19  09:36    


 


Calcium  8.4 mg/dL (8.5-10.1)  L  12/21/19  06:30    


 


Phosphorus  4.4 mg/dL (2.5-4.9)   12/21/19  06:30    


 


Magnesium  2.0 mg/dL (1.8-2.4)   12/21/19  06:30    


 


Total Bilirubin  0.2 mg/dL (0.2-1)   12/14/19  09:00    


 


AST  23 U/L (15-37)   12/14/19  09:00    


 


ALT  42 U/L (13-61)   12/14/19  09:00    


 


Alkaline Phosphatase  115 U/L ()   12/14/19  09:00    


 


Troponin I  < 0.02 ng/ml (0.00-0.05)   12/13/19  09:36    


 


Total Protein  6.9 g/dl (6.4-8.2)   12/14/19  09:00    


 


Albumin  3.0 g/dl (3.4-5.0)  L  12/14/19  09:00    








 Current Medications











Generic Name Dose Route Start Last Admin





  Trade Name Freq  PRN Reason Stop Dose Admin


 


Acetaminophen  1,000 mg  12/16/19 12:57  12/16/19 13:10





  Ofirmev Injection -  IVPB   1,000 mg





  Q6H PRN   Administration





  PAIN LEVEL 7 - 10   





     





     





     


 


Albuterol Sulfate  1 amp  12/13/19 14:54  





  Ventolin 0.083% Nebulizer Soln -  NEB   





  Q4H PRN   





  SHORT OF BREATH/WHEEZING   





     





     





     


 


Calcium Carbonate/Cholecalciferol  1 tab  12/13/19 22:00  12/21/19 10:10





  Os-Dale 500+D -  GT   1 tab





  BID RAKAN   Administration





     





     





     





     


 


Diazepam   mg  12/13/19 15:00  





  Diastat Rectal Gel -  RC   





  PRN RAKAN   





     





     





     





     


 


Famotidine  20 mg  12/14/19 10:00  12/21/19 10:12





  Pepcid  PEG   20 mg





  DAILY RAKAN   Administration





     





     





     





     


 


Lactated Ringer's  1,000 ml in 1,000 mls @ 75 mls/hr  12/18/19 07:45  12/21/19 

10:16





  Lactated Ringers Solution  IV   75 mls/hr





  ASDIR RAKAN   Administration





     





     





     





     


 


Meropenem 500 mg/ Dextrose  100 mls @ 200 mls/hr  12/20/19 16:00  12/21/19 05:26





  IVPB   200 mls/hr





  Q12H RAKAN   Administration





     





     





     





     


 


Lacosamide  200 mg  12/17/19 22:00  12/21/19 10:10





  Vimpat Liquid -  GT   200 mg





  BID RAKAN   Administration





     





     





     





     


 


Lactobacillus Acidophilus  2 tab  12/14/19 10:00  12/21/19 10:09





  Bacid -  GT   2 tab





  DAILY RAKAN   Administration





     





     





     





     


 


Levetiracetam  2,000 mg  12/17/19 22:00  12/21/19 10:16





  Keppra Oral Solution -  GT   2,000 mg





  BID RAKAN   Administration





     





     





     





     


 


Levocarnitine  700 mg  12/13/19 22:00  12/21/19 05:25





  Carnitor Oral Solution -  GT   700 mg





  TID RAKAN   Administration





     





     





     





     


 


Multivitamins/Minerals  15 ml  12/14/19 10:00  12/21/19 10:15





  Certavite-Antioxidant Liquid  PO   15 ml





  DAILY RAKAN   Administration





     





     





     





     


 


Ofloxacin  2 drop  12/13/19 18:00  12/21/19 10:16





  Ocuflox 0.3% Eye Drops -  AD   2 drop





  Q4HWA RAKAN   Administration





     





     





     





     


 


Pyridoxine HCl  100 mg  12/15/19 10:00  12/21/19 10:10





  Vitamin B6 -  GT   100 mg





  DAILY RAKAN   Administration





     





     





     





     


 


Senna/Docusate Sodium  2 tablet  12/13/19 22:00  12/20/19 21:32





  Pericolace -  PO   2 tablet





  HS RAKAN   Administration





     





     





     





     


 


Sodium Chloride  3.5 gm  12/13/19 22:00  12/21/19 10:12





  Sodium Chloride Tablet -  GT   3.5 gm





  BID RAKAN   Administration





     





     





     





     


 


Zonisamide  300 mg  12/13/19 22:00  12/21/19 10:12





  Zonisamide  GT   300 mg





  BID RAKAN   Administration





     





     





     





     











Constitutional: Yes: Other (Microcephaly)


Eyes: Yes: Conjunctiva Clear


HENT: Yes: Other (ground down teeth)


Neck: Yes: Supple


Cardiovascular: Yes: Regular Rate and Rhythm


Respiratory: Yes: Diminished (breath sounds at both bases)


Gastrointestinal Inspection: Yes: Other (indwelling LUQ 18 Fr G-tube and 

suprapubic 18 Fr cystostomy tube)


...Auscultate: Yes: Normoactive Bowel Sounds


...Palpate: Yes: Soft, Other (nontender)


Labs: 


 CBC, BMP





 12/21/19 06:30 





 12/21/19 06:30 





 INR, PTT











INR  0.97  (0.83-1.09)   12/13/19  09:40    














Problem List





- Problems


(1) Gastrostomy tube obstruction


Code(s): K94.29 - OTHER COMPLICATIONS OF GASTROSTOMY   





(2) Acute respiratory failure with hypoxia


Code(s): J96.01 - ACUTE RESPIRATORY FAILURE WITH HYPOXIA   





(3) Cerebral palsy


Code(s): G80.9 - CEREBRAL PALSY, UNSPECIFIED   





(4) Microcephalic


Code(s): Q02 - MICROCEPHALY   





(5) Pneumonia


Code(s): J18.9 - PNEUMONIA, UNSPECIFIED ORGANISM   


Qualifiers: 


   Pneumonia type: due to unspecified organism   Laterality: unspecified 

laterality   Lung location: unspecified part of lung   Qualified Code(s): J18.9 

- Pneumonia, unspecified organism   





Assessment/Plan





Assessment:


- Obstructed G tube





Plan:


- Old G tube removed and replaced with 18Fr replacement G tube. Internal 

balloon inflated with 10cc of sterile NS


- X ray ordered for gastrograffin enhanced KUB to confirm intragastric G tube 

location before utilizing it.

## 2019-12-21 NOTE — PN
Progress Note, Physician


Chief Complaint: 





no new c/o, same, except has blocked GT 





- Current Medication List


Current Medications: 


Active Medications





Acetaminophen (Ofirmev Injection -)  1,000 mg IVPB Q6H PRN


   PRN Reason: PAIN LEVEL 7 - 10


   Last Admin: 12/16/19 13:10 Dose:  1,000 mg


Albuterol Sulfate (Ventolin 0.083% Nebulizer Soln -)  1 amp NEB Q4H PRN


   PRN Reason: SHORT OF BREATH/WHEEZING


Calcium Carbonate/Cholecalciferol (Os-Dale 500+D -)  1 tab GT BID [facility]


   Last Admin: 12/21/19 10:10 Dose:  1 tab


Diazepam (Diastat Rectal Gel -)   mg RC PRN RAKAN


Famotidine (Pepcid)  20 mg PEG DAILY [facility]


   Last Admin: 12/21/19 10:12 Dose:  20 mg


Lactated Ringer's (Lactated Ringers Solution)  1,000 ml in 1,000 mls @ 75 mls/

hr IV ASDIR [facility]


   Last Admin: 12/21/19 10:16 Dose:  75 mls/hr


Meropenem 500 mg/ Dextrose  100 mls @ 200 mls/hr IVPB Q12H RAKAN


   Last Admin: 12/21/19 05:26 Dose:  200 mls/hr


Lacosamide (Vimpat Liquid -)  200 mg GT BID [facility]


   Last Admin: 12/21/19 10:10 Dose:  200 mg


Lactobacillus Acidophilus (Bacid -)  2 tab GT DAILY [facility]


   Last Admin: 12/21/19 10:09 Dose:  2 tab


Levetiracetam (Keppra Oral Solution -)  2,000 mg GT BID [facility]


   Last Admin: 12/21/19 10:16 Dose:  2,000 mg


Levocarnitine (Carnitor Oral Solution -)  700 mg GT TID [facility]


   Last Admin: 12/21/19 05:25 Dose:  700 mg


Multivitamins/Minerals (Certavite-Antioxidant Liquid)  15 ml PO DAILY [facility]


   Last Admin: 12/21/19 10:15 Dose:  15 ml


Ofloxacin (Ocuflox 0.3% Eye Drops -)  2 drop AD Q4HWA [facility]


   Last Admin: 12/21/19 10:16 Dose:  2 drop


Pyridoxine HCl (Vitamin B6 -)  100 mg GT DAILY [facility]


   Last Admin: 12/21/19 10:10 Dose:  100 mg


Senna/Docusate Sodium (Pericolace -)  2 tablet PO HS [facility]


   Last Admin: 12/20/19 21:32 Dose:  2 tablet


Sodium Chloride (Sodium Chloride Tablet -)  3.5 gm GT BID [facility]


   Last Admin: 12/21/19 10:12 Dose:  3.5 gm


Zonisamide (Zonisamide)  300 mg GT BID [facility]


   Last Admin: 12/21/19 10:12 Dose:  300 mg











- Objective


Vital Signs: 


 Vital Signs











Temperature  97.8 F   12/21/19 10:00


 


Pulse Rate  62   12/21/19 10:00


 


Respiratory Rate  20   12/21/19 10:00


 


Blood Pressure  90/52 L  12/21/19 10:00


 


O2 Sat by Pulse Oximetry (%)  95   12/21/19 08:29











Constitutional: Yes: Well Nourished, No Distress


Eyes: Yes: EOM Intact


HENT: Yes: Normocephalic


Neck: Yes: Supple, Trachea Midline


Cardiovascular: Yes: Regular Rate and Rhythm


Respiratory: Yes: CTA Bilaterally


Gastrointestinal: Yes: Normal Bowel Sounds, Soft (GT site clean,)


Edema: No


Neurological: Yes: Other (no change,)


Labs: 


 CBC, BMP





 12/21/19 06:30 





 12/21/19 06:30 





 INR, PTT











INR  0.97  (0.83-1.09)   12/13/19  09:40    














Impression/Plan


Impression/Plan: 





Assessment/Plan


34 year old male with past medical history of seizures, microcephaly with cranio

-synostosis, urethral stricture s/p suprapubic catheter placement, hx of 

chronic UTI's with ESBL, developmental delay, functional quadriplegia, hx of 

PNA admitted for sepsis secondary to urinary tract infection.





 UTI


-was on zosyn (12/14-12/19), now on meropenem given ESBL 


-suprapubic cath changed (12/19)


-ID: Dr. Garcia


-Uro: Dr. Jo





#Seizure d/o


-continue home anti-epileptics Vantin, Keppra, Onfi, zonisamide, levocarnitine,


 


-neuro: Dr. Miles





#F/E/N


on IV LR 75 cc/hr, for hydration 


continue to follow lytes


TF jevity 1.5 


 GT blocked, called GI for evaluation, 





#PPX


DVT: hep 5k tid 


GI: famotidine 20mg daily








Visit type





- Emergency Visit


Emergency Visit: No





- New Patient


This patient is new to me today: Yes


Date on this admission: 12/21/19





- Critical Care


Critical Care patient: No





- Discharge Referral


Referred to [facility] No

## 2019-12-21 NOTE — PN
Progress Note (short form)





- Note


Progress Note: 





GI NOte: G tube is in goodp osition on X ray. Will resume feedings and 

medications





Problem List





- Problems


(1) Gastrostomy tube obstruction


Code(s): K94.29 - OTHER COMPLICATIONS OF GASTROSTOMY   





(2) Acute respiratory failure with hypoxia


Code(s): J96.01 - ACUTE RESPIRATORY FAILURE WITH HYPOXIA   





(3) Cerebral palsy


Code(s): G80.9 - CEREBRAL PALSY, UNSPECIFIED   





(4) Microcephalic


Code(s): Q02 - MICROCEPHALY   





(5) Pneumonia


Code(s): J18.9 - PNEUMONIA, UNSPECIFIED ORGANISM   


Qualifiers: 


   Pneumonia type: due to unspecified organism   Laterality: unspecified 

laterality   Lung location: unspecified part of lung   Qualified Code(s): J18.9 

- Pneumonia, unspecified organism

## 2019-12-22 LAB
ALBUMIN SERPL-MCNC: 2.8 G/DL (ref 3.4–5)
ALP SERPL-CCNC: 89 U/L (ref 45–117)
ALT SERPL-CCNC: 38 U/L (ref 13–61)
ANION GAP SERPL CALC-SCNC: 7 MMOL/L (ref 8–16)
AST SERPL-CCNC: 24 U/L (ref 15–37)
BASOPHILS # BLD: 0.3 % (ref 0–2)
BILIRUB SERPL-MCNC: 0.2 MG/DL (ref 0.2–1)
BUN SERPL-MCNC: 13 MG/DL (ref 7–18)
CALCIUM SERPL-MCNC: 9 MG/DL (ref 8.5–10.1)
CHLORIDE SERPL-SCNC: 102 MMOL/L (ref 98–107)
CO2 SERPL-SCNC: 27 MMOL/L (ref 21–32)
CREAT SERPL-MCNC: 0.4 MG/DL (ref 0.55–1.3)
DEPRECATED RDW RBC AUTO: 12.4 % (ref 11.9–15.9)
EOSINOPHIL # BLD: 3.8 % (ref 0–4.5)
GLUCOSE SERPL-MCNC: 135 MG/DL (ref 74–106)
HCT VFR BLD CALC: 31 % (ref 35.4–49)
HGB BLD-MCNC: 10.5 GM/DL (ref 11.7–16.9)
LYMPHOCYTES # BLD: 44.2 % (ref 8–40)
MCH RBC QN AUTO: 32.6 PG (ref 25.7–33.7)
MCHC RBC AUTO-ENTMCNC: 33.9 G/DL (ref 32–35.9)
MCV RBC: 96 FL (ref 80–96)
MONOCYTES # BLD AUTO: 6.1 % (ref 3.8–10.2)
NEUTROPHILS # BLD: 45.6 % (ref 42.8–82.8)
PLATELET # BLD AUTO: 190 K/MM3 (ref 134–434)
PMV BLD: 7.7 FL (ref 7.5–11.1)
POTASSIUM SERPLBLD-SCNC: 4 MMOL/L (ref 3.5–5.1)
PROT SERPL-MCNC: 6.8 G/DL (ref 6.4–8.2)
RBC # BLD AUTO: 3.23 M/MM3 (ref 4–5.6)
SODIUM SERPL-SCNC: 136 MMOL/L (ref 136–145)
WBC # BLD AUTO: 6.4 K/MM3 (ref 4–10)

## 2019-12-22 RX ADMIN — FAMOTIDINE SCH MG: 40 POWDER, FOR SUSPENSION ORAL at 10:13

## 2019-12-22 RX ADMIN — SODIUM CHLORIDE, POTASSIUM CHLORIDE, SODIUM LACTATE AND CALCIUM CHLORIDE SCH MLS/HR: 600; 310; 30; 20 INJECTION, SOLUTION INTRAVENOUS at 17:42

## 2019-12-22 RX ADMIN — MEROPENEM SCH MLS/HR: 500 INJECTION, POWDER, FOR SOLUTION INTRAVENOUS at 03:34

## 2019-12-22 RX ADMIN — MULTIVITAMIN SCH ML: LIQUID ORAL at 10:10

## 2019-12-22 RX ADMIN — LEVOCARNITINE SCH MG: 1 SOLUTION ORAL at 22:16

## 2019-12-22 RX ADMIN — MEROPENEM SCH MLS/HR: 500 INJECTION, POWDER, FOR SOLUTION INTRAVENOUS at 16:35

## 2019-12-22 RX ADMIN — SODIUM CHLORIDE TAB 1 GM SCH GM: 1 TAB at 10:15

## 2019-12-22 RX ADMIN — DOCUSATE SODIUM,SENNOSIDES SCH TABLET: 50; 8.6 TABLET, FILM COATED ORAL at 22:13

## 2019-12-22 RX ADMIN — Medication SCH TAB: at 22:15

## 2019-12-22 RX ADMIN — LACOSAMIDE SCH MG: 10 SOLUTION ORAL at 10:15

## 2019-12-22 RX ADMIN — LEVOCARNITINE SCH MG: 1 SOLUTION ORAL at 05:27

## 2019-12-22 RX ADMIN — LACOSAMIDE SCH MG: 10 SOLUTION ORAL at 22:16

## 2019-12-22 RX ADMIN — Medication SCH TAB: at 10:13

## 2019-12-22 RX ADMIN — OFLOXACIN SCH DROP: 3 SOLUTION/ DROPS OPHTHALMIC at 14:20

## 2019-12-22 RX ADMIN — OFLOXACIN SCH DROP: 3 SOLUTION/ DROPS OPHTHALMIC at 05:27

## 2019-12-22 RX ADMIN — OFLOXACIN SCH DROP: 3 SOLUTION/ DROPS OPHTHALMIC at 17:44

## 2019-12-22 RX ADMIN — LEVOCARNITINE SCH MG: 1 SOLUTION ORAL at 14:20

## 2019-12-22 RX ADMIN — ZONISAMIDE SCH MG: 100 CAPSULE ORAL at 10:16

## 2019-12-22 RX ADMIN — Medication SCH TAB: at 10:10

## 2019-12-22 RX ADMIN — Medication SCH MG: at 10:16

## 2019-12-22 RX ADMIN — OFLOXACIN SCH DROP: 3 SOLUTION/ DROPS OPHTHALMIC at 10:11

## 2019-12-22 RX ADMIN — LEVETIRACETAM SCH MG: 100 SOLUTION ORAL at 10:10

## 2019-12-22 RX ADMIN — SODIUM CHLORIDE TAB 1 GM SCH GM: 1 TAB at 22:13

## 2019-12-22 NOTE — PN.GI
GI Progress Note


Subjective: 





GI NOte: Tolerating feedings. 





- Objective


Vital Signs: 


 Vital Signs











Temperature  98.0 F   12/21/19 22:00


 


Pulse Rate  59 L  12/21/19 22:00


 


Respiratory Rate  20   12/21/19 22:00


 


Blood Pressure  129/73   12/21/19 22:00


 


O2 Sat by Pulse Oximetry (%)  95   12/21/19 21:00











Constitutional: No Distress


...Auscultate: Yes: Normoactive Bowel Sounds


...Palpate: Yes: Soft, Other (nontender)


Labs: 


 CBC, BMP





 12/22/19 05:35 





 12/22/19 05:35 





 INR, PTT











INR  0.97  (0.83-1.09)   12/13/19  09:40    














Assessment/Plan





Assessment:


- Obstructed G tube replaced with new 18 French G tube which is functioning well





Plan:


- Please recall us as needed. Thank you!





Problem List





- Problems


(1) Gastrostomy tube obstruction


Code(s): K94.29 - OTHER COMPLICATIONS OF GASTROSTOMY   





(2) Acute respiratory failure with hypoxia


Code(s): J96.01 - ACUTE RESPIRATORY FAILURE WITH HYPOXIA   





(3) Cerebral palsy


Code(s): G80.9 - CEREBRAL PALSY, UNSPECIFIED   





(4) Microcephalic


Code(s): Q02 - MICROCEPHALY   





(5) Pneumonia


Code(s): J18.9 - PNEUMONIA, UNSPECIFIED ORGANISM   


Qualifiers: 


   Pneumonia type: due to unspecified organism   Laterality: unspecified 

laterality   Lung location: unspecified part of lung   Qualified Code(s): J18.9 

- Pneumonia, unspecified organism

## 2019-12-22 NOTE — PN
Progress Note, Physician


History of Present Illness: 





Pt is alert, noncommunicative at baseline, without distress. s/p GT 

replacement. Remains afebrile.





- Current Medication List


Current Medications: 


Active Medications





Acetaminophen (Ofirmev Injection -)  1,000 mg IVPB Q6H PRN


   PRN Reason: PAIN LEVEL 7 - 10


   Last Admin: 12/16/19 13:10 Dose:  1,000 mg


Calcium Carbonate/Cholecalciferol (Os-Dale 500+D -)  1 tab GT BID [facility]


   Last Admin: 12/22/19 10:13 Dose:  1 tab


Famotidine (Pepcid)  20 mg PEG DAILY [facility]


   Last Admin: 12/22/19 10:13 Dose:  20 mg


Lactated Ringer's (Lactated Ringers Solution)  1,000 ml in 1,000 mls @ 75 mls/

hr IV ASDIR [facility]


   Last Admin: 12/22/19 17:42 Dose:  75 mls/hr


Meropenem 500 mg/ Dextrose  100 mls @ 200 mls/hr IVPB Q12H [facility]


   Last Admin: 12/22/19 16:35 Dose:  200 mls/hr


Lacosamide (Vimpat Liquid -)  200 mg GT BID [facility]


   Last Admin: 12/22/19 10:15 Dose:  200 mg


Lactobacillus Acidophilus (Bacid -)  2 tab GT DAILY [facility]


   Last Admin: 12/22/19 10:10 Dose:  2 tab


Levetiracetam (Keppra Oral Solution -)  2,000 mg GT BID [facility]


   Last Admin: 12/22/19 10:10 Dose:  2,000 mg


Levocarnitine (Carnitor Oral Solution -)  700 mg GT TID [facility]


   Last Admin: 12/22/19 14:20 Dose:  700 mg


Multivitamins/Minerals (Certavite-Antioxidant Liquid)  15 ml PO DAILY [facility]


   Last Admin: 12/22/19 10:10 Dose:  15 ml


Ofloxacin (Ocuflox 0.3% Eye Drops -)  2 drop AD Q4HWA [facility]


   Last Admin: 12/22/19 17:44 Dose:  2 drop


Pyridoxine HCl (Vitamin B6 -)  100 mg GT DAILY [facility]


   Last Admin: 12/22/19 10:16 Dose:  100 mg


Senna/Docusate Sodium (Pericolace -)  2 tablet PO HS [facility]


   Last Admin: 12/21/19 21:06 Dose:  2 tablet


Sodium Chloride (Sodium Chloride Tablet -)  3.5 gm GT BID [facility]


   Last Admin: 12/22/19 10:15 Dose:  3.5 gm


Zonisamide (Zonisamide)  300 mg GT BID [facility]


   Last Admin: 12/22/19 10:16 Dose:  300 mg











- Objective


Vital Signs: 


 Vital Signs











Temperature  98.1 F   12/22/19 18:00


 


Pulse Rate  54 L  12/22/19 18:00


 


Respiratory Rate  20   12/22/19 18:00


 


Blood Pressure  115/69   12/22/19 18:00


 


O2 Sat by Pulse Oximetry (%)  96   12/22/19 09:00











Constitutional: Yes: No Distress, Calm


Cardiovascular: Yes: Regular Rate and Rhythm


Respiratory: Yes: Regular


Gastrointestinal: Yes: Normal Bowel Sounds, Soft, Other (GT)


Genitourinary: Yes: Other (SPC with clear urine)


Integumentary: Yes: WNL


Neurological: Yes: Alert


Labs: 


 CBC, BMP





 12/22/19 05:35 





 12/22/19 05:35 





 INR, PTT











INR  0.97  (0.83-1.09)   12/13/19  09:40    








 Microbiology





12/13/19 09:36   Blood - Peripheral Venous   Blood Culture - Final


                            NO GROWTH AFTER 5 DAYS INCUBATION


12/13/19 09:36   Blood - Peripheral Venous   Blood Culture - Final


                            NO GROWTH AFTER 5 DAYS INCUBATION


12/13/19 09:45   Urine - Urine Suprapubic   Urine Culture - Final


                            Klebsiella Pneumoniae


                            Escherichia Coli Esbl 


                            Enterococcus Faecalis


12/13/19 17:02   Urine - Urine Larry   Legionella Antigen - Final


12/13/19 17:02   Urine - Urine Larry   Streptococcus pneumoniae Antigen (M - 

Final











Problem List





- Problems


(1) Acute respiratory failure with hypoxia


Code(s): J96.01 - ACUTE RESPIRATORY FAILURE WITH HYPOXIA   





(2) Cerebral palsy


Code(s): G80.9 - CEREBRAL PALSY, UNSPECIFIED   





(3) Microcephalic


Code(s): Q02 - MICROCEPHALY   





(4) Severe mental handicap


Code(s): F72 - SEVERE INTELLECTUAL DISABILITIES   





(5) Functional quadriplegia


Code(s): R53.2 - FUNCTIONAL QUADRIPLEGIA   





(6) Severe sepsis


Code(s): A41.9 - SEPSIS, UNSPECIFIED ORGANISM; R65.20 - SEVERE SEPSIS WITHOUT 

SEPTIC SHOCK   





(7) Urinary tract infection due to ESBL Klebsiella


Code(s): N39.0 - URINARY TRACT INFECTION, SITE NOT SPECIFIED; B96.89 - OTH 

BACTERIAL AGENTS AS THE CAUSE OF DISEASES CLASSD [id number]   





(8) Seizure


Code(s): R56.9 - UNSPECIFIED CONVULSIONS   





Assessment/Plan





Acute hypoxic respiratory failure


UTI


Hx of ESBL+ UTI


Urinary obstruction with SPC


Cerebral palsy with MR


Seizure d.o.





-- Pt is alert, afebrile, without distress at this time


-- Urine catheter change, obstructed GT replaced


-- Urine culture with growth of ESBL+ organisms - colonization?, s/p recent 

treatment, will consider d/c antibiotics





continue close monitoring

## 2019-12-22 NOTE — PN
Progress Note (short form)





- Note


Progress Note: 





Patient is comfortable.  He is seen by gastroenterologist and his G-tube has 

been fixed and he is back on feeding with he is tolerating well.  He has no 

fever no chills no other symptom review of system is negative as per nurse.


 Vital Signs











 Period  Temp  Pulse  Resp  BP Sys/Curran  Pulse Ox


 


 Last 24 Hr  97.3 F-98.0 F  49-59  20-20  129-139/71-85  95








 Past Medical History





CNS                              Seizure (Cerebral Palsy, microcephaly, mental


                                 retardation),Other


Gastrointestinal                 GERD


Renal/                         Other (Urethral stricture)





Physical examination


He is alert alert but nonverbal


Looks comfortable


HEENT NAD neck supple abdomen G-tube placement is clean site


Heart S1-S2 positive neuro he is alert and awake nonverbal.


 CBC, BMP





 12/22/19 05:35 





 12/22/19 05:35 





Is x-rays after the feeding tube is okay





Assessment/Plan


34 year old male with past medical history of seizures, microcephaly with cranio

-synostosis, urethral stricture s/p suprapubic catheter placement, hx of 

chronic UTI's with ESBL, developmental delay, functional quadriplegia, hx of 

PNA admitted for sepsis secondary to urinary tract infection.





 UTI


-was on zosyn (12/14-12/19), now on meropenem given ESBL 


-suprapubic cath changed (12/19)


-ID: Dr. Garcia


-Uro: Dr. Jo





#Seizure d/o


-continue home anti-epileptics Vantin, Keppra, Onfi, zonisamide, levocarnitine,


 


-neuro: Dr. Miles





#F/E/N


on IV LR 75 cc/hr, for hydration 


continue to follow lytes


TF jevity 1.5 


 GT Is working and he is back on feeding





#PPX


DVT: hep 5k tid 


GI: famotidine 20mg daily





If he continues to tolerate feeding tonight he can be discharged back to his 

residence tomorrow morning.


Left message to the physician in [facility] and doctor on call 

Dr. Alvarez is on off he will be back Monday morning





 Current Medications





Acetaminophen (Ofirmev Injection -)  1,000 mg IVPB Q6H PRN


   PRN Reason: PAIN LEVEL 7 - 10


   Last Admin: 12/16/19 13:10 Dose:  1,000 mg


Albuterol Sulfate (Ventolin 0.083% Nebulizer Soln -)  1 amp NEB Q4H PRN


   PRN Reason: SHORT OF BREATH/WHEEZING


Calcium Carbonate/Cholecalciferol (Os-Dale 500+D -)  1 tab GT BID [facility]


   Last Admin: 12/21/19 21:06 Dose:  1 tab


Famotidine (Pepcid)  20 mg PEG DAILY [facility]


   Last Admin: 12/21/19 10:12 Dose:  20 mg


Lactated Ringer's (Lactated Ringers Solution)  1,000 ml in 1,000 mls @ 75 mls/

hr IV ASDIR [facility]


   Last Admin: 12/21/19 21:07 Dose:  75 mls/hr


Meropenem 500 mg/ Dextrose  100 mls @ 200 mls/hr IVPB Q12H [facility]


   Last Admin: 12/22/19 03:34 Dose:  200 mls/hr


Lacosamide (Vimpat Liquid -)  200 mg GT BID [facility]


   Last Admin: 12/21/19 21:06 Dose:  200 mg


Lactobacillus Acidophilus (Bacid -)  2 tab GT DAILY [facility]


   Last Admin: 12/21/19 10:09 Dose:  2 tab


Levetiracetam (Keppra Oral Solution -)  2,000 mg GT BID [facility]


   Last Admin: 12/21/19 21:06 Dose:  2,000 mg


Levocarnitine (Carnitor Oral Solution -)  700 mg GT TID [facility]


   Last Admin: 12/22/19 05:27 Dose:  700 mg


Multivitamins/Minerals (Certavite-Antioxidant Liquid)  15 ml PO DAILY [facility]


   Last Admin: 12/21/19 10:15 Dose:  15 ml


Ofloxacin (Ocuflox 0.3% Eye Drops -)  2 drop AD Q4HWA [facility]


   Last Admin: 12/22/19 05:27 Dose:  2 drop


Pyridoxine HCl (Vitamin B6 -)  100 mg GT DAILY [facility]


   Last Admin: 12/21/19 10:10 Dose:  100 mg


Senna/Docusate Sodium (Pericolace -)  2 tablet PO HS [facility]


   Last Admin: 12/21/19 21:06 Dose:  2 tablet


Sodium Chloride (Sodium Chloride Tablet -)  3.5 gm GT BID [facility]


   Last Admin: 12/21/19 21:06 Dose:  3.5 gm


Zonisamide (Zonisamide)  300 mg GT BID [facility]


   Last Admin: 12/21/19 21:06 Dose:  300 mg











Visit type





- Emergency Visit


Emergency Visit: Yes


ED Registration Date: 12/13/19


Care time: The patient presented to the Emergency Department on the above date 

and was hospitalized for further evaluation of their emergent condition.





- New Patient


This patient is new to me today: Yes


Date on this admission: 12/22/19





- Critical Care


Critical Care patient: No





- Discharge Referral


Referred to [facility] No

## 2019-12-22 NOTE — PN
Progress Note (short form)





- Note


Progress Note: 





PULMONARY





Appears comfortable. No fevers recorded.





 Vital Signs











 Period  Temp  Pulse  Resp  BP Sys/Curran  Pulse Ox


 


 Last 24 Hr  97.3 F-97.9 F  50-78  16-20  /52-85  95-96











Gen:  breathing nonlabored


Heart: RRR


Lung: decreased breath sounds at the bases


Abd: soft, nontender


Ext: no edema





 CBC, BMP





 12/21/19 06:30 





 12/21/19 06:30 





Active Medications





Acetaminophen (Ofirmev Injection -)  1,000 mg IVPB Q6H PRN


   PRN Reason: PAIN LEVEL 7 - 10


   Last Admin: 12/16/19 13:10 Dose:  1,000 mg


Albuterol Sulfate (Ventolin 0.083% Nebulizer Soln -)  1 amp NEB Q4H PRN


   PRN Reason: SHORT OF BREATH/WHEEZING


Calcium Carbonate/Cholecalciferol (Os-Dale 500+D -)  1 tab GT BID [facility]


   Last Admin: 12/21/19 10:10 Dose:  1 tab


Diazepam (Diastat Rectal Gel -)   mg RC PRN [facility]


Famotidine (Pepcid)  20 mg PEG DAILY [facility]


   Last Admin: 12/21/19 10:12 Dose:  20 mg


Lactated Ringer's (Lactated Ringers Solution)  1,000 ml in 1,000 mls @ 75 mls/

hr IV ASDIR [facility]


   Last Admin: 12/21/19 10:16 Dose:  75 mls/hr


Meropenem 500 mg/ Dextrose  100 mls @ 200 mls/hr IVPB Q12H [facility]


   Last Admin: 12/21/19 05:26 Dose:  200 mls/hr


Lacosamide (Vimpat Liquid -)  200 mg GT BID [facility]


   Last Admin: 12/21/19 10:10 Dose:  200 mg


Lactobacillus Acidophilus (Bacid -)  2 tab GT DAILY [facility]


   Last Admin: 12/21/19 10:09 Dose:  2 tab


Levetiracetam (Keppra Oral Solution -)  2,000 mg GT BID [facility]


   Last Admin: 12/21/19 10:16 Dose:  2,000 mg


Levocarnitine (Carnitor Oral Solution -)  700 mg GT TID [facility]


   Last Admin: 12/21/19 05:25 Dose:  700 mg


Multivitamins/Minerals (Certavite-Antioxidant Liquid)  15 ml PO DAILY [facility]


   Last Admin: 12/21/19 10:15 Dose:  15 ml


Ofloxacin (Ocuflox 0.3% Eye Drops -)  2 drop AD Q4HWA [facility]


   Last Admin: 12/21/19 10:16 Dose:  2 drop


Pyridoxine HCl (Vitamin B6 -)  100 mg GT DAILY [facility]


   Last Admin: 12/21/19 10:10 Dose:  100 mg


Senna/Docusate Sodium (Pericolace -)  2 tablet PO HS [facility]


   Last Admin: 12/20/19 21:32 Dose:  2 tablet


Sodium Chloride (Sodium Chloride Tablet -)  3.5 gm GT BID [facility]


   Last Admin: 12/21/19 10:12 Dose:  3.5 gm


Zonisamide (Zonisamide)  300 mg GT BID [facility]


   Last Admin: 12/21/19 10:12 Dose:  300 mg








A/P


Acute Hypoxic Respiratory Failure


Pneumonia


Lung Nodule


Cerebral Palsy


Mental Retardation


Seizure Disorder





-  continue antibiotics per ID


-  O2 to keep SpO2 >90%


-  aspiration precautions


-  inhaled bronchodilators as needed


-  DVT prophylaxis


-  conservative management of lung nodule


-  d/c planning

## 2019-12-23 VITALS — TEMPERATURE: 97.8 F | SYSTOLIC BLOOD PRESSURE: 102 MMHG | DIASTOLIC BLOOD PRESSURE: 51 MMHG | HEART RATE: 78 BPM

## 2019-12-23 LAB
ANION GAP SERPL CALC-SCNC: 8 MMOL/L (ref 8–16)
BASOPHILS # BLD: 0.5 % (ref 0–2)
BUN SERPL-MCNC: 14.8 MG/DL (ref 7–18)
CALCIUM SERPL-MCNC: 9.3 MG/DL (ref 8.5–10.1)
CHLORIDE SERPL-SCNC: 104 MMOL/L (ref 98–107)
CO2 SERPL-SCNC: 25 MMOL/L (ref 21–32)
CREAT SERPL-MCNC: 0.4 MG/DL (ref 0.55–1.3)
DEPRECATED RDW RBC AUTO: 12.6 % (ref 11.9–15.9)
EOSINOPHIL # BLD: 3.9 % (ref 0–4.5)
GLUCOSE SERPL-MCNC: 142 MG/DL (ref 74–106)
HCT VFR BLD CALC: 34.1 % (ref 35.4–49)
HGB BLD-MCNC: 11.4 GM/DL (ref 11.7–16.9)
LYMPHOCYTES # BLD: 38.5 % (ref 8–40)
MCH RBC QN AUTO: 32.5 PG (ref 25.7–33.7)
MCHC RBC AUTO-ENTMCNC: 33.6 G/DL (ref 32–35.9)
MCV RBC: 96.8 FL (ref 80–96)
MONOCYTES # BLD AUTO: 5.6 % (ref 3.8–10.2)
NEUTROPHILS # BLD: 51.5 % (ref 42.8–82.8)
PLATELET # BLD AUTO: 210 K/MM3 (ref 134–434)
PMV BLD: 8 FL (ref 7.5–11.1)
POTASSIUM SERPLBLD-SCNC: 4.3 MMOL/L (ref 3.5–5.1)
RBC # BLD AUTO: 3.52 M/MM3 (ref 4–5.6)
SODIUM SERPL-SCNC: 137 MMOL/L (ref 136–145)
WBC # BLD AUTO: 7.7 K/MM3 (ref 4–10)

## 2019-12-23 RX ADMIN — OFLOXACIN SCH DROP: 3 SOLUTION/ DROPS OPHTHALMIC at 00:12

## 2019-12-23 RX ADMIN — SODIUM CHLORIDE, POTASSIUM CHLORIDE, SODIUM LACTATE AND CALCIUM CHLORIDE SCH MLS/HR: 600; 310; 30; 20 INJECTION, SOLUTION INTRAVENOUS at 05:52

## 2019-12-23 RX ADMIN — LEVOCARNITINE SCH MG: 1 SOLUTION ORAL at 05:33

## 2019-12-23 RX ADMIN — OFLOXACIN SCH DROP: 3 SOLUTION/ DROPS OPHTHALMIC at 14:11

## 2019-12-23 RX ADMIN — ZONISAMIDE SCH MG: 100 CAPSULE ORAL at 00:14

## 2019-12-23 RX ADMIN — OFLOXACIN SCH DROP: 3 SOLUTION/ DROPS OPHTHALMIC at 17:00

## 2019-12-23 RX ADMIN — Medication SCH TAB: at 09:25

## 2019-12-23 RX ADMIN — MULTIVITAMIN SCH ML: LIQUID ORAL at 09:29

## 2019-12-23 RX ADMIN — LEVETIRACETAM SCH MG: 100 SOLUTION ORAL at 00:14

## 2019-12-23 RX ADMIN — SODIUM CHLORIDE, POTASSIUM CHLORIDE, SODIUM LACTATE AND CALCIUM CHLORIDE SCH: 600; 310; 30; 20 INJECTION, SOLUTION INTRAVENOUS at 12:43

## 2019-12-23 RX ADMIN — OFLOXACIN SCH DROP: 3 SOLUTION/ DROPS OPHTHALMIC at 09:29

## 2019-12-23 RX ADMIN — LACOSAMIDE SCH MG: 10 SOLUTION ORAL at 09:26

## 2019-12-23 RX ADMIN — SODIUM CHLORIDE TAB 1 GM SCH GM: 1 TAB at 09:27

## 2019-12-23 RX ADMIN — LEVOCARNITINE SCH MG: 1 SOLUTION ORAL at 14:11

## 2019-12-23 RX ADMIN — FAMOTIDINE SCH MG: 40 POWDER, FOR SUSPENSION ORAL at 09:29

## 2019-12-23 RX ADMIN — LEVETIRACETAM SCH MG: 100 SOLUTION ORAL at 09:28

## 2019-12-23 RX ADMIN — Medication SCH MG: at 09:26

## 2019-12-23 RX ADMIN — OFLOXACIN SCH DROP: 3 SOLUTION/ DROPS OPHTHALMIC at 05:34

## 2019-12-23 RX ADMIN — MEROPENEM SCH MLS/HR: 500 INJECTION, POWDER, FOR SOLUTION INTRAVENOUS at 03:01

## 2019-12-23 RX ADMIN — ZONISAMIDE SCH MG: 100 CAPSULE ORAL at 09:27

## 2019-12-23 NOTE — PN
Progress Note (short form)





- Note


Progress Note: 


Hospitalist Medicine


Looks comfortable. off abx. To return to [place] in AM, has no transportation 

currently. D/w Rashmi Madrid from [place], covering for Dr. Alvarez; 

otherwise he has been accepted. 





 Vitals











  12/20/19 12/20/19





  10:00 14:27


 


Temperature  97.7 F


 


Pulse Rate 59 L 


 


Respiratory 18 





Rate  


 


Blood Pressure 116/63 











Physical Exam


general: resting comfortably in bed


HEENT: +microcephaly. moist MM


neck: supple


cardio: S1, S2, RRR. no r/m/g


pulm: CTA B/l. 


abdomen: soft, nontender, nondistended. +PEG


: +suprapubic fraser, intact


LE: no edema. pulses intact. contracted 


neuro: CNs appear to be grossly intact





 Laboratory Tests











  12/23/19 12/23/19





  09:20 09:20


 


WBC  7.7 


 


Hgb  11.4 L 


 


Hct  34.1 L 


 


Plt Count  210 


 


Sodium   137


 


Potassium   4.3


 


Chloride   104


 


Carbon Dioxide   25


 


BUN   14.8


 


Creatinine   0.4 L


 


Random Glucose   142 H

















Microbiology





12/13/19 17:02   Urine - Urine Fraser   Legionella Antigen - Final


12/13/19 17:02   Urine - Urine Fraser   Streptococcus pneumoniae Antigen (M - 

Final


12/13/19 09:45   Urine - Urine Suprapubic   Urine Culture - Final


                              Klebsiella Pneumoniae


                              Escherichia Coli Esbl 


                              Enterococcus Faecalis


12/13/19 09:36   Blood - Peripheral Venous   Blood Culture - Final


                              NO GROWTH AFTER 5 DAYS INCUBATION


12/13/19 09:36   Blood - Peripheral Venous   Blood Culture - Final


                              NO GROWTH AFTER 5 DAYS INCUBATION





Imaging





12/13/19: CXR: dextroscoliosis. no evidence of vascular congestive changes, 

pulm infiltrates. no pneumo or large pleural effusion, no blunting ot he 

costophrenic angles.





12/13/19: chest CTA: limited atelectasis of the left lower lung. suspect new 

RUL lesion and correlate with PET/CT may prove useful as clinically indicated. 


(1.9cm in the medial aspect of the upper right lung; appears to represent 

increase in size of a poorly visualized lesion seen previously.)





12/13/19: EKG: NSR, rate 95bpm, qtc 449ms





12/17/19: CXR: scoliosis, with convexity to the right with clear lungs





Assessment/Plan


34 year old male with past medical history of seizures, microcephaly with cranio

-synostosis, urethral stricture s/p suprapubic catheter placement, hx of 

chronic UTI's with ESBL, developmental delay, functional quadriplegia, hx of 

PNA admitted for sepsis secondary to urinary tract infection.





#Sepsis 2/2 UTI


-was on zosyn (12/14-12/19), was on meropenem given ESBL (12/19-12/23)


-ucx: +Kleb, ESBL, E. faecalis


-suprapubic cath changed (12/19)


-IV tylenol PRN pain/fever 


-flu, RSV, legionella (-)


-f/u blood cx - NGTD


-ID: Dr. Garcia


-Uro: Dr. Jo 





#RUL lung lesion


-c/w conservative mgmt





#Constipation


-c/w senna. added colace





#Seizure d/o


-continue home anti-epileptics Vantin, Keppra, Onfi, zonisamide, levocarnitine


-though keppra supratherapeutic, per neuro will continue d/t low seizure 

threshold


-neuro: Dr. Miles





#Hyponatremia-resolved


-continue sodium chloride tabs





#Hypoxia- resolved 


-aspiration precautions


-Pulm: Dr. Olguin 





#F/E/N


off IVF 


continue to follow lytes


TF jevity 1.5 





#PPX


DVT: hep 5k tid 


GI: famotidine 20mg daily





#Dispo


cont'd monitoring on med-surg


off 02, and off abx


for d/c tomorrow AM per Diaz, no transportation


d/w CHIARA Madrid

## 2019-12-23 NOTE — PN
Progress Note (short form)





- Note


Progress Note: 





                                                                               

                          Neurology 


CHIEF COMPLAINT: hypoxia





PCP: Autumn Jeffery





HISTORY OF PRESENT ILLNESS:


Theodore Mendieta is a 34 year old male with a past medical history of seizures, 

microcephaly with cranial synostosis, urethral stricture s/p suprapubic 

catheter placement, hx of chronic UTI's with ESBL, MR, hx of PNA presented from 

[facility] for hypoxia. It was noted by the staff at [place] that 

the patient was hypoxic on RA at 78% and was having increased work of 

breathing. Patient was noted to have a non-productive cough, increase in 

respiratory rate, and hypoxia. No other history was obtainable from the NH. 

Patient came to the ED where he had increased work of breath and was placed on 

non-rebreather. At baseline, the patient is nonverbal, does not track, does not 

follow commands. Given 3 amps of Duoneb, 1.5L of NS, Tylenol 1g, Zosyn, Vanco.  

UA + nitrites, 2+ LE, 65 WBC, 3464 bacteria. CXR with no acute findings, CTA 

with atelectasis in LLL with new RULL lesion 1.9cm, no evidence of PE or PNA. I 

wasconsulted for further management of his seizure medications which included 

Onfi, Keppra, zonegran and vimpat. The concern was that his G-tube was clogged 

and therefore multiple medications were not  able to be administered. Discussed 

with hospitalist on 12/17 and advised using depakote and dilantin instead of 

the two medications that could not given IV. Following morning, nurse reports G 

tube no longer clogged, patient back on outpatient medications aand doses. GI 

note reviewed, G tube clear to be used. No abnormal movements and has been 

seizure free.  He should follow up with his regular neurologist to determine if 

Keppra dose can be reduced. No new seizure like movements or events. Remains 

stable at this time. 





Active Medications





Acetaminophen (Ofirmev Injection -)  1,000 mg IVPB Q6H PRN


   PRN Reason: PAIN LEVEL 7 - 10


   Last Admin: 12/16/19 13:10 Dose:  1,000 mg


Calcium Carbonate/Cholecalciferol (Os-Dale 500+D -)  1 tab GT BID [facility]


   Last Admin: 12/22/19 22:15 Dose:  1 tab


Famotidine (Pepcid)  20 mg PEG DAILY [facility]


   Last Admin: 12/22/19 10:13 Dose:  20 mg


Meropenem 500 mg/ Dextrose  100 mls @ 200 mls/hr IVPB Q12H [facility]


   Last Admin: 12/23/19 03:01 Dose:  200 mls/hr


Lacosamide (Vimpat Liquid -)  200 mg GT BID [facility]


   Last Admin: 12/22/19 22:16 Dose:  200 mg


Lactobacillus Acidophilus (Bacid -)  2 tab GT DAILY [facility]


   Last Admin: 12/22/19 10:10 Dose:  2 tab


Levetiracetam (Keppra Oral Solution -)  2,000 mg GT BID [facility]


   Last Admin: 12/23/19 00:14 Dose:  2,000 mg


Levocarnitine (Carnitor Oral Solution -)  700 mg GT TID [facility]


   Last Admin: 12/23/19 05:33 Dose:  700 mg


Multivitamins/Minerals (Certavite-Antioxidant Liquid)  15 ml PO DAILY [facility]


   Last Admin: 12/22/19 10:10 Dose:  15 ml


Ofloxacin (Ocuflox 0.3% Eye Drops -)  2 drop AD Q4HWA [facility]


   Last Admin: 12/23/19 05:34 Dose:  2 drop


Pyridoxine HCl (Vitamin B6 -)  100 mg GT DAILY [facility]


   Last Admin: 12/22/19 10:16 Dose:  100 mg


Senna/Docusate Sodium (Pericolace -)  2 tablet PO HS [facility]


   Last Admin: 12/22/19 22:13 Dose:  2 tablet


Sodium Chloride (Sodium Chloride Tablet -)  3.5 gm GT BID [facility]


   Last Admin: 12/22/19 22:13 Dose:  3.5 gm


Zonisamide (Zonisamide)  300 mg GT BID [facility]


   Last Admin: 12/23/19 00:14 Dose:  300 mg





PHYSICAL EXAMINATION


  


 Vital Signs











 Period  Temp  Pulse  Resp  BP Sys/Curran  Pulse Ox


 


 Last 24 Hr  97.4 F-98.3 F  52-75  20-20  /61-83  96-98











GENERAL: Non-verbal, non-responsive.


HEAD: Microcephalic.


EYES: Pupils equal, round and reactive to light, extraocular movements intact, 

conjunctiva clear. 


EARS, NOSE, THROAT: Oropharynx clear without exudates. Moist mucous membranes.


LUNGS: Noted bibasilar crackles. No wheezes appreciated. No accessory muscle 

use.


HEART: Regular rate and rhythm, normal S1 and S2 without murmur.


ABDOMEN: Soft, nontender, not distended, normoactive bowel sounds, no guarding, 

no rebound, no masses. Suprapubic catheter and G tube in place without purulence

, erythema, localized signs of infection


MUSCULOSKELETAL: Spastic extremities with contracted R UE.


UPPER EXTREMITIES: 2+ pulses, warm, well-perfused. No peripheral edema.


LOWER EXTREMITIES: 2+ pulses, warm, well-perfused. No peripheral edema. 


NEUROLOGICAL:  Spastic extremities. Groan response to painful stimuli. Moves 

extremities grossly, sensory intact to LT


SKIN: Cool, dry, normal turgor, no rashes or lesions noted, normal capillary 

refill. 





 CBCD











WBC  6.4 K/mm3 (4.0-10.0)   12/22/19  05:35    


 


RBC  3.23 M/mm3 (4.00-5.60)  L  12/22/19  05:35    


 


Hgb  10.5 GM/dL (11.7-16.9)  L  12/22/19  05:35    


 


Hct  31.0 % (35.4-49)  L  12/22/19  05:35    


 


MCV  96.0 fl (80-96)   12/22/19  05:35    


 


MCHC  33.9 g/dl (32.0-35.9)   12/22/19  05:35    


 


RDW  12.4 % (11.9-15.9)   12/22/19  05:35    


 


Plt Count  190 K/MM3 (134-434)   12/22/19  05:35    


 


MPV  7.7 fl (7.5-11.1)   12/22/19  05:35    








 CMP











Sodium  136 mmol/L (136-145)   12/22/19  05:35    


 


Potassium  4.0 mmol/L (3.5-5.1)   12/22/19  05:35    


 


Chloride  102 mmol/L ()   12/22/19  05:35    


 


Carbon Dioxide  27 mmol/L (21-32)   12/22/19  05:35    


 


Anion Gap  7 MMOL/L (8-16)  L  12/22/19  05:35    


 


BUN  13.0 mg/dL (7-18)   12/22/19  05:35    


 


Creatinine  0.4 mg/dL (0.55-1.3)  L  12/22/19  05:35    


 


Random Glucose  135 mg/dL ()  H  12/22/19  05:35    


 


Calcium  9.0 mg/dL (8.5-10.1)   12/22/19  05:35    


 


Total Bilirubin  0.2 mg/dL (0.2-1)   12/22/19  05:35    


 


AST  24 U/L (15-37)   12/22/19  05:35    


 


ALT  38 U/L (13-61)   12/22/19  05:35    


 


Alkaline Phosphatase  89 U/L ()   12/22/19  05:35    


 


Total Protein  6.8 g/dl (6.4-8.2)   12/22/19  05:35    


 


Albumin  2.8 g/dl (3.4-5.0)  L  12/22/19  05:35    








 CARDIAC ENZYMES











Troponin I  < 0.02 ng/ml (0.00-0.05)   12/13/19  09:36    











ASSESSMENT/PLAN:


Theodore Mendieta is a 34 year old male with a past medical history of seizures, 

microcephaly with cranial synostosis, urethral stricture s/p suprapubic 

catheter placement, hx of chronic UTI's with ESBL, MR, hx of PNA presented from 

[facility] for hypoxia. It was noted by the staff at [place] that 

the patient was hypoxic on RA at 78% and was having increased work of 

breathing. Patient was noted to have a non-productive cough, increase in 

respiratory rate, and hypoxia. No other history was obtainable from the NH. 

Patient came to the ED where he had increased work of breath and was placed on 

non-rebreather. At baseline, the patient is nonverbal, does not track, does not 

follow commands. Given 3 amps of Duoneb, 1.5L of NS, Tylenol 1g, Zosyn, Vanco.  

UA + nitrites, 2+ LE, 65 WBC, 3464 bacteria. CXR with no acute findings, CTA 

with atelectasis in LLL with new RULL lesion 1.9cm, no evidence of PE or PNA. I 

wasconsulted for further management of his seizure medications which included 

Onfi, Keppra, zonegran and vimpat. The concern was that his G-tube was clogged 

and therefore multiple medications were not  able to be administered. Discussed 

with hospitalist on 12/17 and advised using depakote and dilantin instead of 

the two medications that could not given IV.Following morning, nurse reports G 

tube no longer clogged, patient back on outpatient medications aand doses. GI 

note reviewed, G tube clear to be used. No abnormal movements and has been 

seizure free.  He should follow up with his regular neurologist to determine if 

Keppra dose can be reduced. No new seizure like movements or events. Remains 

stable at this time.  Continue medical mgmt and optimization, infectious mgmt. 

Monitor G tube functionality. Medication compliance. Maintain adequate 

hydration. No new seizure like movements or events. Remains stable at this 

time. No further recommendations at this time.

## 2019-12-23 NOTE — PN
Progress Note, Physician


History of Present Illness: 





stable


no new issues





- Current Medication List


Current Medications: 


Active Medications





Acetaminophen (Ofirmev Injection -)  1,000 mg IVPB Q6H PRN


   PRN Reason: PAIN LEVEL 7 - 10


   Last Admin: 12/16/19 13:10 Dose:  1,000 mg


Calcium Carbonate/Cholecalciferol (Os-Dale 500+D -)  1 tab GT BID [facility]


   Last Admin: 12/23/19 09:25 Dose:  1 tab


Famotidine (Pepcid)  20 mg PEG DAILY [facility]


   Last Admin: 12/23/19 09:29 Dose:  20 mg


Lacosamide (Vimpat Liquid -)  200 mg GT BID [facility]


   Last Admin: 12/23/19 09:26 Dose:  200 mg


Lactobacillus Acidophilus (Bacid -)  2 tab GT DAILY [facility]


   Last Admin: 12/23/19 09:25 Dose:  2 tab


Levetiracetam (Keppra Oral Solution -)  2,000 mg GT BID [facility]


   Last Admin: 12/23/19 09:28 Dose:  2,000 mg


Levocarnitine (Carnitor Oral Solution -)  700 mg GT TID [facility]


   Last Admin: 12/23/19 14:11 Dose:  700 mg


Multivitamins/Minerals (Certavite-Antioxidant Liquid)  15 ml PO DAILY [facility]


   Last Admin: 12/23/19 09:29 Dose:  15 ml


Ofloxacin (Ocuflox 0.3% Eye Drops -)  2 drop AD Q4HWA [facility]


   Last Admin: 12/23/19 14:11 Dose:  2 drop


Pyridoxine HCl (Vitamin B6 -)  100 mg GT DAILY [facility]


   Last Admin: 12/23/19 09:26 Dose:  100 mg


Senna/Docusate Sodium (Pericolace -)  2 tablet PO HS [facility]


   Last Admin: 12/22/19 22:13 Dose:  2 tablet


Sodium Chloride (Sodium Chloride Tablet -)  3.5 gm GT BID [facility]


   Last Admin: 12/23/19 09:27 Dose:  3.5 gm


Zonisamide (Zonisamide)  300 mg GT BID [facility]


   Last Admin: 12/23/19 09:27 Dose:  300 mg











- Objective


Vital Signs: 


 Vital Signs











Temperature  97.8 F   12/23/19 14:02


 


Pulse Rate  78   12/23/19 14:02


 


Respiratory Rate  20   12/23/19 14:02


 


Blood Pressure  102/51 L  12/23/19 14:02


 


O2 Sat by Pulse Oximetry (%)  100   12/23/19 09:00











Constitutional: Yes: No Distress, Calm


Cardiovascular: Yes: S1, S2


Respiratory: Yes: Regular, CTA Bilaterally


Gastrointestinal: Yes: Normal Bowel Sounds, Soft


Genitourinary: Yes: Larry Present


Musculoskeletal: Yes: WNL


Extremities: Yes: Other


Neurological: Yes: Alert


Psychiatric: Yes: Alert


Labs: 


 CBC, BMP





 12/23/19 09:20 





 12/23/19 09:20 





 INR, PTT











INR  0.97  (0.83-1.09)   12/13/19  09:40    














Assessment/Plan





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 

bedbound, non-verbal, non-communicative at baseline, with suprapubic catheter 

placed with underlying UTI. and patient coming in with hypoxia


 Problem List 





- Problems


(1) Microcephalic


Code(s): Q02 - MICROCEPHALY   





(2) Acute respiratory failure with hypoxia


Code(s): J96.01 - ACUTE RESPIRATORY FAILURE WITH HYPOXIA   





(3) Hypoxemia


Code(s): R09.02 - HYPOXEMIA   





(4) Pneumonia


Code(s): J18.9 - PNEUMONIA, UNSPECIFIED ORGANISM   


Qualifiers: 


   Pneumonia type: due to unspecified organism   Laterality: unspecified 

laterality   Lung location: unspecified part of lung   Qualified Code(s): J18.9 

- Pneumonia, unspecified organism   





(5) Cerebral palsy


Code(s): G80.9 - CEREBRAL PALSY, UNSPECIFIED   





(6) Functional quadriplegia


Code(s): R53.2 - FUNCTIONAL QUADRIPLEGIA   





(7) Seizure


Code(s): R56.9 - UNSPECIFIED CONVULSIONS   





(8) Cerebral palsy


Code(s): G80.9 - CEREBRAL PALSY, UNSPECIFIED   





(9) Severe mental handicap


Code(s): F72 - SEVERE INTELLECTUAL DISABILITIES   





10 uti





plan


continue current mgmt


rest as pert he team

## 2019-12-23 NOTE — PN
Progress Note (short form)





- Note


Progress Note: 


Appears comfortable on NC O2. 


No fevers recorded.


No acute events overnight. 





 Intake & Output











 12/20/19 12/21/19 12/22/19 12/23/19





 23:59 23:59 23:59 23:59


 


Intake Total 650 650 750 


 


Output Total 3500 2550 2150 800


 


Balance -2850 -1900 -1400 -800








 Last Vital Signs











Temp Pulse Resp BP Pulse Ox


 


 98.0 F   63   20   99/66   98 


 


 12/23/19 06:00  12/23/19 06:00  12/23/19 06:00  12/23/19 06:00  12/22/19 21:00








Active Medications





Acetaminophen (Ofirmev Injection -)  1,000 mg IVPB Q6H PRN


   PRN Reason: PAIN LEVEL 7 - 10


   Last Admin: 12/16/19 13:10 Dose:  1,000 mg


Calcium Carbonate/Cholecalciferol (Os-Dale 500+D -)  1 tab GT BID [facility]


   Last Admin: 12/23/19 09:25 Dose:  1 tab


Famotidine (Pepcid)  20 mg PEG DAILY [facility]


   Last Admin: 12/23/19 09:29 Dose:  20 mg


Meropenem 500 mg/ Dextrose  100 mls @ 200 mls/hr IVPB Q12H [facility]


   Last Admin: 12/23/19 03:01 Dose:  200 mls/hr


Lacosamide (Vimpat Liquid -)  200 mg GT BID [facility]


   Last Admin: 12/23/19 09:26 Dose:  200 mg


Lactobacillus Acidophilus (Bacid -)  2 tab GT DAILY [facility]


   Last Admin: 12/23/19 09:25 Dose:  2 tab


Levetiracetam (Keppra Oral Solution -)  2,000 mg GT BID [facility]


   Last Admin: 12/23/19 09:28 Dose:  2,000 mg


Levocarnitine (Carnitor Oral Solution -)  700 mg GT TID [facility]


   Last Admin: 12/23/19 05:33 Dose:  700 mg


Multivitamins/Minerals (Certavite-Antioxidant Liquid)  15 ml PO DAILY [facility]


   Last Admin: 12/23/19 09:29 Dose:  15 ml


Ofloxacin (Ocuflox 0.3% Eye Drops -)  2 drop AD Q4HWA [facility]


   Last Admin: 12/23/19 09:29 Dose:  2 drop


Pyridoxine HCl (Vitamin B6 -)  100 mg GT DAILY [facility]


   Last Admin: 12/23/19 09:26 Dose:  100 mg


Senna/Docusate Sodium (Pericolace -)  2 tablet PO HS [facility]


   Last Admin: 12/22/19 22:13 Dose:  2 tablet


Sodium Chloride (Sodium Chloride Tablet -)  3.5 gm GT BID [facility]


   Last Admin: 12/23/19 09:27 Dose:  3.5 gm


Zonisamide (Zonisamide)  300 mg GT BID [facility]


   Last Admin: 12/23/19 09:27 Dose:  300 mg








 





Gen: breathing nonlabored


Heart: RRR


Lung: decreased breath sounds at the bases


Abd: soft, nontender


Ext: no edema





 


A/P


Acute Hypoxic Respiratory Failure


Pneumonia


Lung Nodule


Cerebral Palsy


Mental Retardation


Seizure Disorder





-  Antibiotics per ID


-  O2 to keep SpO2 >90%


-  Aspiration precautions


-  Inhaled bronchodilators as needed


-  DVT prophylaxis


-  Conservative management of lung nodule





Dr Wan

## 2019-12-23 NOTE — PN
Teaching Attending Note


Name of Resident: Maria C Robin





ATTENDING PHYSICIAN STATEMENT





I saw and evaluated the patient.


I reviewed the resident's note and discussed the case with the resident.


I agree with the resident's findings and plan as documented.








Please see resident note for further subjective info; limited history given 

their clinical condition.  Overall, ID mentions over the weekend that they may 

DC abx as this could be chronic colonization. Thus we will go ahead and DC abx 

and confirm plan with Dr. Garcia.  No issues with the fraser after replacement.  

Thus the patient can be taken back to their residence if cleared with 

subspecialists.  Obstructed GT replaced by dr. Gutierrez and is fucntional.





ROS impossible with the clinical chronic issues








ASSESSMENT AND PLAN:


Catheter is doing well after exchange alongside the GT; I DCd abx given ID's 

thought that this could be colonization. We will clear for DC with the 

subspecialty services; can go today if ok off abx





Problems include:


-uti vs. colonization


-obstructed gt s/p replacement


-spt s/p replacement





Full Code

## 2019-12-23 NOTE — DS
Physical Exam: 


SUBJECTIVE: Patient seen and examined at bedside. Looks comfortable. Sign out 

given to Joe NP ; per staff OK for transfer and return to facility if send 

own ambulance.








OBJECTIVE:





 Vital Signs











 Period  Temp  Pulse  Resp  BP Sys/Curran  Pulse Ox


 


 Last 24 Hr  97.7 F-98.3 F  54-78  20-20  /51-83  








Physical Exam


general: resting comfortably in bed


HEENT: +microcephaly. moist MM


neck: supple


cardio: S1, S2, RRR. no r/m/g


pulm: CTA B/l. 


abdomen: soft, nontender, nondistended. +PEG


: +suprapubic fraser, intact


LE: no edema. pulses intact. contracted 


neuro: CNs appear to be grossly intact





LABS


 Laboratory Results - last 24 hr











  12/23/19 12/23/19





  09:20 09:20


 


WBC  7.7 


 


RBC  3.52 L 


 


Hgb  11.4 L 


 


Hct  34.1 L 


 


MCV  96.8 H 


 


MCH  32.5 


 


MCHC  33.6 


 


RDW  12.6 


 


Plt Count  210 


 


MPV  8.0 


 


Absolute Neuts (auto)  4.0 


 


Neutrophils %  51.5 


 


Lymphocytes %  38.5 


 


Monocytes %  5.6 


 


Eosinophils %  3.9 


 


Basophils %  0.5 


 


Nucleated RBC %  0 


 


Sodium   137


 


Potassium   4.3


 


Chloride   104


 


Carbon Dioxide   25


 


Anion Gap   8


 


BUN   14.8


 


Creatinine   0.4 L


 


Est GFR (CKD-EPI)AfAm   178.35


 


Est GFR (CKD-EPI)NonAf   153.88


 


Random Glucose   142 H


 


Calcium   9.3








 








  12/13/19 12/14/19 12/15/19





  09:36 09:00 06:40


 


WBC  6.1  10.3 H  7.3


 


Hgb  13.1  10.8 L  10.2 L


 


Hct  38.7  D  31.7 L D  30.2 L


 


Plt Count  293  260  235














  12/16/19 12/17/19 12/18/19





  06:00 07:10 06:32


 


WBC  6.3  6.0  4.4


 


Hgb    10.5 L


 


Hct    30.3 L


 


Plt Count   244  224














  12/19/19 12/20/19 12/21/19





  06:15 07:22 06:30


 


WBC  4.0  8.3  7.1


 


Hgb  10.5 L  10.4 L 


 


Hct  30.5 L  30.1 L 


 


Plt Count   














  12/22/19 12/23/19





  05:35 09:20


 


WBC  6.4  7.7


 


Hgb  10.5 L  11.4 L


 


Hct  31.0 L  34.1 L


 


Plt Count  





 








  12/13/19





  14:00


 


ABG pH  7.33 L


 


ABG pCO2 at Pt Temp  38.3


 


ABG pO2 at Pt Temp  239 H


 


ABG HCO3  19.7 L


 


ABG O2 Sat (Measured)  99.3 H


 


ABG Base Excess  -5.2 L

















  12/13/19 12/14/19 12/18/19





  09:36 09:00 06:32


 


Sodium    134 L


 


Potassium    3.9


 


Chloride    102


 


Anion Gap    10


 


BUN    17.2


 


Creatinine    0.5 L


 


Random Glucose    141 H


 


AST  39 H  


 


ALT  56  


 


Alkaline Phosphatase  143 H  


 


Troponin I  < 0.02  


 


Total Protein  8.4 H  


 


Albumin  3.4  3.0 L 














  12/19/19 12/20/19 12/21/19





  06:15 07:22 06:30


 


Sodium  137  137  136


 


Potassium  4.2  3.6  4.0


 


Chloride  108 H  111 H  104


 


Anion Gap  7 L  4 L  7 L


 


BUN  15.2  11.4  12.4


 


Creatinine  0.5 L  0.5 L  0.5 L


 


Random Glucose  146 H  147 H  189 H


 


AST   


 


ALT   


 


Alkaline Phosphatase   


 


Troponin I   


 


Total Protein   


 


Albumin   














  12/22/19 12/23/19





  05:35 09:20


 


Sodium  136  137


 


Potassium  4.0  4.3


 


Chloride  102  104


 


Anion Gap  


 


BUN  13.0  14.8


 


Creatinine  0.4 L  0.4 L


 


Random Glucose  135 H  142 H


 


AST  


 


ALT  


 


Alkaline Phosphatase  


 


Troponin I  


 


Total Protein  


 


Albumin  2.8 L 





 











  12/13/19





  09:45


 


Urine pH  8.5 H


 


Urine Nitrite  Positive H


 


Urine Bilirubin  Negative


 


Urine Urobilinogen  0.2


 


Urine WBC (Auto)  65


 


Urine RBC (Auto)  1


 


Urine Casts (Auto)  15


 


U Epithel Cells (Auto)  0.3


 


Urine Bacteria (Auto)  3464.3








 











  12/13/19 12/13/19 12/14/19





  09:36 18:00 09:00


 


Zonisamide    8.8 L


 


Lacosamide Level   11.1 H 


 


Levetiracetam  122.8 H  

















  12/13/19 12/13/19





  09:40 Unknown


 


Influenza A (Rapid)  Negative 


 


Influenza B (Rapid)  Negative 


 


RSV Rapid   Negative








Microbiology





12/13/19 17:02   Urine - Urine Fraser   Legionella Antigen - Final


12/13/19 17:02   Urine - Urine Fraser   Streptococcus pneumoniae Antigen (M - 

Final


12/13/19 09:45   Urine - Urine Suprapubic   Urine Culture - Final


                              Klebsiella Pneumoniae


                              Escherichia Coli Esbl 


                              Enterococcus Faecalis


12/13/19 09:36   Blood - Peripheral Venous   Blood Culture - Final


                              NO GROWTH AFTER 5 DAYS INCUBATION


12/13/19 09:36   Blood - Peripheral Venous   Blood Culture - Final


                              NO GROWTH AFTER 5 DAYS INCUBATION





Imaging





12/13/19: CXR: dextroscoliosis. no evidence of vascular congestive changes, 

pulm infiltrates. no pneumo or large pleural effusion, no blunting ot he 

costophrenic angles.





12/13/19: chest CTA: limited atelectasis of the left lower lung. suspect new 

RUL lesion and correlate with PET/CT may prove useful as clinically indicated. 


(1.9cm in the medial aspect of the upper right lung; appears to represent 

increase in size of a poorly visualized lesion seen previously.)





12/13/19: EKG: NSR, rate 95bpm, qtc 449ms





12/17/19: CXR: scoliosis, with convexity to the right with clear lungs








HOSPITAL COURSE:





Date of Admission:12/13/19





Date of Discharge: 12/23/19





34 year old male with past medical history of seizures, microcephaly with cranio

-synostosis, urethral stricture s/p suprapubic catheter placement, hx of 

chronic UTI's with ESBL, developmental delay, functional quadriplegia, hx of 

PNA admitted for sepsis secondary to urinary tract infection.





#Sepsis 2/2 UTI


-was on zosyn (12/14-12/19), on meropenem given ESBL (12/19-12/23). off abx on d

/c 


-ucx: +Kleb, ESBL, E. faecalis


-suprapubic cath changed (12/19)


-IV tylenol PRN pain/fever 


-flu, RSV, legionella (-)


-ID: Dr. Garcia


-Uro: Dr. Jo 





#RUL lung lesion


-c/w conservative mgmt**





#Constipation


-c/w senna. added colace





#Seizure d/o


-continue home anti-epileptics Vantin, Keppra, Onfi, zonisamide, levocarnitine


-though keppra supratherapeutic, per neuro will continue d/t low seizure 

threshold


-neuro: Dr. Miles





#Hyponatremia-resolved


-continue sodium chloride tabs





#Hypoxia- resolved 


-aspiration precautions


-Pulm: Dr. Olguin 





#F/E/N


continue to follow lytes


TF jevity 1.5 





Minutes to complete discharge: 45





Discharge Summary


Problems reviewed: Yes


Reason For Visit: UTI,HYPOXEMIA


Current Active Problems





Acute respiratory failure with hypoxia (Acute)


Gastrostomy tube obstruction (Acute)


Hypoxemia (Acute)


Pneumonia (Acute)


Severe mental handicap (Acute)


Severe protein-calorie malnutrition (Acute)


UTI (urinary tract infection) (Acute)


Urinary tract infection due to ESBL Klebsiella (Acute)


Microcephalic (Chronic)








Condition: Improved





- Instructions


Diet, Activity, Other Instructions: 


You were in the hospital because you had a severe urinary tract infection. You 

were treated with IV antibiotics. You were also seen by an infectious disease 

specialist, as well as a urologist. Your suprapubic catheter was exchanged on 12 /19 by Dr. Jo. You improved and are being sent back to your facility.





Medications


You have not been started on any new medications; you have completed your 

antibiotic course.


Please continue your home medications.





Diet


You tolerated Tube Feed Jevity 1.5


You were placed on a rate of 40 cc/hr, w/ titration instructions 20cc q4h, to a 

goal rate of 45 cc/hr


and additional water flushes of 20 cc/hr





Testing


you were found to have a right upper lung lesion. You were seen by a lung 

doctor and the primary team


you have been thus far recommended continued conservative management by the 

team.





Follow up


Please follow up with the following doctor upon your discharge:


   -Dr. Alvarez, the facility physician- this week 


Referrals: 


Olu Alvarez Jr [Non Staff, Medical] - 1 Week


Disposition: HOME





- Home Medications


Comprehensive Discharge Medication List: 


Ambulatory Orders





Albuterol 0.083% Nebulizer Sol [Ventolin 0.083% Nebulizer Soln -] 1 neb NEB Q4H 

PRN 09/13/19 


Clobazam [Onfi -] 10 mg GT BID 09/13/19 


Fructooligosaccharides/Polydex [Fiber-Stat 15 gm/30 ml Liquid] 30 ml GT DAILY 09 /13/19 


Lacosamide [Vimpat] 200 mg GT BID 09/13/19 


Levocarnitine 7 ml GT TID 09/13/19 


Multivitamin [Multiple Vitamins] 30 ml GT DAILY 09/13/19 


Pyridoxine HCl [Vitamin B-6] 100 mg GT ASDIR 09/13/19 


Sennosides/Docusate Sodium [Senna-S Tablet] 2 tab GT HS 09/13/19 


Sodium Chloride Tablet - 3.5 gm GT BID 09/13/19 


Zonisamide 300 mg GT BID 09/13/19 


levETIRAcetam [levETIRAcetam ORAL SUSPENSION] 2 gm GT BID 09/13/19 


Ofloxacin 0.3% Ophth Soln [Ocuflox -] 2 drop AD Q4HWA #10 drops 09/18/19 


Calcium Carbonate/Vitamin D3 [Oyster Shell 500-Vit D3 200 Pk] 1 each GT BID 12/ 13/19 


Diazepam [Diastat Acudial] 1 each RC PRN 12/13/19 


Lactobacillus Acidophilus [Acidophilus] 2 each GT DAILY 12/13/19 


Nutritional Supplement/Fiber [Promote with Fiber Liquid] 237 ml GT ASDIR 12/13/ 19 








This patient is new to me today: No


Emergency Visit: No


Critical Care patient: No





- Discharge Referral


Referred to [facility] No

## 2021-02-25 ENCOUNTER — HOSPITAL ENCOUNTER (INPATIENT)
Dept: HOSPITAL 74 - JER | Age: 36
LOS: 7 days | Discharge: NURSING, REHAB OR LONG-TERM CARE | DRG: 720 | End: 2021-03-04
Attending: NURSE PRACTITIONER | Admitting: INTERNAL MEDICINE
Payer: COMMERCIAL

## 2021-02-25 VITALS — BODY MASS INDEX: 22 KG/M2

## 2021-02-25 DIAGNOSIS — R68.0: ICD-10-CM

## 2021-02-25 DIAGNOSIS — I10: ICD-10-CM

## 2021-02-25 DIAGNOSIS — J69.0: ICD-10-CM

## 2021-02-25 DIAGNOSIS — N39.0: ICD-10-CM

## 2021-02-25 DIAGNOSIS — Z93.1: ICD-10-CM

## 2021-02-25 DIAGNOSIS — A41.89: Primary | ICD-10-CM

## 2021-02-25 DIAGNOSIS — I95.9: ICD-10-CM

## 2021-02-25 DIAGNOSIS — Z16.12: ICD-10-CM

## 2021-02-25 DIAGNOSIS — R62.50: ICD-10-CM

## 2021-02-25 DIAGNOSIS — G40.909: ICD-10-CM

## 2021-02-25 DIAGNOSIS — J96.01: ICD-10-CM

## 2021-02-25 DIAGNOSIS — Z93.59: ICD-10-CM

## 2021-02-25 DIAGNOSIS — F73: ICD-10-CM

## 2021-02-25 DIAGNOSIS — E11.9: ICD-10-CM

## 2021-02-25 DIAGNOSIS — Q75.0: ICD-10-CM

## 2021-02-25 DIAGNOSIS — K21.9: ICD-10-CM

## 2021-02-25 DIAGNOSIS — R53.2: ICD-10-CM

## 2021-02-25 LAB
ALBUMIN SERPL-MCNC: 3.6 G/DL (ref 3.4–5)
ALP SERPL-CCNC: 119 U/L (ref 45–117)
ALT SERPL-CCNC: 49 U/L (ref 13–61)
ANION GAP SERPL CALC-SCNC: 8 MMOL/L (ref 8–16)
APPEARANCE UR: CLEAR
APTT BLD: 40.2 SECONDS (ref 25.2–36.5)
AST SERPL-CCNC: 29 U/L (ref 15–37)
BASOPHILS # BLD: 0.7 % (ref 0–2)
BILIRUB DIRECT SERPL-MCNC: 268 U/L (ref 87–246)
BILIRUB SERPL-MCNC: 0.3 MG/DL (ref 0.2–1)
BILIRUB UR STRIP.AUTO-MCNC: NEGATIVE MG/DL
BNP SERPL-MCNC: 295.2 PG/ML (ref 5–125)
BUN SERPL-MCNC: 13.4 MG/DL (ref 7–18)
CALCIUM SERPL-MCNC: 9.8 MG/DL (ref 8.5–10.1)
CHLORIDE SERPL-SCNC: 106 MMOL/L (ref 98–107)
CO2 SERPL-SCNC: 29 MMOL/L (ref 21–32)
COLOR UR: YELLOW
CREAT SERPL-MCNC: 0.4 MG/DL (ref 0.55–1.3)
DEPRECATED RDW RBC AUTO: 14.2 % (ref 11.9–15.9)
EOSINOPHIL # BLD: 3.8 % (ref 0–4.5)
GLUCOSE SERPL-MCNC: 67 MG/DL (ref 74–106)
HCT VFR BLD CALC: 34.3 % (ref 35.4–49)
HGB BLD-MCNC: 11.7 GM/DL (ref 11.7–16.9)
INR BLD: 0.98 (ref 0.83–1.09)
IRON SERPL-MCNC: 101 UG/DL (ref 50–175)
KETONES UR QL STRIP: NEGATIVE
LEUKOCYTE ESTERASE UR QL STRIP.AUTO: NEGATIVE
LYMPHOCYTES # BLD: 37.5 % (ref 8–40)
MAGNESIUM SERPL-MCNC: 2 MG/DL (ref 1.8–2.4)
MCH RBC QN AUTO: 32.7 PG (ref 25.7–33.7)
MCHC RBC AUTO-ENTMCNC: 34 G/DL (ref 32–35.9)
MCV RBC: 96.3 FL (ref 80–96)
MONOCYTES # BLD AUTO: 4.8 % (ref 3.8–10.2)
NEUTROPHILS # BLD: 53.2 % (ref 42.8–82.8)
NITRITE UR QL STRIP: NEGATIVE
PH UR: 7.5 [PH] (ref 5–8)
PHOSPHATE SERPL-MCNC: 3.6 MG/DL (ref 2.5–4.9)
PLATELET # BLD AUTO: 167 K/MM3 (ref 134–434)
PMV BLD: 8.7 FL (ref 7.5–11.1)
POTASSIUM SERPLBLD-SCNC: 3.5 MMOL/L (ref 3.5–5.1)
PROT SERPL-MCNC: 8.1 G/DL (ref 6.4–8.2)
PROT UR QL STRIP: NEGATIVE
PROT UR QL STRIP: NEGATIVE
PT PNL PPP: 11.9 SEC (ref 9.7–13)
RBC # BLD AUTO: 3.56 M/MM3 (ref 4–5.6)
SODIUM SERPL-SCNC: 143 MMOL/L (ref 136–145)
SP GR UR: 1.01 (ref 1.01–1.03)
TIBC SERPL-MCNC: 360 UG/DL (ref 250–450)
UROBILINOGEN UR STRIP-MCNC: 0.2 MG/DL (ref 0.2–1)
WBC # BLD AUTO: 6.9 K/MM3 (ref 4–10)

## 2021-02-25 PROCEDURE — U0003 INFECTIOUS AGENT DETECTION BY NUCLEIC ACID (DNA OR RNA); SEVERE ACUTE RESPIRATORY SYNDROME CORONAVIRUS 2 (SARS-COV-2) (CORONAVIRUS DISEASE [COVID-19]), AMPLIFIED PROBE TECHNIQUE, MAKING USE OF HIGH THROUGHPUT TECHNOLOGIES AS DESCRIBED BY CMS-2020-01-R: HCPCS

## 2021-02-25 PROCEDURE — C9803 HOPD COVID-19 SPEC COLLECT: HCPCS

## 2021-02-25 PROCEDURE — G0008 ADMIN INFLUENZA VIRUS VAC: HCPCS

## 2021-02-25 RX ADMIN — DOCUSATE SODIUM,SENNOSIDES SCH TABLET: 50; 8.6 TABLET, FILM COATED ORAL at 22:53

## 2021-02-25 RX ADMIN — FUROSEMIDE SCH: 40 SOLUTION ORAL at 17:05

## 2021-02-25 RX ADMIN — LEVOCARNITINE SCH MG: 1 SOLUTION ORAL at 22:54

## 2021-02-25 RX ADMIN — PIPERACILLIN SODIUM,TAZOBACTAM SODIUM SCH MLS/HR: 3; .375 INJECTION, POWDER, FOR SOLUTION INTRAVENOUS at 20:29

## 2021-02-25 RX ADMIN — ATORVASTATIN CALCIUM SCH MG: 40 TABLET, FILM COATED ORAL at 22:53

## 2021-02-25 RX ADMIN — LEVETIRACETAM SCH MG: 100 SOLUTION ORAL at 22:53

## 2021-02-25 RX ADMIN — LACOSAMIDE SCH MG: 10 SOLUTION ORAL at 23:12

## 2021-02-25 RX ADMIN — LEVOCARNITINE SCH: 1 SOLUTION ORAL at 17:09

## 2021-02-26 LAB
ALBUMIN SERPL-MCNC: 3.2 G/DL (ref 3.4–5)
ALP SERPL-CCNC: 110 U/L (ref 45–117)
ALT SERPL-CCNC: 42 U/L (ref 13–61)
ANION GAP SERPL CALC-SCNC: 7 MMOL/L (ref 8–16)
AST SERPL-CCNC: 25 U/L (ref 15–37)
BASOPHILS # BLD: 0.9 % (ref 0–2)
BILIRUB SERPL-MCNC: 0.3 MG/DL (ref 0.2–1)
BUN SERPL-MCNC: 14.8 MG/DL (ref 7–18)
CALCIUM SERPL-MCNC: 9.1 MG/DL (ref 8.5–10.1)
CHLORIDE SERPL-SCNC: 112 MMOL/L (ref 98–107)
CO2 SERPL-SCNC: 25 MMOL/L (ref 21–32)
CREAT SERPL-MCNC: 0.4 MG/DL (ref 0.55–1.3)
DEPRECATED RDW RBC AUTO: 14.3 % (ref 11.9–15.9)
EOSINOPHIL # BLD: 0.6 % (ref 0–4.5)
GLUCOSE SERPL-MCNC: 82 MG/DL (ref 74–106)
HCT VFR BLD CALC: 30.7 % (ref 35.4–49)
HGB BLD-MCNC: 10.5 GM/DL (ref 11.7–16.9)
LYMPHOCYTES # BLD: 32.9 % (ref 8–40)
MAGNESIUM SERPL-MCNC: 2.4 MG/DL (ref 1.8–2.4)
MCH RBC QN AUTO: 32.8 PG (ref 25.7–33.7)
MCHC RBC AUTO-ENTMCNC: 34.1 G/DL (ref 32–35.9)
MCV RBC: 96.1 FL (ref 80–96)
MONOCYTES # BLD AUTO: 4.8 % (ref 3.8–10.2)
NEUTROPHILS # BLD: 60.8 % (ref 42.8–82.8)
PLATELET # BLD AUTO: 172 K/MM3 (ref 134–434)
PMV BLD: 9.4 FL (ref 7.5–11.1)
POTASSIUM SERPLBLD-SCNC: 3.7 MMOL/L (ref 3.5–5.1)
PROT SERPL-MCNC: 7.4 G/DL (ref 6.4–8.2)
RBC # BLD AUTO: 3.19 M/MM3 (ref 4–5.6)
SODIUM SERPL-SCNC: 144 MMOL/L (ref 136–145)
WBC # BLD AUTO: 7.1 K/MM3 (ref 4–10)

## 2021-02-26 RX ADMIN — PIPERACILLIN SODIUM,TAZOBACTAM SODIUM SCH MLS/HR: 3; .375 INJECTION, POWDER, FOR SOLUTION INTRAVENOUS at 17:09

## 2021-02-26 RX ADMIN — ZONISAMIDE SCH MG: 100 CAPSULE ORAL at 21:04

## 2021-02-26 RX ADMIN — LACOSAMIDE SCH MG: 10 SOLUTION ORAL at 09:37

## 2021-02-26 RX ADMIN — FUROSEMIDE SCH MG: 40 SOLUTION ORAL at 12:17

## 2021-02-26 RX ADMIN — LEVOCARNITINE SCH MG: 1 SOLUTION ORAL at 06:25

## 2021-02-26 RX ADMIN — Medication SCH TAB: at 09:37

## 2021-02-26 RX ADMIN — ZONISAMIDE SCH MG: 100 CAPSULE ORAL at 01:18

## 2021-02-26 RX ADMIN — PIPERACILLIN SODIUM,TAZOBACTAM SODIUM SCH MLS/HR: 3; .375 INJECTION, POWDER, FOR SOLUTION INTRAVENOUS at 01:35

## 2021-02-26 RX ADMIN — LEVETIRACETAM SCH MG: 100 SOLUTION ORAL at 21:04

## 2021-02-26 RX ADMIN — MULTIVITAMIN SCH ML: LIQUID ORAL at 09:38

## 2021-02-26 RX ADMIN — MINERAL SUPPLEMENT IRON 300 MG / 5 ML STRENGTH LIQUID 100 PER BOX UNFLAVORED SCH MG: at 09:36

## 2021-02-26 RX ADMIN — DOCUSATE SODIUM,SENNOSIDES SCH TABLET: 50; 8.6 TABLET, FILM COATED ORAL at 21:05

## 2021-02-26 RX ADMIN — LACOSAMIDE SCH MG: 10 SOLUTION ORAL at 21:05

## 2021-02-26 RX ADMIN — PIPERACILLIN SODIUM,TAZOBACTAM SODIUM SCH MLS/HR: 3; .375 INJECTION, POWDER, FOR SOLUTION INTRAVENOUS at 09:38

## 2021-02-26 RX ADMIN — LEVOCARNITINE SCH MG: 1 SOLUTION ORAL at 21:03

## 2021-02-26 RX ADMIN — LEVETIRACETAM SCH MG: 100 SOLUTION ORAL at 09:39

## 2021-02-26 RX ADMIN — LEVOCARNITINE SCH MG: 1 SOLUTION ORAL at 13:43

## 2021-02-26 RX ADMIN — ZONISAMIDE SCH MG: 100 CAPSULE ORAL at 12:17

## 2021-02-26 RX ADMIN — ENOXAPARIN SODIUM SCH MG: 40 INJECTION SUBCUTANEOUS at 09:36

## 2021-02-26 RX ADMIN — DEXAMETHASONE SODIUM PHOSPHATE SCH MG: 4 INJECTION, SOLUTION INTRAMUSCULAR; INTRAVENOUS at 12:18

## 2021-02-26 RX ADMIN — ATORVASTATIN CALCIUM SCH MG: 40 TABLET, FILM COATED ORAL at 21:03

## 2021-02-27 LAB
ALBUMIN SERPL-MCNC: 3.3 G/DL (ref 3.4–5)
ALP SERPL-CCNC: 106 U/L (ref 45–117)
ALT SERPL-CCNC: 39 U/L (ref 13–61)
ANION GAP SERPL CALC-SCNC: 8 MMOL/L (ref 8–16)
AST SERPL-CCNC: 20 U/L (ref 15–37)
BASOPHILS # BLD: 0.4 % (ref 0–2)
BILIRUB DIRECT SERPL-MCNC: 202 U/L (ref 87–246)
BILIRUB SERPL-MCNC: 0.3 MG/DL (ref 0.2–1)
BUN SERPL-MCNC: 14.9 MG/DL (ref 7–18)
CALCIUM SERPL-MCNC: 9.2 MG/DL (ref 8.5–10.1)
CHLORIDE SERPL-SCNC: 110 MMOL/L (ref 98–107)
CO2 SERPL-SCNC: 26 MMOL/L (ref 21–32)
CREAT SERPL-MCNC: 0.5 MG/DL (ref 0.55–1.3)
DEPRECATED RDW RBC AUTO: 14.7 % (ref 11.9–15.9)
EOSINOPHIL # BLD: 0.9 % (ref 0–4.5)
GLUCOSE SERPL-MCNC: 95 MG/DL (ref 74–106)
HCT VFR BLD CALC: 31.9 % (ref 35.4–49)
HGB BLD-MCNC: 10.7 GM/DL (ref 11.7–16.9)
LYMPHOCYTES # BLD: 46.2 % (ref 8–40)
MAGNESIUM SERPL-MCNC: 2.3 MG/DL (ref 1.8–2.4)
MCH RBC QN AUTO: 32.5 PG (ref 25.7–33.7)
MCHC RBC AUTO-ENTMCNC: 33.7 G/DL (ref 32–35.9)
MCV RBC: 96.6 FL (ref 80–96)
MONOCYTES # BLD AUTO: 5.1 % (ref 3.8–10.2)
NEUTROPHILS # BLD: 47.4 % (ref 42.8–82.8)
PLATELET # BLD AUTO: 213 K/MM3 (ref 134–434)
PMV BLD: 8.4 FL (ref 7.5–11.1)
POTASSIUM SERPLBLD-SCNC: 3.4 MMOL/L (ref 3.5–5.1)
PROT SERPL-MCNC: 7.4 G/DL (ref 6.4–8.2)
RBC # BLD AUTO: 3.3 M/MM3 (ref 4–5.6)
SODIUM SERPL-SCNC: 145 MMOL/L (ref 136–145)
WBC # BLD AUTO: 6.7 K/MM3 (ref 4–10)

## 2021-02-27 RX ADMIN — MULTIVITAMIN SCH ML: LIQUID ORAL at 09:27

## 2021-02-27 RX ADMIN — LEVETIRACETAM SCH MG: 100 SOLUTION ORAL at 21:14

## 2021-02-27 RX ADMIN — ZONISAMIDE SCH MG: 100 CAPSULE ORAL at 21:17

## 2021-02-27 RX ADMIN — PIPERACILLIN SODIUM,TAZOBACTAM SODIUM SCH MLS/HR: 3; .375 INJECTION, POWDER, FOR SOLUTION INTRAVENOUS at 02:19

## 2021-02-27 RX ADMIN — ATORVASTATIN CALCIUM SCH MG: 40 TABLET, FILM COATED ORAL at 21:14

## 2021-02-27 RX ADMIN — LEVOCARNITINE SCH MG: 1 SOLUTION ORAL at 06:53

## 2021-02-27 RX ADMIN — PIPERACILLIN SODIUM,TAZOBACTAM SODIUM SCH MLS/HR: 3; .375 INJECTION, POWDER, FOR SOLUTION INTRAVENOUS at 09:26

## 2021-02-27 RX ADMIN — PIPERACILLIN SODIUM,TAZOBACTAM SODIUM SCH MLS/HR: 3; .375 INJECTION, POWDER, FOR SOLUTION INTRAVENOUS at 17:26

## 2021-02-27 RX ADMIN — ZONISAMIDE SCH MG: 100 CAPSULE ORAL at 09:27

## 2021-02-27 RX ADMIN — ENOXAPARIN SODIUM SCH MG: 40 INJECTION SUBCUTANEOUS at 09:28

## 2021-02-27 RX ADMIN — LEVETIRACETAM SCH MG: 100 SOLUTION ORAL at 09:28

## 2021-02-27 RX ADMIN — Medication SCH TAB: at 09:29

## 2021-02-27 RX ADMIN — LEVOCARNITINE SCH MG: 1 SOLUTION ORAL at 13:29

## 2021-02-27 RX ADMIN — DOCUSATE SODIUM,SENNOSIDES SCH TABLET: 50; 8.6 TABLET, FILM COATED ORAL at 21:15

## 2021-02-27 RX ADMIN — DEXAMETHASONE SODIUM PHOSPHATE SCH MG: 4 INJECTION, SOLUTION INTRAMUSCULAR; INTRAVENOUS at 09:29

## 2021-02-27 RX ADMIN — MINERAL SUPPLEMENT IRON 300 MG / 5 ML STRENGTH LIQUID 100 PER BOX UNFLAVORED SCH MG: at 09:28

## 2021-02-27 RX ADMIN — FUROSEMIDE SCH MG: 40 SOLUTION ORAL at 09:28

## 2021-02-27 RX ADMIN — LACOSAMIDE SCH MG: 10 SOLUTION ORAL at 21:15

## 2021-02-27 RX ADMIN — LACOSAMIDE SCH MG: 10 SOLUTION ORAL at 09:28

## 2021-02-27 RX ADMIN — LEVOCARNITINE SCH MG: 1 SOLUTION ORAL at 21:13

## 2021-02-28 LAB
ALBUMIN SERPL-MCNC: 3.2 G/DL (ref 3.4–5)
ALP SERPL-CCNC: 103 U/L (ref 45–117)
ALT SERPL-CCNC: 36 U/L (ref 13–61)
ANION GAP SERPL CALC-SCNC: 8 MMOL/L (ref 8–16)
AST SERPL-CCNC: 18 U/L (ref 15–37)
BASOPHILS # BLD: 0.5 % (ref 0–2)
BILIRUB DIRECT SERPL-MCNC: 176 U/L (ref 87–246)
BILIRUB SERPL-MCNC: 0.2 MG/DL (ref 0.2–1)
BUN SERPL-MCNC: 19.1 MG/DL (ref 7–18)
CALCIUM SERPL-MCNC: 9 MG/DL (ref 8.5–10.1)
CHLORIDE SERPL-SCNC: 106 MMOL/L (ref 98–107)
CO2 SERPL-SCNC: 25 MMOL/L (ref 21–32)
CREAT SERPL-MCNC: 0.8 MG/DL (ref 0.55–1.3)
DEPRECATED RDW RBC AUTO: 14.3 % (ref 11.9–15.9)
EOSINOPHIL # BLD: 0.6 % (ref 0–4.5)
GLUCOSE SERPL-MCNC: 283 MG/DL (ref 74–106)
HCT VFR BLD CALC: 32.2 % (ref 35.4–49)
HGB BLD-MCNC: 11 GM/DL (ref 11.7–16.9)
LYMPHOCYTES # BLD: 37.3 % (ref 8–40)
MAGNESIUM SERPL-MCNC: 2.1 MG/DL (ref 1.8–2.4)
MCH RBC QN AUTO: 32.8 PG (ref 25.7–33.7)
MCHC RBC AUTO-ENTMCNC: 34.1 G/DL (ref 32–35.9)
MCV RBC: 96.2 FL (ref 80–96)
MONOCYTES # BLD AUTO: 5.5 % (ref 3.8–10.2)
NEUTROPHILS # BLD: 56.1 % (ref 42.8–82.8)
PLATELET # BLD AUTO: 307 K/MM3 (ref 134–434)
PMV BLD: 8.1 FL (ref 7.5–11.1)
POTASSIUM SERPLBLD-SCNC: 3.5 MMOL/L (ref 3.5–5.1)
PROT SERPL-MCNC: 7.5 G/DL (ref 6.4–8.2)
RBC # BLD AUTO: 3.35 M/MM3 (ref 4–5.6)
SODIUM SERPL-SCNC: 139 MMOL/L (ref 136–145)
WBC # BLD AUTO: 9.4 K/MM3 (ref 4–10)

## 2021-02-28 RX ADMIN — PIPERACILLIN SODIUM,TAZOBACTAM SODIUM SCH MLS/HR: 3; .375 INJECTION, POWDER, FOR SOLUTION INTRAVENOUS at 01:46

## 2021-02-28 RX ADMIN — LEVETIRACETAM SCH MG: 100 SOLUTION ORAL at 10:10

## 2021-02-28 RX ADMIN — LEVOCARNITINE SCH MG: 1 SOLUTION ORAL at 21:00

## 2021-02-28 RX ADMIN — PIPERACILLIN SODIUM,TAZOBACTAM SODIUM SCH MLS/HR: 3; .375 INJECTION, POWDER, FOR SOLUTION INTRAVENOUS at 17:42

## 2021-02-28 RX ADMIN — LEVETIRACETAM SCH MG: 100 SOLUTION ORAL at 21:02

## 2021-02-28 RX ADMIN — FUROSEMIDE SCH MG: 40 SOLUTION ORAL at 10:11

## 2021-02-28 RX ADMIN — ATORVASTATIN CALCIUM SCH MG: 40 TABLET, FILM COATED ORAL at 21:02

## 2021-02-28 RX ADMIN — Medication SCH ML: at 11:09

## 2021-02-28 RX ADMIN — DOCUSATE SODIUM,SENNOSIDES SCH TABLET: 50; 8.6 TABLET, FILM COATED ORAL at 21:02

## 2021-02-28 RX ADMIN — LEVOCARNITINE SCH MG: 1 SOLUTION ORAL at 05:33

## 2021-02-28 RX ADMIN — MULTIVITAMIN SCH ML: LIQUID ORAL at 10:11

## 2021-02-28 RX ADMIN — LACOSAMIDE SCH MG: 10 SOLUTION ORAL at 21:01

## 2021-02-28 RX ADMIN — Medication SCH TAB: at 11:08

## 2021-02-28 RX ADMIN — LEVOCARNITINE SCH MG: 1 SOLUTION ORAL at 13:41

## 2021-02-28 RX ADMIN — ZONISAMIDE SCH MG: 100 CAPSULE ORAL at 21:03

## 2021-02-28 RX ADMIN — ENOXAPARIN SODIUM SCH MG: 40 INJECTION SUBCUTANEOUS at 11:08

## 2021-02-28 RX ADMIN — DEXAMETHASONE SODIUM PHOSPHATE SCH MG: 4 INJECTION, SOLUTION INTRAMUSCULAR; INTRAVENOUS at 10:12

## 2021-02-28 RX ADMIN — PIPERACILLIN SODIUM,TAZOBACTAM SODIUM SCH MLS/HR: 3; .375 INJECTION, POWDER, FOR SOLUTION INTRAVENOUS at 10:12

## 2021-02-28 RX ADMIN — ZONISAMIDE SCH MG: 100 CAPSULE ORAL at 11:31

## 2021-02-28 RX ADMIN — MINERAL SUPPLEMENT IRON 300 MG / 5 ML STRENGTH LIQUID 100 PER BOX UNFLAVORED SCH MG: at 11:08

## 2021-02-28 RX ADMIN — LACOSAMIDE SCH MG: 10 SOLUTION ORAL at 11:31

## 2021-03-01 LAB
ALBUMIN SERPL-MCNC: 3.2 G/DL (ref 3.4–5)
ALP SERPL-CCNC: 99 U/L (ref 45–117)
ALT SERPL-CCNC: 32 U/L (ref 13–61)
ANION GAP SERPL CALC-SCNC: 6 MMOL/L (ref 8–16)
AST SERPL-CCNC: 15 U/L (ref 15–37)
BASOPHILS # BLD: 0.8 % (ref 0–2)
BILIRUB SERPL-MCNC: 0.2 MG/DL (ref 0.2–1)
BUN SERPL-MCNC: 23.6 MG/DL (ref 7–18)
CALCIUM SERPL-MCNC: 9 MG/DL (ref 8.5–10.1)
CHLORIDE SERPL-SCNC: 106 MMOL/L (ref 98–107)
CO2 SERPL-SCNC: 27 MMOL/L (ref 21–32)
CREAT SERPL-MCNC: 0.8 MG/DL (ref 0.55–1.3)
DEPRECATED RDW RBC AUTO: 14.5 % (ref 11.9–15.9)
EOSINOPHIL # BLD: 0.3 % (ref 0–4.5)
GLUCOSE SERPL-MCNC: 475 MG/DL (ref 74–106)
HCT VFR BLD CALC: 34.1 % (ref 35.4–49)
HGB BLD-MCNC: 11.5 GM/DL (ref 11.7–16.9)
LYMPHOCYTES # BLD: 31.9 % (ref 8–40)
MAGNESIUM SERPL-MCNC: 2.4 MG/DL (ref 1.8–2.4)
MCH RBC QN AUTO: 32.8 PG (ref 25.7–33.7)
MCHC RBC AUTO-ENTMCNC: 33.7 G/DL (ref 32–35.9)
MCV RBC: 97.4 FL (ref 80–96)
MONOCYTES # BLD AUTO: 6.2 % (ref 3.8–10.2)
NEUTROPHILS # BLD: 60.8 % (ref 42.8–82.8)
PLATELET # BLD AUTO: 297 K/MM3 (ref 134–434)
PMV BLD: 8.1 FL (ref 7.5–11.1)
POTASSIUM SERPLBLD-SCNC: 3.9 MMOL/L (ref 3.5–5.1)
PROT SERPL-MCNC: 7.3 G/DL (ref 6.4–8.2)
RBC # BLD AUTO: 3.51 M/MM3 (ref 4–5.6)
SODIUM SERPL-SCNC: 139 MMOL/L (ref 136–145)
WBC # BLD AUTO: 10.2 K/MM3 (ref 4–10)

## 2021-03-01 RX ADMIN — ATORVASTATIN CALCIUM SCH MG: 40 TABLET, FILM COATED ORAL at 21:56

## 2021-03-01 RX ADMIN — LEVETIRACETAM SCH MG: 100 SOLUTION ORAL at 09:49

## 2021-03-01 RX ADMIN — MINERAL SUPPLEMENT IRON 300 MG / 5 ML STRENGTH LIQUID 100 PER BOX UNFLAVORED SCH MG: at 09:52

## 2021-03-01 RX ADMIN — DOCUSATE SODIUM,SENNOSIDES SCH TABLET: 50; 8.6 TABLET, FILM COATED ORAL at 21:56

## 2021-03-01 RX ADMIN — Medication SCH ML: at 08:29

## 2021-03-01 RX ADMIN — Medication SCH TAB: at 09:51

## 2021-03-01 RX ADMIN — ZONISAMIDE SCH MG: 100 CAPSULE ORAL at 11:03

## 2021-03-01 RX ADMIN — INSULIN ASPART SCH UNITS: 100 INJECTION, SOLUTION INTRAVENOUS; SUBCUTANEOUS at 11:15

## 2021-03-01 RX ADMIN — INSULIN ASPART SCH UNITS: 100 INJECTION, SOLUTION INTRAVENOUS; SUBCUTANEOUS at 17:47

## 2021-03-01 RX ADMIN — ENOXAPARIN SODIUM SCH MG: 40 INJECTION SUBCUTANEOUS at 09:52

## 2021-03-01 RX ADMIN — LEVOCARNITINE SCH MG: 1 SOLUTION ORAL at 13:56

## 2021-03-01 RX ADMIN — PIPERACILLIN SODIUM,TAZOBACTAM SODIUM SCH MLS/HR: 3; .375 INJECTION, POWDER, FOR SOLUTION INTRAVENOUS at 09:52

## 2021-03-01 RX ADMIN — LEVOCARNITINE SCH MG: 1 SOLUTION ORAL at 05:50

## 2021-03-01 RX ADMIN — LACOSAMIDE SCH MG: 10 SOLUTION ORAL at 09:50

## 2021-03-01 RX ADMIN — ZONISAMIDE SCH MG: 100 CAPSULE ORAL at 22:12

## 2021-03-01 RX ADMIN — MULTIVITAMIN SCH ML: LIQUID ORAL at 09:48

## 2021-03-01 RX ADMIN — INSULIN DETEMIR SCH UNITS: 100 INJECTION, SOLUTION SUBCUTANEOUS at 22:03

## 2021-03-01 RX ADMIN — LACOSAMIDE SCH MG: 10 SOLUTION ORAL at 21:57

## 2021-03-01 RX ADMIN — INSULIN ASPART SCH UNITS: 100 INJECTION, SOLUTION INTRAVENOUS; SUBCUTANEOUS at 21:59

## 2021-03-01 RX ADMIN — LEVETIRACETAM SCH MG: 100 SOLUTION ORAL at 22:02

## 2021-03-01 RX ADMIN — LEVOCARNITINE SCH MG: 1 SOLUTION ORAL at 21:58

## 2021-03-01 RX ADMIN — FUROSEMIDE SCH MG: 40 SOLUTION ORAL at 09:49

## 2021-03-01 RX ADMIN — AMOXICILLIN AND CLAVULANATE POTASSIUM SCH MG: 250; 62.5 POWDER, FOR SUSPENSION ORAL at 18:11

## 2021-03-01 RX ADMIN — PIPERACILLIN SODIUM,TAZOBACTAM SODIUM SCH MLS/HR: 3; .375 INJECTION, POWDER, FOR SOLUTION INTRAVENOUS at 02:00

## 2021-03-01 RX ADMIN — INSULIN ASPART SCH UNITS: 100 INJECTION, SOLUTION INTRAVENOUS; SUBCUTANEOUS at 08:16

## 2021-03-02 LAB
ALBUMIN SERPL-MCNC: 3.2 G/DL (ref 3.4–5)
ALP SERPL-CCNC: 104 U/L (ref 45–117)
ALT SERPL-CCNC: 34 U/L (ref 13–61)
ANION GAP SERPL CALC-SCNC: 6 MMOL/L (ref 8–16)
AST SERPL-CCNC: 20 U/L (ref 15–37)
BASOPHILS # BLD: 0.5 % (ref 0–2)
BILIRUB SERPL-MCNC: 0.2 MG/DL (ref 0.2–1)
BUN SERPL-MCNC: 28.6 MG/DL (ref 7–18)
CALCIUM SERPL-MCNC: 9.7 MG/DL (ref 8.5–10.1)
CHLORIDE SERPL-SCNC: 108 MMOL/L (ref 98–107)
CO2 SERPL-SCNC: 28 MMOL/L (ref 21–32)
CREAT SERPL-MCNC: 0.7 MG/DL (ref 0.55–1.3)
DEPRECATED RDW RBC AUTO: 14.3 % (ref 11.9–15.9)
EOSINOPHIL # BLD: 2.6 % (ref 0–4.5)
GLUCOSE SERPL-MCNC: 339 MG/DL (ref 74–106)
HCT VFR BLD CALC: 33.3 % (ref 35.4–49)
HGB BLD-MCNC: 11.2 GM/DL (ref 11.7–16.9)
LYMPHOCYTES # BLD: 38.8 % (ref 8–40)
MAGNESIUM SERPL-MCNC: 2.4 MG/DL (ref 1.8–2.4)
MCH RBC QN AUTO: 32.9 PG (ref 25.7–33.7)
MCHC RBC AUTO-ENTMCNC: 33.6 G/DL (ref 32–35.9)
MCV RBC: 98.1 FL (ref 80–96)
MONOCYTES # BLD AUTO: 6.5 % (ref 3.8–10.2)
NEUTROPHILS # BLD: 51.6 % (ref 42.8–82.8)
PLATELET # BLD AUTO: 330 K/MM3 (ref 134–434)
PMV BLD: 7.8 FL (ref 7.5–11.1)
POTASSIUM SERPLBLD-SCNC: 4.3 MMOL/L (ref 3.5–5.1)
PROT SERPL-MCNC: 7.3 G/DL (ref 6.4–8.2)
RBC # BLD AUTO: 3.39 M/MM3 (ref 4–5.6)
SODIUM SERPL-SCNC: 142 MMOL/L (ref 136–145)
WBC # BLD AUTO: 7.7 K/MM3 (ref 4–10)

## 2021-03-02 RX ADMIN — AMOXICILLIN AND CLAVULANATE POTASSIUM SCH MG: 250; 62.5 POWDER, FOR SUSPENSION ORAL at 08:49

## 2021-03-02 RX ADMIN — ATORVASTATIN CALCIUM SCH MG: 40 TABLET, FILM COATED ORAL at 21:35

## 2021-03-02 RX ADMIN — DOCUSATE SODIUM,SENNOSIDES SCH TABLET: 50; 8.6 TABLET, FILM COATED ORAL at 21:35

## 2021-03-02 RX ADMIN — LACOSAMIDE SCH MG: 10 SOLUTION ORAL at 09:05

## 2021-03-02 RX ADMIN — LACOSAMIDE SCH MG: 10 SOLUTION ORAL at 21:36

## 2021-03-02 RX ADMIN — ZONISAMIDE SCH MG: 100 CAPSULE ORAL at 21:36

## 2021-03-02 RX ADMIN — AMOXICILLIN AND CLAVULANATE POTASSIUM SCH MG: 250; 62.5 POWDER, FOR SUSPENSION ORAL at 17:53

## 2021-03-02 RX ADMIN — MULTIVITAMIN SCH ML: LIQUID ORAL at 09:03

## 2021-03-02 RX ADMIN — LEVOCARNITINE SCH MG: 1 SOLUTION ORAL at 06:30

## 2021-03-02 RX ADMIN — INSULIN DETEMIR SCH UNITS: 100 INJECTION, SOLUTION SUBCUTANEOUS at 21:35

## 2021-03-02 RX ADMIN — INSULIN ASPART SCH UNIT: 100 INJECTION, SOLUTION INTRAVENOUS; SUBCUTANEOUS at 21:34

## 2021-03-02 RX ADMIN — INSULIN ASPART SCH UNITS: 100 INJECTION, SOLUTION INTRAVENOUS; SUBCUTANEOUS at 11:51

## 2021-03-02 RX ADMIN — MINERAL SUPPLEMENT IRON 300 MG / 5 ML STRENGTH LIQUID 100 PER BOX UNFLAVORED SCH MG: at 09:04

## 2021-03-02 RX ADMIN — ZONISAMIDE SCH MG: 100 CAPSULE ORAL at 09:05

## 2021-03-02 RX ADMIN — ENOXAPARIN SODIUM SCH MG: 40 INJECTION SUBCUTANEOUS at 09:04

## 2021-03-02 RX ADMIN — Medication SCH TAB: at 09:03

## 2021-03-02 RX ADMIN — LEVOCARNITINE SCH MG: 1 SOLUTION ORAL at 12:59

## 2021-03-02 RX ADMIN — LEVETIRACETAM SCH MG: 100 SOLUTION ORAL at 21:36

## 2021-03-02 RX ADMIN — INSULIN ASPART SCH UNIT: 100 INJECTION, SOLUTION INTRAVENOUS; SUBCUTANEOUS at 17:16

## 2021-03-02 RX ADMIN — LEVOCARNITINE SCH MG: 1 SOLUTION ORAL at 21:35

## 2021-03-02 RX ADMIN — LEVETIRACETAM SCH MG: 100 SOLUTION ORAL at 09:04

## 2021-03-02 RX ADMIN — Medication SCH ML: at 08:49

## 2021-03-02 RX ADMIN — FUROSEMIDE SCH MG: 40 SOLUTION ORAL at 09:04

## 2021-03-02 RX ADMIN — INSULIN ASPART SCH UNITS: 100 INJECTION, SOLUTION INTRAVENOUS; SUBCUTANEOUS at 06:33

## 2021-03-03 LAB
ALBUMIN SERPL-MCNC: 3.5 G/DL (ref 3.4–5)
ALP SERPL-CCNC: 111 U/L (ref 45–117)
ALT SERPL-CCNC: 36 U/L (ref 13–61)
ANION GAP SERPL CALC-SCNC: 7 MMOL/L (ref 8–16)
AST SERPL-CCNC: 26 U/L (ref 15–37)
BILIRUB SERPL-MCNC: 0.4 MG/DL (ref 0.2–1)
BUN SERPL-MCNC: 35.7 MG/DL (ref 7–18)
CALCIUM SERPL-MCNC: 10.2 MG/DL (ref 8.5–10.1)
CHLORIDE SERPL-SCNC: 109 MMOL/L (ref 98–107)
CO2 SERPL-SCNC: 27 MMOL/L (ref 21–32)
CREAT SERPL-MCNC: 0.6 MG/DL (ref 0.55–1.3)
GLUCOSE SERPL-MCNC: 169 MG/DL (ref 74–106)
MAGNESIUM SERPL-MCNC: 2.6 MG/DL (ref 1.8–2.4)
POTASSIUM SERPLBLD-SCNC: 4.3 MMOL/L (ref 3.5–5.1)
PROT SERPL-MCNC: 8.3 G/DL (ref 6.4–8.2)
SODIUM SERPL-SCNC: 144 MMOL/L (ref 136–145)

## 2021-03-03 RX ADMIN — LACOSAMIDE SCH MG: 10 SOLUTION ORAL at 09:21

## 2021-03-03 RX ADMIN — LEVETIRACETAM SCH MG: 100 SOLUTION ORAL at 22:19

## 2021-03-03 RX ADMIN — Medication SCH ML: at 08:36

## 2021-03-03 RX ADMIN — DOCUSATE SODIUM,SENNOSIDES SCH TABLET: 50; 8.6 TABLET, FILM COATED ORAL at 22:21

## 2021-03-03 RX ADMIN — INSULIN ASPART SCH UNIT: 100 INJECTION, SOLUTION INTRAVENOUS; SUBCUTANEOUS at 11:43

## 2021-03-03 RX ADMIN — LEVOCARNITINE SCH MG: 1 SOLUTION ORAL at 13:41

## 2021-03-03 RX ADMIN — LEVOCARNITINE SCH MG: 1 SOLUTION ORAL at 05:49

## 2021-03-03 RX ADMIN — INSULIN ASPART SCH UNIT: 100 INJECTION, SOLUTION INTRAVENOUS; SUBCUTANEOUS at 22:21

## 2021-03-03 RX ADMIN — LEVETIRACETAM SCH MG: 100 SOLUTION ORAL at 10:10

## 2021-03-03 RX ADMIN — ZONISAMIDE SCH MG: 100 CAPSULE ORAL at 22:25

## 2021-03-03 RX ADMIN — MULTIVITAMIN SCH ML: LIQUID ORAL at 10:11

## 2021-03-03 RX ADMIN — INSULIN ASPART SCH: 100 INJECTION, SOLUTION INTRAVENOUS; SUBCUTANEOUS at 00:09

## 2021-03-03 RX ADMIN — LACOSAMIDE SCH MG: 10 SOLUTION ORAL at 22:21

## 2021-03-03 RX ADMIN — ZONISAMIDE SCH MG: 100 CAPSULE ORAL at 12:23

## 2021-03-03 RX ADMIN — FUROSEMIDE SCH MG: 40 SOLUTION ORAL at 10:11

## 2021-03-03 RX ADMIN — MINERAL SUPPLEMENT IRON 300 MG / 5 ML STRENGTH LIQUID 100 PER BOX UNFLAVORED SCH MG: at 09:21

## 2021-03-03 RX ADMIN — AMOXICILLIN AND CLAVULANATE POTASSIUM SCH MG: 250; 62.5 POWDER, FOR SUSPENSION ORAL at 17:11

## 2021-03-03 RX ADMIN — AMOXICILLIN AND CLAVULANATE POTASSIUM SCH MG: 250; 62.5 POWDER, FOR SUSPENSION ORAL at 12:24

## 2021-03-03 RX ADMIN — Medication SCH TAB: at 09:20

## 2021-03-03 RX ADMIN — ATORVASTATIN CALCIUM SCH MG: 40 TABLET, FILM COATED ORAL at 22:21

## 2021-03-03 RX ADMIN — ENOXAPARIN SODIUM SCH MG: 40 INJECTION SUBCUTANEOUS at 09:21

## 2021-03-03 RX ADMIN — INSULIN ASPART SCH UNIT: 100 INJECTION, SOLUTION INTRAVENOUS; SUBCUTANEOUS at 06:03

## 2021-03-03 RX ADMIN — INSULIN ASPART SCH UNIT: 100 INJECTION, SOLUTION INTRAVENOUS; SUBCUTANEOUS at 17:10

## 2021-03-03 RX ADMIN — INSULIN DETEMIR SCH UNITS: 100 INJECTION, SOLUTION SUBCUTANEOUS at 22:18

## 2021-03-03 RX ADMIN — LEVOCARNITINE SCH MG: 1 SOLUTION ORAL at 22:18

## 2021-03-04 VITALS — DIASTOLIC BLOOD PRESSURE: 68 MMHG | SYSTOLIC BLOOD PRESSURE: 105 MMHG | TEMPERATURE: 98.2 F | HEART RATE: 93 BPM

## 2021-03-04 LAB
ALBUMIN SERPL-MCNC: 3.3 G/DL (ref 3.4–5)
ALP SERPL-CCNC: 108 U/L (ref 45–117)
ALT SERPL-CCNC: 30 U/L (ref 13–61)
ANION GAP SERPL CALC-SCNC: 5 MMOL/L (ref 8–16)
AST SERPL-CCNC: 18 U/L (ref 15–37)
BASOPHILS # BLD: 0.3 % (ref 0–2)
BILIRUB SERPL-MCNC: 0.3 MG/DL (ref 0.2–1)
BUN SERPL-MCNC: 35.6 MG/DL (ref 7–18)
CALCIUM SERPL-MCNC: 9.5 MG/DL (ref 8.5–10.1)
CHLORIDE SERPL-SCNC: 109 MMOL/L (ref 98–107)
CO2 SERPL-SCNC: 28 MMOL/L (ref 21–32)
CREAT SERPL-MCNC: 0.6 MG/DL (ref 0.55–1.3)
DEPRECATED RDW RBC AUTO: 13.9 % (ref 11.9–15.9)
EOSINOPHIL # BLD: 3.2 % (ref 0–4.5)
GLUCOSE SERPL-MCNC: 286 MG/DL (ref 74–106)
HCT VFR BLD CALC: 35.3 % (ref 35.4–49)
HGB BLD-MCNC: 11.7 GM/DL (ref 11.7–16.9)
LYMPHOCYTES # BLD: 35.6 % (ref 8–40)
MAGNESIUM SERPL-MCNC: 2.3 MG/DL (ref 1.8–2.4)
MCH RBC QN AUTO: 32.5 PG (ref 25.7–33.7)
MCHC RBC AUTO-ENTMCNC: 33.2 G/DL (ref 32–35.9)
MCV RBC: 98 FL (ref 80–96)
MONOCYTES # BLD AUTO: 6.9 % (ref 3.8–10.2)
NEUTROPHILS # BLD: 54 % (ref 42.8–82.8)
PLATELET # BLD AUTO: 364 K/MM3 (ref 134–434)
PMV BLD: 8 FL (ref 7.5–11.1)
POTASSIUM SERPLBLD-SCNC: 3.7 MMOL/L (ref 3.5–5.1)
PROT SERPL-MCNC: 7.6 G/DL (ref 6.4–8.2)
RBC # BLD AUTO: 3.6 M/MM3 (ref 4–5.6)
SODIUM SERPL-SCNC: 142 MMOL/L (ref 136–145)
WBC # BLD AUTO: 9.1 K/MM3 (ref 4–10)

## 2021-03-04 RX ADMIN — AMOXICILLIN AND CLAVULANATE POTASSIUM SCH MG: 250; 62.5 POWDER, FOR SUSPENSION ORAL at 08:45

## 2021-03-04 RX ADMIN — MULTIVITAMIN SCH ML: LIQUID ORAL at 10:33

## 2021-03-04 RX ADMIN — LACOSAMIDE SCH MG: 10 SOLUTION ORAL at 10:33

## 2021-03-04 RX ADMIN — Medication SCH TAB: at 10:33

## 2021-03-04 RX ADMIN — INSULIN ASPART SCH UNIT: 100 INJECTION, SOLUTION INTRAVENOUS; SUBCUTANEOUS at 17:13

## 2021-03-04 RX ADMIN — INSULIN ASPART SCH UNIT: 100 INJECTION, SOLUTION INTRAVENOUS; SUBCUTANEOUS at 06:00

## 2021-03-04 RX ADMIN — Medication SCH ML: at 08:45

## 2021-03-04 RX ADMIN — LEVOCARNITINE SCH MG: 1 SOLUTION ORAL at 05:48

## 2021-03-04 RX ADMIN — ZONISAMIDE SCH MG: 100 CAPSULE ORAL at 10:34

## 2021-03-04 RX ADMIN — FUROSEMIDE SCH MG: 40 SOLUTION ORAL at 10:33

## 2021-03-04 RX ADMIN — LEVOCARNITINE SCH MG: 1 SOLUTION ORAL at 13:33

## 2021-03-04 RX ADMIN — INSULIN ASPART SCH UNIT: 100 INJECTION, SOLUTION INTRAVENOUS; SUBCUTANEOUS at 11:49

## 2021-03-04 RX ADMIN — LEVETIRACETAM SCH MG: 100 SOLUTION ORAL at 10:33

## 2021-03-04 RX ADMIN — AMOXICILLIN AND CLAVULANATE POTASSIUM SCH MG: 250; 62.5 POWDER, FOR SUSPENSION ORAL at 17:07

## 2021-03-04 RX ADMIN — MINERAL SUPPLEMENT IRON 300 MG / 5 ML STRENGTH LIQUID 100 PER BOX UNFLAVORED SCH MG: at 10:32

## 2021-03-04 RX ADMIN — ENOXAPARIN SODIUM SCH MG: 40 INJECTION SUBCUTANEOUS at 10:33

## 2021-05-21 ENCOUNTER — HOSPITAL ENCOUNTER (INPATIENT)
Dept: HOSPITAL 74 - JER | Age: 36
LOS: 7 days | Discharge: HOME | DRG: 466 | End: 2021-05-28
Attending: NURSE PRACTITIONER | Admitting: INTERNAL MEDICINE
Payer: COMMERCIAL

## 2021-05-21 VITALS — BODY MASS INDEX: 30.4 KG/M2

## 2021-05-21 DIAGNOSIS — R56.9: ICD-10-CM

## 2021-05-21 DIAGNOSIS — E11.9: ICD-10-CM
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
## 2021-05-21 DIAGNOSIS — Q02: ICD-10-CM

## 2021-05-21 DIAGNOSIS — N39.0: ICD-10-CM

## 2021-05-21 DIAGNOSIS — B96.20: ICD-10-CM

## 2021-05-21 DIAGNOSIS — R74.01: ICD-10-CM

## 2021-05-21 DIAGNOSIS — Z93.50: ICD-10-CM

## 2021-05-21 DIAGNOSIS — Z93.1: ICD-10-CM

## 2021-05-21 DIAGNOSIS — T83.510A: Primary | ICD-10-CM

## 2021-05-21 DIAGNOSIS — G80.9: ICD-10-CM

## 2021-05-21 DIAGNOSIS — E87.6: ICD-10-CM

## 2021-05-21 DIAGNOSIS — F72: ICD-10-CM

## 2021-05-21 DIAGNOSIS — K21.9: ICD-10-CM

## 2021-05-21 DIAGNOSIS — R53.2: ICD-10-CM

## 2021-05-21 LAB
ALBUMIN SERPL-MCNC: 4.1 G/DL (ref 3.4–5)
ALP SERPL-CCNC: 163 U/L (ref 45–117)
ALT SERPL-CCNC: 97 U/L (ref 13–61)
ANION GAP SERPL CALC-SCNC: 7 MMOL/L (ref 8–16)
APPEARANCE UR: (no result)
APPEARANCE UR: CLEAR
AST SERPL-CCNC: 52 U/L (ref 15–37)
BACTERIA # UR AUTO: 1690 /UL (ref 0–1359)
BACTERIA # UR AUTO: 5590 /UL (ref 0–1359)
BASOPHILS # BLD: 0.3 % (ref 0–2)
BILIRUB SERPL-MCNC: 0.2 MG/DL (ref 0.2–1)
BILIRUB UR STRIP.AUTO-MCNC: NEGATIVE MG/DL
BILIRUB UR STRIP.AUTO-MCNC: NEGATIVE MG/DL
BUN SERPL-MCNC: 7.4 MG/DL (ref 7–18)
CALCIUM SERPL-MCNC: 10 MG/DL (ref 8.5–10.1)
CASTS URNS QL MICRO: 12 /UL (ref 0–3.1)
CASTS URNS QL MICRO: 2 /UL (ref 0–3.1)
CHLORIDE SERPL-SCNC: 99 MMOL/L (ref 98–107)
CO2 SERPL-SCNC: 32 MMOL/L (ref 21–32)
COLOR UR: YELLOW
COLOR UR: YELLOW
CREAT SERPL-MCNC: 0.5 MG/DL (ref 0.55–1.3)
DEPRECATED RDW RBC AUTO: 14.5 % (ref 11.9–15.9)
EOSINOPHIL # BLD: 0.8 % (ref 0–4.5)
EPITH CASTS URNS QL MICRO: 27 /UL (ref 0–25.1)
EPITH CASTS URNS QL MICRO: 33 /UL (ref 0–25.1)
GLUCOSE SERPL-MCNC: 76 MG/DL (ref 74–106)
HCT VFR BLD CALC: 39 % (ref 35.4–49)
HGB BLD-MCNC: 13.2 GM/DL (ref 11.7–16.9)
KETONES UR QL STRIP: NEGATIVE
KETONES UR QL STRIP: NEGATIVE
LEUKOCYTE ESTERASE UR QL STRIP.AUTO: (no result)
LEUKOCYTE ESTERASE UR QL STRIP.AUTO: (no result)
LYMPHOCYTES # BLD: 19.7 % (ref 8–40)
MCH RBC QN AUTO: 32 PG (ref 25.7–33.7)
MCHC RBC AUTO-ENTMCNC: 33.9 G/DL (ref 32–35.9)
MCV RBC: 94.4 FL (ref 80–96)
MONOCYTES # BLD AUTO: 2.6 % (ref 3.8–10.2)
NEUTROPHILS # BLD: 76.6 % (ref 42.8–82.8)
NITRITE UR QL STRIP: NEGATIVE
NITRITE UR QL STRIP: POSITIVE
PH UR: 5.5 [PH] (ref 5–8)
PH UR: 7 [PH] (ref 5–8)
PLATELET # BLD AUTO: 190 K/MM3 (ref 134–434)
PLATELET BLD QL SMEAR: (no result)
PMV BLD: 8.2 FL (ref 7.5–11.1)
PROT SERPL-MCNC: 9 G/DL (ref 6.4–8.2)
PROT UR QL STRIP: (no result)
PROT UR QL STRIP: (no result)
PROT UR QL STRIP: NEGATIVE
PROT UR QL STRIP: NEGATIVE
RBC # BLD AUTO: 256 /UL (ref 0–23.9)
RBC # BLD AUTO: 4 /UL (ref 0–23.9)
RBC # BLD AUTO: 4.13 M/MM3 (ref 4–5.6)
SODIUM SERPL-SCNC: 138 MMOL/L (ref 136–145)
SP GR UR: 1.01 (ref 1.01–1.03)
SP GR UR: 1.02 (ref 1.01–1.03)
UROBILINOGEN UR STRIP-MCNC: 0.2 MG/DL (ref 0.2–1)
UROBILINOGEN UR STRIP-MCNC: 0.2 MG/DL (ref 0.2–1)
WBC # BLD AUTO: 11 K/MM3 (ref 4–10)
WBC # UR AUTO: 1260 /UL (ref 0–25.8)
WBC # UR AUTO: 54 /UL (ref 0–25.8)

## 2021-05-21 PROCEDURE — U0003 INFECTIOUS AGENT DETECTION BY NUCLEIC ACID (DNA OR RNA); SEVERE ACUTE RESPIRATORY SYNDROME CORONAVIRUS 2 (SARS-COV-2) (CORONAVIRUS DISEASE [COVID-19]), AMPLIFIED PROBE TECHNIQUE, MAKING USE OF HIGH THROUGHPUT TECHNOLOGIES AS DESCRIBED BY CMS-2020-01-R: HCPCS

## 2021-05-21 PROCEDURE — C9803 HOPD COVID-19 SPEC COLLECT: HCPCS

## 2021-05-21 PROCEDURE — U0005 INFEC AGEN DETEC AMPLI PROBE: HCPCS

## 2021-05-21 PROCEDURE — G0009 ADMIN PNEUMOCOCCAL VACCINE: HCPCS

## 2021-05-21 PROCEDURE — G0480 DRUG TEST DEF 1-7 CLASSES: HCPCS

## 2021-05-21 RX ADMIN — INSULIN ASPART SCH: 100 INJECTION, SOLUTION INTRAVENOUS; SUBCUTANEOUS at 22:26

## 2021-05-22 LAB
ALBUMIN SERPL-MCNC: 3.5 G/DL (ref 3.4–5)
ALP SERPL-CCNC: 136 U/L (ref 45–117)
ALT SERPL-CCNC: 85 U/L (ref 13–61)
ANION GAP SERPL CALC-SCNC: 7 MMOL/L (ref 8–16)
AST SERPL-CCNC: 49 U/L (ref 15–37)
BASOPHILS # BLD: 0.2 % (ref 0–2)
BILIRUB CONJ SERPL-MCNC: 0.1 MG/DL (ref 0–0.2)
BILIRUB SERPL-MCNC: 0.2 MG/DL (ref 0.2–1)
BUN SERPL-MCNC: 8.9 MG/DL (ref 7–18)
CALCIUM SERPL-MCNC: 9.2 MG/DL (ref 8.5–10.1)
CHLORIDE SERPL-SCNC: 101 MMOL/L (ref 98–107)
CO2 SERPL-SCNC: 29 MMOL/L (ref 21–32)
CREAT SERPL-MCNC: 0.4 MG/DL (ref 0.55–1.3)
DEPRECATED RDW RBC AUTO: 14.5 % (ref 11.9–15.9)
EOSINOPHIL # BLD: 0.3 % (ref 0–4.5)
GLUCOSE SERPL-MCNC: 103 MG/DL (ref 74–106)
HCT VFR BLD CALC: 33.9 % (ref 35.4–49)
HGB BLD-MCNC: 11.5 GM/DL (ref 11.7–16.9)
LYMPHOCYTES # BLD: 7.9 % (ref 8–40)
MAGNESIUM SERPL-MCNC: 2.3 MG/DL (ref 1.8–2.4)
MCH RBC QN AUTO: 32 PG (ref 25.7–33.7)
MCHC RBC AUTO-ENTMCNC: 34 G/DL (ref 32–35.9)
MCV RBC: 94.1 FL (ref 80–96)
MONOCYTES # BLD AUTO: 3.3 % (ref 3.8–10.2)
NEUTROPHILS # BLD: 88.3 % (ref 42.8–82.8)
PHOSPHATE SERPL-MCNC: 3.6 MG/DL (ref 2.5–4.9)
PLATELET # BLD AUTO: 160 K/MM3 (ref 134–434)
PMV BLD: 7.9 FL (ref 7.5–11.1)
PROT SERPL-MCNC: 7.6 G/DL (ref 6.4–8.2)
RBC # BLD AUTO: 3.6 M/MM3 (ref 4–5.6)
SODIUM SERPL-SCNC: 138 MMOL/L (ref 136–145)
WBC # BLD AUTO: 11.7 K/MM3 (ref 4–10)

## 2021-05-22 RX ADMIN — Medication SCH MG: at 21:45

## 2021-05-22 RX ADMIN — SENNOSIDES SCH MG: 8.8 SYRUP ORAL at 21:44

## 2021-05-22 RX ADMIN — Medication SCH: at 00:55

## 2021-05-22 RX ADMIN — LACOSAMIDE SCH MG: 10 SOLUTION ORAL at 21:45

## 2021-05-22 RX ADMIN — MEROPENEM SCH: 1 INJECTION, POWDER, FOR SOLUTION INTRAVENOUS at 15:58

## 2021-05-22 RX ADMIN — SENNOSIDES SCH: 8.8 SYRUP ORAL at 00:55

## 2021-05-22 RX ADMIN — ENOXAPARIN SODIUM SCH MG: 40 INJECTION SUBCUTANEOUS at 10:35

## 2021-05-22 RX ADMIN — Medication SCH TAB: at 21:44

## 2021-05-22 RX ADMIN — ZONISAMIDE SCH MG: 100 CAPSULE ORAL at 02:16

## 2021-05-22 RX ADMIN — INSULIN ASPART SCH: 100 INJECTION, SOLUTION INTRAVENOUS; SUBCUTANEOUS at 16:57

## 2021-05-22 RX ADMIN — Medication SCH TAB: at 10:35

## 2021-05-22 RX ADMIN — LEVETIRACETAM SCH MG: 100 SOLUTION ORAL at 10:36

## 2021-05-22 RX ADMIN — INSULIN ASPART SCH: 100 INJECTION, SOLUTION INTRAVENOUS; SUBCUTANEOUS at 06:11

## 2021-05-22 RX ADMIN — LACOSAMIDE SCH MG: 10 SOLUTION ORAL at 00:07

## 2021-05-22 RX ADMIN — POTASSIUM CHLORIDE SCH MEQ: 20 SOLUTION ORAL at 23:06

## 2021-05-22 RX ADMIN — ATORVASTATIN CALCIUM SCH MG: 40 TABLET, FILM COATED ORAL at 00:06

## 2021-05-22 RX ADMIN — MEROPENEM SCH MLS/HR: 1 INJECTION, POWDER, FOR SOLUTION INTRAVENOUS at 06:11

## 2021-05-22 RX ADMIN — Medication SCH TAB: at 10:50

## 2021-05-22 RX ADMIN — PIPERACILLIN SODIUM AND TAZOBACTAM SODIUM SCH MLS/HR: .25; 2 INJECTION, POWDER, LYOPHILIZED, FOR SOLUTION INTRAVENOUS at 17:03

## 2021-05-22 RX ADMIN — INSULIN ASPART SCH UNITS: 100 INJECTION, SOLUTION INTRAVENOUS; SUBCUTANEOUS at 21:42

## 2021-05-22 RX ADMIN — POTASSIUM CHLORIDE SCH MEQ: 20 SOLUTION ORAL at 16:00

## 2021-05-22 RX ADMIN — ATORVASTATIN CALCIUM SCH MG: 40 TABLET, FILM COATED ORAL at 21:42

## 2021-05-22 RX ADMIN — ZONISAMIDE SCH MG: 100 CAPSULE ORAL at 23:06

## 2021-05-22 RX ADMIN — LACOSAMIDE SCH MG: 10 SOLUTION ORAL at 10:35

## 2021-05-22 RX ADMIN — MEROPENEM SCH MLS/HR: 1 INJECTION, POWDER, FOR SOLUTION INTRAVENOUS at 10:37

## 2021-05-22 RX ADMIN — INSULIN ASPART SCH: 100 INJECTION, SOLUTION INTRAVENOUS; SUBCUTANEOUS at 11:33

## 2021-05-22 RX ADMIN — ZONISAMIDE SCH MG: 100 CAPSULE ORAL at 11:04

## 2021-05-22 RX ADMIN — LEVETIRACETAM SCH MG: 100 SOLUTION ORAL at 00:07

## 2021-05-22 RX ADMIN — LEVETIRACETAM SCH MG: 100 SOLUTION ORAL at 21:42

## 2021-05-23 LAB
ALBUMIN SERPL-MCNC: 3 G/DL (ref 3.4–5)
ALP SERPL-CCNC: 126 U/L (ref 45–117)
ALT SERPL-CCNC: 66 U/L (ref 13–61)
ANION GAP SERPL CALC-SCNC: 5 MMOL/L (ref 8–16)
AST SERPL-CCNC: 31 U/L (ref 15–37)
BASOPHILS # BLD: 0.3 % (ref 0–2)
BILIRUB SERPL-MCNC: 0.3 MG/DL (ref 0.2–1)
BUN SERPL-MCNC: 14.6 MG/DL (ref 7–18)
CALCIUM SERPL-MCNC: 9.5 MG/DL (ref 8.5–10.1)
CHLORIDE SERPL-SCNC: 104 MMOL/L (ref 98–107)
CO2 SERPL-SCNC: 29 MMOL/L (ref 21–32)
CREAT SERPL-MCNC: 0.5 MG/DL (ref 0.55–1.3)
DEPRECATED RDW RBC AUTO: 15.1 % (ref 11.9–15.9)
EOSINOPHIL # BLD: 0.8 % (ref 0–4.5)
GLUCOSE SERPL-MCNC: 158 MG/DL (ref 74–106)
HCT VFR BLD CALC: 32.8 % (ref 35.4–49)
HGB BLD-MCNC: 11.1 GM/DL (ref 11.7–16.9)
LYMPHOCYTES # BLD: 26.2 % (ref 8–40)
MAGNESIUM SERPL-MCNC: 2.5 MG/DL (ref 1.8–2.4)
MCH RBC QN AUTO: 32.1 PG (ref 25.7–33.7)
MCHC RBC AUTO-ENTMCNC: 33.9 G/DL (ref 32–35.9)
MCV RBC: 94.7 FL (ref 80–96)
MONOCYTES # BLD AUTO: 5.4 % (ref 3.8–10.2)
NEUTROPHILS # BLD: 67.3 % (ref 42.8–82.8)
PLATELET # BLD AUTO: 187 K/MM3 (ref 134–434)
PMV BLD: 8 FL (ref 7.5–11.1)
PROT SERPL-MCNC: 7.3 G/DL (ref 6.4–8.2)
RBC # BLD AUTO: 3.46 M/MM3 (ref 4–5.6)
SODIUM SERPL-SCNC: 139 MMOL/L (ref 136–145)
WBC # BLD AUTO: 8.4 K/MM3 (ref 4–10)

## 2021-05-23 RX ADMIN — ZONISAMIDE SCH MG: 100 CAPSULE ORAL at 10:21

## 2021-05-23 RX ADMIN — PIPERACILLIN SODIUM AND TAZOBACTAM SODIUM SCH MLS/HR: .25; 2 INJECTION, POWDER, LYOPHILIZED, FOR SOLUTION INTRAVENOUS at 10:19

## 2021-05-23 RX ADMIN — Medication SCH TAB: at 22:12

## 2021-05-23 RX ADMIN — Medication SCH MG: at 23:07

## 2021-05-23 RX ADMIN — PIPERACILLIN SODIUM AND TAZOBACTAM SODIUM SCH MLS/HR: .25; 2 INJECTION, POWDER, LYOPHILIZED, FOR SOLUTION INTRAVENOUS at 01:59

## 2021-05-23 RX ADMIN — LEVETIRACETAM SCH MG: 100 SOLUTION ORAL at 10:20

## 2021-05-23 RX ADMIN — LACOSAMIDE SCH MG: 10 SOLUTION ORAL at 22:12

## 2021-05-23 RX ADMIN — ZONISAMIDE SCH MG: 100 CAPSULE ORAL at 22:13

## 2021-05-23 RX ADMIN — LEVETIRACETAM SCH MG: 100 SOLUTION ORAL at 22:11

## 2021-05-23 RX ADMIN — Medication SCH TAB: at 10:21

## 2021-05-23 RX ADMIN — ENOXAPARIN SODIUM SCH MG: 40 INJECTION SUBCUTANEOUS at 10:19

## 2021-05-23 RX ADMIN — INSULIN ASPART SCH UNITS: 100 INJECTION, SOLUTION INTRAVENOUS; SUBCUTANEOUS at 11:50

## 2021-05-23 RX ADMIN — PIPERACILLIN SODIUM AND TAZOBACTAM SODIUM SCH MLS/HR: .25; 2 INJECTION, POWDER, LYOPHILIZED, FOR SOLUTION INTRAVENOUS at 17:32

## 2021-05-23 RX ADMIN — LACOSAMIDE SCH MG: 10 SOLUTION ORAL at 10:19

## 2021-05-23 RX ADMIN — INSULIN ASPART SCH UNITS: 100 INJECTION, SOLUTION INTRAVENOUS; SUBCUTANEOUS at 06:00

## 2021-05-23 RX ADMIN — ATORVASTATIN CALCIUM SCH MG: 40 TABLET, FILM COATED ORAL at 22:11

## 2021-05-23 RX ADMIN — ZONISAMIDE SCH MG: 100 CAPSULE ORAL at 23:08

## 2021-05-23 RX ADMIN — Medication SCH TAB: at 10:19

## 2021-05-23 RX ADMIN — INSULIN ASPART SCH UNITS: 100 INJECTION, SOLUTION INTRAVENOUS; SUBCUTANEOUS at 22:14

## 2021-05-23 RX ADMIN — INSULIN ASPART SCH UNITS: 100 INJECTION, SOLUTION INTRAVENOUS; SUBCUTANEOUS at 17:38

## 2021-05-23 RX ADMIN — SENNOSIDES SCH: 8.8 SYRUP ORAL at 22:14

## 2021-05-24 LAB
ALBUMIN SERPL-MCNC: 3 G/DL (ref 3.4–5)
ALP SERPL-CCNC: 117 U/L (ref 45–117)
ALT SERPL-CCNC: 59 U/L (ref 13–61)
ANION GAP SERPL CALC-SCNC: 8 MMOL/L (ref 8–16)
AST SERPL-CCNC: 42 U/L (ref 15–37)
BASOPHILS # BLD: 0.4 % (ref 0–2)
BILIRUB SERPL-MCNC: 0.3 MG/DL (ref 0.2–1)
BUN SERPL-MCNC: 15.9 MG/DL (ref 7–18)
CALCIUM SERPL-MCNC: 8.8 MG/DL (ref 8.5–10.1)
CHLORIDE SERPL-SCNC: 105 MMOL/L (ref 98–107)
CO2 SERPL-SCNC: 28 MMOL/L (ref 21–32)
CREAT SERPL-MCNC: 0.5 MG/DL (ref 0.55–1.3)
DEPRECATED RDW RBC AUTO: 14.5 % (ref 11.9–15.9)
EOSINOPHIL # BLD: 1.6 % (ref 0–4.5)
GLUCOSE SERPL-MCNC: 203 MG/DL (ref 74–106)
HCT VFR BLD CALC: 30 % (ref 35.4–49)
HGB BLD-MCNC: 10.3 GM/DL (ref 11.7–16.9)
LYMPHOCYTES # BLD: 33.1 % (ref 8–40)
MAGNESIUM SERPL-MCNC: 2.2 MG/DL (ref 1.8–2.4)
MCH RBC QN AUTO: 32.7 PG (ref 25.7–33.7)
MCHC RBC AUTO-ENTMCNC: 34.4 G/DL (ref 32–35.9)
MCV RBC: 95.1 FL (ref 80–96)
MONOCYTES # BLD AUTO: 7.2 % (ref 3.8–10.2)
NEUTROPHILS # BLD: 57.7 % (ref 42.8–82.8)
PLATELET # BLD AUTO: 188 K/MM3 (ref 134–434)
PMV BLD: 8 FL (ref 7.5–11.1)
PROT SERPL-MCNC: 6.8 G/DL (ref 6.4–8.2)
RBC # BLD AUTO: 3.15 M/MM3 (ref 4–5.6)
SODIUM SERPL-SCNC: 141 MMOL/L (ref 136–145)
WBC # BLD AUTO: 7.3 K/MM3 (ref 4–10)

## 2021-05-24 RX ADMIN — LACOSAMIDE SCH MG: 10 SOLUTION ORAL at 22:00

## 2021-05-24 RX ADMIN — INSULIN ASPART SCH UNITS: 100 INJECTION, SOLUTION INTRAVENOUS; SUBCUTANEOUS at 06:00

## 2021-05-24 RX ADMIN — PIPERACILLIN SODIUM AND TAZOBACTAM SODIUM SCH MLS/HR: .25; 2 INJECTION, POWDER, LYOPHILIZED, FOR SOLUTION INTRAVENOUS at 01:18

## 2021-05-24 RX ADMIN — CEFTRIAXONE SODIUM SCH MLS/HR: 2 INJECTION, POWDER, FOR SOLUTION INTRAMUSCULAR; INTRAVENOUS at 18:11

## 2021-05-24 RX ADMIN — Medication SCH TAB: at 10:53

## 2021-05-24 RX ADMIN — PIPERACILLIN SODIUM AND TAZOBACTAM SODIUM SCH MLS/HR: .25; 2 INJECTION, POWDER, LYOPHILIZED, FOR SOLUTION INTRAVENOUS at 10:50

## 2021-05-24 RX ADMIN — Medication SCH TAB: at 22:01

## 2021-05-24 RX ADMIN — Medication SCH MG: at 22:02

## 2021-05-24 RX ADMIN — ENOXAPARIN SODIUM SCH MG: 40 INJECTION SUBCUTANEOUS at 10:53

## 2021-05-24 RX ADMIN — SENNOSIDES SCH MG: 8.8 SYRUP ORAL at 22:01

## 2021-05-24 RX ADMIN — ZONISAMIDE SCH MG: 100 CAPSULE ORAL at 10:53

## 2021-05-24 RX ADMIN — LEVETIRACETAM SCH MG: 100 SOLUTION ORAL at 10:52

## 2021-05-24 RX ADMIN — LEVETIRACETAM SCH MG: 100 SOLUTION ORAL at 23:52

## 2021-05-24 RX ADMIN — ZONISAMIDE SCH MG: 100 CAPSULE ORAL at 22:02

## 2021-05-24 RX ADMIN — INSULIN ASPART SCH UNITS: 100 INJECTION, SOLUTION INTRAVENOUS; SUBCUTANEOUS at 11:21

## 2021-05-24 RX ADMIN — INSULIN ASPART SCH UNITS: 100 INJECTION, SOLUTION INTRAVENOUS; SUBCUTANEOUS at 18:11

## 2021-05-24 RX ADMIN — INSULIN ASPART SCH UNITS: 100 INJECTION, SOLUTION INTRAVENOUS; SUBCUTANEOUS at 22:28

## 2021-05-24 RX ADMIN — ATORVASTATIN CALCIUM SCH MG: 40 TABLET, FILM COATED ORAL at 22:01

## 2021-05-24 RX ADMIN — LACOSAMIDE SCH MG: 10 SOLUTION ORAL at 10:52

## 2021-05-25 LAB
ALBUMIN SERPL-MCNC: 2.9 G/DL (ref 3.4–5)
ALP SERPL-CCNC: 110 U/L (ref 45–117)
ALT SERPL-CCNC: 55 U/L (ref 13–61)
ANION GAP SERPL CALC-SCNC: 5 MMOL/L (ref 8–16)
AST SERPL-CCNC: 38 U/L (ref 15–37)
BASOPHILS # BLD: 0.3 % (ref 0–2)
BILIRUB SERPL-MCNC: 0.2 MG/DL (ref 0.2–1)
BUN SERPL-MCNC: 16 MG/DL (ref 7–18)
CALCIUM SERPL-MCNC: 9.1 MG/DL (ref 8.5–10.1)
CHLORIDE SERPL-SCNC: 108 MMOL/L (ref 98–107)
CO2 SERPL-SCNC: 28 MMOL/L (ref 21–32)
CREAT SERPL-MCNC: 0.4 MG/DL (ref 0.55–1.3)
DEPRECATED RDW RBC AUTO: 14.3 % (ref 11.9–15.9)
EOSINOPHIL # BLD: 2 % (ref 0–4.5)
GLUCOSE SERPL-MCNC: 185 MG/DL (ref 74–106)
HCT VFR BLD CALC: 30.7 % (ref 35.4–49)
HGB BLD-MCNC: 10.4 GM/DL (ref 11.7–16.9)
LYMPHOCYTES # BLD: 34.2 % (ref 8–40)
MAGNESIUM SERPL-MCNC: 1.9 MG/DL (ref 1.8–2.4)
MCH RBC QN AUTO: 32.5 PG (ref 25.7–33.7)
MCHC RBC AUTO-ENTMCNC: 33.8 G/DL (ref 32–35.9)
MCV RBC: 96.2 FL (ref 80–96)
MONOCYTES # BLD AUTO: 6.3 % (ref 3.8–10.2)
NEUTROPHILS # BLD: 57.2 % (ref 42.8–82.8)
PLATELET # BLD AUTO: 205 K/MM3 (ref 134–434)
PMV BLD: 7.8 FL (ref 7.5–11.1)
PROT SERPL-MCNC: 7 G/DL (ref 6.4–8.2)
RBC # BLD AUTO: 3.19 M/MM3 (ref 4–5.6)
SODIUM SERPL-SCNC: 141 MMOL/L (ref 136–145)
WBC # BLD AUTO: 8.4 K/MM3 (ref 4–10)

## 2021-05-25 RX ADMIN — LACOSAMIDE SCH MG: 10 SOLUTION ORAL at 10:20

## 2021-05-25 RX ADMIN — ENOXAPARIN SODIUM SCH MG: 40 INJECTION SUBCUTANEOUS at 10:20

## 2021-05-25 RX ADMIN — LACOSAMIDE SCH MG: 10 SOLUTION ORAL at 21:35

## 2021-05-25 RX ADMIN — LEVETIRACETAM SCH MG: 100 SOLUTION ORAL at 10:20

## 2021-05-25 RX ADMIN — CEFTRIAXONE SODIUM SCH MLS/HR: 2 INJECTION, POWDER, FOR SOLUTION INTRAMUSCULAR; INTRAVENOUS at 10:17

## 2021-05-25 RX ADMIN — INSULIN ASPART SCH UNITS: 100 INJECTION, SOLUTION INTRAVENOUS; SUBCUTANEOUS at 11:30

## 2021-05-25 RX ADMIN — ZONISAMIDE SCH MG: 100 CAPSULE ORAL at 11:31

## 2021-05-25 RX ADMIN — SENNOSIDES SCH MG: 8.8 SYRUP ORAL at 21:36

## 2021-05-25 RX ADMIN — INSULIN ASPART SCH UNITS: 100 INJECTION, SOLUTION INTRAVENOUS; SUBCUTANEOUS at 16:18

## 2021-05-25 RX ADMIN — LEVETIRACETAM SCH MG: 100 SOLUTION ORAL at 22:46

## 2021-05-25 RX ADMIN — Medication SCH MG: at 21:36

## 2021-05-25 RX ADMIN — Medication SCH TAB: at 10:18

## 2021-05-25 RX ADMIN — Medication SCH TAB: at 21:36

## 2021-05-25 RX ADMIN — INSULIN ASPART SCH UNITS: 100 INJECTION, SOLUTION INTRAVENOUS; SUBCUTANEOUS at 21:37

## 2021-05-25 RX ADMIN — INSULIN ASPART SCH UNITS: 100 INJECTION, SOLUTION INTRAVENOUS; SUBCUTANEOUS at 06:51

## 2021-05-25 RX ADMIN — Medication SCH TAB: at 10:23

## 2021-05-25 RX ADMIN — ZONISAMIDE SCH MG: 100 CAPSULE ORAL at 22:46

## 2021-05-25 RX ADMIN — ATORVASTATIN CALCIUM SCH MG: 40 TABLET, FILM COATED ORAL at 21:35

## 2021-05-26 LAB
ALBUMIN SERPL-MCNC: 2.9 G/DL (ref 3.4–5)
ALP SERPL-CCNC: 115 U/L (ref 45–117)
ALT SERPL-CCNC: 52 U/L (ref 13–61)
ANION GAP SERPL CALC-SCNC: 8 MMOL/L (ref 8–16)
AST SERPL-CCNC: 32 U/L (ref 15–37)
BASOPHILS # BLD: 0.3 % (ref 0–2)
BILIRUB SERPL-MCNC: 0.3 MG/DL (ref 0.2–1)
BUN SERPL-MCNC: 15.4 MG/DL (ref 7–18)
CALCIUM SERPL-MCNC: 8.8 MG/DL (ref 8.5–10.1)
CHLORIDE SERPL-SCNC: 105 MMOL/L (ref 98–107)
CO2 SERPL-SCNC: 27 MMOL/L (ref 21–32)
CREAT SERPL-MCNC: 0.4 MG/DL (ref 0.55–1.3)
DEPRECATED RDW RBC AUTO: 14.3 % (ref 11.9–15.9)
EOSINOPHIL # BLD: 3 % (ref 0–4.5)
GLUCOSE SERPL-MCNC: 268 MG/DL (ref 74–106)
HCT VFR BLD CALC: 30.2 % (ref 35.4–49)
HGB BLD-MCNC: 10 GM/DL (ref 11.7–16.9)
LYMPHOCYTES # BLD: 35.3 % (ref 8–40)
MAGNESIUM SERPL-MCNC: 2.1 MG/DL (ref 1.8–2.4)
MCH RBC QN AUTO: 32.1 PG (ref 25.7–33.7)
MCHC RBC AUTO-ENTMCNC: 33.1 G/DL (ref 32–35.9)
MCV RBC: 97 FL (ref 80–96)
MONOCYTES # BLD AUTO: 5.2 % (ref 3.8–10.2)
NEUTROPHILS # BLD: 56.2 % (ref 42.8–82.8)
PLATELET # BLD AUTO: 213 K/MM3 (ref 134–434)
PMV BLD: 7.8 FL (ref 7.5–11.1)
PROT SERPL-MCNC: 7.2 G/DL (ref 6.4–8.2)
RBC # BLD AUTO: 3.12 M/MM3 (ref 4–5.6)
SODIUM SERPL-SCNC: 140 MMOL/L (ref 136–145)
WBC # BLD AUTO: 6.4 K/MM3 (ref 4–10)

## 2021-05-26 RX ADMIN — LACOSAMIDE SCH MG: 10 SOLUTION ORAL at 09:39

## 2021-05-26 RX ADMIN — ZONISAMIDE SCH MG: 100 CAPSULE ORAL at 10:23

## 2021-05-26 RX ADMIN — ZONISAMIDE SCH MG: 100 CAPSULE ORAL at 23:41

## 2021-05-26 RX ADMIN — ATORVASTATIN CALCIUM SCH MG: 40 TABLET, FILM COATED ORAL at 23:37

## 2021-05-26 RX ADMIN — Medication SCH TAB: at 09:40

## 2021-05-26 RX ADMIN — INSULIN ASPART SCH UNITS: 100 INJECTION, SOLUTION INTRAVENOUS; SUBCUTANEOUS at 06:19

## 2021-05-26 RX ADMIN — Medication SCH MG: at 23:37

## 2021-05-26 RX ADMIN — Medication SCH TAB: at 23:37

## 2021-05-26 RX ADMIN — LACOSAMIDE SCH MG: 10 SOLUTION ORAL at 23:28

## 2021-05-26 RX ADMIN — Medication SCH TAB: at 09:37

## 2021-05-26 RX ADMIN — INSULIN ASPART SCH UNITS: 100 INJECTION, SOLUTION INTRAVENOUS; SUBCUTANEOUS at 11:15

## 2021-05-26 RX ADMIN — LEVETIRACETAM SCH MG: 100 SOLUTION ORAL at 23:38

## 2021-05-26 RX ADMIN — SENNOSIDES SCH MG: 8.8 SYRUP ORAL at 23:28

## 2021-05-26 RX ADMIN — INSULIN ASPART SCH UNITS: 100 INJECTION, SOLUTION INTRAVENOUS; SUBCUTANEOUS at 23:40

## 2021-05-26 RX ADMIN — LEVETIRACETAM SCH MG: 100 SOLUTION ORAL at 09:39

## 2021-05-26 RX ADMIN — ENOXAPARIN SODIUM SCH MG: 40 INJECTION SUBCUTANEOUS at 09:38

## 2021-05-26 RX ADMIN — CEFTRIAXONE SODIUM SCH MLS/HR: 2 INJECTION, POWDER, FOR SOLUTION INTRAMUSCULAR; INTRAVENOUS at 09:37

## 2021-05-26 RX ADMIN — INSULIN ASPART SCH: 100 INJECTION, SOLUTION INTRAVENOUS; SUBCUTANEOUS at 16:04

## 2021-05-27 LAB
ALBUMIN SERPL-MCNC: 2.9 G/DL (ref 3.4–5)
ALP SERPL-CCNC: 117 U/L (ref 45–117)
ALT SERPL-CCNC: 48 U/L (ref 13–61)
ANION GAP SERPL CALC-SCNC: 8 MMOL/L (ref 8–16)
AST SERPL-CCNC: 30 U/L (ref 15–37)
BASOPHILS # BLD: 0.4 % (ref 0–2)
BILIRUB SERPL-MCNC: 0.3 MG/DL (ref 0.2–1)
BUN SERPL-MCNC: 17.6 MG/DL (ref 7–18)
CALCIUM SERPL-MCNC: 8.7 MG/DL (ref 8.5–10.1)
CHLORIDE SERPL-SCNC: 104 MMOL/L (ref 98–107)
CO2 SERPL-SCNC: 28 MMOL/L (ref 21–32)
CREAT SERPL-MCNC: 0.5 MG/DL (ref 0.55–1.3)
DEPRECATED RDW RBC AUTO: 14.2 % (ref 11.9–15.9)
EOSINOPHIL # BLD: 2.9 % (ref 0–4.5)
GLUCOSE SERPL-MCNC: 169 MG/DL (ref 74–106)
HCT VFR BLD CALC: 30.3 % (ref 35.4–49)
HGB BLD-MCNC: 10.2 GM/DL (ref 11.7–16.9)
LYMPHOCYTES # BLD: 27.3 % (ref 8–40)
MAGNESIUM SERPL-MCNC: 2.3 MG/DL (ref 1.8–2.4)
MCH RBC QN AUTO: 32.5 PG (ref 25.7–33.7)
MCHC RBC AUTO-ENTMCNC: 33.7 G/DL (ref 32–35.9)
MCV RBC: 96.5 FL (ref 80–96)
MONOCYTES # BLD AUTO: 5 % (ref 3.8–10.2)
NEUTROPHILS # BLD: 64.4 % (ref 42.8–82.8)
PLATELET # BLD AUTO: 246 K/MM3 (ref 134–434)
PMV BLD: 7.8 FL (ref 7.5–11.1)
PROT SERPL-MCNC: 6.8 G/DL (ref 6.4–8.2)
RBC # BLD AUTO: 3.14 M/MM3 (ref 4–5.6)
SODIUM SERPL-SCNC: 139 MMOL/L (ref 136–145)
WBC # BLD AUTO: 8 K/MM3 (ref 4–10)

## 2021-05-27 RX ADMIN — INSULIN ASPART SCH: 100 INJECTION, SOLUTION INTRAVENOUS; SUBCUTANEOUS at 11:11

## 2021-05-27 RX ADMIN — INSULIN ASPART SCH UNITS: 100 INJECTION, SOLUTION INTRAVENOUS; SUBCUTANEOUS at 06:31

## 2021-05-27 RX ADMIN — LEVETIRACETAM SCH MG: 100 SOLUTION ORAL at 22:22

## 2021-05-27 RX ADMIN — LACOSAMIDE SCH MG: 10 SOLUTION ORAL at 10:59

## 2021-05-27 RX ADMIN — CEPHALEXIN SCH MG: 250 POWDER, FOR SUSPENSION ORAL at 18:50

## 2021-05-27 RX ADMIN — INSULIN ASPART SCH UNITS: 100 INJECTION, SOLUTION INTRAVENOUS; SUBCUTANEOUS at 18:49

## 2021-05-27 RX ADMIN — ZONISAMIDE SCH MG: 100 CAPSULE ORAL at 22:23

## 2021-05-27 RX ADMIN — INSULIN ASPART SCH UNITS: 100 INJECTION, SOLUTION INTRAVENOUS; SUBCUTANEOUS at 22:25

## 2021-05-27 RX ADMIN — Medication SCH TAB: at 11:04

## 2021-05-27 RX ADMIN — CEPHALEXIN SCH MG: 250 POWDER, FOR SUSPENSION ORAL at 11:08

## 2021-05-27 RX ADMIN — ATORVASTATIN CALCIUM SCH MG: 40 TABLET, FILM COATED ORAL at 22:22

## 2021-05-27 RX ADMIN — Medication SCH MG: at 22:24

## 2021-05-27 RX ADMIN — ENOXAPARIN SODIUM SCH MG: 40 INJECTION SUBCUTANEOUS at 11:03

## 2021-05-27 RX ADMIN — LACOSAMIDE SCH MG: 10 SOLUTION ORAL at 22:22

## 2021-05-27 RX ADMIN — SENNOSIDES SCH MG: 8.8 SYRUP ORAL at 22:24

## 2021-05-27 RX ADMIN — FUROSEMIDE SCH MG: 40 SOLUTION ORAL at 11:01

## 2021-05-27 RX ADMIN — Medication SCH TAB: at 22:24

## 2021-05-27 RX ADMIN — LEVETIRACETAM SCH MG: 100 SOLUTION ORAL at 11:00

## 2021-05-27 RX ADMIN — CEPHALEXIN SCH MG: 250 POWDER, FOR SUSPENSION ORAL at 23:33

## 2021-05-27 RX ADMIN — Medication SCH TAB: at 11:02

## 2021-05-27 RX ADMIN — ZONISAMIDE SCH MG: 100 CAPSULE ORAL at 11:04

## 2021-05-28 VITALS — DIASTOLIC BLOOD PRESSURE: 66 MMHG | HEART RATE: 88 BPM | TEMPERATURE: 98.5 F | SYSTOLIC BLOOD PRESSURE: 115 MMHG

## 2021-05-28 LAB
ALBUMIN SERPL-MCNC: 3.1 G/DL (ref 3.4–5)
ALP SERPL-CCNC: 122 U/L (ref 45–117)
ALT SERPL-CCNC: 50 U/L (ref 13–61)
ANION GAP SERPL CALC-SCNC: 6 MMOL/L (ref 8–16)
AST SERPL-CCNC: 29 U/L (ref 15–37)
BASOPHILS # BLD: 0.5 % (ref 0–2)
BILIRUB SERPL-MCNC: 0.4 MG/DL (ref 0.2–1)
BUN SERPL-MCNC: 16.9 MG/DL (ref 7–18)
CALCIUM SERPL-MCNC: 8.7 MG/DL (ref 8.5–10.1)
CHLORIDE SERPL-SCNC: 106 MMOL/L (ref 98–107)
CO2 SERPL-SCNC: 27 MMOL/L (ref 21–32)
CREAT SERPL-MCNC: 0.4 MG/DL (ref 0.55–1.3)
DEPRECATED RDW RBC AUTO: 14.4 % (ref 11.9–15.9)
EOSINOPHIL # BLD: 2 % (ref 0–4.5)
GLUCOSE SERPL-MCNC: 175 MG/DL (ref 74–106)
HCT VFR BLD CALC: 31.8 % (ref 35.4–49)
HGB BLD-MCNC: 10.9 GM/DL (ref 11.7–16.9)
LYMPHOCYTES # BLD: 31.9 % (ref 8–40)
MAGNESIUM SERPL-MCNC: 2.2 MG/DL (ref 1.8–2.4)
MCH RBC QN AUTO: 32.9 PG (ref 25.7–33.7)
MCHC RBC AUTO-ENTMCNC: 34.3 G/DL (ref 32–35.9)
MCV RBC: 95.8 FL (ref 80–96)
MONOCYTES # BLD AUTO: 5.7 % (ref 3.8–10.2)
NEUTROPHILS # BLD: 59.9 % (ref 42.8–82.8)
PLATELET # BLD AUTO: 260 K/MM3 (ref 134–434)
PMV BLD: 7.9 FL (ref 7.5–11.1)
PROT SERPL-MCNC: 7.4 G/DL (ref 6.4–8.2)
RBC # BLD AUTO: 3.32 M/MM3 (ref 4–5.6)
SODIUM SERPL-SCNC: 139 MMOL/L (ref 136–145)
WBC # BLD AUTO: 7.5 K/MM3 (ref 4–10)

## 2021-05-28 RX ADMIN — ZONISAMIDE SCH MG: 100 CAPSULE ORAL at 10:45

## 2021-05-28 RX ADMIN — CEPHALEXIN SCH MG: 250 POWDER, FOR SUSPENSION ORAL at 06:09

## 2021-05-28 RX ADMIN — LACOSAMIDE SCH MG: 10 SOLUTION ORAL at 10:44

## 2021-05-28 RX ADMIN — Medication SCH TAB: at 10:44

## 2021-05-28 RX ADMIN — CEPHALEXIN SCH MG: 250 POWDER, FOR SUSPENSION ORAL at 11:03

## 2021-05-28 RX ADMIN — Medication SCH TAB: at 10:45

## 2021-05-28 RX ADMIN — LEVETIRACETAM SCH MG: 100 SOLUTION ORAL at 10:44

## 2021-05-28 RX ADMIN — INSULIN ASPART SCH UNITS: 100 INJECTION, SOLUTION INTRAVENOUS; SUBCUTANEOUS at 11:02

## 2021-05-28 RX ADMIN — ENOXAPARIN SODIUM SCH MG: 40 INJECTION SUBCUTANEOUS at 10:45

## 2021-05-28 RX ADMIN — INSULIN ASPART SCH UNITS: 100 INJECTION, SOLUTION INTRAVENOUS; SUBCUTANEOUS at 06:09

## 2021-05-28 RX ADMIN — FUROSEMIDE SCH MG: 40 SOLUTION ORAL at 10:45

## 2021-06-02 ENCOUNTER — HOSPITAL ENCOUNTER (EMERGENCY)
Dept: HOSPITAL 74 - JER | Age: 36
Discharge: HOME | End: 2021-06-02
Payer: COMMERCIAL

## 2021-06-02 VITALS — HEART RATE: 68 BPM | DIASTOLIC BLOOD PRESSURE: 83 MMHG | SYSTOLIC BLOOD PRESSURE: 120 MMHG

## 2021-06-02 VITALS — TEMPERATURE: 95.8 F

## 2021-06-02 VITALS — BODY MASS INDEX: 31 KG/M2

## 2021-06-02 DIAGNOSIS — G80.9: Primary | ICD-10-CM

## 2021-06-02 LAB
ALBUMIN SERPL-MCNC: 3.4 G/DL (ref 3.4–5)
ALP SERPL-CCNC: 135 U/L (ref 45–117)
ALT SERPL-CCNC: 73 U/L (ref 13–61)
ANION GAP SERPL CALC-SCNC: 7 MMOL/L (ref 8–16)
AST SERPL-CCNC: 36 U/L (ref 15–37)
BASOPHILS # BLD: 0.3 % (ref 0–2)
BILIRUB SERPL-MCNC: 0.2 MG/DL (ref 0.2–1)
BUN SERPL-MCNC: 8.4 MG/DL (ref 7–18)
CALCIUM SERPL-MCNC: 9.7 MG/DL (ref 8.5–10.1)
CHLORIDE SERPL-SCNC: 103 MMOL/L (ref 98–107)
CO2 SERPL-SCNC: 26 MMOL/L (ref 21–32)
CREAT SERPL-MCNC: 0.4 MG/DL (ref 0.55–1.3)
DEPRECATED RDW RBC AUTO: 14.6 % (ref 11.9–15.9)
EOSINOPHIL # BLD: 2 % (ref 0–4.5)
GLUCOSE SERPL-MCNC: 136 MG/DL (ref 74–106)
HCT VFR BLD CALC: 32.2 % (ref 35.4–49)
HGB BLD-MCNC: 11.1 GM/DL (ref 11.7–16.9)
LYMPHOCYTES # BLD: 32.4 % (ref 8–40)
MAGNESIUM SERPL-MCNC: 2.1 MG/DL (ref 1.8–2.4)
MCH RBC QN AUTO: 32.3 PG (ref 25.7–33.7)
MCHC RBC AUTO-ENTMCNC: 34.4 G/DL (ref 32–35.9)
MCV RBC: 93.9 FL (ref 80–96)
MONOCYTES # BLD AUTO: 3.4 % (ref 3.8–10.2)
NEUTROPHILS # BLD: 61.9 % (ref 42.8–82.8)
PHOSPHATE SERPL-MCNC: 2.9 MG/DL (ref 2.5–4.9)
PLATELET # BLD AUTO: 316 K/MM3 (ref 134–434)
PMV BLD: 8.3 FL (ref 7.5–11.1)
PROT SERPL-MCNC: 8.1 G/DL (ref 6.4–8.2)
RBC # BLD AUTO: 3.43 M/MM3 (ref 4–5.6)
SODIUM SERPL-SCNC: 135 MMOL/L (ref 136–145)
WBC # BLD AUTO: 6.7 K/MM3 (ref 4–10)

## 2021-08-01 ENCOUNTER — HOSPITAL ENCOUNTER (EMERGENCY)
Dept: HOSPITAL 74 - JER | Age: 36
Discharge: HOME | End: 2021-08-01
Payer: COMMERCIAL

## 2021-08-01 VITALS — TEMPERATURE: 93.9 F

## 2021-08-01 VITALS — SYSTOLIC BLOOD PRESSURE: 178 MMHG | HEART RATE: 70 BPM | DIASTOLIC BLOOD PRESSURE: 95 MMHG

## 2021-08-01 VITALS — BODY MASS INDEX: 28.1 KG/M2

## 2021-08-01 DIAGNOSIS — R09.02: Primary | ICD-10-CM

## 2021-12-06 ENCOUNTER — HOSPITAL ENCOUNTER (INPATIENT)
Dept: HOSPITAL 74 - JER | Age: 36
LOS: 7 days | Discharge: HOME | DRG: 466 | End: 2021-12-13
Attending: NURSE PRACTITIONER | Admitting: INTERNAL MEDICINE
Payer: COMMERCIAL

## 2021-12-06 VITALS — BODY MASS INDEX: 37 KG/M2

## 2021-12-06 DIAGNOSIS — R31.0: ICD-10-CM

## 2021-12-06 DIAGNOSIS — E66.9: ICD-10-CM

## 2021-12-06 DIAGNOSIS — D69.6: ICD-10-CM

## 2021-12-06 DIAGNOSIS — A41.9: ICD-10-CM

## 2021-12-06 DIAGNOSIS — E87.1: ICD-10-CM
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
## 2021-12-06 DIAGNOSIS — N39.0: ICD-10-CM

## 2021-12-06 DIAGNOSIS — G80.8: ICD-10-CM

## 2021-12-06 DIAGNOSIS — R53.2: ICD-10-CM

## 2021-12-06 DIAGNOSIS — D72.819: ICD-10-CM

## 2021-12-06 DIAGNOSIS — F73: ICD-10-CM

## 2021-12-06 DIAGNOSIS — T83.511A: Primary | ICD-10-CM

## 2021-12-06 DIAGNOSIS — G40.909: ICD-10-CM

## 2021-12-06 DIAGNOSIS — E11.9: ICD-10-CM

## 2021-12-06 DIAGNOSIS — K21.9: ICD-10-CM

## 2021-12-06 DIAGNOSIS — I95.9: ICD-10-CM

## 2021-12-06 DIAGNOSIS — Q02: ICD-10-CM

## 2021-12-06 LAB
ALBUMIN SERPL-MCNC: 3.3 G/DL (ref 3.4–5)
ALP SERPL-CCNC: 139 U/L (ref 45–117)
ALT SERPL-CCNC: 51 U/L (ref 13–61)
ANION GAP SERPL CALC-SCNC: 8 MMOL/L (ref 8–16)
APPEARANCE UR: (no result)
APTT BLD: 40.8 SECONDS (ref 25.2–36.5)
AST SERPL-CCNC: 31 U/L (ref 15–37)
BACTERIA # UR AUTO: 187 /UL (ref 0–1359)
BASOPHILS # BLD: 0.5 % (ref 0–2)
BILIRUB SERPL-MCNC: 0.1 MG/DL (ref 0.2–1)
BILIRUB UR STRIP.AUTO-MCNC: (no result) MG/DL
BUN SERPL-MCNC: 9.1 MG/DL (ref 7–18)
CALCIUM SERPL-MCNC: 9.3 MG/DL (ref 8.5–10.1)
CASTS URNS QL MICRO: 22 /UL (ref 0–3.1)
CHLORIDE SERPL-SCNC: 92 MMOL/L (ref 98–107)
CO2 SERPL-SCNC: 24 MMOL/L (ref 21–32)
COLOR UR: (no result)
CREAT SERPL-MCNC: 0.4 MG/DL (ref 0.55–1.3)
DEPRECATED RDW RBC AUTO: 15.1 % (ref 11.9–15.9)
EOSINOPHIL # BLD: 2.9 % (ref 0–4.5)
EPITH CASTS URNS QL MICRO: 5 /UL (ref 0–25.1)
GLUCOSE SERPL-MCNC: 96 MG/DL (ref 74–106)
HCT VFR BLD CALC: 31.5 % (ref 35.4–49)
HGB BLD-MCNC: 10.8 GM/DL (ref 11.7–16.9)
INR BLD: 1 (ref 0.83–1.09)
KETONES UR QL STRIP: NEGATIVE
LEUKOCYTE ESTERASE UR QL STRIP.AUTO: (no result)
LYMPHOCYTES # BLD: 47 % (ref 8–40)
MCH RBC QN AUTO: 32.2 PG (ref 25.7–33.7)
MCHC RBC AUTO-ENTMCNC: 34.4 G/DL (ref 32–35.9)
MCV RBC: 93.7 FL (ref 80–96)
MONOCYTES # BLD AUTO: 6 % (ref 3.8–10.2)
NEUTROPHILS # BLD: 43.6 % (ref 42.8–82.8)
NITRITE UR QL STRIP: POSITIVE
PH UR: 8 [PH] (ref 5–8)
PLATELET # BLD AUTO: 120 10^3/UL (ref 134–434)
PMV BLD: 7.6 FL (ref 7.5–11.1)
PROT SERPL-MCNC: 7.6 G/DL (ref 6.4–8.2)
PROT UR QL STRIP: (no result)
PROT UR QL STRIP: NEGATIVE
PT PNL PPP: 11.7 SEC (ref 9.7–13)
RBC # BLD AUTO: (no result) /UL (ref 0–23.9)
RBC # BLD AUTO: 3.37 M/MM3 (ref 4–5.6)
SODIUM SERPL-SCNC: 124 MMOL/L (ref 136–145)
SP GR UR: 1.01 (ref 1.01–1.03)
UROBILINOGEN UR STRIP-MCNC: 0.2 MG/DL (ref 0.2–1)
WBC # BLD AUTO: 3.5 K/MM3 (ref 4–10)
WBC # UR AUTO: 40 /UL (ref 0–25.8)

## 2021-12-06 PROCEDURE — U0005 INFEC AGEN DETEC AMPLI PROBE: HCPCS

## 2021-12-06 PROCEDURE — C9803 HOPD COVID-19 SPEC COLLECT: HCPCS

## 2021-12-06 PROCEDURE — U0003 INFECTIOUS AGENT DETECTION BY NUCLEIC ACID (DNA OR RNA); SEVERE ACUTE RESPIRATORY SYNDROME CORONAVIRUS 2 (SARS-COV-2) (CORONAVIRUS DISEASE [COVID-19]), AMPLIFIED PROBE TECHNIQUE, MAKING USE OF HIGH THROUGHPUT TECHNOLOGIES AS DESCRIBED BY CMS-2020-01-R: HCPCS

## 2021-12-07 LAB
ALBUMIN SERPL-MCNC: 2.9 G/DL (ref 3.4–5)
ALP SERPL-CCNC: 121 U/L (ref 45–117)
ALT SERPL-CCNC: 45 U/L (ref 13–61)
ANION GAP SERPL CALC-SCNC: 7 MMOL/L (ref 8–16)
AST SERPL-CCNC: 29 U/L (ref 15–37)
BASOPHILS # BLD: 0.5 % (ref 0–2)
BASOPHILS # BLD: 0.6 % (ref 0–2)
BILIRUB SERPL-MCNC: 0.4 MG/DL (ref 0.2–1)
BUN SERPL-MCNC: 8 MG/DL (ref 7–18)
CALCIUM SERPL-MCNC: 9 MG/DL (ref 8.5–10.1)
CHLORIDE SERPL-SCNC: 95 MMOL/L (ref 98–107)
CO2 SERPL-SCNC: 23 MMOL/L (ref 21–32)
CREAT SERPL-MCNC: 0.4 MG/DL (ref 0.55–1.3)
DEPRECATED RDW RBC AUTO: 14.7 % (ref 11.9–15.9)
DEPRECATED RDW RBC AUTO: 15.1 % (ref 11.9–15.9)
EOSINOPHIL # BLD: 1.9 % (ref 0–4.5)
EOSINOPHIL # BLD: 3.5 % (ref 0–4.5)
GLUCOSE SERPL-MCNC: 121 MG/DL (ref 74–106)
HCT VFR BLD CALC: 27.5 % (ref 35.4–49)
HCT VFR BLD CALC: 28.5 % (ref 35.4–49)
HGB BLD-MCNC: 9.3 GM/DL (ref 11.7–16.9)
HGB BLD-MCNC: 9.9 GM/DL (ref 11.7–16.9)
LYMPHOCYTES # BLD: 21.2 % (ref 8–40)
LYMPHOCYTES # BLD: 53.3 % (ref 8–40)
MAGNESIUM SERPL-MCNC: 1.8 MG/DL (ref 1.8–2.4)
MCH RBC QN AUTO: 31.8 PG (ref 25.7–33.7)
MCH RBC QN AUTO: 32.6 PG (ref 25.7–33.7)
MCHC RBC AUTO-ENTMCNC: 33.9 G/DL (ref 32–35.9)
MCHC RBC AUTO-ENTMCNC: 34.6 G/DL (ref 32–35.9)
MCV RBC: 93.9 FL (ref 80–96)
MCV RBC: 94.3 FL (ref 80–96)
MONOCYTES # BLD AUTO: 5.7 % (ref 3.8–10.2)
MONOCYTES # BLD AUTO: 7.3 % (ref 3.8–10.2)
NEUTROPHILS # BLD: 35.4 % (ref 42.8–82.8)
NEUTROPHILS # BLD: 70.6 % (ref 42.8–82.8)
PHOSPHATE SERPL-MCNC: 4.3 MG/DL (ref 2.5–4.9)
PLATELET # BLD AUTO: 118 10^3/UL (ref 134–434)
PLATELET # BLD AUTO: 121 10^3/UL (ref 134–434)
PMV BLD: 7.5 FL (ref 7.5–11.1)
PMV BLD: 8.1 FL (ref 7.5–11.1)
PROT SERPL-MCNC: 6.8 G/DL (ref 6.4–8.2)
RBC # BLD AUTO: 2.93 M/MM3 (ref 4–5.6)
RBC # BLD AUTO: 3.02 M/MM3 (ref 4–5.6)
SODIUM SERPL-SCNC: 125 MMOL/L (ref 136–145)
WBC # BLD AUTO: 2.4 K/MM3 (ref 4–10)
WBC # BLD AUTO: 2.9 K/MM3 (ref 4–10)

## 2021-12-07 RX ADMIN — INSULIN ASPART SCH: 100 INJECTION, SOLUTION INTRAVENOUS; SUBCUTANEOUS at 12:58

## 2021-12-07 RX ADMIN — ATORVASTATIN CALCIUM SCH MG: 40 TABLET, FILM COATED ORAL at 22:32

## 2021-12-07 RX ADMIN — INSULIN ASPART SCH: 100 INJECTION, SOLUTION INTRAVENOUS; SUBCUTANEOUS at 06:54

## 2021-12-07 RX ADMIN — SENNOSIDES SCH MG: 8.8 SYRUP ORAL at 22:33

## 2021-12-07 RX ADMIN — INSULIN ASPART SCH: 100 INJECTION, SOLUTION INTRAVENOUS; SUBCUTANEOUS at 17:57

## 2021-12-07 RX ADMIN — SODIUM CHLORIDE SCH MLS/HR: 9 INJECTION, SOLUTION INTRAVENOUS at 13:26

## 2021-12-07 RX ADMIN — ZONISAMIDE SCH: 100 CAPSULE ORAL at 16:45

## 2021-12-07 RX ADMIN — MEROPENEM SCH MLS/HR: 1 INJECTION, POWDER, FOR SOLUTION INTRAVENOUS at 17:43

## 2021-12-07 RX ADMIN — MEROPENEM SCH MLS/HR: 1 INJECTION, POWDER, FOR SOLUTION INTRAVENOUS at 12:57

## 2021-12-07 RX ADMIN — PIPERACILLIN SODIUM,TAZOBACTAM SODIUM SCH MLS/HR: 3; .375 INJECTION, POWDER, FOR SOLUTION INTRAVENOUS at 03:23

## 2021-12-07 RX ADMIN — LEVETIRACETAM SCH MG: 100 SOLUTION ORAL at 12:55

## 2021-12-07 RX ADMIN — INSULIN ASPART SCH: 100 INJECTION, SOLUTION INTRAVENOUS; SUBCUTANEOUS at 22:32

## 2021-12-07 RX ADMIN — PIPERACILLIN SODIUM,TAZOBACTAM SODIUM SCH: 3; .375 INJECTION, POWDER, FOR SOLUTION INTRAVENOUS at 13:07

## 2021-12-07 RX ADMIN — LACOSAMIDE SCH MG: 10 SOLUTION ORAL at 22:34

## 2021-12-07 RX ADMIN — INSULIN DETEMIR SCH UNITS: 100 INJECTION, SOLUTION SUBCUTANEOUS at 22:32

## 2021-12-07 RX ADMIN — LEVETIRACETAM SCH MG: 100 SOLUTION ORAL at 22:31

## 2021-12-07 RX ADMIN — LACOSAMIDE SCH MG: 10 SOLUTION ORAL at 12:53

## 2021-12-08 LAB
ALBUMIN SERPL-MCNC: 2.9 G/DL (ref 3.4–5)
ALP SERPL-CCNC: 112 U/L (ref 45–117)
ALT SERPL-CCNC: 45 U/L (ref 13–61)
ANION GAP SERPL CALC-SCNC: 7 MMOL/L (ref 8–16)
AST SERPL-CCNC: 24 U/L (ref 15–37)
BASOPHILS # BLD: 0.5 % (ref 0–2)
BILIRUB SERPL-MCNC: 0.1 MG/DL (ref 0.2–1)
BUN SERPL-MCNC: 5.9 MG/DL (ref 7–18)
CALCIUM SERPL-MCNC: 8.8 MG/DL (ref 8.5–10.1)
CHLORIDE SERPL-SCNC: 106 MMOL/L (ref 98–107)
CO2 SERPL-SCNC: 20 MMOL/L (ref 21–32)
CREAT SERPL-MCNC: 0.5 MG/DL (ref 0.55–1.3)
DEPRECATED RDW RBC AUTO: 14.8 % (ref 11.9–15.9)
EOSINOPHIL # BLD: 3.3 % (ref 0–4.5)
GLUCOSE SERPL-MCNC: 101 MG/DL (ref 74–106)
HCT VFR BLD CALC: 30 % (ref 35.4–49)
HGB BLD-MCNC: 10.1 GM/DL (ref 11.7–16.9)
LYMPHOCYTES # BLD: 42.3 % (ref 8–40)
MAGNESIUM SERPL-MCNC: 1.9 MG/DL (ref 1.8–2.4)
MCH RBC QN AUTO: 32.3 PG (ref 25.7–33.7)
MCHC RBC AUTO-ENTMCNC: 33.7 G/DL (ref 32–35.9)
MCV RBC: 96 FL (ref 80–96)
MONOCYTES # BLD AUTO: 7.2 % (ref 3.8–10.2)
NEUTROPHILS # BLD: 46.7 % (ref 42.8–82.8)
PHOSPHATE SERPL-MCNC: 3.1 MG/DL (ref 2.5–4.9)
PLATELET # BLD AUTO: 139 10^3/UL (ref 134–434)
PMV BLD: 7.7 FL (ref 7.5–11.1)
PROT SERPL-MCNC: 7 G/DL (ref 6.4–8.2)
RBC # BLD AUTO: 3.12 M/MM3 (ref 4–5.6)
SODIUM SERPL-SCNC: 134 MMOL/L (ref 136–145)
WBC # BLD AUTO: 3.6 K/MM3 (ref 4–10)

## 2021-12-08 RX ADMIN — LACOSAMIDE SCH MG: 10 SOLUTION ORAL at 10:36

## 2021-12-08 RX ADMIN — MEROPENEM SCH MLS/HR: 1 INJECTION, POWDER, FOR SOLUTION INTRAVENOUS at 02:16

## 2021-12-08 RX ADMIN — Medication SCH MG: at 10:33

## 2021-12-08 RX ADMIN — SODIUM CHLORIDE SCH MLS/HR: 9 INJECTION, SOLUTION INTRAVENOUS at 17:23

## 2021-12-08 RX ADMIN — LACOSAMIDE SCH MG: 10 SOLUTION ORAL at 22:34

## 2021-12-08 RX ADMIN — SODIUM CHLORIDE SCH MLS/HR: 9 INJECTION, SOLUTION INTRAVENOUS at 02:15

## 2021-12-08 RX ADMIN — MEROPENEM SCH MLS/HR: 1 INJECTION, POWDER, FOR SOLUTION INTRAVENOUS at 17:23

## 2021-12-08 RX ADMIN — MEROPENEM SCH MLS/HR: 1 INJECTION, POWDER, FOR SOLUTION INTRAVENOUS at 10:31

## 2021-12-08 RX ADMIN — INSULIN ASPART SCH: 100 INJECTION, SOLUTION INTRAVENOUS; SUBCUTANEOUS at 11:58

## 2021-12-08 RX ADMIN — LEVETIRACETAM SCH MG: 100 SOLUTION ORAL at 10:32

## 2021-12-08 RX ADMIN — SENNOSIDES SCH MG: 8.8 SYRUP ORAL at 22:32

## 2021-12-08 RX ADMIN — ZONISAMIDE SCH MG: 100 CAPSULE ORAL at 10:34

## 2021-12-08 RX ADMIN — ATORVASTATIN CALCIUM SCH MG: 40 TABLET, FILM COATED ORAL at 22:33

## 2021-12-08 RX ADMIN — LEVETIRACETAM SCH MG: 100 SOLUTION ORAL at 22:32

## 2021-12-08 RX ADMIN — INSULIN DETEMIR SCH: 100 INJECTION, SOLUTION SUBCUTANEOUS at 22:34

## 2021-12-08 RX ADMIN — INSULIN ASPART SCH: 100 INJECTION, SOLUTION INTRAVENOUS; SUBCUTANEOUS at 06:03

## 2021-12-08 RX ADMIN — Medication SCH MG: at 22:35

## 2021-12-08 RX ADMIN — INSULIN ASPART SCH: 100 INJECTION, SOLUTION INTRAVENOUS; SUBCUTANEOUS at 22:34

## 2021-12-08 RX ADMIN — INSULIN ASPART SCH: 100 INJECTION, SOLUTION INTRAVENOUS; SUBCUTANEOUS at 17:53

## 2021-12-08 RX ADMIN — ZONISAMIDE SCH MG: 100 CAPSULE ORAL at 22:33

## 2021-12-08 RX ADMIN — ZONISAMIDE SCH: 100 CAPSULE ORAL at 00:10

## 2021-12-09 RX ADMIN — SODIUM CHLORIDE SCH: 9 INJECTION, SOLUTION INTRAVENOUS at 13:33

## 2021-12-09 RX ADMIN — MEROPENEM SCH MLS/HR: 1 INJECTION, POWDER, FOR SOLUTION INTRAVENOUS at 01:13

## 2021-12-09 RX ADMIN — Medication SCH MG: at 21:53

## 2021-12-09 RX ADMIN — MEROPENEM SCH MLS/HR: 1 INJECTION, POWDER, FOR SOLUTION INTRAVENOUS at 18:02

## 2021-12-09 RX ADMIN — SENNOSIDES SCH: 8.8 SYRUP ORAL at 21:50

## 2021-12-09 RX ADMIN — ZONISAMIDE SCH MG: 100 CAPSULE ORAL at 11:07

## 2021-12-09 RX ADMIN — INSULIN DETEMIR SCH UNITS: 100 INJECTION, SOLUTION SUBCUTANEOUS at 22:31

## 2021-12-09 RX ADMIN — LACOSAMIDE SCH MG: 10 SOLUTION ORAL at 11:06

## 2021-12-09 RX ADMIN — ATORVASTATIN CALCIUM SCH MG: 40 TABLET, FILM COATED ORAL at 21:52

## 2021-12-09 RX ADMIN — SODIUM CHLORIDE SCH MLS/HR: 9 INJECTION, SOLUTION INTRAVENOUS at 22:44

## 2021-12-09 RX ADMIN — FUROSEMIDE SCH MG: 40 TABLET ORAL at 13:32

## 2021-12-09 RX ADMIN — LEVETIRACETAM SCH MG: 100 SOLUTION ORAL at 21:55

## 2021-12-09 RX ADMIN — LACOSAMIDE SCH MG: 10 SOLUTION ORAL at 21:51

## 2021-12-09 RX ADMIN — LEVETIRACETAM SCH MG: 100 SOLUTION ORAL at 13:32

## 2021-12-09 RX ADMIN — INSULIN ASPART SCH: 100 INJECTION, SOLUTION INTRAVENOUS; SUBCUTANEOUS at 22:30

## 2021-12-09 RX ADMIN — INSULIN ASPART SCH: 100 INJECTION, SOLUTION INTRAVENOUS; SUBCUTANEOUS at 06:00

## 2021-12-09 RX ADMIN — MEROPENEM SCH MLS/HR: 1 INJECTION, POWDER, FOR SOLUTION INTRAVENOUS at 11:03

## 2021-12-09 RX ADMIN — ZONISAMIDE SCH MG: 100 CAPSULE ORAL at 21:53

## 2021-12-09 RX ADMIN — INSULIN ASPART SCH: 100 INJECTION, SOLUTION INTRAVENOUS; SUBCUTANEOUS at 13:37

## 2021-12-09 RX ADMIN — INSULIN ASPART SCH UNIT: 100 INJECTION, SOLUTION INTRAVENOUS; SUBCUTANEOUS at 18:02

## 2021-12-09 RX ADMIN — SODIUM CHLORIDE SCH MLS/HR: 9 INJECTION, SOLUTION INTRAVENOUS at 08:38

## 2021-12-09 RX ADMIN — Medication SCH MG: at 11:07

## 2021-12-10 LAB
ALBUMIN SERPL-MCNC: 3 G/DL (ref 3.4–5)
ALP SERPL-CCNC: 117 U/L (ref 45–117)
ALT SERPL-CCNC: 42 U/L (ref 13–61)
ANION GAP SERPL CALC-SCNC: 8 MMOL/L (ref 8–16)
AST SERPL-CCNC: 22 U/L (ref 15–37)
BASOPHILS # BLD: 0.6 % (ref 0–2)
BILIRUB SERPL-MCNC: 0.3 MG/DL (ref 0.2–1)
BUN SERPL-MCNC: 10.8 MG/DL (ref 7–18)
CALCIUM SERPL-MCNC: 9.2 MG/DL (ref 8.5–10.1)
CHLORIDE SERPL-SCNC: 101 MMOL/L (ref 98–107)
CO2 SERPL-SCNC: 24 MMOL/L (ref 21–32)
CREAT SERPL-MCNC: 0.5 MG/DL (ref 0.55–1.3)
DEPRECATED RDW RBC AUTO: 14.8 % (ref 11.9–15.9)
EOSINOPHIL # BLD: 2.3 % (ref 0–4.5)
GLUCOSE SERPL-MCNC: 134 MG/DL (ref 74–106)
HCT VFR BLD CALC: 31.9 % (ref 35.4–49)
HGB BLD-MCNC: 10.8 GM/DL (ref 11.7–16.9)
LYMPHOCYTES # BLD: 34.6 % (ref 8–40)
MAGNESIUM SERPL-MCNC: 2.2 MG/DL (ref 1.8–2.4)
MCH RBC QN AUTO: 32.3 PG (ref 25.7–33.7)
MCHC RBC AUTO-ENTMCNC: 33.9 G/DL (ref 32–35.9)
MCV RBC: 95.3 FL (ref 80–96)
MONOCYTES # BLD AUTO: 7.3 % (ref 3.8–10.2)
NEUTROPHILS # BLD: 55.2 % (ref 42.8–82.8)
PLATELET # BLD AUTO: 160 10^3/UL (ref 134–434)
PMV BLD: 7.3 FL (ref 7.5–11.1)
PROT SERPL-MCNC: 7.2 G/DL (ref 6.4–8.2)
RBC # BLD AUTO: 3.35 M/MM3 (ref 4–5.6)
SODIUM SERPL-SCNC: 134 MMOL/L (ref 136–145)
WBC # BLD AUTO: 5.5 K/MM3 (ref 4–10)

## 2021-12-10 RX ADMIN — MEROPENEM SCH MLS/HR: 1 INJECTION, POWDER, FOR SOLUTION INTRAVENOUS at 18:17

## 2021-12-10 RX ADMIN — INSULIN ASPART SCH: 100 INJECTION, SOLUTION INTRAVENOUS; SUBCUTANEOUS at 14:56

## 2021-12-10 RX ADMIN — MEROPENEM SCH MLS/HR: 1 INJECTION, POWDER, FOR SOLUTION INTRAVENOUS at 11:19

## 2021-12-10 RX ADMIN — LEVETIRACETAM SCH MG: 100 SOLUTION ORAL at 14:26

## 2021-12-10 RX ADMIN — MEROPENEM SCH MLS/HR: 1 INJECTION, POWDER, FOR SOLUTION INTRAVENOUS at 02:25

## 2021-12-10 RX ADMIN — ZONISAMIDE SCH MG: 100 CAPSULE ORAL at 11:20

## 2021-12-10 RX ADMIN — LACOSAMIDE SCH MG: 10 SOLUTION ORAL at 21:42

## 2021-12-10 RX ADMIN — INSULIN ASPART SCH: 100 INJECTION, SOLUTION INTRAVENOUS; SUBCUTANEOUS at 21:40

## 2021-12-10 RX ADMIN — ZONISAMIDE SCH MG: 100 CAPSULE ORAL at 21:43

## 2021-12-10 RX ADMIN — LEVETIRACETAM SCH MG: 100 SOLUTION ORAL at 21:38

## 2021-12-10 RX ADMIN — SODIUM CHLORIDE SCH: 9 INJECTION, SOLUTION INTRAVENOUS at 14:32

## 2021-12-10 RX ADMIN — INSULIN ASPART SCH UNIT: 100 INJECTION, SOLUTION INTRAVENOUS; SUBCUTANEOUS at 18:18

## 2021-12-10 RX ADMIN — SENNOSIDES SCH MG: 8.8 SYRUP ORAL at 21:41

## 2021-12-10 RX ADMIN — INSULIN ASPART SCH: 100 INJECTION, SOLUTION INTRAVENOUS; SUBCUTANEOUS at 06:06

## 2021-12-10 RX ADMIN — Medication SCH MG: at 21:42

## 2021-12-10 RX ADMIN — LACOSAMIDE SCH MG: 10 SOLUTION ORAL at 11:19

## 2021-12-10 RX ADMIN — ATORVASTATIN CALCIUM SCH MG: 40 TABLET, FILM COATED ORAL at 21:40

## 2021-12-10 RX ADMIN — FUROSEMIDE SCH MG: 40 TABLET ORAL at 11:20

## 2021-12-10 RX ADMIN — Medication SCH MG: at 11:20

## 2021-12-10 RX ADMIN — INSULIN DETEMIR SCH UNITS: 100 INJECTION, SOLUTION SUBCUTANEOUS at 21:39

## 2021-12-11 LAB
ALBUMIN SERPL-MCNC: 3.1 G/DL (ref 3.4–5)
ALP SERPL-CCNC: 127 U/L (ref 45–117)
ALT SERPL-CCNC: 45 U/L (ref 13–61)
ANION GAP SERPL CALC-SCNC: 10 MMOL/L (ref 8–16)
AST SERPL-CCNC: 29 U/L (ref 15–37)
BASOPHILS # BLD: 0.4 % (ref 0–2)
BILIRUB SERPL-MCNC: 0.2 MG/DL (ref 0.2–1)
BUN SERPL-MCNC: 12.5 MG/DL (ref 7–18)
CALCIUM SERPL-MCNC: 9.4 MG/DL (ref 8.5–10.1)
CHLORIDE SERPL-SCNC: 100 MMOL/L (ref 98–107)
CO2 SERPL-SCNC: 23 MMOL/L (ref 21–32)
CREAT SERPL-MCNC: 0.6 MG/DL (ref 0.55–1.3)
DEPRECATED RDW RBC AUTO: 14.7 % (ref 11.9–15.9)
EOSINOPHIL # BLD: 2.1 % (ref 0–4.5)
GLUCOSE SERPL-MCNC: 152 MG/DL (ref 74–106)
HCT VFR BLD CALC: 32.1 % (ref 35.4–49)
HGB BLD-MCNC: 10.8 GM/DL (ref 11.7–16.9)
LYMPHOCYTES # BLD: 24.4 % (ref 8–40)
MAGNESIUM SERPL-MCNC: 2.4 MG/DL (ref 1.8–2.4)
MCH RBC QN AUTO: 31.8 PG (ref 25.7–33.7)
MCHC RBC AUTO-ENTMCNC: 33.5 G/DL (ref 32–35.9)
MCV RBC: 94.7 FL (ref 80–96)
MONOCYTES # BLD AUTO: 4.6 % (ref 3.8–10.2)
NEUTROPHILS # BLD: 68.5 % (ref 42.8–82.8)
PLATELET # BLD AUTO: 185 10^3/UL (ref 134–434)
PMV BLD: 7.1 FL (ref 7.5–11.1)
PROT SERPL-MCNC: 7.7 G/DL (ref 6.4–8.2)
RBC # BLD AUTO: 3.4 M/MM3 (ref 4–5.6)
SODIUM SERPL-SCNC: 133 MMOL/L (ref 136–145)
WBC # BLD AUTO: 6 K/MM3 (ref 4–10)

## 2021-12-11 RX ADMIN — FUROSEMIDE SCH: 40 TABLET ORAL at 11:46

## 2021-12-11 RX ADMIN — LACOSAMIDE SCH MG: 10 SOLUTION ORAL at 10:57

## 2021-12-11 RX ADMIN — ATORVASTATIN CALCIUM SCH MG: 40 TABLET, FILM COATED ORAL at 21:33

## 2021-12-11 RX ADMIN — INSULIN ASPART SCH: 100 INJECTION, SOLUTION INTRAVENOUS; SUBCUTANEOUS at 21:35

## 2021-12-11 RX ADMIN — Medication SCH MG: at 21:34

## 2021-12-11 RX ADMIN — INSULIN ASPART SCH UNIT: 100 INJECTION, SOLUTION INTRAVENOUS; SUBCUTANEOUS at 17:40

## 2021-12-11 RX ADMIN — INSULIN ASPART SCH: 100 INJECTION, SOLUTION INTRAVENOUS; SUBCUTANEOUS at 11:22

## 2021-12-11 RX ADMIN — SENNOSIDES SCH MG: 8.8 SYRUP ORAL at 21:33

## 2021-12-11 RX ADMIN — MEROPENEM SCH MLS/HR: 1 INJECTION, POWDER, FOR SOLUTION INTRAVENOUS at 01:53

## 2021-12-11 RX ADMIN — INSULIN DETEMIR SCH UNITS: 100 INJECTION, SOLUTION SUBCUTANEOUS at 21:35

## 2021-12-11 RX ADMIN — Medication SCH MG: at 11:00

## 2021-12-11 RX ADMIN — LEVETIRACETAM SCH MG: 100 SOLUTION ORAL at 21:32

## 2021-12-11 RX ADMIN — ZONISAMIDE SCH MG: 100 CAPSULE ORAL at 11:00

## 2021-12-11 RX ADMIN — FUROSEMIDE SCH MG: 40 TABLET ORAL at 10:56

## 2021-12-11 RX ADMIN — ZONISAMIDE SCH MG: 100 CAPSULE ORAL at 21:35

## 2021-12-11 RX ADMIN — LACOSAMIDE SCH MG: 10 SOLUTION ORAL at 21:33

## 2021-12-11 RX ADMIN — LEVETIRACETAM SCH MG: 100 SOLUTION ORAL at 10:57

## 2021-12-11 RX ADMIN — INSULIN ASPART SCH: 100 INJECTION, SOLUTION INTRAVENOUS; SUBCUTANEOUS at 06:07

## 2021-12-12 LAB
ALBUMIN SERPL-MCNC: 3.3 G/DL (ref 3.4–5)
ALP SERPL-CCNC: 128 U/L (ref 45–117)
ALT SERPL-CCNC: 42 U/L (ref 13–61)
ANION GAP SERPL CALC-SCNC: 9 MMOL/L (ref 8–16)
AST SERPL-CCNC: 24 U/L (ref 15–37)
BASOPHILS # BLD: 1.2 % (ref 0–2)
BILIRUB SERPL-MCNC: 0.4 MG/DL (ref 0.2–1)
BUN SERPL-MCNC: 17.4 MG/DL (ref 7–18)
CALCIUM SERPL-MCNC: 9.3 MG/DL (ref 8.5–10.1)
CHLORIDE SERPL-SCNC: 99 MMOL/L (ref 98–107)
CO2 SERPL-SCNC: 26 MMOL/L (ref 21–32)
CREAT SERPL-MCNC: 0.6 MG/DL (ref 0.55–1.3)
DEPRECATED RDW RBC AUTO: 14.5 % (ref 11.9–15.9)
EOSINOPHIL # BLD: 1.1 % (ref 0–4.5)
GLUCOSE SERPL-MCNC: 183 MG/DL (ref 74–106)
HCT VFR BLD CALC: 32.1 % (ref 35.4–49)
HGB BLD-MCNC: 10.9 GM/DL (ref 11.7–16.9)
LYMPHOCYTES # BLD: 18.7 % (ref 8–40)
MAGNESIUM SERPL-MCNC: 2.3 MG/DL (ref 1.8–2.4)
MCH RBC QN AUTO: 32.1 PG (ref 25.7–33.7)
MCHC RBC AUTO-ENTMCNC: 33.9 G/DL (ref 32–35.9)
MCV RBC: 94.7 FL (ref 80–96)
MONOCYTES # BLD AUTO: 5 % (ref 3.8–10.2)
NEUTROPHILS # BLD: 74 % (ref 42.8–82.8)
PLATELET # BLD AUTO: 213 10^3/UL (ref 134–434)
PMV BLD: 7 FL (ref 7.5–11.1)
PROT SERPL-MCNC: 7.6 G/DL (ref 6.4–8.2)
RBC # BLD AUTO: 3.39 M/MM3 (ref 4–5.6)
SODIUM SERPL-SCNC: 133 MMOL/L (ref 136–145)
WBC # BLD AUTO: 7.6 K/MM3 (ref 4–10)

## 2021-12-12 RX ADMIN — Medication SCH MG: at 22:26

## 2021-12-12 RX ADMIN — SENNOSIDES SCH MG: 8.8 SYRUP ORAL at 22:25

## 2021-12-12 RX ADMIN — INSULIN ASPART SCH: 100 INJECTION, SOLUTION INTRAVENOUS; SUBCUTANEOUS at 06:04

## 2021-12-12 RX ADMIN — LEVETIRACETAM SCH MG: 100 SOLUTION ORAL at 12:29

## 2021-12-12 RX ADMIN — ZONISAMIDE SCH MG: 100 CAPSULE ORAL at 12:33

## 2021-12-12 RX ADMIN — LACOSAMIDE SCH MG: 10 SOLUTION ORAL at 22:24

## 2021-12-12 RX ADMIN — INSULIN ASPART SCH: 100 INJECTION, SOLUTION INTRAVENOUS; SUBCUTANEOUS at 22:23

## 2021-12-12 RX ADMIN — LEVETIRACETAM SCH MG: 100 SOLUTION ORAL at 22:24

## 2021-12-12 RX ADMIN — Medication SCH MG: at 12:34

## 2021-12-12 RX ADMIN — INSULIN ASPART SCH UNIT: 100 INJECTION, SOLUTION INTRAVENOUS; SUBCUTANEOUS at 17:28

## 2021-12-12 RX ADMIN — ATORVASTATIN CALCIUM SCH MG: 40 TABLET, FILM COATED ORAL at 22:24

## 2021-12-12 RX ADMIN — FUROSEMIDE SCH MG: 20 TABLET ORAL at 12:29

## 2021-12-12 RX ADMIN — INSULIN ASPART SCH UNIT: 100 INJECTION, SOLUTION INTRAVENOUS; SUBCUTANEOUS at 12:24

## 2021-12-12 RX ADMIN — INSULIN DETEMIR SCH UNITS: 100 INJECTION, SOLUTION SUBCUTANEOUS at 22:23

## 2021-12-12 RX ADMIN — ZONISAMIDE SCH MG: 100 CAPSULE ORAL at 22:26

## 2021-12-12 RX ADMIN — LACOSAMIDE SCH MG: 10 SOLUTION ORAL at 12:28

## 2021-12-13 VITALS — SYSTOLIC BLOOD PRESSURE: 110 MMHG | DIASTOLIC BLOOD PRESSURE: 60 MMHG | HEART RATE: 71 BPM | TEMPERATURE: 96.9 F

## 2021-12-13 RX ADMIN — Medication SCH MG: at 11:31

## 2021-12-13 RX ADMIN — LACOSAMIDE SCH MG: 10 SOLUTION ORAL at 11:30

## 2021-12-13 RX ADMIN — FUROSEMIDE SCH MG: 20 TABLET ORAL at 11:31

## 2021-12-13 RX ADMIN — INSULIN ASPART SCH: 100 INJECTION, SOLUTION INTRAVENOUS; SUBCUTANEOUS at 16:11

## 2021-12-13 RX ADMIN — INSULIN ASPART SCH UNIT: 100 INJECTION, SOLUTION INTRAVENOUS; SUBCUTANEOUS at 11:57

## 2021-12-13 RX ADMIN — INSULIN ASPART SCH: 100 INJECTION, SOLUTION INTRAVENOUS; SUBCUTANEOUS at 06:43

## 2021-12-13 RX ADMIN — LEVETIRACETAM SCH MG: 100 SOLUTION ORAL at 11:30

## 2021-12-13 RX ADMIN — ZONISAMIDE SCH MG: 100 CAPSULE ORAL at 11:32

## 2021-12-30 ENCOUNTER — HOSPITAL ENCOUNTER (INPATIENT)
Dept: HOSPITAL 74 - JER | Age: 36
LOS: 5 days | Discharge: HOME | DRG: 425 | End: 2022-01-04
Attending: NURSE PRACTITIONER | Admitting: INTERNAL MEDICINE
Payer: COMMERCIAL

## 2021-12-30 VITALS — BODY MASS INDEX: 23.6 KG/M2

## 2021-12-30 DIAGNOSIS — Z93.50: ICD-10-CM

## 2021-12-30 DIAGNOSIS — G40.909: ICD-10-CM

## 2021-12-30 DIAGNOSIS — D64.9: ICD-10-CM

## 2021-12-30 DIAGNOSIS — R31.0: ICD-10-CM

## 2021-12-30 DIAGNOSIS — I10: ICD-10-CM

## 2021-12-30 DIAGNOSIS — R65.20: ICD-10-CM

## 2021-12-30 DIAGNOSIS — Z93.1: ICD-10-CM

## 2021-12-30 DIAGNOSIS — R53.2: ICD-10-CM

## 2021-12-30 DIAGNOSIS — Q02: ICD-10-CM

## 2021-12-30 DIAGNOSIS — F73: ICD-10-CM

## 2021-12-30 DIAGNOSIS — R68.0: ICD-10-CM

## 2021-12-30 DIAGNOSIS — G80.8: ICD-10-CM

## 2021-12-30 DIAGNOSIS — E87.1: Primary | ICD-10-CM

## 2021-12-30 LAB
ALBUMIN SERPL-MCNC: 3.6 G/DL (ref 3.4–5)
ALP SERPL-CCNC: 108 U/L (ref 45–117)
ALT SERPL-CCNC: 39 U/L (ref 13–61)
ANION GAP SERPL CALC-SCNC: 9 MMOL/L (ref 8–16)
APPEARANCE UR: CLEAR
APTT BLD: 39.8 SECONDS (ref 25.2–36.5)
AST SERPL-CCNC: 24 U/L (ref 15–37)
BACTERIA # UR AUTO: 1910 /UL (ref 0–1359)
BASE EXCESS BLDV CALC-SCNC: -4 MMOL/L (ref -2–2)
BASOPHILS # BLD: 0.6 % (ref 0–2)
BILIRUB SERPL-MCNC: 0.2 MG/DL (ref 0.2–1)
BILIRUB UR STRIP.AUTO-MCNC: NEGATIVE MG/DL
BUN SERPL-MCNC: 19.3 MG/DL (ref 7–18)
CALCIUM SERPL-MCNC: 9.3 MG/DL (ref 8.5–10.1)
CASTS URNS QL MICRO: 5 /UL (ref 0–3.1)
CHLORIDE SERPL-SCNC: 93 MMOL/L (ref 98–107)
CO2 SERPL-SCNC: 23 MMOL/L (ref 21–32)
COLOR UR: YELLOW
CREAT SERPL-MCNC: 0.5 MG/DL (ref 0.55–1.3)
DEPRECATED RDW RBC AUTO: 14.3 % (ref 11.9–15.9)
EOSINOPHIL # BLD: 1 % (ref 0–4.5)
EPITH CASTS URNS QL MICRO: 1 /UL (ref 0–25.1)
GLUCOSE SERPL-MCNC: 105 MG/DL (ref 74–106)
HCT VFR BLD CALC: 32.4 % (ref 35.4–49)
HCT VFR BLDV CALC: 35 % (ref 35.4–49)
HGB BLD-MCNC: 10.7 GM/DL (ref 11.7–16.9)
INR BLD: 0.95 (ref 0.83–1.09)
KETONES UR QL STRIP: NEGATIVE
LEUKOCYTE ESTERASE UR QL STRIP.AUTO: (no result)
LYMPHOCYTES # BLD: 36.8 % (ref 8–40)
MCH RBC QN AUTO: 30.8 PG (ref 25.7–33.7)
MCHC RBC AUTO-ENTMCNC: 32.9 G/DL (ref 32–35.9)
MCV RBC: 93.5 FL (ref 80–96)
MONOCYTES # BLD AUTO: 2.4 % (ref 3.8–10.2)
NEUTROPHILS # BLD: 59.2 % (ref 42.8–82.8)
NITRITE UR QL STRIP: NEGATIVE
PCO2 BLDV: 46.7 MMHG (ref 38–52)
PH BLDV: 7.3 [PH] (ref 7.31–7.41)
PH UR: >= 9 [PH] (ref 5–8)
PLATELET # BLD AUTO: 170 10^3/UL (ref 134–434)
PMV BLD: 7.8 FL (ref 7.5–11.1)
PROT SERPL-MCNC: 7.7 G/DL (ref 6.4–8.2)
PROT UR QL STRIP: NEGATIVE
PROT UR QL STRIP: NEGATIVE
PT PNL PPP: 11.1 SEC (ref 9.7–13)
RBC # BLD AUTO: 10 /UL (ref 0–23.9)
RBC # BLD AUTO: 3.47 M/MM3 (ref 4–5.6)
SAO2 % BLDV: 60.9 % (ref 70–80)
SODIUM SERPL-SCNC: 125 MMOL/L (ref 136–145)
SP GR UR: 1.01 (ref 1.01–1.03)
UROBILINOGEN UR STRIP-MCNC: 0.2 MG/DL (ref 0.2–1)
WBC # BLD AUTO: 5.5 K/MM3 (ref 4–10)
WBC # UR AUTO: 382 /UL (ref 0–25.8)

## 2021-12-30 PROCEDURE — 3E0G76Z INTRODUCTION OF NUTRITIONAL SUBSTANCE INTO UPPER GI, VIA NATURAL OR ARTIFICIAL OPENING: ICD-10-PCS | Performed by: NURSE PRACTITIONER

## 2021-12-30 PROCEDURE — U0005 INFEC AGEN DETEC AMPLI PROBE: HCPCS

## 2021-12-30 PROCEDURE — U0003 INFECTIOUS AGENT DETECTION BY NUCLEIC ACID (DNA OR RNA); SEVERE ACUTE RESPIRATORY SYNDROME CORONAVIRUS 2 (SARS-COV-2) (CORONAVIRUS DISEASE [COVID-19]), AMPLIFIED PROBE TECHNIQUE, MAKING USE OF HIGH THROUGHPUT TECHNOLOGIES AS DESCRIBED BY CMS-2020-01-R: HCPCS

## 2021-12-30 PROCEDURE — C9803 HOPD COVID-19 SPEC COLLECT: HCPCS

## 2021-12-31 LAB
ANION GAP SERPL CALC-SCNC: 9 MMOL/L (ref 8–16)
BASOPHILS # BLD: 0.6 % (ref 0–2)
BUN SERPL-MCNC: 17.2 MG/DL (ref 7–18)
CALCIUM SERPL-MCNC: 8.9 MG/DL (ref 8.5–10.1)
CHLORIDE SERPL-SCNC: 98 MMOL/L (ref 98–107)
CO2 SERPL-SCNC: 21 MMOL/L (ref 21–32)
CREAT SERPL-MCNC: 0.5 MG/DL (ref 0.55–1.3)
DEPRECATED RDW RBC AUTO: 13.9 % (ref 11.9–15.9)
EOSINOPHIL # BLD: 0.9 % (ref 0–4.5)
GLUCOSE SERPL-MCNC: 115 MG/DL (ref 74–106)
HCT VFR BLD CALC: 28.9 % (ref 35.4–49)
HGB BLD-MCNC: 9.7 GM/DL (ref 11.7–16.9)
IRON SERPL-MCNC: 102 UG/DL (ref 50–175)
LYMPHOCYTES # BLD: 34.3 % (ref 8–40)
MAGNESIUM SERPL-MCNC: 1.9 MG/DL (ref 1.8–2.4)
MCH RBC QN AUTO: 31.3 PG (ref 25.7–33.7)
MCHC RBC AUTO-ENTMCNC: 33.4 G/DL (ref 32–35.9)
MCV RBC: 93.7 FL (ref 80–96)
MONOCYTES # BLD AUTO: 5.4 % (ref 3.8–10.2)
NEUTROPHILS # BLD: 58.8 % (ref 42.8–82.8)
PHOSPHATE SERPL-MCNC: 3.1 MG/DL (ref 2.5–4.9)
PLATELET # BLD AUTO: 162 10^3/UL (ref 134–434)
PMV BLD: 8.1 FL (ref 7.5–11.1)
RBC # BLD AUTO: 3.09 M/MM3 (ref 4–5.6)
RETICS # AUTO: 0.97 % (ref 0.5–1.5)
SODIUM SERPL-SCNC: 128 MMOL/L (ref 136–145)
TIBC SERPL-MCNC: 316 UG/DL (ref 250–450)
WBC # BLD AUTO: 5.7 K/MM3 (ref 4–10)

## 2021-12-31 PROCEDURE — 0T2BX0Z CHANGE DRAINAGE DEVICE IN BLADDER, EXTERNAL APPROACH: ICD-10-PCS | Performed by: STUDENT IN AN ORGANIZED HEALTH CARE EDUCATION/TRAINING PROGRAM

## 2021-12-31 RX ADMIN — LEVETIRACETAM SCH MG: 100 SOLUTION ORAL at 21:02

## 2021-12-31 RX ADMIN — INSULIN ASPART SCH: 100 INJECTION, SOLUTION INTRAVENOUS; SUBCUTANEOUS at 07:07

## 2021-12-31 RX ADMIN — ZONISAMIDE SCH MG: 100 CAPSULE ORAL at 21:03

## 2021-12-31 RX ADMIN — LACOSAMIDE SCH MG: 10 SOLUTION ORAL at 21:01

## 2021-12-31 RX ADMIN — SENNOSIDES SCH TAB: 8.6 TABLET, FILM COATED ORAL at 21:01

## 2021-12-31 RX ADMIN — LEVOCARNITINE SCH MG: 1 SOLUTION ORAL at 21:02

## 2021-12-31 RX ADMIN — SODIUM CHLORIDE SCH MLS/HR: 9 INJECTION, SOLUTION INTRAVENOUS at 02:45

## 2021-12-31 RX ADMIN — ENOXAPARIN SODIUM SCH MG: 40 INJECTION SUBCUTANEOUS at 09:21

## 2021-12-31 RX ADMIN — INSULIN ASPART SCH: 100 INJECTION, SOLUTION INTRAVENOUS; SUBCUTANEOUS at 12:14

## 2021-12-31 RX ADMIN — LEVOCARNITINE SCH MG: 1 SOLUTION ORAL at 14:43

## 2021-12-31 RX ADMIN — INSULIN ASPART SCH: 100 INJECTION, SOLUTION INTRAVENOUS; SUBCUTANEOUS at 17:24

## 2021-12-31 RX ADMIN — Medication SCH MG: at 21:50

## 2021-12-31 RX ADMIN — MULTIVITAMIN SCH ML: LIQUID ORAL at 11:45

## 2021-12-31 RX ADMIN — INSULIN ASPART SCH: 100 INJECTION, SOLUTION INTRAVENOUS; SUBCUTANEOUS at 21:36

## 2021-12-31 RX ADMIN — LEVETIRACETAM SCH MG: 100 SOLUTION ORAL at 11:44

## 2021-12-31 RX ADMIN — ZONISAMIDE SCH MG: 100 CAPSULE ORAL at 11:47

## 2021-12-31 RX ADMIN — LACOSAMIDE SCH MG: 10 SOLUTION ORAL at 12:15

## 2022-01-01 LAB
ALBUMIN SERPL-MCNC: 3.2 G/DL (ref 3.4–5)
ALP SERPL-CCNC: 99 U/L (ref 45–117)
ALT SERPL-CCNC: 34 U/L (ref 13–61)
ANION GAP SERPL CALC-SCNC: 7 MMOL/L (ref 8–16)
AST SERPL-CCNC: 18 U/L (ref 15–37)
BASOPHILS # BLD: 1 % (ref 0–2)
BILIRUB SERPL-MCNC: 0.2 MG/DL (ref 0.2–1)
BUN SERPL-MCNC: 13.4 MG/DL (ref 7–18)
CALCIUM SERPL-MCNC: 9 MG/DL (ref 8.5–10.1)
CHLORIDE SERPL-SCNC: 104 MMOL/L (ref 98–107)
CO2 SERPL-SCNC: 22 MMOL/L (ref 21–32)
CREAT SERPL-MCNC: 0.4 MG/DL (ref 0.55–1.3)
DEPRECATED RDW RBC AUTO: 13.7 % (ref 11.9–15.9)
EOSINOPHIL # BLD: 1.9 % (ref 0–4.5)
GLUCOSE SERPL-MCNC: 99 MG/DL (ref 74–106)
HCT VFR BLD CALC: 29.6 % (ref 35.4–49)
HGB BLD-MCNC: 9.9 GM/DL (ref 11.7–16.9)
LYMPHOCYTES # BLD: 38.3 % (ref 8–40)
MAGNESIUM SERPL-MCNC: 2.1 MG/DL (ref 1.8–2.4)
MCH RBC QN AUTO: 31.4 PG (ref 25.7–33.7)
MCHC RBC AUTO-ENTMCNC: 33.4 G/DL (ref 32–35.9)
MCV RBC: 93.9 FL (ref 80–96)
MONOCYTES # BLD AUTO: 5.2 % (ref 3.8–10.2)
NEUTROPHILS # BLD: 53.6 % (ref 42.8–82.8)
PLATELET # BLD AUTO: 150 10^3/UL (ref 134–434)
PMV BLD: 8.2 FL (ref 7.5–11.1)
PROT SERPL-MCNC: 7 G/DL (ref 6.4–8.2)
RBC # BLD AUTO: 3.15 M/MM3 (ref 4–5.6)
SODIUM SERPL-SCNC: 133 MMOL/L (ref 136–145)
WBC # BLD AUTO: 4.1 K/MM3 (ref 4–10)

## 2022-01-01 RX ADMIN — INSULIN ASPART SCH: 100 INJECTION, SOLUTION INTRAVENOUS; SUBCUTANEOUS at 11:20

## 2022-01-01 RX ADMIN — LEVETIRACETAM SCH MG: 100 SOLUTION ORAL at 21:11

## 2022-01-01 RX ADMIN — LEVOCARNITINE SCH MG: 1 SOLUTION ORAL at 21:12

## 2022-01-01 RX ADMIN — ZONISAMIDE SCH MG: 100 CAPSULE ORAL at 10:20

## 2022-01-01 RX ADMIN — INSULIN ASPART SCH: 100 INJECTION, SOLUTION INTRAVENOUS; SUBCUTANEOUS at 06:29

## 2022-01-01 RX ADMIN — SENNOSIDES SCH TAB: 8.6 TABLET, FILM COATED ORAL at 21:11

## 2022-01-01 RX ADMIN — LEVETIRACETAM SCH MG: 100 SOLUTION ORAL at 10:15

## 2022-01-01 RX ADMIN — INSULIN ASPART SCH UNITS: 100 INJECTION, SOLUTION INTRAVENOUS; SUBCUTANEOUS at 22:29

## 2022-01-01 RX ADMIN — ZONISAMIDE SCH MG: 100 CAPSULE ORAL at 21:13

## 2022-01-01 RX ADMIN — SODIUM CHLORIDE SCH MLS/HR: 9 INJECTION, SOLUTION INTRAVENOUS at 02:15

## 2022-01-01 RX ADMIN — ENOXAPARIN SODIUM SCH MG: 40 INJECTION SUBCUTANEOUS at 10:15

## 2022-01-01 RX ADMIN — LEVOCARNITINE SCH MG: 1 SOLUTION ORAL at 14:25

## 2022-01-01 RX ADMIN — LACOSAMIDE SCH MG: 10 SOLUTION ORAL at 21:10

## 2022-01-01 RX ADMIN — LEVOCARNITINE SCH MG: 1 SOLUTION ORAL at 06:25

## 2022-01-01 RX ADMIN — INSULIN ASPART SCH: 100 INJECTION, SOLUTION INTRAVENOUS; SUBCUTANEOUS at 17:11

## 2022-01-01 RX ADMIN — MULTIVITAMIN SCH ML: LIQUID ORAL at 10:30

## 2022-01-01 RX ADMIN — Medication SCH MG: at 21:11

## 2022-01-02 LAB
ALBUMIN SERPL-MCNC: 3 G/DL (ref 3.4–5)
ALP SERPL-CCNC: 103 U/L (ref 45–117)
ALT SERPL-CCNC: 36 U/L (ref 13–61)
ANION GAP SERPL CALC-SCNC: 9 MMOL/L (ref 8–16)
AST SERPL-CCNC: 22 U/L (ref 15–37)
BASOPHILS # BLD: 0.4 % (ref 0–2)
BILIRUB SERPL-MCNC: 0.2 MG/DL (ref 0.2–1)
BUN SERPL-MCNC: 14.3 MG/DL (ref 7–18)
CALCIUM SERPL-MCNC: 9 MG/DL (ref 8.5–10.1)
CHLORIDE SERPL-SCNC: 106 MMOL/L (ref 98–107)
CO2 SERPL-SCNC: 18 MMOL/L (ref 21–32)
CREAT SERPL-MCNC: 0.6 MG/DL (ref 0.55–1.3)
DEPRECATED RDW RBC AUTO: 14.1 % (ref 11.9–15.9)
EOSINOPHIL # BLD: 2.4 % (ref 0–4.5)
GLUCOSE SERPL-MCNC: 177 MG/DL (ref 74–106)
HCT VFR BLD CALC: 31 % (ref 35.4–49)
HGB BLD-MCNC: 10.2 GM/DL (ref 11.7–16.9)
LYMPHOCYTES # BLD: 35.7 % (ref 8–40)
MAGNESIUM SERPL-MCNC: 2.1 MG/DL (ref 1.8–2.4)
MCH RBC QN AUTO: 31.2 PG (ref 25.7–33.7)
MCHC RBC AUTO-ENTMCNC: 33 G/DL (ref 32–35.9)
MCV RBC: 94.5 FL (ref 80–96)
MONOCYTES # BLD AUTO: 7.1 % (ref 3.8–10.2)
NEUTROPHILS # BLD: 54.4 % (ref 42.8–82.8)
PLATELET # BLD AUTO: 173 10^3/UL (ref 134–434)
PMV BLD: 8.1 FL (ref 7.5–11.1)
PROT SERPL-MCNC: 7 G/DL (ref 6.4–8.2)
RBC # BLD AUTO: 3.28 M/MM3 (ref 4–5.6)
SODIUM SERPL-SCNC: 133 MMOL/L (ref 136–145)
WBC # BLD AUTO: 5.5 K/MM3 (ref 4–10)

## 2022-01-02 RX ADMIN — LACOSAMIDE SCH MG: 10 SOLUTION ORAL at 09:50

## 2022-01-02 RX ADMIN — LEVETIRACETAM SCH MG: 100 SOLUTION ORAL at 09:51

## 2022-01-02 RX ADMIN — ZONISAMIDE SCH MG: 100 CAPSULE ORAL at 09:52

## 2022-01-02 RX ADMIN — INSULIN ASPART SCH: 100 INJECTION, SOLUTION INTRAVENOUS; SUBCUTANEOUS at 06:11

## 2022-01-02 RX ADMIN — ENOXAPARIN SODIUM SCH MG: 40 INJECTION SUBCUTANEOUS at 09:52

## 2022-01-02 RX ADMIN — SENNOSIDES SCH TAB: 8.6 TABLET, FILM COATED ORAL at 22:44

## 2022-01-02 RX ADMIN — INSULIN ASPART SCH UNITS: 100 INJECTION, SOLUTION INTRAVENOUS; SUBCUTANEOUS at 11:30

## 2022-01-02 RX ADMIN — SODIUM CHLORIDE SCH MLS/HR: 9 INJECTION, SOLUTION INTRAVENOUS at 22:58

## 2022-01-02 RX ADMIN — LEVOCARNITINE SCH MG: 1 SOLUTION ORAL at 13:39

## 2022-01-02 RX ADMIN — ZONISAMIDE SCH MG: 100 CAPSULE ORAL at 22:49

## 2022-01-02 RX ADMIN — LEVOCARNITINE SCH MG: 1 SOLUTION ORAL at 05:09

## 2022-01-02 RX ADMIN — MULTIVITAMIN SCH ML: LIQUID ORAL at 09:51

## 2022-01-02 RX ADMIN — LACOSAMIDE SCH: 10 SOLUTION ORAL at 22:43

## 2022-01-02 RX ADMIN — LEVOCARNITINE SCH MG: 1 SOLUTION ORAL at 22:48

## 2022-01-02 RX ADMIN — INSULIN ASPART SCH: 100 INJECTION, SOLUTION INTRAVENOUS; SUBCUTANEOUS at 17:09

## 2022-01-02 RX ADMIN — SODIUM CHLORIDE SCH MLS/HR: 9 INJECTION, SOLUTION INTRAVENOUS at 06:51

## 2022-01-02 RX ADMIN — INSULIN ASPART SCH: 100 INJECTION, SOLUTION INTRAVENOUS; SUBCUTANEOUS at 22:57

## 2022-01-02 RX ADMIN — LACOSAMIDE SCH MG: 10 SOLUTION ORAL at 22:43

## 2022-01-02 RX ADMIN — Medication SCH MG: at 22:45

## 2022-01-03 LAB
ALBUMIN SERPL-MCNC: 3 G/DL (ref 3.4–5)
ALP SERPL-CCNC: 104 U/L (ref 45–117)
ALT SERPL-CCNC: 36 U/L (ref 13–61)
ANION GAP SERPL CALC-SCNC: 8 MMOL/L (ref 8–16)
AST SERPL-CCNC: 21 U/L (ref 15–37)
BASOPHILS # BLD: 0.3 % (ref 0–2)
BILIRUB SERPL-MCNC: 0.3 MG/DL (ref 0.2–1)
BUN SERPL-MCNC: 14.8 MG/DL (ref 7–18)
CALCIUM SERPL-MCNC: 9 MG/DL (ref 8.5–10.1)
CHLORIDE SERPL-SCNC: 107 MMOL/L (ref 98–107)
CO2 SERPL-SCNC: 20 MMOL/L (ref 21–32)
CREAT SERPL-MCNC: 0.5 MG/DL (ref 0.55–1.3)
DEPRECATED RDW RBC AUTO: 14.2 % (ref 11.9–15.9)
EOSINOPHIL # BLD: 1.8 % (ref 0–4.5)
GLUCOSE SERPL-MCNC: 102 MG/DL (ref 74–106)
HCT VFR BLD CALC: 28.9 % (ref 35.4–49)
HGB BLD-MCNC: 9.8 GM/DL (ref 11.7–16.9)
LYMPHOCYTES # BLD: 34.5 % (ref 8–40)
MAGNESIUM SERPL-MCNC: 2 MG/DL (ref 1.8–2.4)
MCH RBC QN AUTO: 32 PG (ref 25.7–33.7)
MCHC RBC AUTO-ENTMCNC: 34 G/DL (ref 32–35.9)
MCV RBC: 94.1 FL (ref 80–96)
MONOCYTES # BLD AUTO: 5.6 % (ref 3.8–10.2)
NEUTROPHILS # BLD: 57.8 % (ref 42.8–82.8)
PLATELET # BLD AUTO: 184 10^3/UL (ref 134–434)
PMV BLD: 7.5 FL (ref 7.5–11.1)
PROT SERPL-MCNC: 7 G/DL (ref 6.4–8.2)
RBC # BLD AUTO: 3.08 M/MM3 (ref 4–5.6)
SODIUM SERPL-SCNC: 135 MMOL/L (ref 136–145)
WBC # BLD AUTO: 5.3 K/MM3 (ref 4–10)

## 2022-01-03 RX ADMIN — INSULIN ASPART SCH: 100 INJECTION, SOLUTION INTRAVENOUS; SUBCUTANEOUS at 16:45

## 2022-01-03 RX ADMIN — LEVETIRACETAM SCH MG: 100 SOLUTION ORAL at 09:43

## 2022-01-03 RX ADMIN — LEVOCARNITINE SCH MG: 1 SOLUTION ORAL at 23:00

## 2022-01-03 RX ADMIN — LACOSAMIDE SCH MG: 10 SOLUTION ORAL at 23:00

## 2022-01-03 RX ADMIN — MULTIVITAMIN SCH ML: LIQUID ORAL at 09:43

## 2022-01-03 RX ADMIN — INSULIN ASPART SCH UNITS: 100 INJECTION, SOLUTION INTRAVENOUS; SUBCUTANEOUS at 12:18

## 2022-01-03 RX ADMIN — ENOXAPARIN SODIUM SCH MG: 40 INJECTION SUBCUTANEOUS at 09:43

## 2022-01-03 RX ADMIN — ZONISAMIDE SCH MG: 100 CAPSULE ORAL at 09:44

## 2022-01-03 RX ADMIN — INSULIN ASPART SCH: 100 INJECTION, SOLUTION INTRAVENOUS; SUBCUTANEOUS at 23:26

## 2022-01-03 RX ADMIN — LEVOCARNITINE SCH MG: 1 SOLUTION ORAL at 15:15

## 2022-01-03 RX ADMIN — ZONISAMIDE SCH MG: 100 CAPSULE ORAL at 23:00

## 2022-01-03 RX ADMIN — SENNOSIDES SCH TAB: 8.6 TABLET, FILM COATED ORAL at 23:00

## 2022-01-03 RX ADMIN — INSULIN ASPART SCH: 100 INJECTION, SOLUTION INTRAVENOUS; SUBCUTANEOUS at 06:19

## 2022-01-03 RX ADMIN — LEVETIRACETAM SCH MG: 100 SOLUTION ORAL at 23:00

## 2022-01-03 RX ADMIN — Medication SCH MG: at 23:00

## 2022-01-03 RX ADMIN — LACOSAMIDE SCH MG: 10 SOLUTION ORAL at 09:41

## 2022-01-03 RX ADMIN — LEVOCARNITINE SCH MG: 1 SOLUTION ORAL at 06:16

## 2022-01-03 RX ADMIN — LEVETIRACETAM SCH MG: 100 SOLUTION ORAL at 01:44

## 2022-01-04 VITALS — SYSTOLIC BLOOD PRESSURE: 153 MMHG | DIASTOLIC BLOOD PRESSURE: 55 MMHG | HEART RATE: 88 BPM

## 2022-01-04 VITALS — TEMPERATURE: 97.8 F

## 2022-01-04 LAB
ALBUMIN SERPL-MCNC: 3 G/DL (ref 3.4–5)
ALP SERPL-CCNC: 113 U/L (ref 45–117)
ALT SERPL-CCNC: 38 U/L (ref 13–61)
ANION GAP SERPL CALC-SCNC: 6 MMOL/L (ref 8–16)
AST SERPL-CCNC: 18 U/L (ref 15–37)
BILIRUB SERPL-MCNC: 0.3 MG/DL (ref 0.2–1)
BUN SERPL-MCNC: 17.9 MG/DL (ref 7–18)
CALCIUM SERPL-MCNC: 9 MG/DL (ref 8.5–10.1)
CHLORIDE SERPL-SCNC: 106 MMOL/L (ref 98–107)
CO2 SERPL-SCNC: 22 MMOL/L (ref 21–32)
CREAT SERPL-MCNC: 0.6 MG/DL (ref 0.55–1.3)
GLUCOSE SERPL-MCNC: 123 MG/DL (ref 74–106)
PROT SERPL-MCNC: 7.2 G/DL (ref 6.4–8.2)
SODIUM SERPL-SCNC: 134 MMOL/L (ref 136–145)

## 2022-01-04 RX ADMIN — INSULIN ASPART SCH UNITS: 100 INJECTION, SOLUTION INTRAVENOUS; SUBCUTANEOUS at 12:18

## 2022-01-04 RX ADMIN — LEVETIRACETAM SCH MG: 100 SOLUTION ORAL at 09:31

## 2022-01-04 RX ADMIN — ZONISAMIDE SCH MG: 100 CAPSULE ORAL at 09:37

## 2022-01-04 RX ADMIN — INSULIN ASPART SCH: 100 INJECTION, SOLUTION INTRAVENOUS; SUBCUTANEOUS at 06:00

## 2022-01-04 RX ADMIN — LEVOCARNITINE SCH MG: 1 SOLUTION ORAL at 05:58

## 2022-01-04 RX ADMIN — MULTIVITAMIN SCH ML: LIQUID ORAL at 09:31

## 2022-01-04 RX ADMIN — LEVOCARNITINE SCH MG: 1 SOLUTION ORAL at 14:03

## 2022-01-04 RX ADMIN — ENOXAPARIN SODIUM SCH MG: 40 INJECTION SUBCUTANEOUS at 09:30

## 2022-01-04 RX ADMIN — INSULIN ASPART SCH: 100 INJECTION, SOLUTION INTRAVENOUS; SUBCUTANEOUS at 17:02

## 2022-01-04 RX ADMIN — LACOSAMIDE SCH MG: 10 SOLUTION ORAL at 09:30

## 2022-04-04 ENCOUNTER — HOSPITAL ENCOUNTER (INPATIENT)
Dept: HOSPITAL 74 - JER | Age: 37
LOS: 10 days | Discharge: HOME | DRG: 466 | End: 2022-04-14
Attending: NURSE PRACTITIONER | Admitting: INTERNAL MEDICINE
Payer: COMMERCIAL

## 2022-04-04 VITALS — BODY MASS INDEX: 43.9 KG/M2

## 2022-04-04 DIAGNOSIS — Z93.59: ICD-10-CM

## 2022-04-04 DIAGNOSIS — Q02: ICD-10-CM

## 2022-04-04 DIAGNOSIS — F88: ICD-10-CM

## 2022-04-04 DIAGNOSIS — R53.2: ICD-10-CM

## 2022-04-04 DIAGNOSIS — Z99.81: ICD-10-CM

## 2022-04-04 DIAGNOSIS — R62.50: ICD-10-CM

## 2022-04-04 DIAGNOSIS — B96.20: ICD-10-CM

## 2022-04-04 DIAGNOSIS — K21.9: ICD-10-CM

## 2022-04-04 DIAGNOSIS — N31.9: ICD-10-CM

## 2022-04-04 DIAGNOSIS — I10: ICD-10-CM

## 2022-04-04 DIAGNOSIS — N13.6: ICD-10-CM

## 2022-04-04 DIAGNOSIS — E11.649: ICD-10-CM

## 2022-04-04 DIAGNOSIS — Z93.1: ICD-10-CM
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
## 2022-04-04 DIAGNOSIS — E87.1: ICD-10-CM

## 2022-04-04 DIAGNOSIS — R60.0: ICD-10-CM

## 2022-04-04 DIAGNOSIS — T83.510A: Primary | ICD-10-CM

## 2022-04-04 DIAGNOSIS — B95.2: ICD-10-CM

## 2022-04-04 DIAGNOSIS — G40.909: ICD-10-CM

## 2022-04-04 DIAGNOSIS — G80.9: ICD-10-CM

## 2022-04-04 DIAGNOSIS — B96.4: ICD-10-CM

## 2022-04-04 LAB
ALBUMIN SERPL-MCNC: 2.5 G/DL (ref 3.4–5)
ALP SERPL-CCNC: 151 U/L (ref 45–117)
ALT SERPL-CCNC: 102 U/L (ref 13–61)
ANION GAP SERPL CALC-SCNC: 12 MMOL/L (ref 8–16)
APPEARANCE UR: CLEAR
AST SERPL-CCNC: 106 U/L (ref 15–37)
BACTERIA # UR AUTO: 5989 /UL (ref 0–1359)
BASOPHILS # BLD: 1 % (ref 0–2)
BILIRUB SERPL-MCNC: 0.2 MG/DL (ref 0.2–1)
BILIRUB UR STRIP.AUTO-MCNC: NEGATIVE MG/DL
BUN SERPL-MCNC: 11 MG/DL (ref 7–18)
CALCIUM SERPL-MCNC: 8.3 MG/DL (ref 8.5–10.1)
CASTS URNS QL MICRO: 1 /UL (ref 0–3.1)
CHLORIDE SERPL-SCNC: 93 MMOL/L (ref 98–107)
CO2 SERPL-SCNC: 20 MMOL/L (ref 21–32)
COLOR UR: YELLOW
CREAT SERPL-MCNC: 0.5 MG/DL (ref 0.55–1.3)
DEPRECATED RDW RBC AUTO: 15.3 % (ref 11.9–15.9)
EOSINOPHIL # BLD: 0.4 % (ref 0–4.5)
EPITH CASTS URNS QL MICRO: 12 /UL (ref 0–25.1)
GLUCOSE SERPL-MCNC: 51 MG/DL (ref 74–106)
HCT VFR BLD CALC: 36.8 % (ref 35.4–49)
HGB BLD-MCNC: 12.5 GM/DL (ref 11.7–16.9)
KETONES UR QL STRIP: NEGATIVE
LEUKOCYTE ESTERASE UR QL STRIP.AUTO: (no result)
LYMPHOCYTES # BLD: 13.3 % (ref 8–40)
MCH RBC QN AUTO: 31.4 PG (ref 25.7–33.7)
MCHC RBC AUTO-ENTMCNC: 33.9 G/DL (ref 32–35.9)
MCV RBC: 92.7 FL (ref 80–96)
MONOCYTES # BLD AUTO: 3.3 % (ref 3.8–10.2)
NEUTROPHILS # BLD: 82 % (ref 42.8–82.8)
NITRITE UR QL STRIP: NEGATIVE
PH UR: 7.5 [PH] (ref 5–8)
PLATELET # BLD AUTO: 128 10^3/UL (ref 134–434)
PMV BLD: 9 FL (ref 7.5–11.1)
PROT SERPL-MCNC: 7.2 G/DL (ref 6.4–8.2)
PROT UR QL STRIP: NEGATIVE
PROT UR QL STRIP: NEGATIVE
RBC # BLD AUTO: 12 /UL (ref 0–23.9)
RBC # BLD AUTO: 3.98 M/MM3 (ref 4–5.6)
SODIUM SERPL-SCNC: 125 MMOL/L (ref 136–145)
SP GR UR: 1.01 (ref 1.01–1.03)
UROBILINOGEN UR STRIP-MCNC: 0.2 MG/DL (ref 0.2–1)
WBC # BLD AUTO: 9.7 K/MM3 (ref 4–10)
WBC # UR AUTO: 24 /UL (ref 0–25.8)

## 2022-04-04 PROCEDURE — U0003 INFECTIOUS AGENT DETECTION BY NUCLEIC ACID (DNA OR RNA); SEVERE ACUTE RESPIRATORY SYNDROME CORONAVIRUS 2 (SARS-COV-2) (CORONAVIRUS DISEASE [COVID-19]), AMPLIFIED PROBE TECHNIQUE, MAKING USE OF HIGH THROUGHPUT TECHNOLOGIES AS DESCRIBED BY CMS-2020-01-R: HCPCS

## 2022-04-04 PROCEDURE — U0005 INFEC AGEN DETEC AMPLI PROBE: HCPCS

## 2022-04-04 PROCEDURE — G0378 HOSPITAL OBSERVATION PER HR: HCPCS

## 2022-04-04 RX ADMIN — LEVETIRACETAM SCH MG: 100 SOLUTION ORAL at 22:19

## 2022-04-04 RX ADMIN — LACOSAMIDE SCH MG: 10 SOLUTION ORAL at 22:19

## 2022-04-04 RX ADMIN — SODIUM CHLORIDE TAB 1 GM SCH GM: 1 TAB at 22:19

## 2022-04-05 LAB
ALBUMIN SERPL-MCNC: 2.6 G/DL (ref 3.4–5)
ALP SERPL-CCNC: 138 U/L (ref 45–117)
ALT SERPL-CCNC: 78 U/L (ref 13–61)
ANION GAP SERPL CALC-SCNC: 6 MMOL/L (ref 8–16)
ANION GAP SERPL CALC-SCNC: 7 MMOL/L (ref 8–16)
AST SERPL-CCNC: 36 U/L (ref 15–37)
BASOPHILS # BLD: 1 % (ref 0–2)
BILIRUB SERPL-MCNC: 0.3 MG/DL (ref 0.2–1)
BUN SERPL-MCNC: 9.6 MG/DL (ref 7–18)
BUN SERPL-MCNC: 9.7 MG/DL (ref 7–18)
CALCIUM SERPL-MCNC: 8.8 MG/DL (ref 8.5–10.1)
CALCIUM SERPL-MCNC: 9 MG/DL (ref 8.5–10.1)
CHLORIDE SERPL-SCNC: 101 MMOL/L (ref 98–107)
CHLORIDE SERPL-SCNC: 97 MMOL/L (ref 98–107)
CO2 SERPL-SCNC: 25 MMOL/L (ref 21–32)
CO2 SERPL-SCNC: 26 MMOL/L (ref 21–32)
CREAT SERPL-MCNC: 0.4 MG/DL (ref 0.55–1.3)
CREAT SERPL-MCNC: 0.4 MG/DL (ref 0.55–1.3)
DEPRECATED RDW RBC AUTO: 14.7 % (ref 11.9–15.9)
EOSINOPHIL # BLD: 0.9 % (ref 0–4.5)
GLUCOSE SERPL-MCNC: 82 MG/DL (ref 74–106)
GLUCOSE SERPL-MCNC: 92 MG/DL (ref 74–106)
HCT VFR BLD CALC: 34.7 % (ref 35.4–49)
HGB BLD-MCNC: 11.6 GM/DL (ref 11.7–16.9)
LYMPHOCYTES # BLD: 20.9 % (ref 8–40)
MAGNESIUM SERPL-MCNC: 1.8 MG/DL (ref 1.8–2.4)
MCH RBC QN AUTO: 31.3 PG (ref 25.7–33.7)
MCHC RBC AUTO-ENTMCNC: 33.4 G/DL (ref 32–35.9)
MCV RBC: 93.8 FL (ref 80–96)
MONOCYTES # BLD AUTO: 4.5 % (ref 3.8–10.2)
NEUTROPHILS # BLD: 72.7 % (ref 42.8–82.8)
PHOSPHATE SERPL-MCNC: 3.8 MG/DL (ref 2.5–4.9)
PLATELET # BLD AUTO: 99 10^3/UL (ref 134–434)
PMV BLD: 8.8 FL (ref 7.5–11.1)
PROT SERPL-MCNC: 6.4 G/DL (ref 6.4–8.2)
RBC # BLD AUTO: 3.7 M/MM3 (ref 4–5.6)
SODIUM SERPL-SCNC: 129 MMOL/L (ref 136–145)
SODIUM SERPL-SCNC: 133 MMOL/L (ref 136–145)
WBC # BLD AUTO: 5.3 K/MM3 (ref 4–10)

## 2022-04-05 RX ADMIN — ENOXAPARIN SODIUM SCH MG: 40 INJECTION SUBCUTANEOUS at 10:22

## 2022-04-05 RX ADMIN — ZONISAMIDE SCH MG: 100 CAPSULE ORAL at 12:43

## 2022-04-05 RX ADMIN — ZONISAMIDE SCH MG: 100 CAPSULE ORAL at 00:52

## 2022-04-05 RX ADMIN — LEVETIRACETAM SCH MG: 100 SOLUTION ORAL at 10:24

## 2022-04-05 RX ADMIN — SODIUM CHLORIDE TAB 1 GM SCH GM: 1 TAB at 21:17

## 2022-04-05 RX ADMIN — SODIUM CHLORIDE TAB 1 GM SCH GM: 1 TAB at 06:08

## 2022-04-05 RX ADMIN — SODIUM CHLORIDE TAB 1 GM SCH GM: 1 TAB at 13:28

## 2022-04-05 RX ADMIN — LACOSAMIDE SCH MG: 10 SOLUTION ORAL at 10:24

## 2022-04-05 RX ADMIN — CEFTRIAXONE SCH MLS/HR: 1 INJECTION, POWDER, FOR SOLUTION INTRAMUSCULAR; INTRAVENOUS at 10:23

## 2022-04-05 RX ADMIN — LEVETIRACETAM SCH MG: 100 SOLUTION ORAL at 21:52

## 2022-04-05 RX ADMIN — LACOSAMIDE SCH MG: 10 SOLUTION ORAL at 21:17

## 2022-04-05 RX ADMIN — ZONISAMIDE SCH MG: 100 CAPSULE ORAL at 22:24

## 2022-04-06 LAB
ALBUMIN SERPL-MCNC: 2.5 G/DL (ref 3.4–5)
ALP SERPL-CCNC: 140 U/L (ref 45–117)
ALT SERPL-CCNC: 77 U/L (ref 13–61)
ANION GAP SERPL CALC-SCNC: 6 MMOL/L (ref 8–16)
AST SERPL-CCNC: 38 U/L (ref 15–37)
BILIRUB SERPL-MCNC: 0.1 MG/DL (ref 0.2–1)
BUN SERPL-MCNC: 14.1 MG/DL (ref 7–18)
CALCIUM SERPL-MCNC: 9.1 MG/DL (ref 8.5–10.1)
CHLORIDE SERPL-SCNC: 104 MMOL/L (ref 98–107)
CO2 SERPL-SCNC: 25 MMOL/L (ref 21–32)
CREAT SERPL-MCNC: 0.4 MG/DL (ref 0.55–1.3)
GLUCOSE SERPL-MCNC: 94 MG/DL (ref 74–106)
PROT SERPL-MCNC: 6.3 G/DL (ref 6.4–8.2)
SODIUM SERPL-SCNC: 136 MMOL/L (ref 136–145)

## 2022-04-06 RX ADMIN — LACOSAMIDE SCH MG: 10 SOLUTION ORAL at 11:30

## 2022-04-06 RX ADMIN — SODIUM CHLORIDE TAB 1 GM SCH GM: 1 TAB at 13:02

## 2022-04-06 RX ADMIN — ZONISAMIDE SCH MG: 100 CAPSULE ORAL at 11:39

## 2022-04-06 RX ADMIN — CEFTRIAXONE SCH MLS/HR: 1 INJECTION, POWDER, FOR SOLUTION INTRAMUSCULAR; INTRAVENOUS at 11:29

## 2022-04-06 RX ADMIN — LACOSAMIDE SCH MG: 10 SOLUTION ORAL at 21:35

## 2022-04-06 RX ADMIN — MULTIVITAMIN SCH ML: LIQUID ORAL at 11:33

## 2022-04-06 RX ADMIN — SODIUM CHLORIDE TAB 1 GM SCH GM: 1 TAB at 21:35

## 2022-04-06 RX ADMIN — Medication SCH ML: at 08:50

## 2022-04-06 RX ADMIN — ENOXAPARIN SODIUM SCH MG: 40 INJECTION SUBCUTANEOUS at 11:38

## 2022-04-06 RX ADMIN — SODIUM CHLORIDE TAB 1 GM SCH GM: 1 TAB at 06:21

## 2022-04-06 RX ADMIN — LEVETIRACETAM SCH MG: 100 SOLUTION ORAL at 21:35

## 2022-04-06 RX ADMIN — LEVETIRACETAM SCH MG: 100 SOLUTION ORAL at 11:32

## 2022-04-07 LAB
ALBUMIN SERPL-MCNC: 2.4 G/DL (ref 3.4–5)
ALP SERPL-CCNC: 143 U/L (ref 45–117)
ALT SERPL-CCNC: 73 U/L (ref 13–61)
ANION GAP SERPL CALC-SCNC: 6 MMOL/L (ref 8–16)
APTT BLD: 49.4 SECONDS (ref 25.2–36.5)
AST SERPL-CCNC: 43 U/L (ref 15–37)
BASOPHILS # BLD: 0.6 % (ref 0–2)
BILIRUB SERPL-MCNC: 0.1 MG/DL (ref 0.2–1)
BUN SERPL-MCNC: 22.3 MG/DL (ref 7–18)
CALCIUM SERPL-MCNC: 9.2 MG/DL (ref 8.5–10.1)
CHLORIDE SERPL-SCNC: 107 MMOL/L (ref 98–107)
CO2 SERPL-SCNC: 25 MMOL/L (ref 21–32)
CREAT SERPL-MCNC: 0.4 MG/DL (ref 0.55–1.3)
DEPRECATED RDW RBC AUTO: 15.5 % (ref 11.9–15.9)
EOSINOPHIL # BLD: 1 % (ref 0–4.5)
GLUCOSE SERPL-MCNC: 90 MG/DL (ref 74–106)
HCT VFR BLD CALC: 33.4 % (ref 35.4–49)
HGB BLD-MCNC: 10.9 GM/DL (ref 11.7–16.9)
INR BLD: 1.65 (ref 0.83–1.09)
INR BLD: 1.65 (ref 0.83–1.09)
LYMPHOCYTES # BLD: 47.3 % (ref 8–40)
MAGNESIUM SERPL-MCNC: 2 MG/DL (ref 1.8–2.4)
MCH RBC QN AUTO: 30.8 PG (ref 25.7–33.7)
MCHC RBC AUTO-ENTMCNC: 32.8 G/DL (ref 32–35.9)
MCV RBC: 93.9 FL (ref 80–96)
MONOCYTES # BLD AUTO: 7.5 % (ref 3.8–10.2)
NEUTROPHILS # BLD: 43.6 % (ref 42.8–82.8)
PLATELET # BLD AUTO: 114 10^3/UL (ref 134–434)
PMV BLD: 8.7 FL (ref 7.5–11.1)
PROT SERPL-MCNC: 6.2 G/DL (ref 6.4–8.2)
PT PNL PPP: 19.1 SEC (ref 9.7–13)
PT PNL PPP: 19.1 SEC (ref 9.7–13)
RBC # BLD AUTO: 3.55 M/MM3 (ref 4–5.6)
SODIUM SERPL-SCNC: 138 MMOL/L (ref 136–145)
WBC # BLD AUTO: 4.7 K/MM3 (ref 4–10)

## 2022-04-07 RX ADMIN — LEVETIRACETAM SCH MG: 100 SOLUTION ORAL at 10:09

## 2022-04-07 RX ADMIN — Medication SCH ML: at 08:57

## 2022-04-07 RX ADMIN — MEROPENEM SCH MLS/HR: 1 INJECTION, POWDER, FOR SOLUTION INTRAVENOUS at 17:08

## 2022-04-07 RX ADMIN — ZONISAMIDE SCH MG: 100 CAPSULE ORAL at 00:25

## 2022-04-07 RX ADMIN — ZONISAMIDE SCH MG: 100 CAPSULE ORAL at 11:26

## 2022-04-07 RX ADMIN — CEFTRIAXONE SCH MLS/HR: 1 INJECTION, POWDER, FOR SOLUTION INTRAMUSCULAR; INTRAVENOUS at 10:09

## 2022-04-07 RX ADMIN — LACOSAMIDE SCH MG: 10 SOLUTION ORAL at 22:46

## 2022-04-07 RX ADMIN — SODIUM CHLORIDE TAB 1 GM SCH GM: 1 TAB at 14:31

## 2022-04-07 RX ADMIN — SODIUM CHLORIDE TAB 1 GM SCH GM: 1 TAB at 06:32

## 2022-04-07 RX ADMIN — ZONISAMIDE SCH MG: 100 CAPSULE ORAL at 23:07

## 2022-04-07 RX ADMIN — LACOSAMIDE SCH MG: 10 SOLUTION ORAL at 10:08

## 2022-04-07 RX ADMIN — LEVETIRACETAM SCH MG: 100 SOLUTION ORAL at 22:46

## 2022-04-07 RX ADMIN — MULTIVITAMIN SCH ML: LIQUID ORAL at 10:08

## 2022-04-08 LAB
ALBUMIN SERPL-MCNC: 2.2 G/DL (ref 3.4–5)
ALP SERPL-CCNC: 148 U/L (ref 45–117)
ALT SERPL-CCNC: 76 U/L (ref 13–61)
ANION GAP SERPL CALC-SCNC: 7 MMOL/L (ref 8–16)
AST SERPL-CCNC: 43 U/L (ref 15–37)
BASOPHILS # BLD: 0.5 % (ref 0–2)
BILIRUB SERPL-MCNC: 0.3 MG/DL (ref 0.2–1)
BUN SERPL-MCNC: 20 MG/DL (ref 7–18)
CALCIUM SERPL-MCNC: 8.7 MG/DL (ref 8.5–10.1)
CHLORIDE SERPL-SCNC: 106 MMOL/L (ref 98–107)
CO2 SERPL-SCNC: 24 MMOL/L (ref 21–32)
CREAT SERPL-MCNC: 0.5 MG/DL (ref 0.55–1.3)
DEPRECATED RDW RBC AUTO: 15.4 % (ref 11.9–15.9)
EOSINOPHIL # BLD: 0.9 % (ref 0–4.5)
GLUCOSE SERPL-MCNC: 122 MG/DL (ref 74–106)
HCT VFR BLD CALC: 34.6 % (ref 35.4–49)
HGB BLD-MCNC: 11.5 GM/DL (ref 11.7–16.9)
LYMPHOCYTES # BLD: 31.2 % (ref 8–40)
MAGNESIUM SERPL-MCNC: 2 MG/DL (ref 1.8–2.4)
MCH RBC QN AUTO: 30.9 PG (ref 25.7–33.7)
MCHC RBC AUTO-ENTMCNC: 33.3 G/DL (ref 32–35.9)
MCV RBC: 92.8 FL (ref 80–96)
MONOCYTES # BLD AUTO: 8.1 % (ref 3.8–10.2)
NEUTROPHILS # BLD: 59.3 % (ref 42.8–82.8)
PLATELET # BLD AUTO: 115 10^3/UL (ref 134–434)
PMV BLD: 8.1 FL (ref 7.5–11.1)
PROT SERPL-MCNC: 6.2 G/DL (ref 6.4–8.2)
RBC # BLD AUTO: 3.73 M/MM3 (ref 4–5.6)
SODIUM SERPL-SCNC: 137 MMOL/L (ref 136–145)
WBC # BLD AUTO: 5 K/MM3 (ref 4–10)

## 2022-04-08 RX ADMIN — MEROPENEM SCH MLS/HR: 1 INJECTION, POWDER, FOR SOLUTION INTRAVENOUS at 10:55

## 2022-04-08 RX ADMIN — ZONISAMIDE SCH MG: 100 CAPSULE ORAL at 10:59

## 2022-04-08 RX ADMIN — LACOSAMIDE SCH MG: 10 SOLUTION ORAL at 10:57

## 2022-04-08 RX ADMIN — LEVETIRACETAM SCH MG: 100 SOLUTION ORAL at 22:50

## 2022-04-08 RX ADMIN — SODIUM CHLORIDE TAB 1 GM SCH GM: 1 TAB at 10:55

## 2022-04-08 RX ADMIN — LEVETIRACETAM SCH MG: 100 SOLUTION ORAL at 10:58

## 2022-04-08 RX ADMIN — LACOSAMIDE SCH MG: 10 SOLUTION ORAL at 22:49

## 2022-04-08 RX ADMIN — ZONISAMIDE SCH MG: 100 CAPSULE ORAL at 22:51

## 2022-04-08 RX ADMIN — MEROPENEM SCH MLS/HR: 1 INJECTION, POWDER, FOR SOLUTION INTRAVENOUS at 02:38

## 2022-04-08 RX ADMIN — ENOXAPARIN SODIUM SCH MG: 40 INJECTION SUBCUTANEOUS at 10:56

## 2022-04-08 RX ADMIN — MULTIVITAMIN SCH ML: LIQUID ORAL at 10:56

## 2022-04-08 RX ADMIN — MEROPENEM SCH MLS/HR: 1 INJECTION, POWDER, FOR SOLUTION INTRAVENOUS at 17:11

## 2022-04-08 RX ADMIN — Medication SCH ML: at 08:53

## 2022-04-09 LAB
ALBUMIN SERPL-MCNC: 2 G/DL (ref 3.4–5)
ALP SERPL-CCNC: 142 U/L (ref 45–117)
ALT SERPL-CCNC: 65 U/L (ref 13–61)
ANION GAP SERPL CALC-SCNC: 7 MMOL/L (ref 8–16)
AST SERPL-CCNC: 37 U/L (ref 15–37)
BASOPHILS # BLD: 1 % (ref 0–2)
BILIRUB SERPL-MCNC: 0.2 MG/DL (ref 0.2–1)
BUN SERPL-MCNC: 19.7 MG/DL (ref 7–18)
CALCIUM SERPL-MCNC: 8.4 MG/DL (ref 8.5–10.1)
CHLORIDE SERPL-SCNC: 107 MMOL/L (ref 98–107)
CO2 SERPL-SCNC: 24 MMOL/L (ref 21–32)
CREAT SERPL-MCNC: 0.5 MG/DL (ref 0.55–1.3)
DEPRECATED RDW RBC AUTO: 15 % (ref 11.9–15.9)
EOSINOPHIL # BLD: 1.3 % (ref 0–4.5)
GLUCOSE SERPL-MCNC: 114 MG/DL (ref 74–106)
HCT VFR BLD CALC: 33.7 % (ref 35.4–49)
HGB BLD-MCNC: 11 GM/DL (ref 11.7–16.9)
LYMPHOCYTES # BLD: 33.2 % (ref 8–40)
MAGNESIUM SERPL-MCNC: 2 MG/DL (ref 1.8–2.4)
MCH RBC QN AUTO: 30.8 PG (ref 25.7–33.7)
MCHC RBC AUTO-ENTMCNC: 32.6 G/DL (ref 32–35.9)
MCV RBC: 94.5 FL (ref 80–96)
MONOCYTES # BLD AUTO: 6.8 % (ref 3.8–10.2)
NEUTROPHILS # BLD: 57.7 % (ref 42.8–82.8)
PLATELET # BLD AUTO: 122 10^3/UL (ref 134–434)
PMV BLD: 7.8 FL (ref 7.5–11.1)
PROT SERPL-MCNC: 5.9 G/DL (ref 6.4–8.2)
RBC # BLD AUTO: 3.57 M/MM3 (ref 4–5.6)
SODIUM SERPL-SCNC: 137 MMOL/L (ref 136–145)
WBC # BLD AUTO: 6.6 K/MM3 (ref 4–10)

## 2022-04-09 RX ADMIN — FUROSEMIDE SCH MG: 40 SOLUTION ORAL at 10:02

## 2022-04-09 RX ADMIN — LACOSAMIDE SCH MG: 10 SOLUTION ORAL at 10:04

## 2022-04-09 RX ADMIN — Medication SCH ML: at 10:01

## 2022-04-09 RX ADMIN — ZONISAMIDE SCH MG: 100 CAPSULE ORAL at 10:12

## 2022-04-09 RX ADMIN — ZONISAMIDE SCH MG: 100 CAPSULE ORAL at 23:00

## 2022-04-09 RX ADMIN — ENOXAPARIN SODIUM SCH MG: 40 INJECTION SUBCUTANEOUS at 10:03

## 2022-04-09 RX ADMIN — LACOSAMIDE SCH MG: 10 SOLUTION ORAL at 21:47

## 2022-04-09 RX ADMIN — MULTIVITAMIN SCH ML: LIQUID ORAL at 10:02

## 2022-04-09 RX ADMIN — LEVETIRACETAM SCH MG: 100 SOLUTION ORAL at 10:01

## 2022-04-09 RX ADMIN — SODIUM CHLORIDE TAB 1 GM SCH GM: 1 TAB at 10:04

## 2022-04-09 RX ADMIN — MEROPENEM SCH MLS/HR: 1 INJECTION, POWDER, FOR SOLUTION INTRAVENOUS at 17:04

## 2022-04-09 RX ADMIN — LEVETIRACETAM SCH MG: 100 SOLUTION ORAL at 21:48

## 2022-04-09 RX ADMIN — MEROPENEM SCH MLS/HR: 1 INJECTION, POWDER, FOR SOLUTION INTRAVENOUS at 02:30

## 2022-04-09 RX ADMIN — MEROPENEM SCH MLS/HR: 1 INJECTION, POWDER, FOR SOLUTION INTRAVENOUS at 10:03

## 2022-04-10 LAB
ALBUMIN SERPL-MCNC: 2.1 G/DL (ref 3.4–5)
ALP SERPL-CCNC: 160 U/L (ref 45–117)
ALT SERPL-CCNC: 66 U/L (ref 13–61)
ANION GAP SERPL CALC-SCNC: 6 MMOL/L (ref 8–16)
AST SERPL-CCNC: 35 U/L (ref 15–37)
BASOPHILS # BLD: 1.1 % (ref 0–2)
BILIRUB SERPL-MCNC: 0.2 MG/DL (ref 0.2–1)
BUN SERPL-MCNC: 19.7 MG/DL (ref 7–18)
CALCIUM SERPL-MCNC: 8.3 MG/DL (ref 8.5–10.1)
CHLORIDE SERPL-SCNC: 105 MMOL/L (ref 98–107)
CO2 SERPL-SCNC: 26 MMOL/L (ref 21–32)
CREAT SERPL-MCNC: 0.5 MG/DL (ref 0.55–1.3)
DEPRECATED RDW RBC AUTO: 14.9 % (ref 11.9–15.9)
EOSINOPHIL # BLD: 1.4 % (ref 0–4.5)
GLUCOSE SERPL-MCNC: 139 MG/DL (ref 74–106)
HCT VFR BLD CALC: 32.6 % (ref 35.4–49)
HGB BLD-MCNC: 11.1 GM/DL (ref 11.7–16.9)
LYMPHOCYTES # BLD: 19.9 % (ref 8–40)
MAGNESIUM SERPL-MCNC: 1.9 MG/DL (ref 1.8–2.4)
MCH RBC QN AUTO: 31.6 PG (ref 25.7–33.7)
MCHC RBC AUTO-ENTMCNC: 33.9 G/DL (ref 32–35.9)
MCV RBC: 93.3 FL (ref 80–96)
MONOCYTES # BLD AUTO: 7.5 % (ref 3.8–10.2)
NEUTROPHILS # BLD: 70.1 % (ref 42.8–82.8)
PLATELET # BLD AUTO: 125 10^3/UL (ref 134–434)
PMV BLD: 7 FL (ref 7.5–11.1)
PROT SERPL-MCNC: 6.2 G/DL (ref 6.4–8.2)
RBC # BLD AUTO: 3.5 M/MM3 (ref 4–5.6)
SODIUM SERPL-SCNC: 137 MMOL/L (ref 136–145)
WBC # BLD AUTO: 7.6 K/MM3 (ref 4–10)

## 2022-04-10 RX ADMIN — ZONISAMIDE SCH MG: 100 CAPSULE ORAL at 09:36

## 2022-04-10 RX ADMIN — LACOSAMIDE SCH MG: 10 SOLUTION ORAL at 22:52

## 2022-04-10 RX ADMIN — ZONISAMIDE SCH MG: 100 CAPSULE ORAL at 22:53

## 2022-04-10 RX ADMIN — SODIUM CHLORIDE TAB 1 GM SCH GM: 1 TAB at 09:33

## 2022-04-10 RX ADMIN — LEVETIRACETAM SCH MG: 100 SOLUTION ORAL at 09:31

## 2022-04-10 RX ADMIN — MEROPENEM SCH MLS/HR: 1 INJECTION, POWDER, FOR SOLUTION INTRAVENOUS at 09:34

## 2022-04-10 RX ADMIN — MULTIVITAMIN SCH ML: LIQUID ORAL at 09:35

## 2022-04-10 RX ADMIN — ENOXAPARIN SODIUM SCH MG: 40 INJECTION SUBCUTANEOUS at 09:31

## 2022-04-10 RX ADMIN — Medication SCH ML: at 09:30

## 2022-04-10 RX ADMIN — FUROSEMIDE SCH MG: 40 SOLUTION ORAL at 09:33

## 2022-04-10 RX ADMIN — MEROPENEM SCH MLS/HR: 1 INJECTION, POWDER, FOR SOLUTION INTRAVENOUS at 03:19

## 2022-04-10 RX ADMIN — LACOSAMIDE SCH MG: 10 SOLUTION ORAL at 09:29

## 2022-04-10 RX ADMIN — MEROPENEM SCH MLS/HR: 1 INJECTION, POWDER, FOR SOLUTION INTRAVENOUS at 17:10

## 2022-04-11 LAB
ALBUMIN SERPL-MCNC: 1.9 G/DL (ref 3.4–5)
ALP SERPL-CCNC: 153 U/L (ref 45–117)
ALT SERPL-CCNC: 63 U/L (ref 13–61)
ANION GAP SERPL CALC-SCNC: 6 MMOL/L (ref 8–16)
AST SERPL-CCNC: 43 U/L (ref 15–37)
BASOPHILS # BLD: 0.9 % (ref 0–2)
BILIRUB SERPL-MCNC: 0.2 MG/DL (ref 0.2–1)
BUN SERPL-MCNC: 19.9 MG/DL (ref 7–18)
CALCIUM SERPL-MCNC: 8.4 MG/DL (ref 8.5–10.1)
CHLORIDE SERPL-SCNC: 105 MMOL/L (ref 98–107)
CO2 SERPL-SCNC: 28 MMOL/L (ref 21–32)
CREAT SERPL-MCNC: 0.4 MG/DL (ref 0.55–1.3)
DEPRECATED RDW RBC AUTO: 14.7 % (ref 11.9–15.9)
EOSINOPHIL # BLD: 2.9 % (ref 0–4.5)
GLUCOSE SERPL-MCNC: 101 MG/DL (ref 74–106)
HCT VFR BLD CALC: 30.5 % (ref 35.4–49)
HGB BLD-MCNC: 10.3 GM/DL (ref 11.7–16.9)
LYMPHOCYTES # BLD: 39.3 % (ref 8–40)
MAGNESIUM SERPL-MCNC: 2 MG/DL (ref 1.8–2.4)
MCH RBC QN AUTO: 31.7 PG (ref 25.7–33.7)
MCHC RBC AUTO-ENTMCNC: 33.7 G/DL (ref 32–35.9)
MCV RBC: 94.1 FL (ref 80–96)
MONOCYTES # BLD AUTO: 8.5 % (ref 3.8–10.2)
NEUTROPHILS # BLD: 48.4 % (ref 42.8–82.8)
PLATELET # BLD AUTO: 126 10^3/UL (ref 134–434)
PMV BLD: 7.6 FL (ref 7.5–11.1)
PROT SERPL-MCNC: 5.9 G/DL (ref 6.4–8.2)
RBC # BLD AUTO: 3.25 M/MM3 (ref 4–5.6)
SODIUM SERPL-SCNC: 139 MMOL/L (ref 136–145)
WBC # BLD AUTO: 4.4 K/MM3 (ref 4–10)

## 2022-04-11 RX ADMIN — Medication SCH ML: at 10:34

## 2022-04-11 RX ADMIN — SODIUM CHLORIDE TAB 1 GM SCH GM: 1 TAB at 10:32

## 2022-04-11 RX ADMIN — ENOXAPARIN SODIUM SCH MG: 40 INJECTION SUBCUTANEOUS at 10:35

## 2022-04-11 RX ADMIN — LEVETIRACETAM SCH MG: 100 SOLUTION ORAL at 12:33

## 2022-04-11 RX ADMIN — LEVETIRACETAM SCH MG: 100 SOLUTION ORAL at 21:49

## 2022-04-11 RX ADMIN — MULTIVITAMIN SCH ML: LIQUID ORAL at 10:34

## 2022-04-11 RX ADMIN — LACOSAMIDE SCH MG: 10 SOLUTION ORAL at 10:33

## 2022-04-11 RX ADMIN — ZONISAMIDE SCH MG: 100 CAPSULE ORAL at 10:34

## 2022-04-11 RX ADMIN — ZONISAMIDE SCH MG: 100 CAPSULE ORAL at 21:49

## 2022-04-11 RX ADMIN — MEROPENEM SCH MLS/HR: 1 INJECTION, POWDER, FOR SOLUTION INTRAVENOUS at 10:32

## 2022-04-11 RX ADMIN — FUROSEMIDE SCH MG: 40 SOLUTION ORAL at 12:33

## 2022-04-11 RX ADMIN — LEVETIRACETAM SCH MG: 100 SOLUTION ORAL at 01:18

## 2022-04-11 RX ADMIN — MEROPENEM SCH MLS/HR: 1 INJECTION, POWDER, FOR SOLUTION INTRAVENOUS at 02:30

## 2022-04-11 RX ADMIN — MEROPENEM SCH MLS/HR: 1 INJECTION, POWDER, FOR SOLUTION INTRAVENOUS at 17:06

## 2022-04-11 RX ADMIN — LACOSAMIDE SCH MG: 10 SOLUTION ORAL at 21:53

## 2022-04-12 LAB
ALBUMIN SERPL-MCNC: 2 G/DL (ref 3.4–5)
ALP SERPL-CCNC: 154 U/L (ref 45–117)
ALT SERPL-CCNC: 74 U/L (ref 13–61)
ANION GAP SERPL CALC-SCNC: 7 MMOL/L (ref 8–16)
AST SERPL-CCNC: 53 U/L (ref 15–37)
BASOPHILS # BLD: 0.7 % (ref 0–2)
BILIRUB SERPL-MCNC: 0.3 MG/DL (ref 0.2–1)
BUN SERPL-MCNC: 17.8 MG/DL (ref 7–18)
CALCIUM SERPL-MCNC: 8.3 MG/DL (ref 8.5–10.1)
CHLORIDE SERPL-SCNC: 104 MMOL/L (ref 98–107)
CO2 SERPL-SCNC: 27 MMOL/L (ref 21–32)
CREAT SERPL-MCNC: 0.4 MG/DL (ref 0.55–1.3)
DEPRECATED RDW RBC AUTO: 15.3 % (ref 11.9–15.9)
EOSINOPHIL # BLD: 3 % (ref 0–4.5)
GLUCOSE SERPL-MCNC: 116 MG/DL (ref 74–106)
HCT VFR BLD CALC: 31.9 % (ref 35.4–49)
HGB BLD-MCNC: 10.5 GM/DL (ref 11.7–16.9)
LYMPHOCYTES # BLD: 33.8 % (ref 8–40)
MAGNESIUM SERPL-MCNC: 2 MG/DL (ref 1.8–2.4)
MCH RBC QN AUTO: 31 PG (ref 25.7–33.7)
MCHC RBC AUTO-ENTMCNC: 32.8 G/DL (ref 32–35.9)
MCV RBC: 94.3 FL (ref 80–96)
MONOCYTES # BLD AUTO: 9.2 % (ref 3.8–10.2)
NEUTROPHILS # BLD: 53.3 % (ref 42.8–82.8)
PLATELET # BLD AUTO: 164 10^3/UL (ref 134–434)
PMV BLD: 7.7 FL (ref 7.5–11.1)
PROT SERPL-MCNC: 6.2 G/DL (ref 6.4–8.2)
RBC # BLD AUTO: 3.38 M/MM3 (ref 4–5.6)
SODIUM SERPL-SCNC: 139 MMOL/L (ref 136–145)
WBC # BLD AUTO: 5.8 K/MM3 (ref 4–10)

## 2022-04-12 RX ADMIN — MEROPENEM SCH MLS/HR: 1 INJECTION, POWDER, FOR SOLUTION INTRAVENOUS at 01:34

## 2022-04-12 RX ADMIN — Medication SCH ML: at 09:19

## 2022-04-12 RX ADMIN — ENOXAPARIN SODIUM SCH MG: 40 INJECTION SUBCUTANEOUS at 09:18

## 2022-04-12 RX ADMIN — ZONISAMIDE SCH MG: 100 CAPSULE ORAL at 21:36

## 2022-04-12 RX ADMIN — LEVETIRACETAM SCH MG: 100 SOLUTION ORAL at 21:36

## 2022-04-12 RX ADMIN — MEROPENEM SCH MLS/HR: 1 INJECTION, POWDER, FOR SOLUTION INTRAVENOUS at 09:20

## 2022-04-12 RX ADMIN — SODIUM CHLORIDE TAB 1 GM SCH GM: 1 TAB at 09:20

## 2022-04-12 RX ADMIN — LEVETIRACETAM SCH MG: 100 SOLUTION ORAL at 09:19

## 2022-04-12 RX ADMIN — ZONISAMIDE SCH MG: 100 CAPSULE ORAL at 09:21

## 2022-04-12 RX ADMIN — MEROPENEM SCH MLS/HR: 1 INJECTION, POWDER, FOR SOLUTION INTRAVENOUS at 17:38

## 2022-04-12 RX ADMIN — MULTIVITAMIN SCH ML: LIQUID ORAL at 09:19

## 2022-04-13 LAB
ALBUMIN SERPL-MCNC: 2.1 G/DL (ref 3.4–5)
ALP SERPL-CCNC: 162 U/L (ref 45–117)
ALT SERPL-CCNC: 75 U/L (ref 13–61)
ANION GAP SERPL CALC-SCNC: 7 MMOL/L (ref 8–16)
AST SERPL-CCNC: 49 U/L (ref 15–37)
BILIRUB SERPL-MCNC: 0.4 MG/DL (ref 0.2–1)
BUN SERPL-MCNC: 15.2 MG/DL (ref 7–18)
CALCIUM SERPL-MCNC: 8.6 MG/DL (ref 8.5–10.1)
CHLORIDE SERPL-SCNC: 102 MMOL/L (ref 98–107)
CO2 SERPL-SCNC: 29 MMOL/L (ref 21–32)
CREAT SERPL-MCNC: 0.5 MG/DL (ref 0.55–1.3)
GLUCOSE SERPL-MCNC: 136 MG/DL (ref 74–106)
PROT SERPL-MCNC: 6.4 G/DL (ref 6.4–8.2)
SODIUM SERPL-SCNC: 138 MMOL/L (ref 136–145)

## 2022-04-13 RX ADMIN — FUROSEMIDE SCH MG: 40 SOLUTION ORAL at 09:26

## 2022-04-13 RX ADMIN — Medication SCH ML: at 09:26

## 2022-04-13 RX ADMIN — MEROPENEM SCH MLS/HR: 1 INJECTION, POWDER, FOR SOLUTION INTRAVENOUS at 01:29

## 2022-04-13 RX ADMIN — MEROPENEM SCH MLS/HR: 1 INJECTION, POWDER, FOR SOLUTION INTRAVENOUS at 09:26

## 2022-04-13 RX ADMIN — MULTIVITAMIN SCH ML: LIQUID ORAL at 09:27

## 2022-04-13 RX ADMIN — MEROPENEM SCH MLS/HR: 1 INJECTION, POWDER, FOR SOLUTION INTRAVENOUS at 17:15

## 2022-04-13 RX ADMIN — LEVETIRACETAM SCH MG: 100 SOLUTION ORAL at 22:55

## 2022-04-13 RX ADMIN — ZONISAMIDE SCH MG: 100 CAPSULE ORAL at 22:54

## 2022-04-13 RX ADMIN — LEVETIRACETAM SCH MG: 100 SOLUTION ORAL at 09:25

## 2022-04-13 RX ADMIN — ENOXAPARIN SODIUM SCH MG: 40 INJECTION SUBCUTANEOUS at 09:26

## 2022-04-13 RX ADMIN — ZONISAMIDE SCH MG: 100 CAPSULE ORAL at 09:33

## 2022-04-14 VITALS — HEART RATE: 80 BPM | SYSTOLIC BLOOD PRESSURE: 113 MMHG | DIASTOLIC BLOOD PRESSURE: 66 MMHG

## 2022-04-14 VITALS — TEMPERATURE: 98.2 F

## 2022-04-14 LAB
ALBUMIN SERPL-MCNC: 2.2 G/DL (ref 3.4–5)
ALP SERPL-CCNC: 173 U/L (ref 45–117)
ALT SERPL-CCNC: 70 U/L (ref 13–61)
ANION GAP SERPL CALC-SCNC: 7 MMOL/L (ref 8–16)
AST SERPL-CCNC: 43 U/L (ref 15–37)
BASOPHILS # BLD: 0.3 % (ref 0–2)
BILIRUB SERPL-MCNC: 0.1 MG/DL (ref 0.2–1)
BUN SERPL-MCNC: 14 MG/DL (ref 7–18)
CALCIUM SERPL-MCNC: 9 MG/DL (ref 8.5–10.1)
CHLORIDE SERPL-SCNC: 103 MMOL/L (ref 98–107)
CO2 SERPL-SCNC: 26 MMOL/L (ref 21–32)
CREAT SERPL-MCNC: 0.5 MG/DL (ref 0.55–1.3)
DEPRECATED RDW RBC AUTO: 15 % (ref 11.9–15.9)
EOSINOPHIL # BLD: 3.9 % (ref 0–4.5)
GLUCOSE SERPL-MCNC: 191 MG/DL (ref 74–106)
HCT VFR BLD CALC: 33.1 % (ref 35.4–49)
HGB BLD-MCNC: 10.8 GM/DL (ref 11.7–16.9)
LYMPHOCYTES # BLD: 29.5 % (ref 8–40)
MAGNESIUM SERPL-MCNC: 2.2 MG/DL (ref 1.8–2.4)
MCH RBC QN AUTO: 31.1 PG (ref 25.7–33.7)
MCHC RBC AUTO-ENTMCNC: 32.7 G/DL (ref 32–35.9)
MCV RBC: 94.9 FL (ref 80–96)
MONOCYTES # BLD AUTO: 7 % (ref 3.8–10.2)
NEUTROPHILS # BLD: 59.3 % (ref 42.8–82.8)
PLATELET # BLD AUTO: 248 10^3/UL (ref 134–434)
PMV BLD: 7.1 FL (ref 7.5–11.1)
PROT SERPL-MCNC: 7 G/DL (ref 6.4–8.2)
RBC # BLD AUTO: 3.49 M/MM3 (ref 4–5.6)
SODIUM SERPL-SCNC: 136 MMOL/L (ref 136–145)
WBC # BLD AUTO: 7.8 K/MM3 (ref 4–10)

## 2022-04-14 RX ADMIN — MEROPENEM SCH MLS/HR: 1 INJECTION, POWDER, FOR SOLUTION INTRAVENOUS at 10:39

## 2022-04-14 RX ADMIN — MULTIVITAMIN SCH ML: LIQUID ORAL at 10:40

## 2022-04-14 RX ADMIN — FUROSEMIDE SCH MG: 40 SOLUTION ORAL at 10:40

## 2022-04-14 RX ADMIN — Medication SCH ML: at 08:40

## 2022-04-14 RX ADMIN — MEROPENEM SCH MLS/HR: 1 INJECTION, POWDER, FOR SOLUTION INTRAVENOUS at 02:29

## 2022-04-14 RX ADMIN — ENOXAPARIN SODIUM SCH MG: 40 INJECTION SUBCUTANEOUS at 10:21

## 2022-04-14 RX ADMIN — ZONISAMIDE SCH MG: 100 CAPSULE ORAL at 10:56

## 2022-04-14 RX ADMIN — LEVETIRACETAM SCH MG: 100 SOLUTION ORAL at 10:39

## 2023-10-30 NOTE — PDOC
History of Present Illness





- General


History Source: Nursing Home Records, Old Records





- History of Present Illness


Initial Comments: 


34 year old male with PMH seizures, microcephaly with cranial synostosis, 

pneumonia, urethral stricture s/p suprapubic catheter placement, chronic UTIs, 

and MR was brought in by ambulance to Emergency Department from [facility] for 

cough, change in breathing, hypoxia to 78% on RA. Patient is nonverbal, does 

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 

side.





PCP: TRINA Galvin-BC





ROS - unable to complete 2/2 pt being nonverbal and NH employee knows no 

further information. 





PE


Constitutional: microcephaly. awake. alert. does not track with eyes. 

nonverbal. 


HEENT: head is normocephalic, atraumatic. EOMI. PERRLA. 


Neck: supple. Full ROM.


Cardiovascular: regular heart rhythm. no murmurs. no pericardial friction rub. 


Respiratory: coarse breath sounds bilaterally. no stridor. 


Gastrointestinal: suprapubic catheter in place, no surrounding erythema, no 

discharge. soft, nontender. normal bowel sounds. no rebound, guarding, masses. 


Extremities: peripheral pulses intact. no lower extremity edema.


Neurological: awake. alert. does not track with eyes. nonverbal. move all 

fours. 


Psych: awake. alert. does not track with eyes. nonverbal. does not follows 

commands. 








<Haydee Del Rosario - Last Filed: 19 13:22>





<Lesly Boothe - Last Filed: 19 13:53>





- General


Stated Complaint: Shortness of Breath


Time Seen by Provider: 19 09:15





Past History





- Psycho Social/Smoking Cessation Hx


Smoking History: Never smoked


Have you smoked in the past 12 months: No


Hx Alcohol Use: No


Drug/Substance Use Hx: No


Substance Use Type: None


Hx Substance Use Treatment: No





<Haydee Del Rosario - Last Filed: 19 13:22>





<Lesly Boothe - Last Filed: 19 13:53>





- Past Medical History


Allergies/Adverse Reactions: 


 Allergies











Allergy/AdvReac Type Severity Reaction Status Date / Time


 


No Known Allergies Allergy   Verified 19 10:26











Home Medications: 


Ambulatory Orders





Albuterol 0.083% Nebulizer Sol [Ventolin 0.083% Nebulizer Soln -] 1 neb NEB Q4H 

PRN 19 


Calcium Carbonate/Vitamin D3 [Oyster Shell 500-Vit D3 200 Tb] 1 each GT BID  


Clobazam [Onfi -] 10 mg GT BID 19 


Fructooligosaccharides/Polydex [Fiber-Stat 15 gm/30 ml Liquid] 30 ml GT DAILY  


Lacosamide [Vimpat] 200 mg GT BID 19 


Levocarnitine 7 ml GT TID 19 


Multivitamin [Multiple Vitamins] 30 ml GT DAILY 19 


Pyridoxine HCl [Vitamin B-6] 100 mg GT ASDIR 19 


Sennosides/Docusate Sodium [Senna-S Tablet] 2 tab GT HS 19 


Sodium Chloride Tablet - 3.5 gm GT BID 19 


Zonisamide 300 mg GT BID 19 


levETIRAcetam [levETIRAcetam ORAL SUSPENSION] 2 gm GT BID 19 


Ofloxacin 0.3% Ophth Soln [Ocuflox -] 2 drop AD Q4HWA #10 drops 19 


Calcium Carbonate/Vitamin D3 [Oyster Shell 500-Vit D3 200 Pk] 1 each GT BID  


Diazepam [Diastat Acudial] 1 each RC PRN 19 


Lactobacillus Acidophilus [Acidophilus] 2 each GT DAILY 19 


Nutritional Supplement/Fiber [Promote with Fiber Liquid] 237 ml GT ASDIR  











*Physical Exam





- Vital Signs


 Last Vital Signs











Temp Pulse Resp BP Pulse Ox


 


 97.8 F   96 H  26 H  118/86   100 


 


 19 12:00  19 13:00  19 13:00  19 13:00  19 13:00














<Lesly Boothe - Last Filed: 19 13:53>





ED Treatment Course





- LABORATORY


CBC & Chemistry Diagram: 


 19 09:36





 19 09:36





<Haydee Del Rosario - Last Filed: 19 13:22>





- LABORATORY


CBC & Chemistry Diagram: 


 19 09:36





 19 09:36





- ADDITIONAL ORDERS


Additional order review: 


 Laboratory  Results











  19





  09:45 09:40 09:36


 


PT with INR   11.50 


 


INR   0.97 


 


PTT (Actin FS)   35.8 


 


VBG pH    7.37


 


POC VBG pCO2    42.0


 


POC VBG pO2    52.5 H


 


VBG HCO3    23.8


 


VBG O2 Sat (Mando)    83.3 H


 


VBG Base Excess    -0.9


 


Sodium   


 


Potassium   


 


Chloride   


 


Carbon Dioxide   


 


Anion Gap   


 


BUN   


 


Creatinine   


 


Est GFR (CKD-EPI)AfAm   


 


Est GFR (CKD-EPI)NonAf   


 


Random Glucose   


 


Lactic Acid   


 


Calcium   


 


Total Bilirubin   


 


AST   


 


ALT   


 


Alkaline Phosphatase   


 


Troponin I   


 


Total Protein   


 


Albumin   


 


Urine Color  Yellow  


 


Urine Appearance  Turbid  


 


Urine pH  8.5 H  


 


Ur Specific Gravity  1.015  


 


Urine Protein  Negative  


 


Urine Glucose (UA)  Negative  


 


Urine Ketones  Negative  


 


Urine Blood  Negative  


 


Urine Nitrite  Positive H  


 


Urine Bilirubin  Negative  


 


Urine Urobilinogen  0.2  


 


Ur Leukocyte Esterase  2+ H  


 


Urine WBC (Auto)  65  


 


Urine RBC (Auto)  1  


 


Urine Casts (Auto)  15  


 


U Epithel Cells (Auto)  0.3  


 


Urine Bacteria (Auto)  3464.3  














  19





  09:36 09:36


 


PT with INR  


 


INR  


 


PTT (Actin FS)  


 


VBG pH  


 


POC VBG pCO2  


 


POC VBG pO2  


 


VBG HCO3  


 


VBG O2 Sat (Mando)  


 


VBG Base Excess  


 


Sodium   135 L


 


Potassium   5.0


 


Chloride   102


 


Carbon Dioxide   25


 


Anion Gap   8


 


BUN   19.6 H


 


Creatinine   0.6


 


Est GFR (CKD-EPI)AfAm   150.97


 


Est GFR (CKD-EPI)NonAf   130.26


 


Random Glucose   127 H


 


Lactic Acid  1.2 


 


Calcium   9.3


 


Total Bilirubin   0.2


 


AST   39 H


 


ALT   56


 


Alkaline Phosphatase   143 H


 


Troponin I   < 0.02


 


Total Protein   8.4 H


 


Albumin   3.4


 


Urine Color  


 


Urine Appearance  


 


Urine pH  


 


Ur Specific Gravity  


 


Urine Protein  


 


Urine Glucose (UA)  


 


Urine Ketones  


 


Urine Blood  


 


Urine Nitrite  


 


Urine Bilirubin  


 


Urine Urobilinogen  


 


Ur Leukocyte Esterase  


 


Urine WBC (Auto)  


 


Urine RBC (Auto)  


 


Urine Casts (Auto)  


 


U Epithel Cells (Auto)  


 


Urine Bacteria (Auto)  








 











  19





  09:36


 


RBC  3.98 L


 


MCV  97.2 H


 


MCHC  33.8


 


RDW  13.1


 


MPV  7.5


 


Neutrophils %  78.7  D


 


Lymphocytes %  16.8  D


 


Monocytes %  3.6 L


 


Eosinophils %  0.7  D


 


Basophils %  0.2














- Medications


Given in the ED: 


ED Medications














Discontinued Medications














Generic Name Dose Route Start Last Admin





  Trade Name Mario  PRN Reason Stop Dose Admin


 


Acetaminophen  1,000 mg  19 09:49  19 10:00





  Ofirmev Injection -  IVPB  19 09:50  1,000 mg





  ONCE ONE   Administration





     





     





     





     


 


Albuterol/Ipratropium  1 amp  19 09:49  19 10:00





  Duoneb -  NEB  19 09:50  1 amp





  ONCE ONE   Administration





     





     





     





     


 


Albuterol/Ipratropium  2 amp  19 10:14  19 10:19





  Duoneb -  NEB  19 10:15  2 amp





  ONCE ONE   Administration





     





     





     





     


 


Sodium Chloride  1,538 mls @ 769 mls/hr  19 09:49  19 10:00





  Normal Saline -  30 ml/kg infuse over 2 hr (1538 ml)  19 11:48  769 mls/

hr





  IV   Administration





  ONCE ONE   





     





     





     





     


 


Vancomycin HCl 1,000 mg/  250 mls @ 166.667 mls/hr  19 10:02  19 11:

56





  Dextrose  IVPB  19 11:31  166.667 mls/hr





  ONCE ONE   Administration





     





     





  Protocol   





     


 


Piperacillin Sod/Tazobactam  100 mls @ 200 mls/hr  19 10:02  19 10:

27





  Sod 4.5 gm/ Dextrose  IVPB  19 10:31  200 mls/hr





  ONCE ONE   Administration





     





     





  Protocol   





     


 


Ketorolac Tromethamine  15 mg  19 12:08  19 12:21





  Toradol Injection -  IVPUSH  19 12:09  15 mg





  ONCE ONE   Administration





     





     





     





     














<Lesly Boothe - Last Filed: 19 13:53>





Medical Decision Making





- Medical Decision Making


34 year old male with above PMH BIBA to ED from [facility] for cough, change in 

breathing, hypoxemia. 





 Initial Vital Signs











Temp Pulse Resp BP Pulse Ox


 


 97.8 F   99 H  24 H  114/80   94 L


 


 19 09:17  19 09:17  19 09:17  19 09:17  19 09:17








Pt was hypoxic to 90% on 5L during my examination. I placed the patient on Venti

-mask with improvement to 95%. 


Afebrile, rectal. 


Borderline tachycardia. 


Tachypnea. 


No hypotension. 





Labs ordered: sepsis order set, influenza, keppra level


Imaging ordered: CXR


Medications ordered: tylenol IV, duoneb x1, normal saline bolus @ 30 cc/kg, 

vancomycin, zosyn





EKG performed at 0939: rate 95, regular rhythm, normal axis, normal intervals, 

QTc 449, no acute ST changes. 





CXR report: Name: [facility]ANTHA CAM DEPARTMENT OF RADIOLOGY Phys: Haydee Del Rosario 

RESIDENT : 1985 Age: 34 Sex: M [facility] Acct: 

[id number] Loc: [address] Exam Date: 19 Status: [place] [address] Unit Number: [id number] [phone] ACCESSION #: [id number] 

EXAM#: TYPE/EXAM: RESULT: [id number] RAD/CHEST X-RAY PORTABLE* Sepsis. Semierect 

portable chest x-ray compared with 2019. Dextroscoliosis of the 

thoracic spine. Left apical region partially obscured by the soft tissues of 

the neck. No evidence of vascular congestive changes, pulmonary edema. No 

evidence of blunting of the costophrenic angles. No pneumothorax, or large 

pleural effusion is seen. No evidence of a pulmonary infiltrates. EKG leads are 

noted. Impression. Dextroscoliosis. No evidence of vascular congestive changes, 

pulmonary infiltrates. No pneumothorax, or large pleural effusion seen, no 

evidence of blunting of the costophrenic angles. Reported By: Sudhir Mattson MD 19 1051 





19 11:33


 Laboratory Last Values











WBC  6.1 K/mm3 (4.0-10.0)   19  09:36    


 


RBC  3.98 M/mm3 (4.00-5.60)  L  19  09:36    


 


Hgb  13.1 GM/dL (11.7-16.9)   19  09:36    


 


Hct  38.7 % (35.4-49)  D 19  09:36    


 


MCV  97.2 fl (80-96)  H  19  09:36    


 


MCH  32.9 pg (25.7-33.7)   19  09:36    


 


MCHC  33.8 g/dl (32.0-35.9)   19  09:36    


 


RDW  13.1 % (11.9-15.9)   19  09:36    


 


Plt Count  293 K/MM3 (134-434)   19  09:36    


 


MPV  7.5 fl (7.5-11.1)   19  09:36    


 


Absolute Neuts (auto)  4.8 K/mm3 (1.5-8.0)   19  09:36    


 


Neutrophils %  78.7 % (42.8-82.8)  D 19  09:36    


 


Lymphocytes %  16.8 % (8-40)  D 19  09:36    


 


Monocytes %  3.6 % (3.8-10.2)  L  19  09:36    


 


Eosinophils %  0.7 % (0-4.5)  D 19  09:36    


 


Basophils %  0.2 % (0-2.0)   19  09:36    


 


Nucleated RBC %  0 % (0-0)   19  09:36    


 


PT with INR  11.50 SEC (9.7-13.0)   19  09:40    


 


INR  0.97  (0.83-1.09)   19  09:40    


 


PTT (Actin FS)  35.8 SECONDS (25.2-36.5)   19  09:40    


 


VBG pH  7.37  (7.31-7.41)   19  09:36    


 


POC VBG pCO2  42.0 mmHg (38-52)   19  09:36    


 


POC VBG pO2  52.5 mmHg (28-48)  H  19  09:36    


 


VBG HCO3  23.8 mmol/L (23-29)   19  09:36    


 


VBG O2 Sat (Mando)  83.3 % (70-80)  H  19  09:36    


 


VBG Base Excess  -0.9 meq/l (-2-2)   19  09:36    


 


Sodium  135 mmol/L (136-145)  L  19  09:36    


 


Potassium  5.0 mmol/L (3.5-5.1)   19  09:36    


 


Chloride  102 mmol/L ()   19  09:36    


 


Carbon Dioxide  25 mmol/L (21-32)   19  09:36    


 


Anion Gap  8 MMOL/L (8-16)   19  09:36    


 


BUN  19.6 mg/dL (7-18)  H  19  09:36    


 


Creatinine  0.6 mg/dL (0.55-1.3)   19  09:36    


 


Est GFR (CKD-EPI)AfAm  150.97   19  09:36    


 


Est GFR (CKD-EPI)NonAf  130.26   19  09:36    


 


Random Glucose  127 mg/dL ()  H  19  09:36    


 


Lactic Acid  1.2 mmol/L (0.4-2.0)   19  09:36    


 


Calcium  9.3 mg/dL (8.5-10.1)   19  09:36    


 


Total Bilirubin  0.2 mg/dL (0.2-1)   19  09:36    


 


AST  39 U/L (15-37)  H  19  09:36    


 


ALT  56 U/L (13-61)   19  09:36    


 


Alkaline Phosphatase  143 U/L ()  H  19  09:36    


 


Troponin I  < 0.02 ng/ml (0.00-0.05)   19  09:36    


 


Total Protein  8.4 g/dl (6.4-8.2)  H  19  09:36    


 


Albumin  3.4 g/dl (3.4-5.0)   19  09:36    


 


Urine Color  Yellow   19  09:45    


 


Urine Appearance  Turbid   19  09:45    


 


Urine pH  8.5  (5.0-8.0)  H  19  09:45    


 


Ur Specific Gravity  1.015  (1.010-1.035)   19  09:45    


 


Urine Protein  Negative  (NEGATIVE)   19  09:45    


 


Urine Glucose (UA)  Negative  (NEGATIVE)   19  09:45    


 


Urine Ketones  Negative  (NEGATIVE)   19  09:45    


 


Urine Blood  Negative  (NEGATIVE)   19  09:45    


 


Urine Nitrite  Positive  (NEGATIVE)  H  19  09:45    


 


Urine Bilirubin  Negative  (NEGATIVE)   19  09:45    


 


Urine Urobilinogen  0.2 mg/dL (0.2-1.0)   19  09:45    


 


Ur Leukocyte Esterase  2+  (NEGATIVE)  H  19  09:45    


 


Urine WBC (Auto)  65 /hpf (0-5)   19  09:45    


 


Urine RBC (Auto)  1 /hpf (0-4)   19  09:45    


 


Urine Casts (Auto)  15 /lpf (0-8)   19  09:45    


 


U Epithel Cells (Auto)  0.3 /HPF (0-5/HPF)   19  09:45    


 


Urine Bacteria (Auto)  3464.3 /hpf (NEGATIVE)   19  09:45    


 


Influenza A (Rapid)  Negative  (Negative)   19  09:40    


 


Influenza B (Rapid)  Negative  (Negative)   19  09:40    








No leukocytosis


No lactic acidosis


No CO2 retention on VBG


No clinically concerning electrolyte imbalances


Troponin undetectable


UA positive for UTI


-Vancomycin and Zosyn given





Pt persistently hypoxic, increased from Venti-Mask to Nonrebreather with 

improvement of O2 sat. 





19 12:51


CTA report: Name: SAMANTHA CAM DEPARTMENT OF RADIOLOGY Phys: Haydee Del Rosario 

RESIDENT : 1985 Age: 34 Sex: M [facility] Acct: 

[id number] Loc: [address] Exam Date: 19 Status: [facility] Unit Number: [id number] [phone] ACCESSION #: [id number] 

EXAM#: TYPE/EXAM: RESULT: [id number] CT/CHEST CTA CT CHEST: INDICATION: 

Shortness of breath evaluate for pulmonary embolus PROCEDURE: Multiple CT 

images through the chest are performed following intravenous administration of 

62.8 cc of Omnipaque 350 contrast. PRIOR: 2018 FINDINGS: There is an 

area of atelectasis in the left lower lung with no evidence of pneumothorax , 

effusion or infiltrate. There is a 1.9 cm lesion seen in the medial aspect of 

the upper right lung. This appears to represent increase in size of a poorly 

visualized lesion seen on prior exam. No gross endobronchial lesion is 

identified. Significant scoliosis of thoracic spine is noted. Thoracic aorta 

shows no evidence of aneurysmal dilatation or dissection. Cardiac structures 

are grossly within normal limits. Coronary Arteries show moderate 

calcifications. The pulmonary arteries are widely patent with no evidence of 

pulmonary emboli. No significant axillary or mediastinal lymphadenopathy is 

appreciated. Limited evaluation the upper abdomen shows hepatic and splenic 

parenchyma to be within normal limits. IMPRESSION: No evidence of pulmonary 

blebs on. Limited atelectasis of left lower lung. Suspect new right upper lung 

lesion and correlation with PET/CT may prove useful as clinically indicated. 

Christ Villasenor M.D. Diplomate, American Board of Radiology CAQ, Neuroradiology CAQ

, Interventional Radiology Reported By: Christ Villasenor MD 19 [id number] 





19 13:22


Signout given to admitting team. Pt to be admitted under Dr. Porter's 

service. 





<Haydee Del Rosario - Last Filed: 19 13:22>





Discharge





- Discharge Information


Problems reviewed: Yes





- Admission


Yes





<Haydee Del Rosario - Last Filed: 19 13:22>





<Lesly Boothe - Last Filed: 19 13:53>





- Discharge Information


Clinical Impression/Diagnosis: 


 Hypoxemia, Acute respiratory failure with hypoxia





UTI (urinary tract infection)


Qualifiers:


 Urinary tract infection type: site unspecified Hematuria presence: without 

hematuria Qualified Code(s): N39.0 - Urinary tract infection, site not specified





Pneumonia


Qualifiers:


 Pneumonia type: due to unspecified organism Laterality: unspecified laterality 

Lung location: unspecified part of lung Qualified Code(s): J18.9 - Pneumonia, 

unspecified organism





Condition: Guarded Birth Control Pills Pregnancy And Lactation Text: This medication should be avoided if pregnant and for the first 30 days post-partum.